# Patient Record
Sex: FEMALE | Race: WHITE | Employment: OTHER | URBAN - METROPOLITAN AREA
[De-identification: names, ages, dates, MRNs, and addresses within clinical notes are randomized per-mention and may not be internally consistent; named-entity substitution may affect disease eponyms.]

---

## 2017-01-17 ENCOUNTER — HOSPITAL ENCOUNTER (OUTPATIENT)
Dept: RADIOLOGY | Facility: MEDICAL CENTER | Age: 82
End: 2017-01-17
Attending: FAMILY MEDICINE
Payer: MEDICARE

## 2017-01-17 DIAGNOSIS — Z12.31 SCREENING MAMMOGRAM, ENCOUNTER FOR: ICD-10-CM

## 2017-01-17 PROCEDURE — 77063 BREAST TOMOSYNTHESIS BI: CPT

## 2017-03-20 ENCOUNTER — HOSPITAL ENCOUNTER (OUTPATIENT)
Dept: LAB | Facility: MEDICAL CENTER | Age: 82
End: 2017-03-20
Attending: FAMILY MEDICINE
Payer: MEDICARE

## 2017-03-20 DIAGNOSIS — M85.80 OSTEOPENIA: ICD-10-CM

## 2017-03-20 DIAGNOSIS — Z78.0 POST-MENOPAUSAL: ICD-10-CM

## 2017-03-20 LAB
25(OH)D3 SERPL-MCNC: 39 NG/ML (ref 30–100)
CHOLEST SERPL-MCNC: 242 MG/DL (ref 100–199)
HDLC SERPL-MCNC: 86 MG/DL
LDLC SERPL CALC-MCNC: 140 MG/DL
TRIGL SERPL-MCNC: 80 MG/DL (ref 0–149)

## 2017-03-20 PROCEDURE — 80061 LIPID PANEL: CPT

## 2017-03-20 PROCEDURE — 36415 COLL VENOUS BLD VENIPUNCTURE: CPT

## 2017-03-20 PROCEDURE — 82306 VITAMIN D 25 HYDROXY: CPT

## 2017-03-27 ENCOUNTER — TELEPHONE (OUTPATIENT)
Dept: MEDICAL GROUP | Facility: MEDICAL CENTER | Age: 82
End: 2017-03-27

## 2017-03-27 ENCOUNTER — OFFICE VISIT (OUTPATIENT)
Dept: MEDICAL GROUP | Facility: MEDICAL CENTER | Age: 82
End: 2017-03-27
Payer: MEDICARE

## 2017-03-27 VITALS
OXYGEN SATURATION: 94 % | TEMPERATURE: 98.3 F | HEART RATE: 66 BPM | DIASTOLIC BLOOD PRESSURE: 68 MMHG | BODY MASS INDEX: 23.55 KG/M2 | WEIGHT: 132.94 LBS | SYSTOLIC BLOOD PRESSURE: 116 MMHG | HEIGHT: 63 IN | RESPIRATION RATE: 14 BRPM

## 2017-03-27 DIAGNOSIS — J30.9 CHRONIC ALLERGIC RHINITIS: ICD-10-CM

## 2017-03-27 DIAGNOSIS — E78.5 HYPERLIPIDEMIA, UNSPECIFIED HYPERLIPIDEMIA TYPE: ICD-10-CM

## 2017-03-27 DIAGNOSIS — M15.9 OSTEOARTHRITIS OF MULTIPLE JOINTS, UNSPECIFIED OSTEOARTHRITIS TYPE: ICD-10-CM

## 2017-03-27 PROCEDURE — 4040F PNEUMOC VAC/ADMIN/RCVD: CPT | Performed by: FAMILY MEDICINE

## 2017-03-27 PROCEDURE — G8420 CALC BMI NORM PARAMETERS: HCPCS | Performed by: FAMILY MEDICINE

## 2017-03-27 PROCEDURE — 1036F TOBACCO NON-USER: CPT | Performed by: FAMILY MEDICINE

## 2017-03-27 PROCEDURE — 1101F PT FALLS ASSESS-DOCD LE1/YR: CPT | Performed by: FAMILY MEDICINE

## 2017-03-27 PROCEDURE — G8432 DEP SCR NOT DOC, RNG: HCPCS | Performed by: FAMILY MEDICINE

## 2017-03-27 PROCEDURE — G8482 FLU IMMUNIZE ORDER/ADMIN: HCPCS | Performed by: FAMILY MEDICINE

## 2017-03-27 PROCEDURE — 99214 OFFICE O/P EST MOD 30 MIN: CPT | Performed by: FAMILY MEDICINE

## 2017-03-27 NOTE — TELEPHONE ENCOUNTER
1. Caller Name: Lisa                      Call Back Number: [phone] (home)     2. Message: States was seen today and you discussed with her about another nasal spray than the one she has. States that you wanted to prescribe her a different nasal spray?    3. Patient approves office to leave a detailed voicemail/MyChart message: N\A

## 2017-03-27 NOTE — MR AVS SNAPSHOT
"        Lisa Garza   3/27/2017 10:00 AM   Office Visit   MRN: [id number]    Department:  [address] [place] [address]   Dept Phone:  [phone]    Description:  Female : 1935   Provider:  Brandon Pearl M.D.           Reason for Visit     Follow-Up 3 month check up, lab results      Allergies as of 3/27/2017     Allergen Noted Reactions    Clindamycin Hcl-Fd&C Blue #1 2015       unknown    Food 2015       Scallops-vomiting, citrus-itching, redness, oatmeal-diarrhea      You were diagnosed with     Hyperlipidemia, unspecified hyperlipidemia type   [6523847]       Chronic allergic rhinitis   [630621]       Osteoarthritis of multiple joints, unspecified osteoarthritis type   [1670142]         Vital Signs     Blood Pressure Pulse Temperature Respirations Height Weight    116/68 mmHg 66 36.8 °C (98.3 °F) 14 1.6 m (5' 3\") 60.3 kg (132 lb 15 oz)    Body Mass Index Oxygen Saturation Smoking Status             23.55 kg/m2 94% Former Smoker         Basic Information     Date Of Birth Sex Race Ethnicity Preferred Language    1935 Female White Non- English      Problem List              ICD-10-CM Priority Class Noted - Resolved    History of total left hip arthroplasty Z96.642   2015 - Present    Osteoarthritis M19.90   2015 - Present    Osteoarthritis, multiple sites M15.9   2015 - Present    Osteoarthritis of right hip M16.11   2015 - Present    Systolic murmur R01.1   2015 - Present      Health Maintenance        Date Due Completion Dates    IMM ZOSTER VACCINE 1995 ---    IMM PNEUMOCOCCAL 65+ (ADULT) LOW/MEDIUM RISK SERIES (2 of 2 - PCV13) 2017, 10/1/2002    MAMMOGRAM 2018, 2015, 2015 (Postponed)    Override on 2015: Postponed    BONE DENSITY 2021, 2015 (Postponed)    Override on 2015: Postponed    COLONOSCOPY 2025 (Postponed)    Override on 2015: Postponed   " IMM DTaP/Tdap/Td Vaccine (2 - Td) 11/22/2026 11/22/2016            Current Immunizations     Influenza TIV (IM) 10/1/2014    Influenza Vaccine Adult HD 10/7/2016    Pneumococcal polysaccharide vaccine (PPSV-23) 9/6/2016, 10/1/2002    Tdap Vaccine 11/22/2016      Below and/or attached are the medications your provider expects you to take. Review all of your home medications and newly ordered medications with your provider and/or pharmacist. Follow medication instructions as directed by your provider and/or pharmacist. Please keep your medication list with you and share with your provider. Update the information when medications are discontinued, doses are changed, or new medications (including over-the-counter products) are added; and carry medication information at all times in the event of emergency situations     Allergies:  CLINDAMYCIN HCL-FD&C BLUE #1 - (reactions not documented)     FOOD - (reactions not documented)               Medications  Valid as of: March 27, 2017 - 10:28 AM    Generic Name Brand Name Tablet Size Instructions for use    Amoxicillin (Cap) AMOXIL 500 MG Take 2,000 mg by mouth Once. One hour before dental procedure        Aspirin (Tablet Delayed Response) ECOTRIN 81 MG Take 81 mg by mouth every day.        Calcium Carbonate (Chew Tab) Calcium 500 MG Take 1 Tab by mouth every morning.        Cholecalciferol (Cap) Vitamin D-3 1000 UNITS Take 1 Tab by mouth every morning.        Fluticasone Propionate (Suspension) FLONASE 50 MCG/ACT Spray 1 Spray in nose every morning.        Influenza Virus Vacc Split PF (Suspension Prefilled Syringe) AFLURIA PRESERVATIVE FREE 0.5 ML         Multiple Vitamins-Minerals   Take 1 Tab by mouth every morning.        Omega-3 Fatty Acids (Cap) Omega 3 1200 MG Take 1 Cap by mouth every morning.        Psyllium (Cap) Psyllium 0.52 GM Take 1 Cap by mouth every morning.        Simvastatin (Tab) ZOCOR 10 MG Take 1 Tab by mouth every evening.        .                    Medicines prescribed today were sent to:     [id number] - [place], [place] - [address]    [address] [place] [address]    Phone: [phone] Fax: [phone]    Open 24 Hours?: No      Medication refill instructions:       If your prescription bottle indicates you have medication refills left, it is not necessary to call your provider’s office. Please contact your pharmacy and they will refill your medication.    If your prescription bottle indicates you do not have any refills left, you may request refills at any time through one of the following ways: The online [employer] system (except Urgent Care), by calling your provider’s office, or by asking your pharmacy to contact your provider’s office with a refill request. Medication refills are processed only during regular business hours and may not be available until the next business day. Your provider may request additional information or to have a follow-up visit with you prior to refilling your medication.   *Please Note: Medication refills are assigned a new Rx number when refilled electronically. Your pharmacy may indicate that no refills were authorized even though a new prescription for the same medication is available at the pharmacy. Please request the medicine by name with the pharmacy before contacting your provider for a refill.           [place] Status: Patient Declined

## 2017-03-28 NOTE — TELEPHONE ENCOUNTER
Brandon Pearl M.D.  Arthur Villanueva, Med Ass't       Caller: Unspecified (Yesterday, 1:21 PM)                     It is the over the counter normal saline nasal spray ( not the Afrin).

## 2017-05-09 ENCOUNTER — APPOINTMENT (OUTPATIENT)
Dept: OTHER | Facility: IMAGING CENTER | Age: 82
End: 2017-05-09

## 2017-09-13 ENCOUNTER — NON-PROVIDER VISIT (OUTPATIENT)
Dept: MEDICAL GROUP | Facility: MEDICAL CENTER | Age: 82
End: 2017-09-13
Payer: MEDICARE

## 2017-09-13 DIAGNOSIS — Z23 NEED FOR INFLUENZA VACCINATION: ICD-10-CM

## 2017-09-13 PROCEDURE — 90662 IIV NO PRSV INCREASED AG IM: CPT | Performed by: INTERNAL MEDICINE

## 2017-09-13 PROCEDURE — G0008 ADMIN INFLUENZA VIRUS VAC: HCPCS | Performed by: INTERNAL MEDICINE

## 2017-09-13 NOTE — PROGRESS NOTES
"Lisa Garza is a 81 y.o. female here for a non-provider visit for:   FLU    Reason for immunization: Annual Flu Vaccine  Immunization records indicate need for vaccine: Yes, confirmed with Epic  Minimum interval has been met for this vaccine: Yes  ABN completed: Not Indicated    Order and dose verified by: [facility]  VIS Dated  8-7-15 was given to patient: Yes  All IAC Questionnaire questions were answered \"No.\"    Patient tolerated injection and no adverse effects were observed or reported: Yes    Pt scheduled for next dose in series: No    "

## 2017-11-16 ENCOUNTER — OFFICE VISIT (OUTPATIENT)
Dept: MEDICAL GROUP | Facility: MEDICAL CENTER | Age: 82
End: 2017-11-16
Payer: MEDICARE

## 2017-11-16 VITALS
OXYGEN SATURATION: 94 % | WEIGHT: 122 LBS | HEIGHT: 63 IN | DIASTOLIC BLOOD PRESSURE: 62 MMHG | SYSTOLIC BLOOD PRESSURE: 130 MMHG | HEART RATE: 81 BPM | RESPIRATION RATE: 16 BRPM | BODY MASS INDEX: 21.62 KG/M2 | TEMPERATURE: 98.9 F

## 2017-11-16 DIAGNOSIS — J30.1 CHRONIC SEASONAL ALLERGIC RHINITIS DUE TO POLLEN: ICD-10-CM

## 2017-11-16 DIAGNOSIS — R01.1 SYSTOLIC MURMUR: ICD-10-CM

## 2017-11-16 DIAGNOSIS — Z00.00 MEDICARE ANNUAL WELLNESS VISIT, SUBSEQUENT: ICD-10-CM

## 2017-11-16 DIAGNOSIS — E78.2 MIXED HYPERLIPIDEMIA: ICD-10-CM

## 2017-11-16 DIAGNOSIS — Z00.00 HEALTHCARE MAINTENANCE: ICD-10-CM

## 2017-11-16 DIAGNOSIS — M15.9 PRIMARY OSTEOARTHRITIS INVOLVING MULTIPLE JOINTS: ICD-10-CM

## 2017-11-16 PROCEDURE — 90670 PCV13 VACCINE IM: CPT | Performed by: FAMILY MEDICINE

## 2017-11-16 PROCEDURE — G0009 ADMIN PNEUMOCOCCAL VACCINE: HCPCS | Performed by: FAMILY MEDICINE

## 2017-11-16 ASSESSMENT — PATIENT HEALTH QUESTIONNAIRE - PHQ9: CLINICAL INTERPRETATION OF PHQ2 SCORE: 0

## 2017-11-16 ASSESSMENT — PAIN SCALES - GENERAL: PAINLEVEL: NO PAIN

## 2017-11-16 NOTE — PROGRESS NOTES
CC: ***    HPI:   Lisa presents today ***      Patient Active Problem List    Diagnosis Date Noted   • Mixed hyperlipidemia 11/16/2017   • Chronic seasonal allergic rhinitis due to pollen 11/16/2017   • Systolic murmur 11/30/2015   • Osteoarthritis of right hip 07/06/2015   • History of total left hip arthroplasty 05/21/2015   • Osteoarthritis 05/21/2015   • Osteoarthritis, multiple sites 05/21/2015       Current Outpatient Prescriptions   Medication Sig Dispense Refill   • simvastatin (ZOCOR) 10 MG Tab Take 1 Tab by mouth every evening. 30 Tab 11   • aspirin EC (ECOTRIN) 81 MG Tablet Delayed Response Take 81 mg by mouth every day.     • AFLURIA PRESERVATIVE FREE 0.5 ML Suspension Prefilled Syringe      • amoxicillin (AMOXIL) 500 MG Cap Take 2,000 mg by mouth Once. One hour before dental procedure     • fluticasone (FLONASE) 50 MCG/ACT nasal spray Spray 1 Spray in nose every morning.     • Cholecalciferol (VITAMIN D-3) 1000 UNITS CAPS Take 1 Tab by mouth every morning.     • Psyllium (METAMUCIL) 0.52 GM CAPS Take 1 Cap by mouth every morning.     • Omega 3 1200 MG CAPS Take 1 Cap by mouth every morning.     • Calcium 500 MG CHEW Take 1 Tab by mouth every morning.     • Multiple Vitamins-Minerals (MULTIVITAMIN PO) Take 1 Tab by mouth every morning.       No current facility-administered medications for this visit.          Allergies as of 11/16/2017 - Reviewed 11/16/2017   Allergen Reaction Noted   • Clindamycin hcl-fd&c blue #1  05/29/2015   • Food  07/02/2015        ROS: Denies any chest pain, Shortness of breath, Changes bowel or bladder, Lower extremity edema.    Physical Exam:  Gen.: Well-developed, well-nourished, no apparent distress,pleasant and cooperative with the examination  Skin:  Warm and dry with good turgor. No rashes or suspicious lesions in visible areas  Eye: PERRLA, conjunctiva and sclera clear, lids normal  HEENT: Normocephalic/atraumatic, sinuses nontender with palpation, TMs clear, nares  patent with pink mucosa and clear rhinorrhea, lips without lesions, oropharynx clear.  Neck: Trachea midline,no masses or adenopathy  Thyroid: normal consistency and size. No masses or nodules. Not tender with palpation.  Cor: Regular rate and rhythm without murmur, gallop or rub.  Lungs: Respirations unlabored.Clear to auscultation with equal breath sounds bilaterally. No wheezes, rhonchi.  Abdomen: Soft nontender without hepatosplenomegaly or masses appreciated, normoactive bowel sounds. No hernias.  Extremities: No cyanosis, clubbing or edema, Symmetrical without deformities or malformations. Pulses 2+ and symmetrical both upper and lower extremities  Lymphatic: No abnormal adenopathy of the neck groin or axillae.  Psych: Alert and oriented x 3.Normal affect, judgement,insight and memory.        Assessment and Plan.   82 y.o. female ***    1. Medicare annual wellness visit, subsequent  ***  - ANNUAL WELLNESS VISIT SUBSEQ    2. Primary osteoarthritis involving multiple joints  ***  - ANNUAL WELLNESS VISIT SUBSEQ    3. Mixed hyperlipidemia  ***  - ANNUAL WELLNESS VISIT SUBSEQ  - LIPID PANEL    4. Systolic murmur  ***  - ANNUAL WELLNESS VISIT SUBSEQ    5. Chronic seasonal allergic rhinitis due to pollen  ***  - ANNUAL WELLNESS VISIT SUBSEQ    6. Healthcare maintenance  ***  - Prevnar 13 PCV-13  - ANNUAL WELLNESS VISIT SUBSEQ

## 2017-11-16 NOTE — PROGRESS NOTES
Chief Complaint   Patient presents with   • Annual Wellness Visit         HPI:  Lisa is a 82 y.o. here for Medicare Annual Wellness Visit    Patient Active Problem List    Diagnosis Date Noted   • Mixed hyperlipidemia 11/16/2017   • Chronic seasonal allergic rhinitis due to pollen 11/16/2017   • Systolic murmur 11/30/2015   • Osteoarthritis of right hip 07/06/2015   • History of total left hip arthroplasty 05/21/2015   • Osteoarthritis 05/21/2015   • Osteoarthritis, multiple sites 05/21/2015       Current Outpatient Prescriptions   Medication Sig Dispense Refill   • simvastatin (ZOCOR) 10 MG Tab Take 1 Tab by mouth every evening. 30 Tab 11   • aspirin EC (ECOTRIN) 81 MG Tablet Delayed Response Take 81 mg by mouth every day.     • AFLURIA PRESERVATIVE FREE 0.5 ML Suspension Prefilled Syringe      • amoxicillin (AMOXIL) 500 MG Cap Take 2,000 mg by mouth Once. One hour before dental procedure     • fluticasone (FLONASE) 50 MCG/ACT nasal spray Spray 1 Spray in nose every morning.     • Cholecalciferol (VITAMIN D-3) 1000 UNITS CAPS Take 1 Tab by mouth every morning.     • Psyllium (METAMUCIL) 0.52 GM CAPS Take 1 Cap by mouth every morning.     • Omega 3 1200 MG CAPS Take 1 Cap by mouth every morning.     • Calcium 500 MG CHEW Take 1 Tab by mouth every morning.     • Multiple Vitamins-Minerals (MULTIVITAMIN PO) Take 1 Tab by mouth every morning.       No current facility-administered medications for this visit.         Patient is taking medications as noted in medication list.  Current supplements as per medication list.     Allergies: Clindamycin hcl-fd&c blue #1 and Food    Current social contact/activities: Visit with family     Is patient current with immunizations? No, due for PREVNAR (PCV13)  and ZOSTAVAX (Shingles). Patient is interested in receiving PREVNAR (PCV13)  today.    She  reports that she quit smoking about 26 years ago. Her smoking use included Cigarettes. She has a 33.00 pack-year smoking history. She  has never used smokeless tobacco. She reports that she drinks about 7.0 oz of alcohol per week . She reports that she does not use drugs.  Counseling given: Not Answered        DPA/Advanced directive: Patient has Advanced Directive on file.     ROS:    Gait: Uses no assistive device   Ostomy: no   Other tubes: no   Amputations: no   Chronic oxygen use no   Last eye exam 6-8 year ago   Wears hearing aids: no   : Reports incontinence.     Depression Screening    Little interest or pleasure in doing things?  0 - not at all  Feeling down, depressed, or hopeless? 0 - not at all  Patient Health Questionnaire Score: 0    If depressive symptoms identified deferred to follow up visit unless specifically addressed in assessment and plan.    Interpretation of PHQ-9 Total Score   Score Severity   1-4 No Depression   5-9 Mild Depression   10-14 Moderate Depression   15-19 Moderately Severe Depression   20-27 Severe Depression    Screening for Cognitive Impairment    Three Minute Recall (apple, watch, cherelle)  3/3    Draw clock face with all 12 numbers set to the hand to show 10 minutes past 11 o'clock  1 5/5  If cognitive concerns identified, deferred for follow up unless specifically addressed in assessment and plan.    Fall Risk Assessment    Has the patient had two or more falls in the last year or any fall with injury in the last year?     If fall risk identified, deferred for follow up unless specifically addressed in assessment and plan.    Safety Assessment    Throw rugs on floor.  No  Handrails on all stairs.  Yes  Good lighting in all hallways.  Yes  Difficulty hearing.  No  Patient counseled about all safety risks that were identified.    Functional Assessment ADLs    Are there any barriers preventing you from cooking for yourself or meeting nutritional needs?  No.    Are there any barriers preventing you from driving safely or obtaining transportation?  No.    Are there any barriers preventing you from using a  telephone or calling for help?  No.    Are there any barriers preventing you from shopping?  No.    Are there any barriers preventing you from taking care of your own finances?  No.    Are there any barriers preventing you from managing your medications?  No.    Are you currently engaging any exercise or physical activity?  Yes.       Health Maintenance Summary                PFT SCREENING-FEV1 AND FEV/FVC RATIO / SPIROMETRY SHOULD BE PERFORMED ANNUALLY Overdue 11/9/1953     IMM ZOSTER VACCINE Overdue 11/9/1995     IMM PNEUMOCOCCAL 65+ (ADULT) LOW/MEDIUM RISK SERIES Overdue 9/6/2017      Done 9/6/2016 Imm Admin: Pneumococcal polysaccharide vaccine (PPSV-23)     Patient has more history with this topic...    Annual Wellness Visit Next Due 11/23/2017      Done 11/22/2016      Patient has more history with this topic...    MAMMOGRAM Next Due 1/17/2018      Done 1/17/2017 MA-MAMMO SCREENING BILAT W/TOMOSYNTHESIS W/CAD     Patient has more history with this topic...    BONE DENSITY Next Due 12/7/2021      Done 12/7/2016 DS-BONE DENSITY STUDY (DEXA)     Patient has more history with this topic...    COLONOSCOPY Next Due 11/30/2025      Postponed 11/30/2015     IMM DTaP/Tdap/Td Vaccine Next Due 11/22/2026      Done 11/22/2016 Imm Admin: Tdap Vaccine          Patient Care Team:  Brandon Pearl M.D. as PCP - General (Geriatrics)  Bhupinder Goodman D.P.M. as Consulting Physician (Podiatry)    Social History   Substance Use Topics   • Smoking status: Former Smoker     Packs/day: 1.00     Years: 33.00     Types: Cigarettes     Quit date: 1/1/1991   • Smokeless tobacco: Never Used      Comment: Started smoking at age 23   • Alcohol use 7.0 oz/week     14 Glasses of wine per week      Comment: 14 glasses of red wine     Family History   Problem Relation Age of Onset   • Cancer Father      GI   • Heart Disease Father    • Lung Disease Father    • Stroke Sister    • No Known Problems Son    • No Known Problems Son    • No Known  "Problems Maternal Grandmother    • No Known Problems Maternal Grandfather    • No Known Problems Paternal Grandmother    • No Known Problems Paternal Grandfather      She  has a past medical history of Arthritis; Cancer ([facility]); Heart murmur; Unspecified cataract; and Unspecified urinary incontinence.   Past Surgical History:   Procedure Laterality Date   • HIP ARTHROPLASTY TOTAL Left 7/6/2015    Procedure: HIP ARTHROPLASTY TOTAL;  Surgeon: Navid Howard M.D.;  Location: SURGERY [facility];  Service:    • OTHER Right 2006    cyst & bone spur on right hand   • EYE SURGERY      bilateral IOL           Exam:     Blood pressure 130/62, pulse 81, temperature 37.2 °C (98.9 °F), resp. rate 16, height 1.594 m (5' 2.75\"), weight 55.3 kg (122 lb), SpO2 94 %. Body mass index is 21.78 kg/m².    Hearing, good  Dentition , good  Alert, oriented in no acute distress.  Eye contact is good, speech goal directed, affect calm      Assessment and Plan. The following treatment and monitoring plan is recommended:  .   82 y.o. female     1. Medicare annual wellness visit, subsequent  Preventive measures and chronic medical issues were reviewed. Needs PCV 13,given today.    - ANNUAL WELLNESS VISIT SUBSEQ    2. Primary osteoarthritis involving multiple joints  Stable.No issues  Continue on otc pain meds.  - ANNUAL WELLNESS VISIT SUBSEQ    3. Mixed hyperlipidemia  He has been tolerating the statin. Denies muscle pain LFTs has been normal  Continue on Simvastatin 10 mg daily,     - ANNUAL WELLNESS VISIT SUBSEQ    4. Systolic murmur  Asymptomatic.  Last echo was essentially normal.    - ANNUAL WELLNESS VISIT SUBSEQ    5. Chronic seasonal allergic rhinitis due to pollen  Advised to use normal saline nasal drops.  Continue on the flonase as needed.     - ANNUAL WELLNESS VISIT SUBSEQ    6. Healthcare maintenance  Due for PCV 13, given.    - Prevnar 13 PCV-13  - ANNUAL WELLNESS VISIT SUBSEQ      7. No h/o COPD    Health Care Screening " recommendations as per orders if indicated.  Referrals offered: PT/OT/Nutrition counseling/Behavioral Health/Smoking cessation as per orders if indicated.    Discussion today about general wellness and lifestyle habits:    · Prevent falls and reduce trip hazards; Cautioned about securing or removing rugs.  · Have a working fire alarm and carbon monoxide detector;   · Engage in regular physical activity and social activities       Follow-up: 6 month.

## 2017-11-17 ENCOUNTER — TELEPHONE (OUTPATIENT)
Dept: MEDICAL GROUP | Facility: MEDICAL CENTER | Age: 82
End: 2017-11-17

## 2017-11-17 NOTE — TELEPHONE ENCOUNTER
1. Caller Name: Lisa Garza                                           Call Back Number: [phone] (home)         Patient approves a detailed voicemail message: N\A    2.  What is the procedure: Lab    3.  When is the procedure scheduled: 11-    5. Has the patient completed any screening tests for the procedure: n/a    6. Has PCP received a written request from Surgeon: no    7. Patient scheduled for an appointment for this clearance?:  yes -     Patient is concerned that her nam[place] injection may interfear with her lab appt.

## 2017-11-21 NOTE — TELEPHONE ENCOUNTER
Pt is concerned that her recent immunization with interfere with her up-coming lab work. She recently received the Pneumonia vaccine

## 2017-11-27 ENCOUNTER — HOSPITAL ENCOUNTER (OUTPATIENT)
Dept: LAB | Facility: MEDICAL CENTER | Age: 82
End: 2017-11-27
Attending: FAMILY MEDICINE
Payer: MEDICARE

## 2017-11-27 LAB
CHOLEST SERPL-MCNC: 205 MG/DL (ref 100–199)
HDLC SERPL-MCNC: 86 MG/DL
LDLC SERPL CALC-MCNC: 104 MG/DL
TRIGL SERPL-MCNC: 77 MG/DL (ref 0–149)

## 2017-11-27 PROCEDURE — 36415 COLL VENOUS BLD VENIPUNCTURE: CPT

## 2017-11-27 PROCEDURE — 80061 LIPID PANEL: CPT

## 2017-11-29 ENCOUNTER — TELEPHONE (OUTPATIENT)
Dept: MEDICAL GROUP | Facility: MEDICAL CENTER | Age: 82
End: 2017-11-29

## 2017-11-29 NOTE — TELEPHONE ENCOUNTER
----- Message from Brandon Pearl M.D. sent at 11/28/2017  8:10 AM PST -----  The result is normal please let the patient know.  Slightly high cholesterol.   Avoid high carb and fat diet.  Will discuss more next visit.

## 2017-12-01 ENCOUNTER — OFFICE VISIT (OUTPATIENT)
Dept: MEDICAL GROUP | Facility: MEDICAL CENTER | Age: 82
End: 2017-12-01
Payer: MEDICARE

## 2017-12-01 VITALS
RESPIRATION RATE: 16 BRPM | WEIGHT: 129 LBS | SYSTOLIC BLOOD PRESSURE: 120 MMHG | BODY MASS INDEX: 22.02 KG/M2 | HEIGHT: 64 IN | OXYGEN SATURATION: 92 % | TEMPERATURE: 97.8 F | HEART RATE: 90 BPM | DIASTOLIC BLOOD PRESSURE: 62 MMHG

## 2017-12-01 DIAGNOSIS — E78.2 MIXED HYPERLIPIDEMIA: ICD-10-CM

## 2017-12-01 DIAGNOSIS — M85.88 OSTEOPENIA OF SPINE: ICD-10-CM

## 2017-12-01 DIAGNOSIS — L21.9 SEBORRHEIC DERMATITIS: ICD-10-CM

## 2017-12-01 PROCEDURE — 99214 OFFICE O/P EST MOD 30 MIN: CPT | Performed by: FAMILY MEDICINE

## 2017-12-01 NOTE — PROGRESS NOTES
CC: Skin rash/ high cholesterol    HPI:   Lisa presents today to discuss the following medical issues:    Seborrheic dermatitis, she gets the rash intermittently around the eye brows, and sides of the nose, it itches and scally. She usually responds to Hydrocortisone 2.5 % .     Mixed hyperlipidemia, she has been tolerating the statin. Denies muscle pain LFTs has been normal. Her last lipid panel showed improvement ( T chol 205 ( was 242), TG 77, HDL 86, [id number]( [id number]). She has been on Simvastatin 10 mg chelsea;ly.    Osteopenia of spine as per bone density scan that was done in 12/2016. Denies h/o fractures. Has been on on Calcium and vit D.      Patient Active Problem List    Diagnosis Date Noted   • Mixed hyperlipidemia 11/16/2017   • Chronic seasonal allergic rhinitis due to pollen 11/16/2017   • Systolic murmur 11/30/2015   • Osteoarthritis of right hip 07/06/2015   • History of total left hip arthroplasty 05/21/2015   • Osteoarthritis 05/21/2015   • Osteoarthritis, multiple sites 05/21/2015       Current Outpatient Prescriptions   Medication Sig Dispense Refill   • hydrocortisone 2.5 % Cream topical cream Apply 1 Application to affected area(s) 2 times a day. 2 Tube 1   • Psyllium (METAMUCIL FIBER PO) Take  by mouth.     • Multiple Vitamins-Minerals (VITRUM 50+ SENIOR MULTI PO) Take  by mouth.     • aspirin EC (ECOTRIN) 81 MG Tablet Delayed Response Take 81 mg by mouth every day.     • simvastatin (ZOCOR) 10 MG Tab Take 1 Tab by mouth every evening. 30 Tab 11   • fluticasone (FLONASE) 50 MCG/ACT nasal spray Spray 1 Spray in nose every morning.     • Cholecalciferol (VITAMIN D-3) 1000 UNITS CAPS Take 1 Tab by mouth every morning.     • Omega 3 1200 MG CAPS Take 1 Cap by mouth every morning.     • Calcium 500 MG CHEW Take 1 Tab by mouth every morning.       No current facility-administered medications for this visit.          Allergies as of 12/01/2017 - Reviewed 12/01/2017   Allergen Reaction Noted   •  "Clindamycin hcl-fd&c blue #1  05/29/2015   • Food  07/02/2015        ROS: Denies any chest pain, Shortness of breath, Changes bowel or bladder, Lower extremity edema.    Physical Exam:  /62   Pulse 90   Temp 36.6 °C (97.8 °F)   Resp 16   Ht 1.619 m (5' 3.75\")   Wt 58.5 kg (129 lb)   SpO2 92%   BMI 22.32 kg/m²   Gen.: Well-developed, well-nourished, no apparent distress,pleasant and cooperative with the examination  Skin:  Warm and dry with good turgor. Dry scaly rashes around the lid brows, sides of the nose  HEENT:Sinuses nontender with palpation, TMs clear, nares patent with pink mucosa and clear rhinorrhea,no septal deviation ,polyps or lesions. lips without lesions, oropharynx clear.  Neck: Trachea midline,no masses or adenopathy. No JVD.  Cor: Regular rate and rhythm without murmur, gallop or rub.  Lungs: Respirations unlabored.Clear to auscultation with equal breath sounds bilaterally. No wheezes, rhonchi.  Extremities: No cyanosis, clubbing or edema.          Assessment and Plan.   82 y.o. female     1. Seborrheic dermatitis  Intermittently symptomatic.  Usually responds to Hydrocortisone 2.5 % . Medication refilled.    - hydrocortisone 2.5 % Cream topical cream; Apply 1 Application to affected area(s) 2 times a day.  Dispense: 2 Tube; Refill: 1    2. Mixed hyperlipidemia  He has been tolerating the statin. Denies muscle pain LFTs has been normal  Last lipid panel showed improvement( T chol 205 ( was 242), TG, HDL, [id number]( [id number]).  Continue on Simvastatin 10 mg chelsea;ly.    3. Osteopenia of spine  Stable, asymptomatic.  Continue on Calcium and vit D.      "

## 2018-01-10 DIAGNOSIS — E78.5 HYPERLIPIDEMIA, UNSPECIFIED HYPERLIPIDEMIA TYPE: ICD-10-CM

## 2018-01-10 RX ORDER — SIMVASTATIN 10 MG
10 TABLET ORAL EVERY EVENING
Qty: 30 TAB | Refills: 11 | Status: SHIPPED | OUTPATIENT
Start: 2018-01-10 | End: 2018-01-12 | Stop reason: SDUPTHER

## 2018-01-12 DIAGNOSIS — E78.5 HYPERLIPIDEMIA, UNSPECIFIED HYPERLIPIDEMIA TYPE: ICD-10-CM

## 2018-01-14 RX ORDER — SIMVASTATIN 10 MG
10 TABLET ORAL EVERY EVENING
Qty: 30 TAB | Refills: 11 | Status: SHIPPED | OUTPATIENT
Start: 2018-01-14 | End: 2018-06-13 | Stop reason: SDUPTHER

## 2018-03-26 ENCOUNTER — APPOINTMENT (OUTPATIENT)
Dept: RADIOLOGY | Facility: MEDICAL CENTER | Age: 83
DRG: 871 | End: 2018-03-26
Attending: EMERGENCY MEDICINE
Payer: MEDICARE

## 2018-03-26 ENCOUNTER — HOSPITAL ENCOUNTER (INPATIENT)
Facility: MEDICAL CENTER | Age: 83
LOS: 4 days | DRG: 871 | End: 2018-03-31
Attending: EMERGENCY MEDICINE | Admitting: HOSPITALIST
Payer: MEDICARE

## 2018-03-26 ENCOUNTER — OFFICE VISIT (OUTPATIENT)
Dept: URGENT CARE | Facility: CLINIC | Age: 83
End: 2018-03-26
Payer: MEDICARE

## 2018-03-26 ENCOUNTER — APPOINTMENT (OUTPATIENT)
Dept: RADIOLOGY | Facility: MEDICAL CENTER | Age: 83
DRG: 871 | End: 2018-03-26
Payer: MEDICARE

## 2018-03-26 ENCOUNTER — TELEPHONE (OUTPATIENT)
Dept: MEDICAL GROUP | Facility: MEDICAL CENTER | Age: 83
End: 2018-03-26

## 2018-03-26 VITALS
HEART RATE: 120 BPM | OXYGEN SATURATION: 93 % | WEIGHT: 136.91 LBS | SYSTOLIC BLOOD PRESSURE: 118 MMHG | TEMPERATURE: 100 F | DIASTOLIC BLOOD PRESSURE: 70 MMHG | BODY MASS INDEX: 23.37 KG/M2 | HEIGHT: 64 IN | RESPIRATION RATE: 22 BRPM

## 2018-03-26 DIAGNOSIS — J18.9 SEPSIS DUE TO PNEUMONIA (HCC): ICD-10-CM

## 2018-03-26 DIAGNOSIS — I48.0 PAROXYSMAL ATRIAL FIBRILLATION (HCC): ICD-10-CM

## 2018-03-26 DIAGNOSIS — R09.02 HYPOXIA: ICD-10-CM

## 2018-03-26 DIAGNOSIS — R05.9 COUGH: ICD-10-CM

## 2018-03-26 DIAGNOSIS — J18.9 PNEUMONIA OF LEFT LOWER LOBE DUE TO INFECTIOUS ORGANISM: ICD-10-CM

## 2018-03-26 DIAGNOSIS — A41.9 SEPSIS DUE TO PNEUMONIA (HCC): ICD-10-CM

## 2018-03-26 DIAGNOSIS — I48.91 ATRIAL FIBRILLATION WITH RVR (HCC): ICD-10-CM

## 2018-03-26 LAB
ALBUMIN SERPL BCP-MCNC: 3 G/DL (ref 3.2–4.9)
ALBUMIN/GLOB SERPL: 0.9 G/DL
ALP SERPL-CCNC: 107 U/L (ref 30–99)
ALT SERPL-CCNC: 58 U/L (ref 2–50)
ANION GAP SERPL CALC-SCNC: 8 MMOL/L (ref 0–11.9)
APTT PPP: 33.1 SEC (ref 24.7–36)
AST SERPL-CCNC: 66 U/L (ref 12–45)
BASOPHILS # BLD AUTO: 0.6 % (ref 0–1.8)
BASOPHILS # BLD: 0.08 K/UL (ref 0–0.12)
BILIRUB SERPL-MCNC: 0.5 MG/DL (ref 0.1–1.5)
BNP SERPL-MCNC: 274 PG/ML (ref 0–100)
BUN SERPL-MCNC: 14 MG/DL (ref 8–22)
CALCIUM SERPL-MCNC: 9.3 MG/DL (ref 8.5–10.5)
CHLORIDE SERPL-SCNC: 98 MMOL/L (ref 96–112)
CO2 SERPL-SCNC: 28 MMOL/L (ref 20–33)
CREAT SERPL-MCNC: 0.56 MG/DL (ref 0.5–1.4)
DEPRECATED D DIMER PPP IA-ACNC: 1114 NG/ML(D-DU)
EKG IMPRESSION: NORMAL
EOSINOPHIL # BLD AUTO: 0.3 K/UL (ref 0–0.51)
EOSINOPHIL NFR BLD: 2.2 % (ref 0–6.9)
ERYTHROCYTE [DISTWIDTH] IN BLOOD BY AUTOMATED COUNT: 48.5 FL (ref 35.9–50)
FLUAV+FLUBV AG SPEC QL IA: NEGATIVE
GLOBULIN SER CALC-MCNC: 3.5 G/DL (ref 1.9–3.5)
GLUCOSE SERPL-MCNC: 108 MG/DL (ref 65–99)
HCT VFR BLD AUTO: 33.8 % (ref 37–47)
HGB BLD-MCNC: 11 G/DL (ref 12–16)
IMM GRANULOCYTES # BLD AUTO: 0.08 K/UL (ref 0–0.11)
IMM GRANULOCYTES NFR BLD AUTO: 0.6 % (ref 0–0.9)
INR PPP: 1.25 (ref 0.87–1.13)
INT CON NEG: NEGATIVE
INT CON POS: POSITIVE
LIPASE SERPL-CCNC: 5 U/L (ref 11–82)
LYMPHOCYTES # BLD AUTO: 0.96 K/UL (ref 1–4.8)
LYMPHOCYTES NFR BLD: 7.2 % (ref 22–41)
MCH RBC QN AUTO: 32.7 PG (ref 27–33)
MCHC RBC AUTO-ENTMCNC: 32.5 G/DL (ref 33.6–35)
MCV RBC AUTO: 100.6 FL (ref 81.4–97.8)
MONOCYTES # BLD AUTO: 1.1 K/UL (ref 0–0.85)
MONOCYTES NFR BLD AUTO: 8.2 % (ref 0–13.4)
NEUTROPHILS # BLD AUTO: 10.85 K/UL (ref 2–7.15)
NEUTROPHILS NFR BLD: 81.2 % (ref 44–72)
NRBC # BLD AUTO: 0 K/UL
NRBC BLD-RTO: 0 /100 WBC
PLATELET # BLD AUTO: 438 K/UL (ref 164–446)
PMV BLD AUTO: 10.1 FL (ref 9–12.9)
POTASSIUM SERPL-SCNC: 4 MMOL/L (ref 3.6–5.5)
PROT SERPL-MCNC: 6.5 G/DL (ref 6–8.2)
PROTHROMBIN TIME: 15.4 SEC (ref 12–14.6)
RBC # BLD AUTO: 3.36 M/UL (ref 4.2–5.4)
SODIUM SERPL-SCNC: 134 MMOL/L (ref 135–145)
TROPONIN I SERPL-MCNC: 0.02 NG/ML (ref 0–0.04)
TSH SERPL DL<=0.005 MIU/L-ACNC: 1.1 UIU/ML (ref 0.38–5.33)
WBC # BLD AUTO: 13.4 K/UL (ref 4.8–10.8)

## 2018-03-26 PROCEDURE — 85610 PROTHROMBIN TIME: CPT

## 2018-03-26 PROCEDURE — 87040 BLOOD CULTURE FOR BACTERIA: CPT | Mod: 91

## 2018-03-26 PROCEDURE — 700105 HCHG RX REV CODE 258: Performed by: EMERGENCY MEDICINE

## 2018-03-26 PROCEDURE — 94760 N-INVAS EAR/PLS OXIMETRY 1: CPT

## 2018-03-26 PROCEDURE — 700111 HCHG RX REV CODE 636 W/ 250 OVERRIDE (IP): Performed by: EMERGENCY MEDICINE

## 2018-03-26 PROCEDURE — 71045 X-RAY EXAM CHEST 1 VIEW: CPT

## 2018-03-26 PROCEDURE — 700101 HCHG RX REV CODE 250: Performed by: EMERGENCY MEDICINE

## 2018-03-26 PROCEDURE — 80053 COMPREHEN METABOLIC PANEL: CPT

## 2018-03-26 PROCEDURE — 83690 ASSAY OF LIPASE: CPT

## 2018-03-26 PROCEDURE — 87804 INFLUENZA ASSAY W/OPTIC: CPT | Performed by: PHYSICIAN ASSISTANT

## 2018-03-26 PROCEDURE — 83036 HEMOGLOBIN GLYCOSYLATED A1C: CPT

## 2018-03-26 PROCEDURE — 83880 ASSAY OF NATRIURETIC PEPTIDE: CPT

## 2018-03-26 PROCEDURE — 96361 HYDRATE IV INFUSION ADD-ON: CPT

## 2018-03-26 PROCEDURE — 96365 THER/PROPH/DIAG IV INF INIT: CPT

## 2018-03-26 PROCEDURE — 96375 TX/PRO/DX INJ NEW DRUG ADDON: CPT

## 2018-03-26 PROCEDURE — 85379 FIBRIN DEGRADATION QUANT: CPT

## 2018-03-26 PROCEDURE — 85730 THROMBOPLASTIN TIME PARTIAL: CPT

## 2018-03-26 PROCEDURE — 93005 ELECTROCARDIOGRAM TRACING: CPT

## 2018-03-26 PROCEDURE — 85025 COMPLETE CBC W/AUTO DIFF WBC: CPT

## 2018-03-26 PROCEDURE — 93005 ELECTROCARDIOGRAM TRACING: CPT | Performed by: EMERGENCY MEDICINE

## 2018-03-26 PROCEDURE — 36415 COLL VENOUS BLD VENIPUNCTURE: CPT

## 2018-03-26 PROCEDURE — 94640 AIRWAY INHALATION TREATMENT: CPT

## 2018-03-26 PROCEDURE — 84443 ASSAY THYROID STIM HORMONE: CPT

## 2018-03-26 PROCEDURE — 99205 OFFICE O/P NEW HI 60 MIN: CPT | Performed by: PHYSICIAN ASSISTANT

## 2018-03-26 PROCEDURE — 99285 EMERGENCY DEPT VISIT HI MDM: CPT

## 2018-03-26 PROCEDURE — 84484 ASSAY OF TROPONIN QUANT: CPT

## 2018-03-26 RX ORDER — SODIUM CHLORIDE 9 MG/ML
1000 INJECTION, SOLUTION INTRAVENOUS ONCE
Status: COMPLETED | OUTPATIENT
Start: 2018-03-26 | End: 2018-03-26

## 2018-03-26 RX ORDER — AZITHROMYCIN 250 MG/1
TABLET, FILM COATED ORAL
Qty: 6 TAB | Refills: 0 | Status: CANCELLED | OUTPATIENT
Start: 2018-03-26

## 2018-03-26 RX ORDER — METHYLPREDNISOLONE 4 MG/1
TABLET ORAL
Qty: 1 KIT | Refills: 0 | Status: CANCELLED | OUTPATIENT
Start: 2018-03-26

## 2018-03-26 RX ORDER — VERAPAMIL HYDROCHLORIDE 2.5 MG/ML
5 INJECTION, SOLUTION INTRAVENOUS ONCE
Status: COMPLETED | OUTPATIENT
Start: 2018-03-26 | End: 2018-03-26

## 2018-03-26 RX ORDER — AZITHROMYCIN 500 MG/1
500 INJECTION, POWDER, LYOPHILIZED, FOR SOLUTION INTRAVENOUS ONCE
Status: DISCONTINUED | OUTPATIENT
Start: 2018-03-26 | End: 2018-03-26

## 2018-03-26 RX ORDER — CEFTRIAXONE 1 G/1
1 INJECTION, POWDER, FOR SOLUTION INTRAMUSCULAR; INTRAVENOUS ONCE
Status: COMPLETED | OUTPATIENT
Start: 2018-03-26 | End: 2018-03-26

## 2018-03-26 RX ADMIN — AZITHROMYCIN FOR INJECTION INJECTION, POWDER, LYOPHILIZED, FOR SOLUTION 500 MG: 500 INJECTION INTRAVENOUS at 23:05

## 2018-03-26 RX ADMIN — VERAPAMIL HYDROCHLORIDE 5 MG: 2.5 INJECTION, SOLUTION INTRAVENOUS at 21:34

## 2018-03-26 RX ADMIN — CEFTRIAXONE SODIUM 1 G: 1 INJECTION, POWDER, FOR SOLUTION INTRAMUSCULAR; INTRAVENOUS at 22:30

## 2018-03-26 RX ADMIN — ALBUTEROL SULFATE 5 MG: 2.5 SOLUTION RESPIRATORY (INHALATION) at 22:46

## 2018-03-26 RX ADMIN — SODIUM CHLORIDE 1000 ML: 9 INJECTION, SOLUTION INTRAVENOUS at 21:34

## 2018-03-26 ASSESSMENT — LIFESTYLE VARIABLES
DO YOU DRINK ALCOHOL: YES
TOTAL SCORE: 0
TOTAL SCORE: 0
HAVE YOU EVER FELT YOU SHOULD CUT DOWN ON YOUR DRINKING: NO
HAVE PEOPLE ANNOYED YOU BY CRITICIZING YOUR DRINKING: NO
EVER HAD A DRINK FIRST THING IN THE MORNING TO STEADY YOUR NERVES TO GET RID OF A HANGOVER: NO
TOTAL SCORE: 0
EVER FELT BAD OR GUILTY ABOUT YOUR DRINKING: NO
AVERAGE NUMBER OF DAYS PER WEEK YOU HAVE A DRINK CONTAINING ALCOHOL: 5
ON A TYPICAL DAY WHEN YOU DRINK ALCOHOL HOW MANY DRINKS DO YOU HAVE: 1
HOW MANY TIMES IN THE PAST YEAR HAVE YOU HAD 5 OR MORE DRINKS IN A DAY: 0
CONSUMPTION TOTAL: NEGATIVE

## 2018-03-26 ASSESSMENT — ENCOUNTER SYMPTOMS
ORTHOPNEA: 0
FEVER: 0
CHILLS: 0
DIZZINESS: 0
DIARRHEA: 0
NAUSEA: 0
PALPITATIONS: 0
VOMITING: 0
MYALGIAS: 0
SPUTUM PRODUCTION: 1
ABDOMINAL PAIN: 0
COUGH: 1
SHORTNESS OF BREATH: 0
WHEEZING: 0
MUSCULOSKELETAL NEGATIVE: 1
SORE THROAT: 0

## 2018-03-26 ASSESSMENT — PAIN SCALES - GENERAL: PAINLEVEL_OUTOF10: 0

## 2018-03-26 ASSESSMENT — COPD QUESTIONNAIRES: COPD: 0

## 2018-03-26 NOTE — TELEPHONE ENCOUNTER
Fátima the patients son is requesting a referral for home health, patient has a hard time leaving the house and getting to her appointments and would like to have a nurse come out few hours a weeks   Fátima number is [phone]

## 2018-03-27 ENCOUNTER — RESOLUTE PROFESSIONAL BILLING HOSPITAL PROF FEE (OUTPATIENT)
Dept: HOSPITALIST | Facility: MEDICAL CENTER | Age: 83
End: 2018-03-27
Payer: MEDICARE

## 2018-03-27 ENCOUNTER — TELEPHONE (OUTPATIENT)
Dept: MEDICAL GROUP | Facility: MEDICAL CENTER | Age: 83
End: 2018-03-27

## 2018-03-27 ENCOUNTER — APPOINTMENT (OUTPATIENT)
Dept: RADIOLOGY | Facility: MEDICAL CENTER | Age: 83
DRG: 871 | End: 2018-03-27
Attending: HOSPITALIST
Payer: MEDICARE

## 2018-03-27 PROBLEM — I48.91 ATRIAL FIBRILLATION WITH RVR (HCC): Status: ACTIVE | Noted: 2018-03-27

## 2018-03-27 PROBLEM — J18.9 SEPSIS DUE TO PNEUMONIA (HCC): Status: ACTIVE | Noted: 2018-03-27

## 2018-03-27 PROBLEM — F10.10 ALCOHOL ABUSE: Status: ACTIVE | Noted: 2018-03-27

## 2018-03-27 PROBLEM — A41.9 SEPSIS DUE TO PNEUMONIA (HCC): Status: ACTIVE | Noted: 2018-03-27

## 2018-03-27 PROBLEM — R79.89 ABNORMAL LFTS: Status: ACTIVE | Noted: 2018-03-27

## 2018-03-27 PROBLEM — E88.09 HYPOALBUMINEMIA: Status: ACTIVE | Noted: 2018-03-27

## 2018-03-27 PROBLEM — E87.1 HYPONATREMIA: Status: ACTIVE | Noted: 2018-03-27

## 2018-03-27 PROBLEM — D64.9 ANEMIA: Status: ACTIVE | Noted: 2018-03-27

## 2018-03-27 LAB
APPEARANCE UR: CLEAR
APTT PPP: 31 SEC (ref 24.7–36)
BILIRUB UR QL STRIP.AUTO: NEGATIVE
BLOOD CULTURE HOLD CXBCH: NORMAL
BLOOD CULTURE HOLD CXBCH: NORMAL
COLOR UR: YELLOW
EST. AVERAGE GLUCOSE BLD GHB EST-MCNC: 120 MG/DL
GLUCOSE UR STRIP.AUTO-MCNC: NEGATIVE MG/DL
HBA1C MFR BLD: 5.8 % (ref 0–5.6)
INR PPP: 1.32 (ref 0.87–1.13)
IRON SATN MFR SERPL: 8 % (ref 15–55)
IRON SERPL-MCNC: 17 UG/DL (ref 40–170)
KETONES UR STRIP.AUTO-MCNC: NEGATIVE MG/DL
LACTATE BLD-SCNC: 1.2 MMOL/L (ref 0.5–2)
LACTATE BLD-SCNC: 1.2 MMOL/L (ref 0.5–2)
LEUKOCYTE ESTERASE UR QL STRIP.AUTO: NEGATIVE
MAGNESIUM SERPL-MCNC: 1.7 MG/DL (ref 1.5–2.5)
MICRO URNS: NORMAL
NITRITE UR QL STRIP.AUTO: NEGATIVE
PH UR STRIP.AUTO: 7.5 [PH]
PREALB SERPL-MCNC: 3 MG/DL (ref 18–38)
PROCALCITONIN SERPL-MCNC: <0.05 NG/ML
PROT UR QL STRIP: NEGATIVE MG/DL
PROTHROMBIN TIME: 16.1 SEC (ref 12–14.6)
RBC UR QL AUTO: NEGATIVE
SP GR UR STRIP.AUTO: 1.04
TIBC SERPL-MCNC: 204 UG/DL (ref 250–450)
TROPONIN I SERPL-MCNC: 0.01 NG/ML (ref 0–0.04)
TROPONIN I SERPL-MCNC: <0.01 NG/ML (ref 0–0.04)
UROBILINOGEN UR STRIP.AUTO-MCNC: 1 MG/DL

## 2018-03-27 PROCEDURE — 700102 HCHG RX REV CODE 250 W/ 637 OVERRIDE(OP): Performed by: INTERNAL MEDICINE

## 2018-03-27 PROCEDURE — A9270 NON-COVERED ITEM OR SERVICE: HCPCS | Performed by: INTERNAL MEDICINE

## 2018-03-27 PROCEDURE — 700102 HCHG RX REV CODE 250 W/ 637 OVERRIDE(OP): Performed by: HOSPITALIST

## 2018-03-27 PROCEDURE — 83540 ASSAY OF IRON: CPT

## 2018-03-27 PROCEDURE — 83735 ASSAY OF MAGNESIUM: CPT

## 2018-03-27 PROCEDURE — A9270 NON-COVERED ITEM OR SERVICE: HCPCS | Performed by: HOSPITALIST

## 2018-03-27 PROCEDURE — 700111 HCHG RX REV CODE 636 W/ 250 OVERRIDE (IP): Performed by: HOSPITALIST

## 2018-03-27 PROCEDURE — 85610 PROTHROMBIN TIME: CPT

## 2018-03-27 PROCEDURE — 81003 URINALYSIS AUTO W/O SCOPE: CPT

## 2018-03-27 PROCEDURE — 85730 THROMBOPLASTIN TIME PARTIAL: CPT

## 2018-03-27 PROCEDURE — 84484 ASSAY OF TROPONIN QUANT: CPT

## 2018-03-27 PROCEDURE — 700105 HCHG RX REV CODE 258: Performed by: HOSPITALIST

## 2018-03-27 PROCEDURE — 700117 HCHG RX CONTRAST REV CODE 255: Performed by: EMERGENCY MEDICINE

## 2018-03-27 PROCEDURE — 770020 HCHG ROOM/CARE - TELE (206)

## 2018-03-27 PROCEDURE — 83605 ASSAY OF LACTIC ACID: CPT

## 2018-03-27 PROCEDURE — 94760 N-INVAS EAR/PLS OXIMETRY 1: CPT

## 2018-03-27 PROCEDURE — 99223 1ST HOSP IP/OBS HIGH 75: CPT | Mod: AI | Performed by: HOSPITALIST

## 2018-03-27 PROCEDURE — 36415 COLL VENOUS BLD VENIPUNCTURE: CPT

## 2018-03-27 PROCEDURE — 76705 ECHO EXAM OF ABDOMEN: CPT

## 2018-03-27 PROCEDURE — 71275 CT ANGIOGRAPHY CHEST: CPT

## 2018-03-27 PROCEDURE — 84145 PROCALCITONIN (PCT): CPT

## 2018-03-27 PROCEDURE — 84134 ASSAY OF PREALBUMIN: CPT

## 2018-03-27 PROCEDURE — 83550 IRON BINDING TEST: CPT

## 2018-03-27 RX ORDER — DOXYCYCLINE 100 MG/1
100 CAPSULE ORAL 2 TIMES DAILY
Qty: 14 CAP | Refills: 0 | Status: SHIPPED | OUTPATIENT
Start: 2018-03-27 | End: 2018-03-31

## 2018-03-27 RX ORDER — AMOXICILLIN 250 MG
2 CAPSULE ORAL 2 TIMES DAILY
Status: DISCONTINUED | OUTPATIENT
Start: 2018-03-27 | End: 2018-03-31 | Stop reason: HOSPADM

## 2018-03-27 RX ORDER — SODIUM CHLORIDE 9 MG/ML
500 INJECTION, SOLUTION INTRAVENOUS
Status: DISCONTINUED | OUTPATIENT
Start: 2018-03-27 | End: 2018-03-31 | Stop reason: HOSPADM

## 2018-03-27 RX ORDER — SIMVASTATIN 10 MG
10 TABLET ORAL EVERY EVENING
Status: DISCONTINUED | OUTPATIENT
Start: 2018-03-27 | End: 2018-03-31 | Stop reason: HOSPADM

## 2018-03-27 RX ORDER — HEPARIN SODIUM 5000 [USP'U]/ML
5000 INJECTION, SOLUTION INTRAVENOUS; SUBCUTANEOUS EVERY 8 HOURS
Status: DISCONTINUED | OUTPATIENT
Start: 2018-03-27 | End: 2018-03-27

## 2018-03-27 RX ORDER — ONDANSETRON 4 MG/1
4 TABLET, ORALLY DISINTEGRATING ORAL EVERY 4 HOURS PRN
Status: DISCONTINUED | OUTPATIENT
Start: 2018-03-27 | End: 2018-03-31 | Stop reason: HOSPADM

## 2018-03-27 RX ORDER — BISACODYL 10 MG
10 SUPPOSITORY, RECTAL RECTAL
Status: DISCONTINUED | OUTPATIENT
Start: 2018-03-27 | End: 2018-03-31 | Stop reason: HOSPADM

## 2018-03-27 RX ORDER — OXYCODONE HYDROCHLORIDE 5 MG/1
2.5 TABLET ORAL
Status: DISCONTINUED | OUTPATIENT
Start: 2018-03-27 | End: 2018-03-31 | Stop reason: HOSPADM

## 2018-03-27 RX ORDER — GUAIFENESIN 600 MG/1
1200 TABLET, EXTENDED RELEASE ORAL EVERY 12 HOURS
Status: DISCONTINUED | OUTPATIENT
Start: 2018-03-27 | End: 2018-03-28

## 2018-03-27 RX ORDER — ONDANSETRON 2 MG/ML
4 INJECTION INTRAMUSCULAR; INTRAVENOUS EVERY 4 HOURS PRN
Status: DISCONTINUED | OUTPATIENT
Start: 2018-03-27 | End: 2018-03-31 | Stop reason: HOSPADM

## 2018-03-27 RX ORDER — OXYCODONE HYDROCHLORIDE 5 MG/1
5 TABLET ORAL
Status: DISCONTINUED | OUTPATIENT
Start: 2018-03-27 | End: 2018-03-31 | Stop reason: HOSPADM

## 2018-03-27 RX ORDER — SODIUM CHLORIDE 9 MG/ML
INJECTION, SOLUTION INTRAVENOUS CONTINUOUS
Status: DISCONTINUED | OUTPATIENT
Start: 2018-03-27 | End: 2018-03-27

## 2018-03-27 RX ORDER — GUAIFENESIN 600 MG/1
600 TABLET, EXTENDED RELEASE ORAL EVERY 12 HOURS
Status: DISCONTINUED | OUTPATIENT
Start: 2018-03-27 | End: 2018-03-27

## 2018-03-27 RX ORDER — POLYETHYLENE GLYCOL 3350 17 G/17G
1 POWDER, FOR SOLUTION ORAL
Status: DISCONTINUED | OUTPATIENT
Start: 2018-03-27 | End: 2018-03-31 | Stop reason: HOSPADM

## 2018-03-27 RX ORDER — MORPHINE SULFATE 4 MG/ML
2 INJECTION, SOLUTION INTRAMUSCULAR; INTRAVENOUS
Status: DISCONTINUED | OUTPATIENT
Start: 2018-03-27 | End: 2018-03-31 | Stop reason: HOSPADM

## 2018-03-27 RX ORDER — AZITHROMYCIN 250 MG/1
250 TABLET, FILM COATED ORAL DAILY
Status: DISCONTINUED | OUTPATIENT
Start: 2018-03-27 | End: 2018-03-29

## 2018-03-27 RX ORDER — MAGNESIUM SULFATE HEPTAHYDRATE 40 MG/ML
2 INJECTION, SOLUTION INTRAVENOUS ONCE
Status: COMPLETED | OUTPATIENT
Start: 2018-03-27 | End: 2018-03-27

## 2018-03-27 RX ADMIN — AZITHROMYCIN 250 MG: 250 TABLET, FILM COATED ORAL at 11:13

## 2018-03-27 RX ADMIN — GUAIFENESIN 600 MG: 600 TABLET, EXTENDED RELEASE ORAL at 03:39

## 2018-03-27 RX ADMIN — CEFTRIAXONE 2 G: 2 INJECTION, POWDER, FOR SOLUTION INTRAMUSCULAR; INTRAVENOUS at 08:47

## 2018-03-27 RX ADMIN — METOPROLOL TARTRATE 25 MG: 25 TABLET, FILM COATED ORAL at 08:47

## 2018-03-27 RX ADMIN — RIVAROXABAN 20 MG: 20 TABLET, FILM COATED ORAL at 18:45

## 2018-03-27 RX ADMIN — METOPROLOL TARTRATE 25 MG: 25 TABLET, FILM COATED ORAL at 02:15

## 2018-03-27 RX ADMIN — SODIUM CHLORIDE: 9 INJECTION, SOLUTION INTRAVENOUS at 02:15

## 2018-03-27 RX ADMIN — GUAIFENESIN 1200 MG: 600 TABLET, EXTENDED RELEASE ORAL at 20:05

## 2018-03-27 RX ADMIN — STANDARDIZED SENNA CONCENTRATE AND DOCUSATE SODIUM 1 TABLET: 8.6; 5 TABLET, FILM COATED ORAL at 11:13

## 2018-03-27 RX ADMIN — IOHEXOL 65 ML: 350 INJECTION, SOLUTION INTRAVENOUS at 00:29

## 2018-03-27 RX ADMIN — MAGNESIUM SULFATE IN WATER 2 G: 40 INJECTION, SOLUTION INTRAVENOUS at 11:20

## 2018-03-27 RX ADMIN — SIMVASTATIN 10 MG: 10 TABLET, FILM COATED ORAL at 20:06

## 2018-03-27 RX ADMIN — HEPARIN SODIUM 5000 UNITS: 5000 INJECTION, SOLUTION INTRAVENOUS; SUBCUTANEOUS at 05:47

## 2018-03-27 RX ADMIN — METOPROLOL TARTRATE 25 MG: 25 TABLET, FILM COATED ORAL at 20:06

## 2018-03-27 ASSESSMENT — COGNITIVE AND FUNCTIONAL STATUS - GENERAL
SUGGESTED CMS G CODE MODIFIER DAILY ACTIVITY: CH
DAILY ACTIVITIY SCORE: 24
DAILY ACTIVITIY SCORE: 24
SUGGESTED CMS G CODE MODIFIER MOBILITY: CI
DAILY ACTIVITIY SCORE: 24
CLIMB 3 TO 5 STEPS WITH RAILING: A LITTLE
MOBILITY SCORE: 23
MOBILITY SCORE: 23
CLIMB 3 TO 5 STEPS WITH RAILING: A LITTLE
SUGGESTED CMS G CODE MODIFIER DAILY ACTIVITY: CH
CLIMB 3 TO 5 STEPS WITH RAILING: A LITTLE
MOBILITY SCORE: 23
SUGGESTED CMS G CODE MODIFIER DAILY ACTIVITY: CH
SUGGESTED CMS G CODE MODIFIER MOBILITY: CI
SUGGESTED CMS G CODE MODIFIER MOBILITY: CI

## 2018-03-27 ASSESSMENT — ENCOUNTER SYMPTOMS
FEVER: 0
NAUSEA: 0
MYALGIAS: 0
BRUISES/BLEEDS EASILY: 0
BLURRED VISION: 0
VOMITING: 0
DIZZINESS: 0
FOCAL WEAKNESS: 0
WEAKNESS: 0
DEPRESSION: 0
SENSORY CHANGE: 0
HEMOPTYSIS: 0
HALLUCINATIONS: 0
EYE DISCHARGE: 0
HEARTBURN: 0
DOUBLE VISION: 0
SPUTUM PRODUCTION: 1
ABDOMINAL PAIN: 0
SHORTNESS OF BREATH: 1
DEPRESSION: 1
PALPITATIONS: 0
FLANK PAIN: 0
SPEECH CHANGE: 0
CHILLS: 0
COUGH: 1

## 2018-03-27 ASSESSMENT — COPD QUESTIONNAIRES
DURING THE PAST 4 WEEKS HOW MUCH DID YOU FEEL SHORT OF BREATH: SOME OF THE TIME
DO YOU EVER COUGH UP ANY MUCUS OR PHLEGM?: NO/ONLY WITH OCCASIONAL COLDS OR INFECTIONS
COPD SCREENING SCORE: 5
HAVE YOU SMOKED AT LEAST 100 CIGARETTES IN YOUR ENTIRE LIFE: YES

## 2018-03-27 ASSESSMENT — PATIENT HEALTH QUESTIONNAIRE - PHQ9
SUM OF ALL RESPONSES TO PHQ9 QUESTIONS 1 AND 2: 0
2. FEELING DOWN, DEPRESSED, IRRITABLE, OR HOPELESS: NOT AT ALL
1. LITTLE INTEREST OR PLEASURE IN DOING THINGS: NOT AT ALL

## 2018-03-27 ASSESSMENT — LIFESTYLE VARIABLES
TOTAL SCORE: 0
TOTAL SCORE: 0
ALCOHOL_USE: YES
CONSUMPTION TOTAL: NEGATIVE
HOW MANY TIMES IN THE PAST YEAR HAVE YOU HAD 5 OR MORE DRINKS IN A DAY: 0
HAVE PEOPLE ANNOYED YOU BY CRITICIZING YOUR DRINKING: NO
TOTAL SCORE: 0
EVER HAD A DRINK FIRST THING IN THE MORNING TO STEADY YOUR NERVES TO GET RID OF A HANGOVER: NO
HAVE YOU EVER FELT YOU SHOULD CUT DOWN ON YOUR DRINKING: NO
AVERAGE NUMBER OF DAYS PER WEEK YOU HAVE A DRINK CONTAINING ALCOHOL: 5
SUBSTANCE_ABUSE: 0
EVER_SMOKED: YES
ON A TYPICAL DAY WHEN YOU DRINK ALCOHOL HOW MANY DRINKS DO YOU HAVE: 1
EVER FELT BAD OR GUILTY ABOUT YOUR DRINKING: NO
EVER_SMOKED: YES

## 2018-03-27 ASSESSMENT — PAIN SCALES - GENERAL
PAINLEVEL_OUTOF10: 0

## 2018-03-27 ASSESSMENT — CHA2DS2 SCORE
PRIOR STROKE OR TIA OR THROMBOEMBOLISM: NO
CHF OR LEFT VENTRICULAR DYSFUNCTION: NO
CHA2DS2 VASC SCORE: 3
HYPERTENSION: NO
DIABETES: NO
AGE 65 TO 74: NO
SEX: FEMALE
VASCULAR DISEASE: NO
AGE 75 OR GREATER: YES

## 2018-03-27 NOTE — ED TRIAGE NOTES
Pt came in from urgent care for A-fib with RVR.  Pt was seen at urgent care for a cough lasting 3 weeks with clear sputum but was found by staff to have A-fib with RVR.  Pt denies history of A-fib.

## 2018-03-27 NOTE — ED NOTES
Pt requesting a new IV due to the original IV causing a lot of pain in her arm.  New PIV established, original PIV removed.

## 2018-03-27 NOTE — ED NOTES
Pt had a transient run of V-tach lasting about 8 beats.  Pt denied any symptoms.  Dr. Taylor notified, orders received, pt medicated per MAR.  Blood drawn and sent to lab.

## 2018-03-27 NOTE — ED PROVIDER NOTES
"ED Provider Note    Scribed for Boyd Frye M.D. by Lolly Gomez. 3/26/2018, 9:10 PM.    Primary care provider: Brandon Pearl M.D.  Means of arrival: Walk in  History obtained from: Patient  History limited by: None    CHIEF COMPLAINT  Chief Complaint   Patient presents with   • Atrial Fibrillation     Sent by Urgent Care for A-fib with RVR   • Cough     3 weeks, productive with clear sputum   • Chest Pain     Sharp left sided chest pain 8/10       HPI  Lisa Garza is a 82 y.o. female who presents to the Emergency Department for evaluation of atrial fibrillation with RVR onset prior to arrival. She states she had went to urgent care, had an EKG, and was recommended to come to the ED for further evaluation. The patient claims she had originally went to urgent care for a cough that has been ongoing the past 3 weeks. She additionally endorses \"sharp\" left sided chest pain that is exacerbated by coughing. The patient reports she recently was in a car for a prolonged period of time when she drove back from [place]. The patient denies swelling in her legs. She has no past history of cardiovascular issues. No alleviating or exacerbating factors are identified at this time.     REVIEW OF SYSTEMS  Pertinent positives include atrial fibrillation, cough, and left chest pain. Pertinent negatives include bilateral leg swelling. All other systems negative.  C.    PAST MEDICAL HISTORY   has a past medical history of Arthritis; Cancer (CMS-HCC); Heart murmur; High cholesterol; Unspecified cataract; and Unspecified urinary incontinence.    SURGICAL HISTORY   has a past surgical history that includes eye surgery; other (Right, 2006); and hip arthroplasty total (Left, 7/6/2015).    SOCIAL HISTORY  Social History   Substance Use Topics   • Smoking status: Former Smoker     Packs/day: 1.00     Years: 33.00     Types: Cigarettes     Quit date: 1/1/1991   • Smokeless tobacco: Never Used      Comment: Started smoking at age " "23   • Alcohol use 7.0 oz/week     14 Glasses of wine per week      Comment: 1 glass wine/day      History   Drug Use No       FAMILY HISTORY  Family History   Problem Relation Age of Onset   • Cancer Father      GI   • Heart Disease Father    • Lung Disease Father    • Stroke Sister    • No Known Problems Son    • No Known Problems Son    • No Known Problems Maternal Grandmother    • No Known Problems Maternal Grandfather    • No Known Problems Paternal Grandmother    • No Known Problems Paternal Grandfather        CURRENT MEDICATIONS  Home Medications     Reviewed by Jared Valdez R.N. (Registered Nurse) on 03/27/18 at 0256  Med List Status: Complete   Medication Last Dose Status   aspirin EC (ECOTRIN) 81 MG Tablet Delayed Response 12/1/2017 Active   Calcium 500 MG CHEW 12/1/2017 Active   Cholecalciferol (VITAMIN D-3) 1000 UNITS CAPS 12/1/2017 Active   fluticasone (FLONASE) 50 MCG/ACT nasal spray 12/1/2017 Active   hydrocortisone 2.5 % Cream topical cream  Active   Multiple Vitamins-Minerals (VITRUM 50+ SENIOR MULTI PO) 12/1/2017 Active   [place] 3 1200 MG CAPS 12/1/2017 Active   Psyllium (METAMUCIL FIBER PO) 12/1/2017 Active   simvastatin (ZOCOR) 10 MG Tab 3/26/2018 Active                ALLERGIES  Allergies   Allergen Reactions   • Clindamycin Hcl-Fd&C Blue #1      unknown   • Food      Scallops-vomiting, citrus-itching, redness, oatmeal-diarrhea       PHYSICAL EXAM  VITAL SIGNS: /92   Pulse (!) 115   Temp 37.6 °C (99.6 °F)   Resp 18   Ht 1.626 m (5' 4\")   SpO2 97%   BMI 23.50 kg/m²     Constitutional: Well developed, Well nourished, No distress.   HENT: Normocephalic, Atraumatic, Oropharynx moist.   Eyes: Conjunctiva normal, No discharge.   Cardiovascular: Tachycardic heart rate, irregularly irregular rhythm, No murmurs, equal pulses.   Pulmonary: Normal breath sounds, No respiratory distress, No wheezing, No rales, No rhonchi.  Chest: No chest wall deformity. Chest pain non reproducible. "   Abdomen:Soft, No tenderness, No masses, no rebound, no guarding.   Back: No CVA tenderness.   Musculoskeletal: No major deformities noted, No calf pain or tenderness.  Skin: Warm, Dry. Small bruise to left lower shin.   Neurologic: Alert & oriented x 3, Normal motor function,  No focal deficits noted.   Psychiatric: Affect normal, Judgment normal, Mood normal.     LABS  Results for orders placed or performed during the hospital encounter of 03/26/18   Troponin   Result Value Ref Range    Troponin I 0.02 0.00 - 0.04 ng/mL   Btype Natriuretic Peptide   Result Value Ref Range    B Natriuretic Peptide 274 (H) 0 - 100 pg/mL   CBC with Differential   Result Value Ref Range    WBC 13.4 (H) 4.8 - 10.8 K/uL    RBC 3.36 (L) 4.20 - 5.40 M/uL    Hemoglobin 11.0 (L) 12.0 - 16.0 g/dL    Hematocrit 33.8 (L) 37.0 - 47.0 %    .6 (H) 81.4 - 97.8 fL    MCH 32.7 27.0 - 33.0 pg    MCHC 32.5 (L) 33.6 - 35.0 g/dL    RDW 48.5 35.9 - 50.0 fL    Platelet Count 438 164 - 446 K/uL    MPV 10.1 9.0 - 12.9 fL    Neutrophils-Polys 81.20 (H) 44.00 - 72.00 %    Lymphocytes 7.20 (L) 22.00 - 41.00 %    Monocytes 8.20 0.00 - 13.40 %    Eosinophils 2.20 0.00 - 6.90 %    Basophils 0.60 0.00 - 1.80 %    Immature Granulocytes 0.60 0.00 - 0.90 %    Nucleated RBC 0.00 /100 WBC    Neutrophils (Absolute) 10.85 (H) 2.00 - 7.15 K/uL    Lymphs (Absolute) 0.96 (L) 1.00 - 4.80 K/uL    Monos (Absolute) 1.10 (H) 0.00 - 0.85 K/uL    Eos (Absolute) 0.30 0.00 - 0.51 K/uL    Baso (Absolute) 0.08 0.00 - 0.12 K/uL    Immature Granulocytes (abs) 0.08 0.00 - 0.11 K/uL    NRBC (Absolute) 0.00 K/uL   Complete Metabolic Panel (CMP)   Result Value Ref Range    Sodium 134 (L) 135 - 145 mmol/L    Potassium 4.0 3.6 - 5.5 mmol/L    Chloride 98 96 - 112 mmol/L    Co2 28 20 - 33 mmol/L    Anion Gap 8.0 0.0 - 11.9    Glucose 108 (H) 65 - 99 mg/dL    Bun 14 8 - 22 mg/dL    Creatinine 0.56 0.50 - 1.40 mg/dL    Calcium 9.3 8.5 - 10.5 mg/dL    AST(SGOT) 66 (H) 12 - 45 U/L     ALT(SGPT) 58 (H) 2 - 50 U/L    Alkaline Phosphatase 107 (H) 30 - 99 U/L    Total Bilirubin 0.5 0.1 - 1.5 mg/dL    Albumin 3.0 (L) 3.2 - 4.9 g/dL    Total Protein 6.5 6.0 - 8.2 g/dL    Globulin 3.5 1.9 - 3.5 g/dL    A-G Ratio 0.9 g/dL   Prothrombin Time   Result Value Ref Range    PT 15.4 (H) 12.0 - 14.6 sec    INR 1.25 (H) 0.87 - 1.13   APTT   Result Value Ref Range    APTT 33.1 24.7 - 36.0 sec   Lipase   Result Value Ref Range    Lipase 5 (L) 11 - 82 U/L   ESTIMATED GFR   Result Value Ref Range    GFR If African American >60 >60 mL/min/1.73 m 2    GFR If Non African American >60 >60 mL/min/1.73 m 2   TSH (for screening thyroid dysfunction)   Result Value Ref Range    TSH 1.100 0.380 - 5.330 uIU/mL   D-Dimer (only helpful in low pre-test probability wells critieria. Do not order if patient ruled out by PERC criteria. See Weblinks at top of Labs section)   Result Value Ref Range    D-Dimer Screen 1114 (H) <250 ng/mL(D-DU)   BLOOD CULTURE,HOLD   Result Value Ref Range    Blood Culture Hold Collected    BLOOD CULTURE,HOLD   Result Value Ref Range    Blood Culture Hold Collected    Urinalysis   Result Value Ref Range    Color Yellow     Character Clear     Specific Gravity 1.039 <1.035    Ph 7.5 5.0 - 8.0    Glucose Negative Negative mg/dL    Ketones Negative Negative mg/dL    Protein Negative Negative mg/dL    Bilirubin Negative Negative    Urobilinogen, Urine 1.0 Negative    Nitrite Negative Negative    Leukocyte Esterase Negative Negative    Occult Blood Negative Negative    Micro Urine Req see below    Prothrombin time (INR)   Result Value Ref Range    PT 16.1 (H) 12.0 - 14.6 sec    INR 1.32 (H) 0.87 - 1.13   APTT   Result Value Ref Range    APTT 31.0 24.7 - 36.0 sec   Lactic Acid -STAT Once   Result Value Ref Range    Lactic Acid 1.2 0.5 - 2.0 mmol/L   Procalcitonin   Result Value Ref Range    Procalcitonin <0.05 <0.25 ng/mL   TROPONIN   Result Value Ref Range    Troponin I <0.01 0.00 - 0.04 ng/mL   MAGNESIUM    Result Value Ref Range    Magnesium 1.7 1.5 - 2.5 mg/dL   PREALBUMIN   Result Value Ref Range    Pre-Albumin 3.0 (L) 18.0 - 38.0 mg/dL   EKG (ER)   Result Value Ref Range    Report       [facility] Emergency Dept.    Test Date:  2018  Pt Name:    ADALID REDDY                 Department: ER  MRN:        [id number]                      Room:  Gender:     Female                       Technician: [id number]  :        [id number]                   Requested By:ER TRIAGE PROTOCOL  Order #:    [id number]                    Reading MD:    Measurements  Intervals                                Axis  Rate:       151                          P:  NJ:                                      QRS:        71  QRSD:       68                           T:          47  QT:         304  QTc:        482    Interpretive Statements  ATRIAL FIBRILLATION WITH RAPID V-RATE  MULTIFORM VENTRICULAR PREMATURE COMPLEXES  Compared to ECG 2015 11:06:16  Ventricular premature complex(es) now present  Sinus rhythm no longer present  Atrial abnormality no longer present  Myocardial infarct finding no longer present     EKG (NOW)   Result Value Ref Range    Report       [facility] Emergency Dept.    Test Date:  2018  Pt Name:    ADALID REDDY                 Department: ER  MRN:        [id number]                      Room:       RD 11  Gender:     Female                       Technician: [id number]  :        [id number]                   Requested By:UMBERTO KATZ  Order #:    [id number]                    Reading MD:    Measurements  Intervals                                Axis  Rate:       86                           P:          35  NJ:         208                          QRS:        -29  QRSD:       74                           T:          6  QT:         364  QTc:        436    Interpretive Statements  SINUS RHYTHM  MULTIPLE VENTRICULAR PREMATURE COMPLEXES  LEFT ATRIAL ABNORMALITY  INFERIOR INFARCT,  AGE INDETERMINATE  Compared to ECG 2018 19:09:01  Atrial abnormality now present  Myocardial infarct finding now present  Atrial fibrillation no longer present       All labs reviewed by me.    EKG  12 Lead EKG interpreted by me shows atrial fibrillation at a rapid rate of 151. Axis normal. No ST elevations. Occasional PVC's. Old EKG from 7/2/15 shows no patient is no longer in sinus rhythm. Final impression: Atrial Fibrillation.    Repeat EK Lead EKG interpreted by me shows a normal sinus rhythm at a rate of 86. Leftward axis. Occasional PVC's. Q waves in inferior leads, 3, and AVF. Prior EKG show patient no longer in atrial fibrillation. Final impression: Sinus Rhythm with multiple PVC's.    RADIOLOGY  CT-CTA CHEST PULMONARY ARTERY W/ RECONS   Final Result         1. No CT evidence of pulmonary embolism.      2. Patchy left basilar opacity, likely atelectasis.      DX-CHEST-LIMITED (1 VIEW)   Final Result         Airspace opacity in the left lower lobe, in keeping with pneumonia.      ECHOCARDIOGRAM COMP W/O CONT    (Results Pending)   US-LIVER AND BILIARY TREE    (Results Pending)     The radiologist's interpretation of all radiological studies have been reviewed by me.    COURSE & MEDICAL DECISION MAKING  Pertinent Labs & Imaging studies reviewed. (See chart for details)    9:10 PM - Patient seen and examined at bedside. Patient will be treated with 1 L NS for tachycardia. Ordered D-Dimer, TSH, Estimated GFR, Lipase, APTT, PTT, CMP, CBC, BNP, Troponin, DX-Chest, and EKG to evaluate her symptoms. The differential diagnoses include but are not limited to: pneumonia, hypothyroidism, atrial fibrillation with RVR, Myocardial infarction, PE.     10:14 PM - Reviewed patient's lab and radiology results as shown above.     10:16 PM - Patient reevaluated at bedside. She was informed of lab and radiology results. Patient made aware I ordered CT-Chest. She will be medicated with Zithromax 500 mg, Rocephin 1 g,  and Isoptin 5 mg.     1:01 AM - Patient reevaluated at bedside. She has improvement of tachycardia after IV fluids. She was informed of radiology results that indicate no blood clot.    1:02 AM - Paged Cardiology.     1:05 AM - Consult with Dr. Mccloud, Cardiology, who wants the patient started on Metoprolol 25 mg BID and can follow up with him in clinic.    1:10 AM - Patient informed she will be placed on Metoprolol after consulting with Cardiology. She will be taken off oxygen to determine oxygen saturation level before discharge.    1:15 AM - Patient reevaluated. She is found to be 87% on Room air. Patient informed she will be subjected to a road test to determine if her oxygen saturation improves.    1:19 AM - Patient found to be 84% on roadtest. She is informed she will be admitted. Patient is agreeable.     1:20 AM - Paged Hospitalist.     1:28 AM - Consult with Dr. Taylor, Hospitalist, who agrees to admit the patient.     Medical Decision Making: Patient presents with A. fib with RVR. As well as a cough. Because the patient's recent travel I was concerned that she may have a pulmonary embolism there for CTA of the chest was done after she had a positive d-dimer. This did not show any pulmonary embolism but did show questionable pneumonia versus atelectasis. Given the patient's increasing cough as well as fevers and think this is likely early pneumonia. Patient continued to have episodes of hypoxia despite breathing treatments therefore she'll be admitted for oxygen and continue breathing treatments.     DISPOSITION:  Patient will be admitted to Dr. Taylor, Hospitalist, in guarded condition.    FINAL IMPRESSION  1. Atrial fibrillation with RVR (CMS-HCC)    2. Cough    3. Pneumonia of left lower lobe due to infectious organism (CMS-HCC)    4. Hypoxia          Lolly STONE), am scribing for, and in the presence of, Boyd Frye M.D.    Electronically signed by: Lolly Echeverria), 3/26/2018    CHASE  Boyd Frye M.D. personally performed the services described in this documentation, as scribed by Lolly Gomez in my presence, and it is both accurate and complete.    The note accurately reflects work and decisions made by me.  Boyd Frye  3/27/2018  6:41 AM

## 2018-03-27 NOTE — PROGRESS NOTES
Lisa Heath is an 82-year-old female with history of PAF. She has been feeling unwell for the past 3 weeks with cough. She spontaneously reverted to sinus rhythm. She has a history of hyperlipidemia and is on statin.    Will stop IV fluids.   Pending echocardiogram.

## 2018-03-27 NOTE — H&P
Hospital Medicine History and Physical    Date of Service  3/27/2018    Chief Complaint  Chief Complaint   Patient presents with   • Atrial Fibrillation     Sent by Urgent Care for A-fib with RVR   • Cough     3 weeks, productive with clear sputum   • Chest Pain     Sharp left sided chest pain 8/10       History of Presenting Illness  82 y.o. female who presented 3/26/2018 with evaluation of A. fib with RVR. Patient went to urgent care at EKG came to the ED for further evaluation for A. fib. Upon presentation in A. fib with RVR rate into the 140s. She initially presented to the urgent care with a cough ongoing for the past 3 weeks. Also is sharp left-sided chest pain exacerbated by coughing. Reports recently was in a car for prolonged time when she drove back from [place]. Cough productive yellow-green sputum. No alleviating or exacerbating factors to the patient's symptoms.     Primary Care Physician  Brandon Pearl M.D.    Consultants  Dr. Mccloud, Cardiology    Code Status  full    Review of Systems  Review of Systems   Constitutional: Negative for chills and fever.   HENT: Negative for congestion, hearing loss and tinnitus.    Eyes: Negative for blurred vision, double vision and discharge.   Respiratory: Positive for cough and shortness of breath. Negative for hemoptysis.    Cardiovascular: Positive for chest pain. Negative for palpitations and leg swelling.   Gastrointestinal: Negative for abdominal pain, heartburn, nausea and vomiting.   Genitourinary: Negative for dysuria and flank pain.   Musculoskeletal: Negative for joint pain and myalgias.   Skin: Negative for rash.   Neurological: Negative for dizziness, sensory change, speech change, focal weakness and weakness.   Endo/Heme/Allergies: Negative for environmental allergies. Does not bruise/bleed easily.   Psychiatric/Behavioral: Negative for depression, hallucinations and substance abuse.        Past Medical History  Past Medical History:   Diagnosis Date    • Arthritis     osteo, hip   • Cancer (CMS-HCC)     skin on left shoulder   • Heart murmur    • High cholesterol    • Unspecified cataract     tony IOL   • Unspecified urinary incontinence        Surgical History  Past Surgical History:   Procedure Laterality Date   • HIP ARTHROPLASTY TOTAL Left 7/6/2015    Procedure: HIP ARTHROPLASTY TOTAL;  Surgeon: Navid Howard M.D.;  Location: SURGERY [facility];  Service:    • OTHER Right 2006    cyst & bone spur on right hand   • EYE SURGERY      bilateral IOL       Medications  No current facility-administered medications on file prior to encounter.      Current Outpatient Prescriptions on File Prior to Encounter   Medication Sig Dispense Refill   • simvastatin (ZOCOR) 10 MG Tab Take 1 Tab by mouth every evening. 30 Tab 11   • hydrocortisone 2.5 % Cream topical cream Apply 1 Application to affected area(s) 2 times a day. 2 Tube 1   • Psyllium (METAMUCIL FIBER PO) Take  by mouth.     • Multiple Vitamins-Minerals (VITRUM 50+ SENIOR MULTI PO) Take  by mouth.     • aspirin EC (ECOTRIN) 81 MG Tablet Delayed Response Take 81 mg by mouth every day.     • fluticasone (FLONASE) 50 MCG/ACT nasal spray Spray 1 Spray in nose every morning.     • Cholecalciferol (VITAMIN D-3) 1000 UNITS CAPS Take 1 Tab by mouth every morning.     • Omega 3 1200 MG CAPS Take 1 Cap by mouth every morning.     • Calcium 500 MG CHEW Take 1 Tab by mouth every morning.         Family History  Family History   Problem Relation Age of Onset   • Cancer Father      GI   • Heart Disease Father    • Lung Disease Father    • Stroke Sister    • No Known Problems Son    • No Known Problems Son    • No Known Problems Maternal Grandmother    • No Known Problems Maternal Grandfather    • No Known Problems Paternal Grandmother    • No Known Problems Paternal Grandfather        Social History  Social History   Substance Use Topics   • Smoking status: Former Smoker     Packs/day: 1.00     Years: 33.00     Types:  Cigarettes     Quit date: 1991   • Smokeless tobacco: Never Used      Comment: Started smoking at age 23   • Alcohol use 7.0 oz/week     14 Glasses of wine per week      Comment: 1 glass wine/day       Allergies  Allergies   Allergen Reactions   • Clindamycin Hcl-Fd&C Blue #1      unknown   • Food      Scallops-vomiting, citrus-itching, redness, oatmeal-diarrhea        Physical Exam  Laboratory   Hemodynamics  Temp (24hrs), Av.6 °C (99.6 °F), Min:37.6 °C (99.6 °F), Max:37.6 °C (99.6 °F)   Temperature: 37.6 °C (99.6 °F)  Pulse  Av.3  Min: 83  Max: 132 Heart Rate (Monitored): 93  Blood Pressure : 147/92, NIBP: 105/49      Respiratory      Respiration: 16, Pulse Oximetry: 97 %, O2 Daily Delivery Respiratory : Silicone Nasal Cannula     Given By:: Mouthpiece  RUL Breath Sounds: Diminished, RML Breath Sounds: Diminished, RLL Breath Sounds: Diminished, NEO Breath Sounds: Clear, LLL Breath Sounds: Diminished    Physical Exam   Constitutional: She is oriented to person, place, and time. She appears well-developed and well-nourished. No distress.   HENT:   Head: Normocephalic and atraumatic.   Eyes: Conjunctivae and EOM are normal. Pupils are equal, round, and reactive to light.   Neck: Normal range of motion. Neck supple. No JVD present.   Cardiovascular: Normal rate, regular rhythm, normal heart sounds and intact distal pulses.    No murmur heard.  Pulmonary/Chest: Effort normal.   ronchi bilateral    Abdominal: Soft. Bowel sounds are normal. She exhibits no distension. There is no tenderness.   Musculoskeletal: Normal range of motion. She exhibits no edema.   Neurological: She is alert and oriented to person, place, and time. She exhibits normal muscle tone.   Skin: Skin is warm and dry.   Psychiatric: She has a normal mood and affect. Her behavior is normal. Judgment and thought content normal.   Nursing note and vitals reviewed.      Recent Labs      18   WBC  13.4*   RBC  3.36*   HEMOGLOBIN   11.0*   HEMATOCRIT  33.8*   MCV  100.6*   MCH  32.7   MCHC  32.5*   RDW  48.5   PLATELETCT  438   MPV  10.1     Recent Labs      03/26/18 1958   SODIUM  134*   POTASSIUM  4.0   CHLORIDE  98   CO2  28   GLUCOSE  108*   BUN  14   CREATININE  0.56   CALCIUM  9.3     Recent Labs      03/26/18 1958   ALTSGPT  58*   ASTSGOT  66*   ALKPHOSPHAT  107*   TBILIRUBIN  0.5   LIPASE  5*   GLUCOSE  108*     Recent Labs      03/26/18 1958   APTT  33.1   INR  1.25*     Recent Labs      03/26/18 1958   BNPBTYPENAT  274*         Lab Results   Component Value Date    TROPONINI 0.02 03/26/2018     Urinalysis:    Lab Results  Component Value Date/Time   SPECGRAVITY 1.020 07/02/2015 1057   GLUCOSEUR Neg 07/02/2015 1057   KETONES 25 07/02/2015 1057   NITRITE Neg 07/02/2015 1057   RBCURINE 2-5 (A) 07/02/2015 1057   BACTERIA Few (A) 07/02/2015 1057   EPITHELCELL Few 07/02/2015 1057        Imaging  CT-CTA CHEST PULMONARY ARTERY W/ RECONS [[id number]] Resulted: 03/27/18 0041   Order Status: Completed Updated: 03/27/18 0043   Narrative:       3/26/2018 11:56 PM    HISTORY/REASON FOR EXAM:  Pain  Atrial fibrillation.    TECHNIQUE/EXAM DESCRIPTION:  CT angiogram scan for pulmonary embolism with contrast, with reconstructions.    1.25 mm helical sections were obtained from the lung apices through the lung bases following the rapid bolus administration of 65 mL of Omnipaque 350 nonionic contrast. Thin-section overlapping reconstruction interval was utilized. Coronal   reconstructions were generated from the axial data. MIP post processing was performed and utilized for the interpretation.    Low dose optimization technique was utilized for this CT exam including automated exposure control and adjustment of the mA and/or kV according to patient size.    COMPARISON: None    FINDINGS:  Pulmonary Embolism: No.    Main Pulmonary Arteries: No.    Segmental branches: No.    Subsegmental branches: No.      Additional Comments: None.    Lungs: Patchy  left basilar opacity.    Airway: Patent.    Pleura: No pleural effusion or pneumothorax.    Nodes: No enlarged mediastinal or hilar lymph nodes.      Additional findings:    Thoracic aorta and great vessels:  No aneurysm.    Heart and pericardium: Moderate coronary artery calcification. No pericardial effusion.    Thoracic spine:  No fracture or malalignment. No compression deformity.    Chest wall:   Unremarkable.    Visualized upper abdomen: No acute abnormality.     Impression:         1. No CT evidence of pulmonary embolism.    2. Patchy left basilar opacity, likely atelectasis.   DX-CHEST-LIMITED (1 VIEW) [[id number]] Resulted: 03/26/18 2152   Order Status: Completed Updated: 03/26/18 2154   Narrative:       3/26/2018 9:48 PM    HISTORY/REASON FOR EXAM:  Chest Pain      TECHNIQUE/EXAM DESCRIPTION AND NUMBER OF VIEWS:  Single portable view of the chest.    COMPARISON: 9/1/2014    FINDINGS:      Airspace opacity in the left lower lobe.  No pleural effusion. No pneumothorax.  Stable cardiopericardial silhouette.     Impression:         Airspace opacity in the left lower lobe, in keeping with pneumonia.        Assessment/Plan     I anticipate this patient will require at least two midnights for appropriate medical management, necessitating inpatient admission.    * Atrial fibrillation with RVR (CMS-[facility])   Assessment & Plan    New onset, now converted to sinus rhythm   Echo ordered  Dr. Lo cards called upon admission no consult, only recommended 25 mg Metop BID I have ordered this   Will monitor on telemetry   Treat underlying PNA  Consider anti coag          Sepsis due to pneumonia (CMS-[facility])   Assessment & Plan    This is sepsis (without associated acute organ dysfunction).   New onset PNA  IV abx   Follow cultures  procal   descilate as clinically appropriate  Check Lactic        Alcohol abuse   Assessment & Plan    Few glasses of wine a day   No hx of withdrawals no drinks this week   Monitor for withdrawal no  ciwa ordered        Hypoalbuminemia   Assessment & Plan    Check prealb        Hyponatremia   Assessment & Plan    IVF recheck lab        Anemia   Assessment & Plan    Consider lab workup when stable          Abnormal LFTs   Assessment & Plan    Unknown etiology; etoh?  Will check liver us          Mixed hyperlipidemia- (present on admission)   Assessment & Plan    Lipid panel         History of total left hip arthroplasty- (present on admission)   Assessment & Plan    hx            VTE prophylaxis: heparin

## 2018-03-27 NOTE — ASSESSMENT & PLAN NOTE
New onset, now converted to sinus rhythm ; PAF  Echo noted   25 mg Metop BID , titrating up for better heart rate control  Will monitor on telemetry   Treat underlying PNA  xarelto

## 2018-03-27 NOTE — ASSESSMENT & PLAN NOTE
This is sepsis (without associated acute organ dysfunction).   New onset PNA  IV abx   Blood cultures negative   CXR and CT chest concerning of LLL Pna  Continue antibiotic empirically

## 2018-03-27 NOTE — CONSULTS
DATE OF SERVICE:  03/27/2018    CHIEF COMPLAINT:  Paroxysmal atrial fibrillation.    HISTORY OF PRESENT ILLNESS:  The patient is a pleasant 82-year-old female seen   at the request of Dr. Frye for evaluation of PAF.  She has been   feeling ill for the past 3 weeks and has had a chronic and very persistent   nonproductive cough.  She went to an urgent care yesterday where she was found   to be in atrial fibrillation with rapid ventricular response.  The patient   was asymptomatic for this and also denies any prior history of atrial   fibrillation.  She was sent to the ER where she spontaneously reverted to   sinus rhythm.  The patient was going to be sent home, but was admitted because   of hypoxia.  Since reverting to sinus rhythm, she has maintained sinus rhythm   in the hospital.  There is no antecedent history of known atrial fibrillation   or sense of palpitations.  Cardiac risk factor profile is positive for   history of hyperlipidemia, treated with statin.  No history of diabetes,   hypertension, cigarette smoking or family history.    The patient is physically active for her age and denies any exercise   intolerance, exertional chest pain, edema or sense of palpitations.  She is   under significant stress that she is the primary caregiver for her  who   has advanced dementia.    MEDICATIONS ON ADMISSION:  Aspirin 81 mg a day, omega-3 fatty acids,   simvastatin 10 mg a day, Flonase, vitamin D.    SOCIAL HISTORY:  The patient is .  As noted above, she is the primary   caregiver for her  who has dementia.  She is a nonsmoker.  She drinks   1-2 glasses of wine per night.    PAST SURGICAL HISTORY:  Total left hip arthroplasty, July 2015.    REVIEW OF SYSTEMS:  CONSTITUTIONAL:  Patient has had no weight loss.  She actually gained a few   pounds when she was in [place] on a trip recently.  NEUROLOGIC:  No stroke or TIAs.  PSYCHIATRIC:  Denies depression, but is under severe stress that she  is the   primary caretaker for her .  ENT:  No difficulty swallowing.  PULMONARY:  History of cough as outlined above.  The cough has not been   productive.  No asthma or emphysema.  CARDIAC:  As above.  No sense of palpitations, no prior atrial fib, denies   sleep apnea.  GASTROINTESTINAL:  No melena or peptic ulcer disease.  RENAL:  Denies kidney impairment or kidney stones.  GENITOURINARY:  No hematuria.  MUSCULOSKELETAL:  History of arthritis of the hip, no recent trauma.  ENDOCRINE:  Denies weight loss and no history of thyroid disease or cold   intolerance.  INTEGUMENT:  No recent rash.  HEMATOPOIETIC:  She has had no easy bleeding or bruising.    PHYSICAL EXAMINATION:  GENERAL:  Patient is resting comfortably, but does have a persistent cough.  VITAL SIGNS:  On the monitor, she is in sinus rhythm.  Blood pressure is   104/61, afebrile.  HEENT:  Pupils are equal, gaze conjugate, sclerae nonicteric.  NECK:  There is no JVD or bruit.  LUNGS:  No wheezes.  A few rhonchi at left base.  HEART:  Regular rate and rhythm with I/VI systolic murmur compatible with   aortic sclerosis.  There is no heave.  ABDOMEN:  Soft and nontender.  No masses, no hepatomegaly.  EXTREMITIES:  No edema.  Posterior tibial pulses are 2+.  SKIN:  Warm and dry.  NEUROLOGIC:  The patient is alert and cooperative.  Cranial nerves are intact.    Admission chest x-ray is personally reviewed and shows no infiltrate.  There   is no cardiomegaly.    PERTINENT LABORATORY DATA:  Potassium is 4.0.  GFR is greater than 60.  SGOT   is elevated at 66.  Troponin negative x2.  Hemoglobin is 11, hematocrit is   34%, white count is elevated at 13,400.    EKG was personally reviewed and demonstrates sinus rhythm with left axis   deviation and PVCs as well as aberrantly conducted PACs.  Prior EKG   demonstrated atrial fibrillation.    IMPRESSION:  1.  Paroxysmal atrial fibrillation.  Patient had documented paroxysmal atrial   fibrillation.  She  reverted spontaneously to sinus rhythm.  The etiology of   her atrial fibrillation is uncertain.  Thyroid studies were performed on this   admission and TSH is 1.1.  The atrial fibrillation was asymptomatic and she   may have had previous episodes.  An echo is ordered and to evaluate her left   ventricular function.  2.  Heart murmur that has not been evaluated before.  As noted above, an echo   has been ordered.  3.  History of hyperlipidemia, treated with statin.  4.  Chronic cough.  5.  Anemia of uncertain etiology.  Stool guaiac will be ordered.    PLAN:  Patient will be started on beta-blocker for rate control should she go   back to atrial fibrillation.  Her CHADS score is 2 and she should be on   anticoagulation.  Patient will be started on anticoagulant.  Consider EP   consultation.       ____________________________________     MD EMIL KRUSE / NTS    DD:  03/27/2018 06:38:22  DT:  03/27/2018 08:40:02    D#:  [id number]  Job#:  [id number]

## 2018-03-27 NOTE — PROGRESS NOTES
"Subjective:      Lisa Garza is a 82 y.o. female who presents with Cough (for over 3 weeks)        Patient is accompanied by her son.     Cough   This is a new problem. The current episode started 1 to 4 weeks ago (3 weeks). The problem has been unchanged. The problem occurs every few minutes. The cough is productive of sputum. Pertinent negatives include no chest pain, chills, ear pain, fever, myalgias, sore throat, shortness of breath or wheezing. Nothing aggravates the symptoms. Her past medical history is significant for pneumonia. There is no history of asthma, bronchitis or COPD.      Patient presents to urgent care reporting a 3 week history of a cough that is productive in the mornings then dry throughout the daytime. She also reports some intermittent sharp pains in her left anterior rib area that she attributes to the cough. She denies any fevers, chills, body aches, chest pain/pressure, palpitations, SOB, dizziness, nausea, or diaphoresis. She has a history of pneumonia about 15 years ago and reports significant hospitalization with intubation and secondary atrial fibrillation at that time. She does not have sustained atrial fibrillation and is not on anticoagulation or any other chronic medications apart from Zocor.     Review of Systems   Constitutional: Negative for chills and fever.   HENT: Negative for congestion, ear pain and sore throat.    Respiratory: Positive for cough and sputum production. Negative for shortness of breath and wheezing.    Cardiovascular: Negative for chest pain, palpitations, orthopnea and leg swelling.   Gastrointestinal: Negative for abdominal pain, diarrhea, nausea and vomiting.   Genitourinary: Negative.    Musculoskeletal: Negative.  Negative for myalgias.   Neurological: Negative for dizziness.   All other systems reviewed and are negative.       Objective:     /70   Pulse (!) 120   Temp 37.8 °C (100 °F)   Resp (!) 22   Ht 1.619 m (5' 3.75\")   Wt 62.1 kg " (136 lb 14.5 oz)   SpO2 93%   BMI 23.68 kg/m²      Physical Exam   Constitutional: She is oriented to person, place, and time. She appears well-developed and well-nourished. No distress.   HENT:   Head: Normocephalic and atraumatic.   Right Ear: Hearing, tympanic membrane, external ear and ear canal normal.   Left Ear: Hearing, tympanic membrane, external ear and ear canal normal.   Mouth/Throat: Oropharynx is clear and moist. No oropharyngeal exudate, posterior oropharyngeal edema or posterior oropharyngeal erythema.   Eyes: Conjunctivae are normal. Pupils are equal, round, and reactive to light. Right eye exhibits no discharge. Left eye exhibits no discharge.   Neck: Normal range of motion.   Cardiovascular: Exam reveals no friction rub.    Murmur heard.  Tachycardic, irregular rhythm   Pulmonary/Chest: Effort normal. She has no wheezes. She has no rales.   Harsh cough throughout exam   Musculoskeletal: Normal range of motion.   Lymphadenopathy:     She has no cervical adenopathy.   Neurological: She is alert and oriented to person, place, and time.   Skin: Skin is warm and dry. She is not diaphoretic.   Psychiatric: She has a normal mood and affect. Her behavior is normal.   Nursing note and vitals reviewed.       PMH:  has a past medical history of Arthritis; Cancer (CMS-HCC); Heart murmur; Unspecified cataract; and Unspecified urinary incontinence.  MEDS:   Current Outpatient Prescriptions:   •  simvastatin (ZOCOR) 10 MG Tab, Take 1 Tab by mouth every evening., Disp: 30 Tab, Rfl: 11  •  hydrocortisone 2.5 % Cream topical cream, Apply 1 Application to affected area(s) 2 times a day., Disp: 2 Tube, Rfl: 1  •  Psyllium (METAMUCIL FIBER PO), Take  by mouth., Disp: , Rfl:   •  Multiple Vitamins-Minerals (VITRUM 50+ SENIOR MULTI PO), Take  by mouth., Disp: , Rfl:   •  aspirin EC (ECOTRIN) 81 MG Tablet Delayed Response, Take 81 mg by mouth every day., Disp: , Rfl:   •  fluticasone (FLONASE) 50 MCG/ACT nasal spray,  Spray 1 Spray in nose every morning., Disp: , Rfl:   •  Cholecalciferol (VITAMIN D-3) 1000 UNITS CAPS, Take 1 Tab by mouth every morning., Disp: , Rfl:   •  Omega 3 1200 MG CAPS, Take 1 Cap by mouth every morning., Disp: , Rfl:   •  Calcium 500 MG CHEW, Take 1 Tab by mouth every morning., Disp: , Rfl:   ALLERGIES:   Allergies   Allergen Reactions   • Clindamycin Hcl-Fd&C Blue #1      unknown   • Food      Scallops-vomiting, citrus-itching, redness, oatmeal-diarrhea     SURGHX:   Past Surgical History:   Procedure Laterality Date   • HIP ARTHROPLASTY TOTAL Left 7/6/2015    Procedure: HIP ARTHROPLASTY TOTAL;  Surgeon: Navid Howard M.D.;  Location: SURGERY [facility];  Service:    • OTHER Right 2006    cyst & bone spur on right hand   • EYE SURGERY      bilateral IOL     SOCHX:  reports that she quit smoking about 27 years ago. Her smoking use included Cigarettes. She has a 33.00 pack-year smoking history. She has never used smokeless tobacco. She reports that she drinks about 7.0 oz of alcohol per week . She reports that she does not use drugs.  FH: family history includes Cancer in her father; Heart Disease in her father; Lung Disease in her father; No Known Problems in her maternal grandfather, maternal grandmother, paternal grandfather, paternal grandmother, son, and son; Stroke in her sister.       Assessment/Plan:     1. Paroxysmal atrial fibrillation (CMS-[facility])  - EKG - Clinic performed -->  Atrial fibrillation, no evidence of acute ischemic changes    2. Cough  - POCT Influenza A/B: NEGATIVE    Unable to obtain CXR at today's visit. Given patient's atrial fibrillation and duration of cough, recommend patient go directly to the ED for further evaluation and management of her symptoms. She is reluctant to go but ultimately agrees. Called and spoke to Dr. Pacheco about patient's case, who accepted transfer and will be expecting her shortly. Patient left urgent care in stable condition and will be driven to  the hospital by her son.

## 2018-03-27 NOTE — PROGRESS NOTES
Cardiology Progress Note               Author: Katelyn Rivera, BAN Date & Time created: 3/27/2018  9:14 AM     Cardiology consultation for atrial fibrillation with RVR.    Admitted with atrial fibrillation, cough for 3 weeks    History of hyperlipidemia and arthritis.    3/27/18: Patient resting in bed just finishing up with her echocardiogram. Patient reports feeling fairly well. She denies any palpitations, chest pain, or dizziness. She continues to have a cough which is somewhat productive.    Telemetry: SR R PVC, 70s    CTA 3/27/18: No PE, patchy left basilar opacity  Chest x-ray 3/27/18 left lower lobe pneumonia    Review of Systems   Constitutional: Negative for chills and fever.   Respiratory: Positive for cough and sputum production.    Cardiovascular: Negative for chest pain, palpitations and leg swelling.   Gastrointestinal: Negative for nausea and vomiting.   Musculoskeletal:        Left ant pain just below breast.   Psychiatric/Behavioral: Positive for depression.     Physical Exam   Constitutional: She is oriented to person, place, and time. She appears well-developed and well-nourished.   HENT:   Head: Normocephalic and atraumatic.   Eyes: EOM are normal.   Neck: Neck supple.   Cardiovascular: Normal rate and intact distal pulses.  An irregular rhythm present.   Murmur heard.  Pulmonary/Chest: Effort normal. She has decreased breath sounds in the left lower field. She has rhonchi in the left lower field.   Abdominal: Soft. Bowel sounds are normal.   Musculoskeletal: Normal range of motion. She exhibits no edema.   Neurological: She is alert and oriented to person, place, and time.   Skin: Skin is warm and dry.   Psychiatric: She has a normal mood and affect. Her behavior is normal. Judgment and thought content normal.   Nursing note and vitals reviewed.    Hemodynamics:  Temp (24hrs), Av.3 °C (99.1 °F), Min:36.7 °C (98 °F), Max:37.6 °C (99.6 °F)  Temperature: 37.6 °C (99.6 °F)  Pulse  Av.2   Min: 73  Max: 132Heart Rate (Monitored): 83  Blood Pressure : 121/69, NIBP: 104/61     Respiratory:    Respiration: 16, Pulse Oximetry: 94 %, O2 Daily Delivery Respiratory : Silicone Nasal Cannula  Fluids:   Weight: 63 kg (138 lb 14.2 oz)  GI/Nutrition:  Orders Placed This Encounter   Procedures   • Diet Order     Standing Status:   Standing     Number of Occurrences:   1     Order Specific Question:   Diet:     Answer:   Cardiac [6]     Lab Results:  Recent Labs      03/26/18 1958   WBC  13.4*   RBC  3.36*   HEMOGLOBIN  11.0*   HEMATOCRIT  33.8*   MCV  100.6*   MCH  32.7   MCHC  32.5*   RDW  48.5   PLATELETCT  438   MPV  10.1     Recent Labs      03/26/18 1958   SODIUM  134*   POTASSIUM  4.0   CHLORIDE  98   CO2  28   GLUCOSE  108*   BUN  14   CREATININE  0.56   CALCIUM  9.3     Recent Labs      03/26/18 1958 03/27/18 0219   APTT  33.1  31.0   INR  1.25*  1.32*     Recent Labs      03/26/18 1958   BNPBTYPENAT  274*     Recent Labs      03/26/18 1958 03/27/18 0219 03/27/18   0740   TROPONINI  0.02  <0.01  0.01   BNPBTYPENAT  274*   --    --      Medical Decision Making, by Problem:  Active Hospital Problems    Diagnosis   • *Atrial fibrillation with RVR (CMS-[facility]) [I48.91]   • Sepsis due to pneumonia (CMS-HCC) [J18.9, A41.9]   • Abnormal LFTs [R79.89]   • Anemia [D64.9]   • Hyponatremia [E87.1]   • Hypoalbuminemia [E88.09]   • Alcohol abuse [F10.10]   • Mixed hyperlipidemia [E78.2]   • 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 Atrial fibrillation with RVR  -Spontaneous conversion to sinus rhythm  -VT, 8 beats, patient sleeping  -Continue metoprolol 25 mg twice a day, xarelto, and heparin drip for VTE prophylaxis    2. Hyperlipidemia  -Continue simvastatin 10 mg  -Obtain lipid panel (already ordered)    3 Left lower lobe pneumonia  -Followed by primary    4. Murmur 2/6  -Pending echocardiogram results  -Continue antimicrobial therapy    Thank you for allowing us to participate in  the care of your patient.    Quality-Core Measures   Reviewed items::  EKG reviewed, Radiology images reviewed, Labs reviewed and Medications reviewed  Acosta catheter::  No Acosta  DVT prophylaxis pharmacological::  Heparin

## 2018-03-27 NOTE — TELEPHONE ENCOUNTER
Patients son came in and spoke to sherrie and he was advised patient needs an appt. And patient is admitted so they will be handling her care

## 2018-03-28 PROBLEM — J96.01 ACUTE RESPIRATORY FAILURE WITH HYPOXIA (HCC): Status: ACTIVE | Noted: 2018-03-28

## 2018-03-28 LAB
ALBUMIN SERPL BCP-MCNC: 2.7 G/DL (ref 3.2–4.9)
ALBUMIN/GLOB SERPL: 0.8 G/DL
ALP SERPL-CCNC: 123 U/L (ref 30–99)
ALT SERPL-CCNC: 46 U/L (ref 2–50)
ANION GAP SERPL CALC-SCNC: 6 MMOL/L (ref 0–11.9)
AST SERPL-CCNC: 47 U/L (ref 12–45)
BASOPHILS # BLD AUTO: 0.4 % (ref 0–1.8)
BASOPHILS # BLD: 0.05 K/UL (ref 0–0.12)
BILIRUB SERPL-MCNC: 0.4 MG/DL (ref 0.1–1.5)
BUN SERPL-MCNC: 10 MG/DL (ref 8–22)
CALCIUM SERPL-MCNC: 9.2 MG/DL (ref 8.5–10.5)
CHLORIDE SERPL-SCNC: 99 MMOL/L (ref 96–112)
CHOLEST SERPL-MCNC: 119 MG/DL (ref 100–199)
CO2 SERPL-SCNC: 28 MMOL/L (ref 20–33)
CREAT SERPL-MCNC: 0.51 MG/DL (ref 0.5–1.4)
EKG IMPRESSION: NORMAL
EOSINOPHIL # BLD AUTO: 0.48 K/UL (ref 0–0.51)
EOSINOPHIL NFR BLD: 3.7 % (ref 0–6.9)
ERYTHROCYTE [DISTWIDTH] IN BLOOD BY AUTOMATED COUNT: 50.2 FL (ref 35.9–50)
GLOBULIN SER CALC-MCNC: 3.4 G/DL (ref 1.9–3.5)
GLUCOSE SERPL-MCNC: 100 MG/DL (ref 65–99)
HCT VFR BLD AUTO: 32.2 % (ref 37–47)
HDLC SERPL-MCNC: 25 MG/DL
HGB BLD-MCNC: 10.4 G/DL (ref 12–16)
IMM GRANULOCYTES # BLD AUTO: 0.09 K/UL (ref 0–0.11)
IMM GRANULOCYTES NFR BLD AUTO: 0.7 % (ref 0–0.9)
LDLC SERPL CALC-MCNC: 78 MG/DL
LV EJECT FRACT  99904: 70
LV EJECT FRACT MOD 2C 99903: 75.85
LV EJECT FRACT MOD 4C 99902: 66.41
LV EJECT FRACT MOD BP 99901: 72.63
LYMPHOCYTES # BLD AUTO: 1.15 K/UL (ref 1–4.8)
LYMPHOCYTES NFR BLD: 9 % (ref 22–41)
MCH RBC QN AUTO: 32.9 PG (ref 27–33)
MCHC RBC AUTO-ENTMCNC: 32.3 G/DL (ref 33.6–35)
MCV RBC AUTO: 101.9 FL (ref 81.4–97.8)
MONOCYTES # BLD AUTO: 0.97 K/UL (ref 0–0.85)
MONOCYTES NFR BLD AUTO: 7.6 % (ref 0–13.4)
NEUTROPHILS # BLD AUTO: 10.09 K/UL (ref 2–7.15)
NEUTROPHILS NFR BLD: 78.6 % (ref 44–72)
NRBC # BLD AUTO: 0 K/UL
NRBC BLD-RTO: 0 /100 WBC
PHOSPHATE SERPL-MCNC: 3.3 MG/DL (ref 2.5–4.5)
PLATELET # BLD AUTO: 453 K/UL (ref 164–446)
PMV BLD AUTO: 10.2 FL (ref 9–12.9)
POTASSIUM SERPL-SCNC: 4.1 MMOL/L (ref 3.6–5.5)
PROT SERPL-MCNC: 6.1 G/DL (ref 6–8.2)
RBC # BLD AUTO: 3.16 M/UL (ref 4.2–5.4)
SODIUM SERPL-SCNC: 133 MMOL/L (ref 135–145)
TRIGL SERPL-MCNC: 80 MG/DL (ref 0–149)
WBC # BLD AUTO: 12.8 K/UL (ref 4.8–10.8)

## 2018-03-28 PROCEDURE — 700102 HCHG RX REV CODE 250 W/ 637 OVERRIDE(OP): Performed by: HOSPITALIST

## 2018-03-28 PROCEDURE — 36415 COLL VENOUS BLD VENIPUNCTURE: CPT

## 2018-03-28 PROCEDURE — 700102 HCHG RX REV CODE 250 W/ 637 OVERRIDE(OP): Performed by: STUDENT IN AN ORGANIZED HEALTH CARE EDUCATION/TRAINING PROGRAM

## 2018-03-28 PROCEDURE — 93010 ELECTROCARDIOGRAM REPORT: CPT | Performed by: INTERNAL MEDICINE

## 2018-03-28 PROCEDURE — 80053 COMPREHEN METABOLIC PANEL: CPT

## 2018-03-28 PROCEDURE — 85025 COMPLETE CBC W/AUTO DIFF WBC: CPT

## 2018-03-28 PROCEDURE — A9270 NON-COVERED ITEM OR SERVICE: HCPCS | Performed by: NURSE PRACTITIONER

## 2018-03-28 PROCEDURE — A9270 NON-COVERED ITEM OR SERVICE: HCPCS | Performed by: HOSPITALIST

## 2018-03-28 PROCEDURE — 700102 HCHG RX REV CODE 250 W/ 637 OVERRIDE(OP): Performed by: INTERNAL MEDICINE

## 2018-03-28 PROCEDURE — 99233 SBSQ HOSP IP/OBS HIGH 50: CPT | Performed by: HOSPITALIST

## 2018-03-28 PROCEDURE — 84100 ASSAY OF PHOSPHORUS: CPT

## 2018-03-28 PROCEDURE — 93306 TTE W/DOPPLER COMPLETE: CPT

## 2018-03-28 PROCEDURE — A9270 NON-COVERED ITEM OR SERVICE: HCPCS | Performed by: INTERNAL MEDICINE

## 2018-03-28 PROCEDURE — 770020 HCHG ROOM/CARE - TELE (206)

## 2018-03-28 PROCEDURE — 700102 HCHG RX REV CODE 250 W/ 637 OVERRIDE(OP): Performed by: NURSE PRACTITIONER

## 2018-03-28 PROCEDURE — 80061 LIPID PANEL: CPT

## 2018-03-28 PROCEDURE — A9270 NON-COVERED ITEM OR SERVICE: HCPCS | Performed by: STUDENT IN AN ORGANIZED HEALTH CARE EDUCATION/TRAINING PROGRAM

## 2018-03-28 PROCEDURE — 93005 ELECTROCARDIOGRAM TRACING: CPT | Performed by: HOSPITALIST

## 2018-03-28 PROCEDURE — 700111 HCHG RX REV CODE 636 W/ 250 OVERRIDE (IP): Performed by: HOSPITALIST

## 2018-03-28 PROCEDURE — 93306 TTE W/DOPPLER COMPLETE: CPT | Mod: 26 | Performed by: INTERNAL MEDICINE

## 2018-03-28 RX ORDER — ACETAMINOPHEN 325 MG/1
650 TABLET ORAL EVERY 6 HOURS PRN
Status: DISCONTINUED | OUTPATIENT
Start: 2018-03-28 | End: 2018-03-31 | Stop reason: HOSPADM

## 2018-03-28 RX ORDER — BENZONATATE 100 MG/1
100 CAPSULE ORAL 3 TIMES DAILY PRN
Status: DISCONTINUED | OUTPATIENT
Start: 2018-03-28 | End: 2018-03-31 | Stop reason: HOSPADM

## 2018-03-28 RX ORDER — GUAIFENESIN 600 MG/1
600 TABLET, EXTENDED RELEASE ORAL EVERY 12 HOURS
Status: DISCONTINUED | OUTPATIENT
Start: 2018-03-28 | End: 2018-03-31 | Stop reason: HOSPADM

## 2018-03-28 RX ORDER — GUAIFENESIN/DEXTROMETHORPHAN 100-10MG/5
10 SYRUP ORAL EVERY 4 HOURS PRN
Status: DISCONTINUED | OUTPATIENT
Start: 2018-03-28 | End: 2018-03-31 | Stop reason: HOSPADM

## 2018-03-28 RX ORDER — METOPROLOL TARTRATE 50 MG/1
50 TABLET, FILM COATED ORAL TWICE DAILY
Status: DISCONTINUED | OUTPATIENT
Start: 2018-03-28 | End: 2018-03-29

## 2018-03-28 RX ORDER — METOPROLOL TARTRATE 50 MG/1
50 TABLET, FILM COATED ORAL DAILY
Status: DISCONTINUED | OUTPATIENT
Start: 2018-03-28 | End: 2018-03-28

## 2018-03-28 RX ADMIN — GUAIFENESIN 1200 MG: 600 TABLET, EXTENDED RELEASE ORAL at 08:23

## 2018-03-28 RX ADMIN — CEFTRIAXONE SODIUM 1 G: 10 INJECTION, POWDER, FOR SOLUTION INTRAVENOUS at 08:23

## 2018-03-28 RX ADMIN — GUAIFENESIN 600 MG: 600 TABLET, EXTENDED RELEASE ORAL at 20:16

## 2018-03-28 RX ADMIN — ACETAMINOPHEN 650 MG: 325 TABLET, FILM COATED ORAL at 20:17

## 2018-03-28 RX ADMIN — RIVAROXABAN 20 MG: 20 TABLET, FILM COATED ORAL at 17:11

## 2018-03-28 RX ADMIN — METOPROLOL TARTRATE 50 MG: 50 TABLET, FILM COATED ORAL at 17:10

## 2018-03-28 RX ADMIN — METOPROLOL TARTRATE 25 MG: 25 TABLET, FILM COATED ORAL at 08:23

## 2018-03-28 RX ADMIN — AZITHROMYCIN 250 MG: 250 TABLET, FILM COATED ORAL at 08:23

## 2018-03-28 RX ADMIN — GUAIFENESIN AND DEXTROMETHORPHAN 10 ML: 100; 10 SYRUP ORAL at 11:02

## 2018-03-28 RX ADMIN — SIMVASTATIN 10 MG: 10 TABLET, FILM COATED ORAL at 20:16

## 2018-03-28 ASSESSMENT — COGNITIVE AND FUNCTIONAL STATUS - GENERAL
SUGGESTED CMS G CODE MODIFIER MOBILITY: CI
MOBILITY SCORE: 23
DAILY ACTIVITIY SCORE: 24
SUGGESTED CMS G CODE MODIFIER DAILY ACTIVITY: CH
CLIMB 3 TO 5 STEPS WITH RAILING: A LITTLE

## 2018-03-28 ASSESSMENT — PAIN SCALES - GENERAL
PAINLEVEL_OUTOF10: 5
PAINLEVEL_OUTOF10: 0

## 2018-03-28 ASSESSMENT — ENCOUNTER SYMPTOMS
ORTHOPNEA: 0
VOMITING: 0
FEVER: 0
DIARRHEA: 0
WEAKNESS: 1
PALPITATIONS: 1
ABDOMINAL PAIN: 0
SPUTUM PRODUCTION: 1
NECK PAIN: 0
DEPRESSION: 0
HEADACHES: 0
COUGH: 1
DIZZINESS: 0
SHORTNESS OF BREATH: 1
MYALGIAS: 0

## 2018-03-28 NOTE — PROGRESS NOTES
[facility] Hospitalist Progress Note    Date of Service: 3/28/2018    Chief Complaint  82 y.o. female admitted 3/26/2018 with acute hypoxic respiratory failure 2/2 PAF and PNA    Interval Problem Update  Patient doing ok this morning, still requiring O2, coughing unable to bring sputum up.     Consultants/Specialty  Cardiology-signed off    Disposition  Home when stable        Review of Systems   Constitutional: Positive for malaise/fatigue. Negative for fever.   HENT: Negative for hearing loss.    Respiratory: Positive for cough, sputum production and shortness of breath.    Cardiovascular: Positive for palpitations. Negative for chest pain and orthopnea.   Gastrointestinal: Negative for abdominal pain, diarrhea and vomiting.   Genitourinary: Negative for dysuria and urgency.   Musculoskeletal: Negative for myalgias and neck pain.   Neurological: Positive for weakness. Negative for dizziness and headaches.   Psychiatric/Behavioral: Negative for depression and suicidal ideas.      Physical Exam  Laboratory/Imaging   Hemodynamics  Temp (24hrs), Av.2 °C (98.9 °F), Min:36.8 °C (98.3 °F), Max:37.7 °C (99.8 °F)   Temperature: 37.3 °C (99.1 °F)  Pulse  Av.4  Min: 69  Max: 132    Blood Pressure : 111/61      Respiratory      Respiration: 18, Pulse Oximetry: 96 %     Work Of Breathing / Effort: Mild  RUL Breath Sounds: Diminished, RML Breath Sounds: Diminished, RLL Breath Sounds: Crackles;Diminished, NEO Breath Sounds: Diminished, LLL Breath Sounds: Diminished;Crackles    Fluids  No intake or output data in the 24 hours ending 18 1255    Nutrition  Orders Placed This Encounter   Procedures   • Diet Order     Standing Status:   Standing     Number of Occurrences:   1     Order Specific Question:   Diet:     Answer:   Cardiac [6]     Physical Exam   Constitutional: She is oriented to person, place, and time. She appears well-developed and well-nourished.   HENT:   Head: Normocephalic and atraumatic.   Mouth/Throat:  Oropharynx is clear and moist.   Eyes: Pupils are equal, round, and reactive to light.   Neck: Normal range of motion. No JVD present.   Cardiovascular: Normal rate.    Pulmonary/Chest: Effort normal and breath sounds normal. No respiratory distress. She has no wheezes. She has no rales.   Diminished breath sounds   Abdominal: Soft. Bowel sounds are normal. She exhibits no distension. There is no tenderness.   Musculoskeletal: Normal range of motion. She exhibits no edema or tenderness.   Lymphadenopathy:     She has no cervical adenopathy.   Neurological: She is alert and oriented to person, place, and time. She exhibits normal muscle tone.   Skin: Skin is warm and dry. No rash noted. No erythema.   Psychiatric: She has a normal mood and affect.       Recent Labs      03/26/18 1958 03/28/18 0329   WBC  13.4*  12.8*   RBC  3.36*  3.16*   HEMOGLOBIN  11.0*  10.4*   HEMATOCRIT  33.8*  32.2*   MCV  100.6*  101.9*   MCH  32.7  32.9   MCHC  32.5*  32.3*   RDW  48.5  50.2*   PLATELETCT  438  453*   MPV  10.1  10.2     Recent Labs      03/26/18 1958 03/28/18   0329   SODIUM  134*  133*   POTASSIUM  4.0  4.1   CHLORIDE  98  99   CO2  28  28   GLUCOSE  108*  100*   BUN  14  10   CREATININE  0.56  0.51   CALCIUM  9.3  9.2     Recent Labs      03/26/18 1958 03/27/18   0219   APTT  33.1  31.0   INR  1.25*  1.32*     Recent Labs      03/26/18 1958   BNPBTYPENAT  274*     Recent Labs      03/28/18   0329   TRIGLYCERIDE  80   HDL  25*   LDL  78          Assessment/Plan     * Atrial fibrillation with RVR (CMS-HCC)   Assessment & Plan    New onset, now converted to sinus rhythm ; PAF  Echo noted   25 mg Metop BID   Will monitor on telemetry   Treat underlying PNA  xarelto          Sepsis due to pneumonia (CMS-HCC)   Assessment & Plan    This is sepsis (without associated acute organ dysfunction).   New onset PNA  IV abx   Blood cultures negative   CXR and CT chest concerning of LLL Pna  Continue abx, does not wear O2 at  home, however requiring 3L NC  Continue to wean as tolerated          Acute respiratory failure with hypoxia (CMS-HCC)   Assessment & Plan    2/2 PAF and PNA  Treating with abx and BB  Wean O2  RT protocol        Alcohol abuse   Assessment & Plan    Few glasses of wine a day   No hx of withdrawals no drinks this week   Monitor for withdrawal no ciwa ordered        Hypoalbuminemia   Assessment & Plan    Pre-albumin low  Nutrition consult        Hyponatremia   Assessment & Plan    IVF recheck lab        Anemia   Assessment & Plan    Mixed iron def and AOCD  Start iron supplementation          Abnormal LFTs   Assessment & Plan    Unknown etiology; etoh?  Liver US  Neg  Repeat cmp          Mixed hyperlipidemia- (present on admission)   Assessment & Plan    Lipid panel   Continue statin        History of total left hip arthroplasty- (present on admission)   Assessment & Plan    hx          Quality-Core Measures   Reviewed items::  Labs reviewed, Medications reviewed and Radiology images reviewed  Acosta catheter::  No Acosta  Antibiotics:  Treating active infection/contamination beyond 24 hours perioperative coverage

## 2018-03-28 NOTE — PROGRESS NOTES
Monitor Strip Summary: SR with 1st Degree AVB, occasional PVCs noted, HR: 76-96, MD: 0.24, QRS: 0.08, QT: 0.38

## 2018-03-28 NOTE — RESPIRATORY CARE
COPD EDUCATION by COPD CLINICAL EDUCATOR  3/28/2018 at 6:56 AM by Sanjana Drake     Patient reviewed by COPD education team. Patient does not qualify for COPD program.

## 2018-03-28 NOTE — PROGRESS NOTES
Assumed care of pt at 0645. Bedside report received from nightshift RN. Initial assessment complete; orders reviewed. Pt is on monitor with no signs of distress. POC discussed with pt; no further questions at this time. Bed in the lowest position, call light instruction provided and within reach.

## 2018-03-29 PROBLEM — J18.9 PNEUMONIA DUE TO INFECTIOUS ORGANISM: Status: ACTIVE | Noted: 2018-03-29

## 2018-03-29 LAB
MAGNESIUM SERPL-MCNC: 2.1 MG/DL (ref 1.5–2.5)
PROCALCITONIN SERPL-MCNC: <0.05 NG/ML

## 2018-03-29 PROCEDURE — 700102 HCHG RX REV CODE 250 W/ 637 OVERRIDE(OP): Performed by: HOSPITALIST

## 2018-03-29 PROCEDURE — 83735 ASSAY OF MAGNESIUM: CPT

## 2018-03-29 PROCEDURE — 87205 SMEAR GRAM STAIN: CPT

## 2018-03-29 PROCEDURE — A9270 NON-COVERED ITEM OR SERVICE: HCPCS | Performed by: HOSPITALIST

## 2018-03-29 PROCEDURE — 99232 SBSQ HOSP IP/OBS MODERATE 35: CPT | Performed by: INTERNAL MEDICINE

## 2018-03-29 PROCEDURE — 36415 COLL VENOUS BLD VENIPUNCTURE: CPT

## 2018-03-29 PROCEDURE — A9270 NON-COVERED ITEM OR SERVICE: HCPCS | Performed by: INTERNAL MEDICINE

## 2018-03-29 PROCEDURE — 700111 HCHG RX REV CODE 636 W/ 250 OVERRIDE (IP): Performed by: HOSPITALIST

## 2018-03-29 PROCEDURE — 700102 HCHG RX REV CODE 250 W/ 637 OVERRIDE(OP): Performed by: INTERNAL MEDICINE

## 2018-03-29 PROCEDURE — 770020 HCHG ROOM/CARE - TELE (206)

## 2018-03-29 PROCEDURE — 87070 CULTURE OTHR SPECIMN AEROBIC: CPT

## 2018-03-29 PROCEDURE — 84145 PROCALCITONIN (PCT): CPT

## 2018-03-29 RX ORDER — METOPROLOL TARTRATE 50 MG/1
100 TABLET, FILM COATED ORAL TWICE DAILY
Status: DISCONTINUED | OUTPATIENT
Start: 2018-03-29 | End: 2018-03-31 | Stop reason: HOSPADM

## 2018-03-29 RX ORDER — LEVOFLOXACIN 750 MG/1
750 TABLET, FILM COATED ORAL DAILY
Status: DISCONTINUED | OUTPATIENT
Start: 2018-03-29 | End: 2018-03-31 | Stop reason: HOSPADM

## 2018-03-29 RX ADMIN — LEVOFLOXACIN 750 MG: 750 TABLET, FILM COATED ORAL at 13:24

## 2018-03-29 RX ADMIN — RIVAROXABAN 20 MG: 20 TABLET, FILM COATED ORAL at 18:15

## 2018-03-29 RX ADMIN — GUAIFENESIN 600 MG: 600 TABLET, EXTENDED RELEASE ORAL at 08:49

## 2018-03-29 RX ADMIN — STANDARDIZED SENNA CONCENTRATE AND DOCUSATE SODIUM 2 TABLET: 8.6; 5 TABLET, FILM COATED ORAL at 08:50

## 2018-03-29 RX ADMIN — SIMVASTATIN 10 MG: 10 TABLET, FILM COATED ORAL at 19:50

## 2018-03-29 RX ADMIN — AZITHROMYCIN 250 MG: 250 TABLET, FILM COATED ORAL at 08:50

## 2018-03-29 RX ADMIN — METOPROLOL TARTRATE 100 MG: 50 TABLET, FILM COATED ORAL at 19:51

## 2018-03-29 RX ADMIN — STANDARDIZED SENNA CONCENTRATE AND DOCUSATE SODIUM 2 TABLET: 8.6; 5 TABLET, FILM COATED ORAL at 19:50

## 2018-03-29 RX ADMIN — METOPROLOL TARTRATE 100 MG: 50 TABLET, FILM COATED ORAL at 08:50

## 2018-03-29 RX ADMIN — CEFTRIAXONE SODIUM 1 G: 10 INJECTION, POWDER, FOR SOLUTION INTRAVENOUS at 08:50

## 2018-03-29 ASSESSMENT — ENCOUNTER SYMPTOMS
DIZZINESS: 0
PALPITATIONS: 0
ORTHOPNEA: 0
PND: 0
HEADACHES: 0
SPUTUM PRODUCTION: 1
SHORTNESS OF BREATH: 1
WEAKNESS: 1
NECK PAIN: 0
FEVER: 0
DIARRHEA: 0
DEPRESSION: 0
ABDOMINAL PAIN: 0
COUGH: 1
VOMITING: 0
CLAUDICATION: 0
MYALGIAS: 0

## 2018-03-29 ASSESSMENT — PAIN SCALES - GENERAL
PAINLEVEL_OUTOF10: 0

## 2018-03-29 NOTE — PROGRESS NOTES
Monitor Strip Summary: A-Fib/Flutter/SR, frequent PVCs noted, QRS: 0.10, Pt began shift in A-Fib?Flutter, converted to SR at 2132, and then converted back to A-Fib/Flutter @ 2344. Pt remains in A-Fib/Flutter

## 2018-03-29 NOTE — PROGRESS NOTES
Cardiology Progress Note               Author: Ashok Hoyos Date & Time created: 3/29/2018  8:15 AM     Consultation for atrial fibrillation RVR (no prior history of A. fib)      Admitted with feeling ill for the past 3 weeks, chronic and persistent nonproductive cough      History of hyperlipidemia      Labs reviewed   sodium 133  Potassium, creatinine stable  Troponin negative        BP =1116/75  HR =70's NSR, in and out of A. fib overnight      Echocardiogram 3/28/18, EF 70%, grade 2 diastolic dysfunction, moderately dilated left atrium, RVSP 45 mmHg    Review of Systems   Respiratory: Positive for cough and shortness of breath.    Cardiovascular: Negative for chest pain, palpitations, orthopnea, claudication, leg swelling and PND.       Physical Exam   Constitutional: She is oriented to person, place, and time. She appears well-developed.   HENT:   Head: Normocephalic.   Eyes: Conjunctivae are normal.   Neck: No JVD present. No thyromegaly present.   Cardiovascular: Normal rate and regular rhythm.    Pulses:       Carotid pulses are 2+ on the right side, and 2+ on the left side.       Radial pulses are 2+ on the right side, and 2+ on the left side.   Pulmonary/Chest: She has no wheezes.   Abdominal: Soft.   Musculoskeletal: She exhibits no edema.   Neurological: She is alert and oriented to person, place, and time.   Skin: Skin is warm and dry.       Hemodynamics:  Temp (24hrs), Av.2 °C (99 °F), Min:36.4 °C (97.6 °F), Max:38.6 °C (101.4 °F)  Temperature: 37.5 °C (99.5 °F)  Pulse  Av.5  Min: 69  Max: 132   Blood Pressure : 116/75     Respiratory:    Respiration: 16, Pulse Oximetry: 93 %     Work Of Breathing / Effort: Mild  RUL Breath Sounds: Diminished, RML Breath Sounds: Diminished, RLL Breath Sounds: Diminished, NEO Breath Sounds: Diminished, LLL Breath Sounds: Diminished  Fluids:     Weight: 65.8 kg (145 lb 1 oz)  GI/Nutrition:  Orders Placed This Encounter   Procedures   • Diet Order     Standing  Status:   Standing     Number of Occurrences:   1     Order Specific Question:   Diet:     Answer:   Cardiac [6]     Lab Results:  Recent Labs      03/26/18 1958 03/28/18 0329   WBC  13.4*  12.8*   RBC  3.36*  3.16*   HEMOGLOBIN  11.0*  10.4*   HEMATOCRIT  33.8*  32.2*   MCV  100.6*  101.9*   MCH  32.7  32.9   MCHC  32.5*  32.3*   RDW  48.5  50.2*   PLATELETCT  438  453*   MPV  10.1  10.2     Recent Labs      03/26/18 1958 03/28/18 0329   SODIUM  134*  133*   POTASSIUM  4.0  4.1   CHLORIDE  98  99   CO2  28  28   GLUCOSE  108*  100*   BUN  14  10   CREATININE  0.56  0.51   CALCIUM  9.3  9.2     Recent Labs      03/26/18 1958 03/27/18 0219   APTT  33.1  31.0   INR  1.25*  1.32*     Recent Labs      03/26/18 1958   BNPBTYPENAT  274*     Recent Labs      03/26/18 1958 03/27/18 0219 03/27/18   0740   TROPONINI  0.02  <0.01  0.01   BNPBTYPENAT  274*   --    --      Recent Labs      03/28/18   0329   TRIGLYCERIDE  80   HDL  25*   LDL  78         Medical Decision Making, by Problem:  Active Hospital Problems    Diagnosis   • *Atrial fibrillation with RVR (CMS-HCC) [I48.91]   • Sepsis due to pneumonia (CMS-[facility]) [J18.9, A41.9]   • Acute respiratory failure with hypoxia (CMS-[facility]) [J96.01]   • Abnormal LFTs [R79.89]   • Anemia [D64.9]   • Hyponatremia [E87.1]   • Hypoalbuminemia [E88.09]   • Alcohol abuse [F10.10]   • Mixed hyperlipidemia [E78.2]   • 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

## 2018-03-29 NOTE — DISCHARGE PLANNING
Pt's grand daughter requested [facility] paperwork.  Pt signed [employer].  Sw faxed facesheet, [employer] and [facility] paperwork to [facility] coordinator at [phone].     Plan:  SW will continue to assist with pt's d/c needs.

## 2018-03-29 NOTE — PROGRESS NOTES
Monitor summary    SR 69-84 with occasional PVC 0.24/0.06/0.36  Conversion to afib 90s-120 at 1556.

## 2018-03-29 NOTE — CARE PLAN
Problem: Communication  Goal: The ability to communicate needs accurately and effectively will improve    Intervention: Educate patient and significant other/support system about the plan of care, procedures, treatments, medications and allow for questions  Update pt on POC. Answer questions as needed.      Problem: Safety  Goal: Will remain free from injury    Intervention: Provide assistance with mobility  Pt educated on fall precautions. Bed in low, locked position. Call light within reach. Treaded socks on pt. Hourly rounding in place.

## 2018-03-29 NOTE — CARE PLAN
Problem: Safety  Goal: Will remain free from injury    Intervention: Provide assistance with mobility   03/28/18 1930   OTHER   Assistance / Tolerance Standby Assist   Pt instructed to call for help when getting out of bed, verbalized understanding.

## 2018-03-29 NOTE — PROGRESS NOTES
HR sustaining 120s-140s.  Patient asymptomatic.  BP stable.  Dr. Mars with cardiology updated.  No new orders at this time unless HR sustaining >150s.

## 2018-03-30 LAB
ANION GAP SERPL CALC-SCNC: 7 MMOL/L (ref 0–11.9)
BASOPHILS # BLD AUTO: 0.5 % (ref 0–1.8)
BASOPHILS # BLD: 0.07 K/UL (ref 0–0.12)
BUN SERPL-MCNC: 12 MG/DL (ref 8–22)
CALCIUM SERPL-MCNC: 8.8 MG/DL (ref 8.5–10.5)
CHLORIDE SERPL-SCNC: 100 MMOL/L (ref 96–112)
CO2 SERPL-SCNC: 30 MMOL/L (ref 20–33)
CREAT SERPL-MCNC: 0.57 MG/DL (ref 0.5–1.4)
EOSINOPHIL # BLD AUTO: 0.4 K/UL (ref 0–0.51)
EOSINOPHIL NFR BLD: 3 % (ref 0–6.9)
ERYTHROCYTE [DISTWIDTH] IN BLOOD BY AUTOMATED COUNT: 49.7 FL (ref 35.9–50)
GLUCOSE SERPL-MCNC: 96 MG/DL (ref 65–99)
GRAM STN SPEC: NORMAL
HCT VFR BLD AUTO: 31.5 % (ref 37–47)
HGB BLD-MCNC: 10 G/DL (ref 12–16)
IMM GRANULOCYTES # BLD AUTO: 0.07 K/UL (ref 0–0.11)
IMM GRANULOCYTES NFR BLD AUTO: 0.5 % (ref 0–0.9)
LYMPHOCYTES # BLD AUTO: 1.44 K/UL (ref 1–4.8)
LYMPHOCYTES NFR BLD: 10.8 % (ref 22–41)
MCH RBC QN AUTO: 32.2 PG (ref 27–33)
MCHC RBC AUTO-ENTMCNC: 31.7 G/DL (ref 33.6–35)
MCV RBC AUTO: 101.3 FL (ref 81.4–97.8)
MONOCYTES # BLD AUTO: 1.03 K/UL (ref 0–0.85)
MONOCYTES NFR BLD AUTO: 7.7 % (ref 0–13.4)
NEUTROPHILS # BLD AUTO: 10.31 K/UL (ref 2–7.15)
NEUTROPHILS NFR BLD: 77.5 % (ref 44–72)
NRBC # BLD AUTO: 0 K/UL
NRBC BLD-RTO: 0 /100 WBC
PLATELET # BLD AUTO: 473 K/UL (ref 164–446)
PMV BLD AUTO: 9.9 FL (ref 9–12.9)
POTASSIUM SERPL-SCNC: 3.8 MMOL/L (ref 3.6–5.5)
RBC # BLD AUTO: 3.11 M/UL (ref 4.2–5.4)
SIGNIFICANT IND 70042: NORMAL
SITE SITE: NORMAL
SODIUM SERPL-SCNC: 137 MMOL/L (ref 135–145)
SOURCE SOURCE: NORMAL
WBC # BLD AUTO: 13.3 K/UL (ref 4.8–10.8)

## 2018-03-30 PROCEDURE — A9270 NON-COVERED ITEM OR SERVICE: HCPCS | Performed by: STUDENT IN AN ORGANIZED HEALTH CARE EDUCATION/TRAINING PROGRAM

## 2018-03-30 PROCEDURE — 80048 BASIC METABOLIC PNL TOTAL CA: CPT

## 2018-03-30 PROCEDURE — 36415 COLL VENOUS BLD VENIPUNCTURE: CPT

## 2018-03-30 PROCEDURE — 700102 HCHG RX REV CODE 250 W/ 637 OVERRIDE(OP): Performed by: HOSPITALIST

## 2018-03-30 PROCEDURE — 700102 HCHG RX REV CODE 250 W/ 637 OVERRIDE(OP): Performed by: STUDENT IN AN ORGANIZED HEALTH CARE EDUCATION/TRAINING PROGRAM

## 2018-03-30 PROCEDURE — A9270 NON-COVERED ITEM OR SERVICE: HCPCS | Performed by: INTERNAL MEDICINE

## 2018-03-30 PROCEDURE — A9270 NON-COVERED ITEM OR SERVICE: HCPCS | Performed by: HOSPITALIST

## 2018-03-30 PROCEDURE — 700102 HCHG RX REV CODE 250 W/ 637 OVERRIDE(OP): Performed by: INTERNAL MEDICINE

## 2018-03-30 PROCEDURE — 85025 COMPLETE CBC W/AUTO DIFF WBC: CPT

## 2018-03-30 PROCEDURE — 770020 HCHG ROOM/CARE - TELE (206)

## 2018-03-30 PROCEDURE — 99232 SBSQ HOSP IP/OBS MODERATE 35: CPT | Performed by: INTERNAL MEDICINE

## 2018-03-30 RX ORDER — GUAIFENESIN 600 MG/1
600 TABLET, EXTENDED RELEASE ORAL EVERY 12 HOURS
Qty: 28 TAB | Refills: 0 | Status: SHIPPED | OUTPATIENT
Start: 2018-03-30 | End: 2018-04-12

## 2018-03-30 RX ORDER — LEVOFLOXACIN 750 MG/1
750 TABLET, FILM COATED ORAL DAILY
Qty: 3 TAB | Refills: 0 | Status: SHIPPED | OUTPATIENT
Start: 2018-03-31 | End: 2018-04-03

## 2018-03-30 RX ADMIN — LEVOFLOXACIN 750 MG: 750 TABLET, FILM COATED ORAL at 09:36

## 2018-03-30 RX ADMIN — SIMVASTATIN 10 MG: 10 TABLET, FILM COATED ORAL at 21:12

## 2018-03-30 RX ADMIN — METOPROLOL TARTRATE 100 MG: 50 TABLET, FILM COATED ORAL at 09:37

## 2018-03-30 RX ADMIN — METOPROLOL TARTRATE 100 MG: 50 TABLET, FILM COATED ORAL at 21:12

## 2018-03-30 RX ADMIN — GUAIFENESIN 600 MG: 600 TABLET, EXTENDED RELEASE ORAL at 21:12

## 2018-03-30 RX ADMIN — GUAIFENESIN 600 MG: 600 TABLET, EXTENDED RELEASE ORAL at 09:36

## 2018-03-30 RX ADMIN — BENZONATATE 100 MG: 100 CAPSULE ORAL at 02:38

## 2018-03-30 RX ADMIN — RIVAROXABAN 20 MG: 20 TABLET, FILM COATED ORAL at 18:21

## 2018-03-30 ASSESSMENT — PAIN SCALES - GENERAL
PAINLEVEL_OUTOF10: 0

## 2018-03-30 NOTE — DOCUMENTATION QUERY
DOCUMENTATION QUERY    PROVIDERS: Please select “Cosign w/ note” to reply to query.    To better represent the severity of illness of your patient, please review the following information and exercise your independent professional judgment in responding to this query.     Sepsis and acute respiratory failure are documented in the 3/28 - 3/29 Hospitalist Progress Notes. Based upon the clinical findings, risk factors, and treatment, can the relationship between these two conditions be further specified?    • Acute respiratory failure with hypoxia is related to sepsis (severe sepsis)  • Acute respiratory failure with hypoxia is not related to sepsis  • Other explanation of clinical findings  • Unable to determine    The medical record reflects the following:   Clinical Findings 3/28 Hosp PN:  · Sepsis due to pneumonia - does not wear O2 at home, however requiring 3L NC  · Acute respiratory failure with hypoxia - 2/2 PAF and PNA    3/29 Hosp PN:  · Congested cough continues; Still requiring O2  · Diminished breath sounds  · Sepsis - new onset pna  · Acute respiratory failure with hypoxia 2/2 PAF and PNA    Treatment Ceftriaxone  Azithromycin  Levofloxacin   RT protocol   Risk Factors Advanced age  Sepsis  PAF  Acute respiratory failure with hypoxia  Pneumonia    Location within medical record Progress Notes     Thank you,   Dulce Owens, RN, BSN  Clinical   [phone]

## 2018-03-30 NOTE — DISCHARGE PLANNING
ATTN: Case Management  RE: Referral for Home Health    Reason for referral denial: Home Health cannot accept this patient due to capacity issues. We would not be able to see this patient in an timely manner                We would like to take this opportunity to thank you for submitting a referral for your patient to continue the journey to recovery with [facility]. We hope to facilitate continued referrals from your facility as our goal is to provide quality, skilled care to as many patients as possible.            Unfortunately, we are not able to accept your patient into our service for the reason listed above. If further clarity is needed, our Intake Coordinators are available to discuss any barriers to service.            If you have any questions or concerns regarding this or future patients’ transition to Home Health, please do not hesitate to contact us. We are open for referrals 7 days a week from 8AM to 5PM at [phone].      We look forward to collaborating with you in the future,  [facility] Team

## 2018-03-30 NOTE — DISCHARGE PLANNING
Pt provided verbal authorization for [facility], per nurse. SW faxed choice form to [facility].      Plan:  SW will continue to assist with pt's d/c needs.

## 2018-03-30 NOTE — DISCHARGE PLANNING
Received choice form from [facility]. Referral sent to [facility] at [phone] on 03-30-18

## 2018-03-30 NOTE — DISCHARGE SUMMARY
CHIEF COMPLAINT ON ADMISSION  Chief Complaint   Patient presents with   • Atrial Fibrillation     Sent by Urgent Care for A-fib with RVR   • Cough     3 weeks, productive with clear sputum   • Chest Pain     Sharp left sided chest pain 8/10       CODE STATUS  Full Code    HPI & HOSPITAL COURSE  This is a 82 y.o. female here with elevated heart rate, left-sided chest pain, cough with yellow and green sputum, was diagnosed with sepsis and acute hypoxic respiratory failure secondary to pneumonia/sepsis, as well as new onset atrial fibrillation. For details see H&P note.  Patient was treated for pneumonia with IV antibiotics ceftriaxone and azithromycin, that were changed to levofloxacin due to spikes of fever. She was getting breathing treatment, bronchodilators. Hypoxia improved, however patient does require oxygen upon discharge. That was ordered.  Patient had new onset atrial fibrillation treated in the hospital with metoprolol, as well as Xarelto for anticoagulation. His A. fib converted back to sinus rythm. Heart ultrasound showed diastolic dysfunction grade 2, ejection fraction 70%. Cardiology consulted.  The patient met 2-midnight criteria for an inpatient stay at the time of discharge.    Therefore, she is discharged with oral medications, home health, home oxygen in fair and stable condition with close outpatient follow-up.    SPECIFIC OUTPATIENT FOLLOW-UP  PCP  Cardiology    DISCHARGE PROBLEM LIST  Principal Problem:    Atrial fibrillation with RVR (CMS-HCC) POA: Unknown  Active Problems:    Sepsis due to pneumonia (CMS-HCC) POA: Unknown    History of total left hip arthroplasty POA: Yes    Mixed hyperlipidemia POA: Yes    Abnormal LFTs POA: Unknown    Anemia POA: Unknown    Hyponatremia POA: Unknown    Hypoalbuminemia POA: Unknown    Alcohol abuse POA: Unknown    Acute respiratory failure with hypoxia (CMS-HCC) POA: Unknown    Pneumonia due to infectious organism POA: Unknown  Resolved Problems:    * No  resolved hospital problems. *      FOLLOW UP  Future Appointments  Date Time Provider Department Center   4/2/2018 1:00 PM CARE MANAGER [facility]   4/4/2018 1:40 PM Yamileth Worthington M.D. [facility]   4/12/2018 10:40 AM MATTHEW Pereira [facility] None   5/2/2018 3:00 PM Brandon Pearl M.D. [facility] Pearl M.D.  [address]  [facility] [address]  [place] [address]  [phone]    Schedule an appointment as soon as possible for a visit in 1 week  Hospital follow-up appointment with PCP      MEDICATIONS ON DISCHARGE     Medication List      START taking these medications      Instructions   doxycycline 100 MG capsule  Commonly known as:  MONODOX   Take 1 Cap by mouth 2 times a day.  Dose:  100 mg     guaiFENesin  MG Tb12  Commonly known as:  MUCINEX   Take 1 Tab by mouth every 12 hours.  Dose:  600 mg     levoFLOXacin 750 MG tablet  Start taking on:  3/31/2018  Commonly known as:  LEVAQUIN   Take 1 Tab by mouth every day for 3 days.  Dose:  750 mg     metoprolol 25 MG Tabs  Commonly known as:  LOPRESSOR   Take 2 Tabs by mouth 2 times a day.  Dose:  50 mg     rivaroxaban 20 MG Tabs tablet  Commonly known as:  XARELTO   Take 1 Tab by mouth with dinner.  Dose:  20 mg        CONTINUE taking these medications      Instructions   Calcium 500 MG Chew   Take 1 Tab by mouth every morning.  Dose:  1 Tab     FLONASE 50 MCG/ACT nasal spray  Generic drug:  fluticasone   Spray 1 Spray in nose every morning.  Dose:  1 Spray     hydrocortisone 2.5 % Crea topical cream   Apply 1 Application to affected area(s) 2 times a day.  Dose:  1 Application     METAMUCIL FIBER PO   Take  by mouth.     Omega 3 1200 MG Caps   Take 1 Cap by mouth every morning.  Dose:  1 Cap     simvastatin 10 MG Tabs  Commonly known as:  ZOCOR   Take 1 Tab by mouth every evening.  Dose:  10 mg     Vitamin D-3 1000 units Caps   Take 1 Tab by mouth every morning.  Dose:  1 Tab     VITRUM 50+ SENIOR MULTI PO   Take  by mouth.        STOP  taking these medications    aspirin EC 81 MG Tbec  Commonly known as:  ECOTRIN              DIET  Orders Placed This Encounter   Procedures   • Diet Order     Standing Status:   Standing     Number of Occurrences:   1     Order Specific Question:   Diet:     Answer:   Cardiac [6]       ACTIVITY  As tolerated.        CONSULTATIONS  Cardiology    PROCEDURES  none    LABORATORY  Lab Results   Component Value Date/Time    SODIUM 137 03/30/2018 03:16 AM    POTASSIUM 3.8 03/30/2018 03:16 AM    CHLORIDE 100 03/30/2018 03:16 AM    CO2 30 03/30/2018 03:16 AM    GLUCOSE 96 03/30/2018 03:16 AM    BUN 12 03/30/2018 03:16 AM    CREATININE 0.57 03/30/2018 03:16 AM        Lab Results   Component Value Date/Time    WBC 13.3 (H) 03/30/2018 03:16 AM    HEMOGLOBIN 10.0 (L) 03/30/2018 03:16 AM    HEMATOCRIT 31.5 (L) 03/30/2018 03:16 AM    PLATELETCT 473 (H) 03/30/2018 03:16 AM      ECHOCARDIOGRAM COMP W/O CONT   Final Result      US-LIVER AND BILIARY TREE   Final Result      Contracted gallbladder, limiting evaluation.      No biliary ductal dilatation.      Atherosclerotic plaque.         CT-CTA CHEST PULMONARY ARTERY W/ RECONS   Final Result         1. No CT evidence of pulmonary embolism.      2. Patchy left basilar opacity, likely atelectasis.      DX-CHEST-LIMITED (1 VIEW)   Final Result         Airspace opacity in the left lower lobe, in keeping with pneumonia.            Total time of the discharge process exceeds 45 minutes

## 2018-03-30 NOTE — CARE PLAN
Problem: Communication  Goal: The ability to communicate needs accurately and effectively will improve  Outcome: PROGRESSING AS EXPECTED    Intervention: [place] patient and significant other/support system to call light to alert staff of needs   03/30/18 0100   OTHER   Oriented to: All of the Following : Location of Bathroom, Visiting Policy, Unit Routine, Call Light and Bedside Controls, Bedside Rail Policy, Smoking Policy, Rights and Responsibilities, Bedside Report, and Patient Education Notebook         Problem: Knowledge Deficit  Goal: Knowledge of disease process/condition, treatment plan, diagnostic tests, and medications will improve  Outcome: PROGRESSING AS EXPECTED  Patient educated about disease process and plan of care for the shift. Patient expressed understanding. All questions answered at this time.

## 2018-03-30 NOTE — FACE TO FACE
Face to Face Note  -  Durable Medical Equipment    Desmond Sewell M.D. - NPI: [id number]  I certify that this patient is under my care and that they had a durable medical equipment(DME)face to face encounter by myself that meets the physician DME face-to-face encounter requirements with this patient on:    Date of encounter:   Patient:                    MRN:                       YOB: 2018  Lisa Garza  [id number]  1935     The encounter with the patient was in whole, or in part, for the following medical condition, which is the primary reason for durable medical equipment:  Other - Pneumonia    I certify that, based on my findings, the following durable medical equipment is medically necessary:  Oxygen.    HOME O2 Saturation Measurements:(Values must be present for Home Oxygen orders)  Room air sat at rest: 93  Room air sat with amb: 88  With liters of O2: 0.5, O2 sat at rest with O2: 94  With Liters of O2: 1, O2 sat with amb with O2 : 96  Is the patient mobile?: Yes    My Clinical findings support the need for the above equipment due to:  Hypoxia    Supporting Symptoms: Dyspnea

## 2018-03-30 NOTE — PROGRESS NOTES
Report received from previous nurse. Patient encouraged to call for assistance. Call light within reach. Bed locked and in lowest position.

## 2018-03-30 NOTE — FACE TO FACE
Face to Face Supporting Documentation - Home Health    The encounter with this patient was in whole or in part the primary reason for home health admission.    Date of encounter:   Patient:                    MRN:                       YOB: 2018  Lisa Garza  [id number]  1935     Home health to see patient for:  Skilled Nursing care for assessment, interventions & education    Skilled need for:  New Onset Medical Diagnosis Pneumonia and Medication Management .    Skilled nursing interventions to include:  Comment: assessment    Homebound status evidenced by:  Needs the assistance of another person in order to leave the home. Leaving home requires a considerable and taxing effort. There is a normal inability to leave the home.    Community Physician to provide follow up care: Brandon Pearl M.D.     Optional Interventions? No      I certify the face to face encounter for this home health care referral meets the CMS requirements and the encounter/clinical assessment with the patient was, in whole, or in part, for the medical condition(s) listed above, which is the primary reason for home health care. Based on my clinical findings: the service(s) are medically necessary, support the need for home health care, and the homebound criteria are met.  I certify that this patient has had a face to face encounter by myself.  Desmond Sewell M.D. - NPI: [id number]

## 2018-03-30 NOTE — DISCHARGE PLANNING
Received call from [place] at [facility] they have declined patient as they are unable to accept anymore patients.

## 2018-03-30 NOTE — PROGRESS NOTES
[facility]    Date of Service: 3/29/2018    Chief Complaint  82 y.o. female admitted 3/26/2018 with acute hypoxic respiratory failure 2/2 PAF and PNA    Interval Problem Update  Congested cough continues. still requiring O2.  Dose of metoprolol increased for better control of heart rate.  Spiked fever yesterday 101.4. Antibiotics changed to levofloxacin today for additional 5 days    Consultants/Specialty  Cardiology-signed off    Disposition  Home when stable        Review of Systems   Constitutional: Positive for malaise/fatigue. Negative for fever.   HENT: Negative for hearing loss.    Respiratory: Positive for cough, sputum production and shortness of breath.    Cardiovascular: Negative for chest pain, palpitations and orthopnea.   Gastrointestinal: Negative for abdominal pain, diarrhea and vomiting.   Genitourinary: Negative for dysuria and urgency.   Musculoskeletal: Negative for myalgias and neck pain.   Neurological: Positive for weakness. Negative for dizziness and headaches.   Psychiatric/Behavioral: Negative for depression and suicidal ideas.      Physical Exam  Laboratory/Imaging   Hemodynamics  Temp (24hrs), Av.4 °C (99.4 °F), Min:36.8 °C (98.2 °F), Max:38.6 °C (101.4 °F)   Temperature: 37.7 °C (99.8 °F)  Pulse  Av.3  Min: 65  Max: 132    Blood Pressure : 121/70      Respiratory      Respiration: 16, Pulse Oximetry: 94 %     Work Of Breathing / Effort: Mild  RUL Breath Sounds: Clear, RML Breath Sounds: Clear, RLL Breath Sounds: Clear, NEO Breath Sounds: Clear, LLL Breath Sounds: Clear    Fluids  No intake or output data in the 24 hours ending 18 1701    Nutrition  Orders Placed This Encounter   Procedures   • Diet Order     Standing Status:   Standing     Number of Occurrences:   1     Order Specific Question:   Diet:     Answer:   Cardiac [6]     Physical Exam   Constitutional: She is oriented to person, place, and time. She appears well-developed and  well-nourished.   HENT:   Head: Normocephalic and atraumatic.   Mouth/Throat: Oropharynx is clear and moist.   Eyes: Pupils are equal, round, and reactive to light.   Neck: Normal range of motion. No JVD present.   Cardiovascular: Normal rate.    Pulmonary/Chest: Effort normal and breath sounds normal. No respiratory distress. She has no wheezes. She has no rales.   Diminished breath sounds   Abdominal: Soft. Bowel sounds are normal. She exhibits no distension. There is no tenderness.   Musculoskeletal: Normal range of motion. She exhibits no edema or tenderness.   Lymphadenopathy:     She has no cervical adenopathy.   Neurological: She is alert and oriented to person, place, and time. She exhibits normal muscle tone.   Skin: Skin is warm and dry. No rash noted. No erythema.   Psychiatric: She has a normal mood and affect.       Recent Labs      03/26/18 1958 03/28/18 0329   WBC  13.4*  12.8*   RBC  3.36*  3.16*   HEMOGLOBIN  11.0*  10.4*   HEMATOCRIT  33.8*  32.2*   MCV  100.6*  101.9*   MCH  32.7  32.9   MCHC  32.5*  32.3*   RDW  48.5  50.2*   PLATELETCT  438  453*   MPV  10.1  10.2     Recent Labs      03/26/18 1958 03/28/18 0329   SODIUM  134*  133*   POTASSIUM  4.0  4.1   CHLORIDE  98  99   CO2  28  28   GLUCOSE  108*  100*   BUN  14  10   CREATININE  0.56  0.51   CALCIUM  9.3  9.2     Recent Labs      03/26/18 1958 03/27/18   0219   APTT  33.1  31.0   INR  1.25*  1.32*     Recent Labs      03/26/18 1958   BNPBTYPENAT  274*     Recent Labs      03/28/18 0329   TRIGLYCERIDE  80   HDL  25*   LDL  78          Assessment/Plan     * Atrial fibrillation with RVR (CMS-[facility])   Assessment & Plan    New onset, now converted to sinus rhythm ; PAF  Echo noted   25 mg Metop BID , titrating up for better heart rate control  Will monitor on telemetry   Treat underlying PNA  xarelto          Sepsis due to pneumonia (CMS-[facility])   Assessment & Plan    This is sepsis (without associated acute organ dysfunction).    New onset PNA  IV abx   Blood cultures negative   CXR and CT chest concerning of LLL Pna  Continue antibiotic empirically            Pneumonia due to infectious organism   Assessment & Plan    Started on ceftriaxone and azithromycin, changed to levofloxacin due to continuing spikes of fever  Continue for 5 days  Breathing treatment  Blood culture negative.         Acute respiratory failure with hypoxia (CMS-HCC)   Assessment & Plan    2/2 PAF and PNA  Treating with abx and BB  Wean O2  RT protocol.          Alcohol abuse   Assessment & Plan    Few glasses of wine a day   No hx of withdrawals no drinks this week   Monitor for withdrawal.        Hypoalbuminemia   Assessment & Plan    Pre-albumin low  Nutrition consult        Hyponatremia   Assessment & Plan    IVF recheck lab        Anemia   Assessment & Plan    Mixed iron def and AOCD  Started iron supplementation.          Abnormal LFTs   Assessment & Plan    etoh?  Liver US  Neg  Improved. 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

## 2018-03-30 NOTE — ASSESSMENT & PLAN NOTE
Started on ceftriaxone and azithromycin, changed to levofloxacin due to continuing spikes of fever  Continue for 5 days  Breathing treatment  Blood culture negative.

## 2018-03-31 VITALS
RESPIRATION RATE: 16 BRPM | SYSTOLIC BLOOD PRESSURE: 115 MMHG | HEIGHT: 63 IN | OXYGEN SATURATION: 95 % | WEIGHT: 134.92 LBS | BODY MASS INDEX: 23.91 KG/M2 | HEART RATE: 68 BPM | TEMPERATURE: 99.3 F | DIASTOLIC BLOOD PRESSURE: 61 MMHG

## 2018-03-31 LAB
BACTERIA SPEC RESP CULT: NORMAL
GRAM STN SPEC: NORMAL
SIGNIFICANT IND 70042: NORMAL
SITE SITE: NORMAL
SOURCE SOURCE: NORMAL

## 2018-03-31 PROCEDURE — 99239 HOSP IP/OBS DSCHRG MGMT >30: CPT | Performed by: INTERNAL MEDICINE

## 2018-03-31 PROCEDURE — A9270 NON-COVERED ITEM OR SERVICE: HCPCS | Performed by: HOSPITALIST

## 2018-03-31 PROCEDURE — 700102 HCHG RX REV CODE 250 W/ 637 OVERRIDE(OP): Performed by: INTERNAL MEDICINE

## 2018-03-31 PROCEDURE — 700102 HCHG RX REV CODE 250 W/ 637 OVERRIDE(OP): Performed by: HOSPITALIST

## 2018-03-31 PROCEDURE — A9270 NON-COVERED ITEM OR SERVICE: HCPCS | Performed by: INTERNAL MEDICINE

## 2018-03-31 RX ADMIN — GUAIFENESIN 600 MG: 600 TABLET, EXTENDED RELEASE ORAL at 07:16

## 2018-03-31 RX ADMIN — LEVOFLOXACIN 750 MG: 750 TABLET, FILM COATED ORAL at 07:16

## 2018-03-31 RX ADMIN — METOPROLOL TARTRATE 100 MG: 50 TABLET, FILM COATED ORAL at 07:15

## 2018-03-31 ASSESSMENT — ENCOUNTER SYMPTOMS
NECK PAIN: 0
COUGH: 1
VOMITING: 0
ABDOMINAL PAIN: 0
ORTHOPNEA: 0
SHORTNESS OF BREATH: 1
HEADACHES: 0
DIARRHEA: 0
DIZZINESS: 0
DEPRESSION: 0
PALPITATIONS: 0
SPUTUM PRODUCTION: 1
FEVER: 0
MYALGIAS: 0
WEAKNESS: 1

## 2018-03-31 NOTE — CARE PLAN
Problem: Safety  Goal: Will remain free from falls  Outcome: PROGRESSING AS EXPECTED  Bed in lowest position with call light and bedside table within reach. Instructed patient to use call light for assistance,verbalized understanding.

## 2018-03-31 NOTE — DISCHARGE PLANNING
Per [facility] request, referral has been sent to [facility] at 1716 on 03-30-18

## 2018-03-31 NOTE — DISCHARGE INSTRUCTIONS
Discharge Instructions    Discharged to home by car with relative. Discharged via wheelchair, hospital escort: Yes.  Special equipment needed: Oxygen    Be sure to schedule a follow-up appointment with your primary care doctor or any specialists as instructed.     Discharge Plan:   Influenza Vaccine Indication: Not indicated: Previously immunized this influenza season and > 8 years of age    I understand that a diet low in cholesterol, fat, and sodium is recommended for good health. Unless I have been given specific instructions below for another diet, I accept this instruction as my diet prescription.   Other diet: low sodium    Special Instructions: None    · Is patient discharged on Warfarin / Coumadin?   No     Rivaroxaban oral tablets  What is this medicine?  RIVAROXABAN (ri va SRUTHI a ban) is an anticoagulant (blood thinner). It is used to treat blood clots in the lungs or in the veins. It is also used after knee or hip surgeries to prevent blood clots. It is also used to lower the chance of stroke in people with a medical condition called atrial fibrillation.  This medicine may be used for other purposes; ask your health care provider or pharmacist if you have questions.  COMMON BRAND NAME(S): Xarelto, Xarelto Starter Pack  What should I tell my health care provider before I take this medicine?  They need to know if you have any of these conditions:  -bleeding disorders  -bleeding in the brain  -blood in your stools (black or tarry stools) or if you have blood in your vomit  -history of stomach bleeding  -kidney disease  -liver disease  -low blood counts, like low white cell, platelet, or red cell counts  -recent or planned spinal or epidural procedure  -take medicines that treat or prevent blood clots  -an unusual or allergic reaction to rivaroxaban, other medicines, foods, dyes, or preservatives  -pregnant or trying to get pregnant  -breast-feeding  How should I use this medicine?  Take this medicine by mouth  with a glass of water. Follow the directions on the prescription label. Take your medicine at regular intervals. Do not take it more often than directed. Do not stop taking except on your doctor's advice. Stopping this medicine may increase your risk of a blood clot. Be sure to refill your prescription before you run out of medicine.  If you are taking this medicine after hip or knee replacement surgery, take it with or without food. If you are taking this medicine for atrial fibrillation, take it with your evening meal. If you are taking this medicine to treat blood clots, take it with food at the same time each day. If you are unable to swallow your tablet, you may crush the tablet and mix it in applesauce. Then, immediately eat the applesauce. You should eat more food right after you eat the applesauce containing the crushed tablet.  Talk to your pediatrician regarding the use of this medicine in children. Special care may be needed.  Overdosage: If you think you have taken too much of this medicine contact a poison control center or emergency room at once.  NOTE: This medicine is only for you. Do not share this medicine with others.  What if I miss a dose?  If you take your medicine once a day and miss a dose, take the missed dose as soon as you remember. If you take your medicine twice a day and miss a dose, take the missed dose immediately. In this instance, 2 tablets may be taken at the same time. The next day you should take 1 tablet twice a day as directed.  What may interact with this medicine?  Do not take this medicine with any of the following medications:  -defibrotide  This medicine may also interact with the following medications:  -aspirin and aspirin-like medicines  -certain antibiotics like erythromycin, azithromycin, and clarithromycin  -certain medicines for fungal infections like ketoconazole and itraconazole  -certain medicines for irregular heart beat like amiodarone, quinidine,  dronedarone  -certain medicines for seizures like carbamazepine, phenytoin  -certain medicines that treat or prevent blood clots like warfarin, enoxaparin, and dalteparin  -conivaptan  -diltiazem  -felodipine  -indinavir  -lopinavir; ritonavir  -NSAIDS, medicines for pain and inflammation, like ibuprofen or naproxen  -ranolazine  -rifampin  -ritonavir  -SNRIs, medicines for depression, like desvenlafaxine, duloxetine, levomilnacipran, venlafaxine  -SSRIs, medicines for depression, like citalopram, escitalopram, fluoxetine, fluvoxamine, paroxetine, sertraline  -Stone's wort  -verapamil  This list may not describe all possible interactions. Give your health care provider a list of all the medicines, herbs, non-prescription drugs, or dietary supplements you use. Also tell them if you smoke, drink alcohol, or use illegal drugs. Some items may interact with your medicine.  What should I watch for while using this medicine?  Visit your doctor or health care professional for regular checks on your progress.  Notify your doctor or health care professional and seek emergency treatment if you develop breathing problems; changes in vision; chest pain; severe, sudden headache; pain, swelling, warmth in the leg; trouble speaking; sudden numbness or weakness of the face, arm or leg. These can be signs that your condition has gotten worse.  If you are going to have surgery or other procedure, tell your doctor that you are taking this medicine.  What side effects may I notice from receiving this medicine?  Side effects that you should report to your doctor or health care professional as soon as possible:  -allergic reactions like skin rash, itching or hives, swelling of the face, lips, or tongue  -back pain  -redness, blistering, peeling or loosening of the skin, including inside the mouth  -signs and symptoms of bleeding such as bloody or black, tarry stools; red or dark-brown urine; spitting up blood or brown material that  looks like coffee grounds; red spots on the skin; unusual bruising or bleeding from the eye, gums, or nose  Side effects that usually do not require medical attention (report to your doctor or health care professional if they continue or are bothersome):  -dizziness  -muscle pain  This list may not describe all possible side effects. Call your doctor for medical advice about side effects. You may report side effects to FDA at 9-371-UYS-7447.  Where should I keep my medicine?  Keep out of the reach of children.  Store at room temperature between 15 and 30 degrees C (59 and 86 degrees F). Throw away any unused medicine after the expiration date.  NOTE: This sheet is a summary. It may not cover all possible information. If you have questions about this medicine, talk to your doctor, pharmacist, or health care provider.  © 2018 [employer] (2017-09-06 16:29:33)  Metoprolol tablets  What is this medicine?  METOPROLOL (me TOE proe lole) is a beta-blocker. Beta-blockers reduce the workload on the heart and help it to beat more regularly. This medicine is used to treat high blood pressure and to prevent chest pain. It is also used to after a heart attack and to prevent an additional heart attack from occurring.  This medicine may be used for other purposes; ask your health care provider or pharmacist if you have questions.  COMMON BRAND NAME(S): Lopressor  What should I tell my health care provider before I take this medicine?  They need to know if you have any of these conditions:  -diabetes  -heart or vessel disease like slow heart rate, worsening heart failure, heart block, sick sinus syndrome or Raynaud's disease  -kidney disease  -liver disease  -lung or breathing disease, like asthma or emphysema  -pheochromocytoma  -thyroid disease  -an unusual or allergic reaction to metoprolol, other beta-blockers, medicines, foods, dyes, or preservatives  -pregnant or trying to get pregnant  -breast-feeding  How should  I use this medicine?  Take this medicine by mouth with a drink of water. Follow the directions on the prescription label. Take this medicine immediately after meals. Take your doses at regular intervals. Do not take more medicine than directed. Do not stop taking this medicine suddenly. This could lead to serious heart-related effects.  Talk to your pediatrician regarding the use of this medicine in children. Special care may be needed.  Overdosage: If you think you have taken too much of this medicine contact a poison control center or emergency room at once.  NOTE: This medicine is only for you. Do not share this medicine with others.  What if I miss a dose?  If you miss a dose, take it as soon as you can. If it is almost time for your next dose, take only that dose. Do not take double or extra doses.  What may interact with this medicine?  This medicine may interact with the following medications:  -certain medicines for blood pressure, heart disease, irregular heart beat  -certain medicines for depression like monoamine oxidase (MAO) inhibitors, fluoxetine, or paroxetine  -clonidine  -dobutamine  -epinephrine  -isoproterenol  -reserpine  This list may not describe all possible interactions. Give your health care provider a list of all the medicines, herbs, non-prescription drugs, or dietary supplements you use. Also tell them if you smoke, drink alcohol, or use illegal drugs. Some items may interact with your medicine.  What should I watch for while using this medicine?  Visit your doctor or health care professional for regular check ups. Contact your doctor right away if your symptoms worsen. Check your blood pressure and pulse rate regularly. Ask your health care professional what your blood pressure and pulse rate should be, and when you should contact them.  You may get drowsy or dizzy. Do not drive, use machinery, or do anything that needs mental alertness until you know how this medicine affects you. Do  not sit or stand up quickly, especially if you are an older patient. This reduces the risk of dizzy or fainting spells. Contact your doctor if these symptoms continue. Alcohol may interfere with the effect of this medicine. Avoid alcoholic drinks.  What side effects may I notice from receiving this medicine?  Side effects that you should report to your doctor or health care professional as soon as possible:  -allergic reactions like skin rash, itching or hives  -cold or numb hands or feet  -depression  -difficulty breathing  -faint  -fever with sore throat  -irregular heartbeat, chest pain  -rapid weight gain  -swollen legs or ankles  Side effects that usually do not require medical attention (report to your doctor or health care professional if they continue or are bothersome):  -anxiety or nervousness  -change in sex drive or performance  -dry skin  -headache  -nightmares or trouble sleeping  -short term memory loss  -stomach upset or diarrhea  -unusually tired  This list may not describe all possible side effects. Call your doctor for medical advice about side effects. You may report side effects to FDA at 1-762-FDA-7538.  Where should I keep my medicine?  Keep out of the reach of children.  Store at room temperature between 15 and 30 degrees C (59 and 86 degrees F). Throw away any unused medicine after the expiration date.  NOTE: This sheet is a summary. It may not cover all possible information. If you have questions about this medicine, talk to your doctor, pharmacist, or health care provider.  © 2018 [employer] Standard (2014-08-22 14:40:36)  Levofloxacin tablets  What is this medicine?  LEVOFLOXACIN (janene voe FLOX a sin) is a quinolone antibiotic. It is used to treat certain kinds of bacterial infections. It will not work for colds, flu, or other viral infections.  This medicine may be used for other purposes; ask your health care provider or pharmacist if you have questions.  COMMON BRAND NAME(S):  Rosy Gallegos-Gene  What should I tell my health care provider before I take this medicine?  They need to know if you have any of these conditions:  -bone problems  -diabetes  -history of low levels of potassium in the blood  -irregular heartbeat  -joint problems  -kidney disease  -liver disease  -myasthenia gravis  -seizures  -tendon problems  -tingling of the fingers or toes, or other nerve disorder  -an unusual or allergic reaction to levofloxacin, other quinolone antibiotics, foods, dyes, or preservatives  -pregnant or trying to get pregnant  -breast-feeding  How should I use this medicine?  Take this medicine by mouth with a full glass of water. Follow the directions on the prescription label. This medicine can be taken with or without food. Take your medicine at regular intervals. Do not take your medicine more often than directed. Do not skip doses or stop your medicine early even if you feel better. Do not stop taking except on your doctor's advice.  A special MedGuide will be given to you by the pharmacist with each prescription and refill. Be sure to read this information carefully each time.  Talk to your pediatrician regarding the use of this medicine in children. While this drug may be prescribed for children as young as 6 months for selected conditions, precautions do apply.  Overdosage: If you think you have taken too much of this medicine contact a poison control center or emergency room at once.  NOTE: This medicine is only for you. Do not share this medicine with others.  What if I miss a dose?  If you miss a dose, take it as soon as you remember. If it is almost time for your next dose, take only that dose. Do not take double or extra doses.  What may interact with this medicine?  Do not take this medicine with any of the following medications:  -bepridil  -certain medicines for depression, anxiety, or psychotic disturbances like pimozide, thioridazine, and ziprasidone  -certain medicines  for irregular heart beat like dofetilide and dronedarone  -cisapride  -halofantrine  This medicine may also interact with the following medications:  -antacids  -birth control pills  -certain medicines for diabetes, like glipizide, glyburide, or insulin  -didanosine buffered tablets or powder  -multivitamins  -NSAIDS, medicines for pain and inflammation, like ibuprofen or naproxen  -steroid medicines like prednisone or cortisone  -sucralfate  -theophylline  -warfarin  This list may not describe all possible interactions. Give your health care provider a list of all the medicines, herbs, non-prescription drugs, or dietary supplements you use. Also tell them if you smoke, drink alcohol, or use illegal drugs. Some items may interact with your medicine.  What should I watch for while using this medicine?  Tell your doctor or healthcare professional if your symptoms do not start to get better or if they get worse.  Do not treat diarrhea with over the counter products. Contact your doctor if you have diarrhea that lasts more than 2 days or if it is severe and watery.  Check with your doctor or health care professional if you get an attack of severe diarrhea, nausea and vomiting, or if you sweat a lot. The loss of too much body fluid can make it dangerous for you to take this medicine.  You may get drowsy or dizzy. Do not drive, use machinery, or do anything that needs mental alertness until you know how this medicine affects you. Do not sit or stand up quickly, especially if you are an older patient. This reduces the risk of dizzy or fainting spells.  This medicine can make you more sensitive to the sun. Keep out of the sun. If you cannot avoid being in the sun, wear protective clothing and use a sunscreen. Do not use sun lamps or tanning beds/booths. Contact your doctor if you get a sunburn.  If you are a diabetic monitor your blood glucose carefully. If you get an unusual reading stop taking this medicine and call your  doctor right away.  Avoid antacids, calcium, iron, and zinc products for 2 hours before and 2 hours after taking a dose of this medicine.  What side effects may I notice from receiving this medicine?  Side effects that you should report to your doctor or health care professional as soon as possible:  -allergic reactions like skin rash or hives, swelling of the face, lips, or tongue  -anxious  -breathing problems  -confusion  -depressed mood  -diarrhea  -dizziness  -fast, irregular heartbeat  -hallucination, loss of contact with reality  -joint, muscle, or tendon pain or swelling  -muscle weakness  -pain, tingling, numbness in the hands or feet  -seizures  -signs and symptoms of high blood sugar such as dizziness; dry mouth; dry skin; fruity breath; nausea; stomach pain; increased hunger or thirst; increased urination  -signs and symptoms of liver injury like dark yellow or brown urine; general ill feeling or flu-like symptoms; light-colored stools; loss of appetite; nausea; right upper belly pain; unusually weak or tired; yellowing of the eyes or skin  -signs and symptoms of low blood sugar such as feeling anxious; confusion; dizziness; increased hunger; unusually weak or tired; sweating; shakiness; cold; irritable; headache; blurred vision; fast heartbeat; loss of consciousness  -suicidal thoughts or other mood changes  -sunburn  -unusually weak or tired  Side effects that usually do not require medical attention (report to your doctor or health care professional if they continue or are bothersome):  -constipation  -dry mouth  -headache  -nausea, vomiting  -trouble sleeping  This list may not describe all possible side effects. Call your doctor for medical advice about side effects. You may report side effects to FDA at 9-332-FDA-3078.  Where should I keep my medicine?  Keep out of the reach of children.  Store at room temperature between 15 and 30 degrees C (59 and 86 degrees F). Keep in a tightly closed  container. Throw away any unused medicine after the expiration date.  NOTE: This sheet is a summary. It may not cover all possible information. If you have questions about this medicine, talk to your doctor, pharmacist, or health care provider.  © 2018 [employer] Standard (2017-06-27 12:38:27)  Guaifenesin oral ER tablets  What is this medicine?  GUAIFENESIN (gwye FEN e sin) is an expectorant. It helps to thin mucous and make coughs more productive. This medicine is used to treat coughs caused by colds or the flu. It is not intended to treat chronic cough caused by smoking, asthma, emphysema, or heart failure.  This medicine may be used for other purposes; ask your health care provider or pharmacist if you have questions.  COMMON BRAND NAME(S): Humibid, Mucinex  What should I tell my health care provider before I take this medicine?  They need to know if you have any of these conditions:  -fever  -kidney disease  -an unusual or allergic reaction to guaifenesin, other medicines, foods, dyes, or preservatives  -pregnant or trying to get pregnant  -breast-feeding  How should I use this medicine?  Take this medicine by mouth with a full glass of water. Follow the directions on the prescription label. Do not break, chew or crush this medicine. You may take with food or on an empty stomach. Take your medicine at regular intervals. Do not take your medicine more often than directed.  Talk to your pediatrician regarding the use of this medicine in children. While this drug may be prescribed for children as young as 12 years old for selected conditions, precautions do apply.  Overdosage: If you think you have taken too much of this medicine contact a poison control center or emergency room at once.  NOTE: This medicine is only for you. Do not share this medicine with others.  What if I miss a dose?  If you miss a dose, take it as soon as you can. If it is almost time for your next dose, take only that dose. Do not take  double or extra doses.  What may interact with this medicine?  Interactions are not expected.  This list may not describe all possible interactions. Give your health care provider a list of all the medicines, herbs, non-prescription drugs, or dietary supplements you use. Also tell them if you smoke, drink alcohol, or use illegal drugs. Some items may interact with your medicine.  What should I watch for while using this medicine?  Do not treat a cough for more than 1 week without consulting your doctor or health care professional. If you also have a high fever, skin rash, continuing headache, or sore throat, see your doctor.  For best results, drink 6 to 8 glasses water daily while you are taking this medicine.  What side effects may I notice from receiving this medicine?  Side effects that you should report to your doctor or health care professional as soon as possible:  -allergic reactions like skin rash, itching or hives, swelling of the face, lips, or tongue  Side effects that usually do not require medical attention (report to your doctor or health care professional if they continue or are bothersome):  -dizziness  -headache  -stomach upset  This list may not describe all possible side effects. Call your doctor for medical advice about side effects. You may report side effects to FDA at 1-745-FDA-9559.  Where should I keep my medicine?  Keep out of the reach of children.  Store at room temperature between 20 and 25 degrees C (68 and 77 degrees F). Keep container tightly closed. Throw away any unused medicine after the expiration date.  NOTE: This sheet is a summary. It may not cover all possible information. If you have questions about this medicine, talk to your doctor, pharmacist, or health care provider.  © 2018 [employer] Standard (2009-04-29 12:14:14)  Doxycycline tablets or capsules  What is this medicine?  DOXYCYCLINE (dox felicitas degroot) is a tetracycline antibiotic. It kills certain bacteria or stops  their growth. It is used to treat many kinds of infections, like dental, skin, respiratory, and urinary tract infections. It also treats acne, Lyme disease, malaria, and certain sexually transmitted infections.  This medicine may be used for other purposes; ask your health care provider or pharmacist if you have questions.  COMMON BRAND NAME(S): Acticlate, Adoxa, Adoxa CK, Adoxa Gene, Adoxa TT, Alodox, Avidoxy, Doxal, Mondoxyne NL, Monodox, Morgidox 1x, Morgidox 1x Kit, Morgidox 2x, Morgidox 2x Kit, NutriDox, Ocudox, TARGADOX, Vibra-Tabs, Vibramycin  What should I tell my health care provider before I take this medicine?  They need to know if you have any of these conditions:  -liver disease  -long exposure to sunlight like working outdoors  -stomach problems like colitis  -an unusual or allergic reaction to doxycycline, tetracycline antibiotics, other medicines, foods, dyes, or preservatives  -pregnant or trying to get pregnant  -breast-feeding  How should I use this medicine?  Take this medicine by mouth with a full glass of water. Follow the directions on the prescription label. It is best to take this medicine without food, but if it upsets your stomach take it with food. Take your medicine at regular intervals. Do not take your medicine more often than directed. Take all of your medicine as directed even if you think you are better. Do not skip doses or stop your medicine early.  Talk to your pediatrician regarding the use of this medicine in children. While this drug may be prescribed for selected conditions, precautions do apply.  Overdosage: If you think you have taken too much of this medicine contact a poison control center or emergency room at once.  NOTE: This medicine is only for you. Do not share this medicine with others.  What if I miss a dose?  If you miss a dose, take it as soon as you can. If it is almost time for your next dose, take only that dose. Do not take double or extra doses.  What may  interact with this medicine?  -antacids  -barbiturates  -birth control pills  -bismuth subsalicylate  -carbamazepine  -methoxyflurane  -other antibiotics  -phenytoin  -vitamins that contain iron  -warfarin  This list may not describe all possible interactions. Give your health care provider a list of all the medicines, herbs, non-prescription drugs, or dietary supplements you use. Also tell them if you smoke, drink alcohol, or use illegal drugs. Some items may interact with your medicine.  What should I watch for while using this medicine?  Tell your doctor or health care professional if your symptoms do not improve.  Do not treat diarrhea with over the counter products. Contact your doctor if you have diarrhea that lasts more than 2 days or if it is severe and watery.  Do not take this medicine just before going to bed. It may not dissolve properly when you lay down and can cause pain in your throat. Drink plenty of fluids while taking this medicine to also help reduce irritation in your throat.  This medicine can make you more sensitive to the sun. Keep out of the sun. If you cannot avoid being in the sun, wear protective clothing and use sunscreen. Do not use sun lamps or tanning beds/booths.  Birth control pills may not work properly while you are taking this medicine. Talk to your doctor about using an extra method of birth control.  If you are being treated for a sexually transmitted infection, avoid sexual contact until you have finished your treatment. Your sexual partner may also need treatment.  Avoid antacids, aluminum, calcium, magnesium, and iron products for 4 hours before and 2 hours after taking a dose of this medicine.  If you are using this medicine to prevent malaria, you should still protect yourself from contact with mosquitos. Stay in screened-in areas, use mosquito nets, keep your body covered, and use an insect repellent.  What side effects may I notice from receiving this medicine?  Side  effects that you should report to your doctor or health care professional as soon as possible:  -allergic reactions like skin rash, itching or hives, swelling of the face, lips, or tongue  -difficulty breathing  -fever  -itching in the rectal or genital area  -pain on swallowing  -redness, blistering, peeling or loosening of the skin, including inside the mouth  -severe stomach pain or cramps  -unusual bleeding or bruising  -unusually weak or tired  -yellowing of the eyes or skin  Side effects that usually do not require medical attention (report to your doctor or health care professional if they continue or are bothersome):  -diarrhea  -loss of appetite  -nausea, vomiting  This list may not describe all possible side effects. Call your doctor for medical advice about side effects. You may report side effects to FDA at 3-383-FDA-9474.  Where should I keep my medicine?  Keep out of the reach of children.  Store at room temperature, below 30 degrees C (86 degrees F). Protect from light. Keep container tightly closed. Throw away any unused medicine after the expiration date. Taking this medicine after the expiration date can make you seriously ill.  NOTE: This sheet is a summary. It may not cover all possible information. If you have questions about this medicine, talk to your doctor, pharmacist, or health care provider.  © 2018 Elsevier/Gold Standard (2017-01-18 17:11:22)  Sepsis, Adult  Sepsis is a serious infection of your blood or tissues that affects your whole body. The infection that causes sepsis may be bacterial, viral, fungal, or parasitic. Sepsis may be life threatening. Sepsis can cause your blood pressure to drop. This may result in shock. Shock causes your central nervous system and your organs to stop working correctly.  What increases the risk?  Sepsis can happen in anyone, but it is more likely to happen in people who have weakened immune systems.  What are the signs or symptoms?  Symptoms of sepsis  can include:  · Fever or low body temperature (hypothermia).  · Rapid breathing (hyperventilation).  · Chills.  · Rapid heartbeat (tachycardia).  · Confusion or light-headedness.  · Trouble breathing.  · Urinating much less than usual.  · Cool, clammy skin or red, flushed skin.  · Other problems with the heart, kidneys, or brain.  How is this diagnosed?  Your health care provider will likely do tests to look for an infection, to see if the infection has spread to your blood, and to see how serious your condition is. Tests can include:  · Blood tests, including cultures of your blood.  · Cultures of other fluids from your body, such as:  ¨ Urine.  ¨ Pus from wounds.  ¨ Mucus coughed up from your lungs.  · Urine tests other than cultures.  · X-ray exams or other imaging tests.  How is this treated?  Treatment will begin with elimination of the source of infection. If your sepsis is likely caused by a bacterial or fungal infection, you will be given antibiotic or antifungal medicines.  You may also receive:  · Oxygen.  · Fluids through an IV tube.  · Medicines to increase your blood pressure.  · A machine to clean your blood (dialysis) if your kidneys fail.  · A machine to help you breathe if your lungs fail.  Get help right away if:  You get an infection or develop any of the signs and symptoms of sepsis after surgery or a hospitalization.  This information is not intended to replace advice given to you by your health care provider. Make sure you discuss any questions you have with your health care provider.  Document Released: 09/15/2004 Document Revised: 05/25/2017 Document Reviewed: 08/25/2014  [employer] Interactive Patient Education © 2017 [employer] Inc.      Depression / Suicide Risk    As you are discharged from this [facility] facility, it is important to learn how to keep safe from harming yourself.    Recognize the warning signs:  · Abrupt changes in personality, positive or negative- including increase in  energy   · Giving away possessions  · Change in eating patterns- significant weight changes-  positive or negative  · Change in sleeping patterns- unable to sleep or sleeping all the time   · Unwillingness or inability to communicate  · Depression  · Unusual sadness, discouragement and loneliness  · Talk of wanting to die  · Neglect of personal appearance   · Rebelliousness- reckless behavior  · Withdrawal from people/activities they love  · Confusion- inability to concentrate     If you or a loved one observes any of these behaviors or has concerns about self-harm, here's what you can do:  · Talk about it- your feelings and reasons for harming yourself  · Remove any means that you might use to hurt yourself (examples: pills, rope, extension cords, firearm)  · Get professional help from the community (Mental Health, Substance Abuse, psychological counseling)  · Do not be alone:Call your Safe Contact- someone whom you trust who will be there for you.  · Call your local CRISIS HOTLINE 289-4149 or 803-508-2777  · Call your local Children's Mobile Crisis Response Team [place] (153) 116-2006 or wwwActimo  · Call the toll free National Suicide Prevention Hotlines   · National Suicide Prevention Lifeline 982-227-EQCM (3191)  · National Hope Line Network 800-SUICIDE (635-1475)    Return if you have chest pain, rapid heart rate, difficulty breathing Atrial Fibrillation  Atrial fibrillation is a type of irregular or rapid heartbeat (arrhythmia). In atrial fibrillation, the heart quivers continuously in a chaotic pattern. This occurs when parts of the heart receive disorganized signals that make the heart unable to pump blood normally. This can increase the risk for stroke, heart failure, and other heart-related conditions. There are different types of atrial fibrillation, including:  · Paroxysmal atrial fibrillation. This type starts suddenly, and it usually stops on its own shortly after it  starts.  · Persistent atrial fibrillation. This type often lasts longer than a week. It may stop on its own or with treatment.  · Long-lasting persistent atrial fibrillation. This type lasts longer than 12 months.  · Permanent atrial fibrillation. This type does not go away.  Talk with your health care provider to learn about the type of atrial fibrillation that you have.  What are the causes?  This condition is caused by some heart-related conditions or procedures, including:  · A heart attack.  · Coronary artery disease.  · Heart failure.  · Heart valve conditions.  · High blood pressure.  · Inflammation of the sac that surrounds the heart (pericarditis).  · Heart surgery.  · Certain heart rhythm disorders, such as Blandon-Parkinson-White syndrome.  Other causes include:  · Pneumonia.  · Obstructive sleep apnea.  · Blockage of an artery in the lungs (pulmonary embolism, or PE).  · Lung cancer.  · Chronic lung disease.  · Thyroid problems, especially if the thyroid is overactive (hyperthyroidism).  · Caffeine.  · Excessive alcohol use or illegal drug use.  · Use of some medicines, including certain decongestants and diet pills.  Sometimes, the cause cannot be found.  What increases the risk?  This condition is more likely to develop in:  · People who are older in age.  · People who smoke.  · People who have diabetes mellitus.  · People who are overweight (obese).  · Athletes who exercise vigorously.  What are the signs or symptoms?  Symptoms of this condition include:  · A feeling that your heart is beating rapidly or irregularly.  · A feeling of discomfort or pain in your chest.  · Shortness of breath.  · Sudden light-headedness or weakness.  · Getting tired easily during exercise.  In some cases, there are no symptoms.  How is this diagnosed?  Your health care provider may be able to detect atrial fibrillation when taking your pulse. If detected, this condition may be diagnosed with:  · An electrocardiogram  (ECG).  · A Holter monitor test that records your heartbeat patterns over a 24-hour period.  · Transthoracic echocardiogram (TTE) to evaluate how blood flows through your heart.  · Transesophageal echocardiogram (STEVIE) to view more detailed images of your heart.  · A stress test.  · Imaging tests, such as a CT scan or chest X-ray.  · Blood tests.  How is this treated?  The main goals of treatment are to prevent blood clots from forming and to keep your heart beating at a normal rate and rhythm. The type of treatment that you receive depends on many factors, such as your underlying medical conditions and how you feel when you are experiencing atrial fibrillation.  This condition may be treated with:  · Medicine to slow down the heart rate, bring the heart’s rhythm back to normal, or prevent clots from forming.  · Electrical cardioversion. This is a procedure that resets your heart’s rhythm by delivering a controlled, low-energy shock to the heart through your skin.  · Different types of ablation, such as catheter ablation, catheter ablation with pacemaker, or surgical ablation. These procedures destroy the heart tissues that send abnormal signals. When the pacemaker is used, it is placed under your skin to help your heart beat in a regular rhythm.  Follow these instructions at home:  · Take over-the counter and prescription medicines only as told by your health care provider.  · If your health care provider prescribed a blood-thinning medicine (anticoagulant), take it exactly as told. Taking too much blood-thinning medicine can cause bleeding. If you do not take enough blood-thinning medicine, you will not have the protection that you need against stroke and other problems.  · Do not use tobacco products, including cigarettes, chewing tobacco, and e-cigarettes. If you need help quitting, ask your health care provider.  · If you have obstructive sleep apnea, manage your condition as told by your health care  provider.  · Do not drink alcohol.  · Do not drink beverages that contain caffeine, such as coffee, soda, and tea.  · Maintain a healthy weight. Do not use diet pills unless your health care provider approves. Diet pills may make heart problems worse.  · Follow diet instructions as told by your health care provider.  · Exercise regularly as told by your health care provider.  · Keep all follow-up visits as told by your health care provider. This is important.  How is this prevented?  · Avoid drinking beverages that contain caffeine or alcohol.  · Avoid certain medicines, especially medicines that are used for breathing problems.  · Avoid certain herbs and herbal medicines, such as those that contain ephedra or ginseng.  · Do not use illegal drugs, such as cocaine and amphetamines.  · Do not smoke.  · Manage your high blood pressure.  Contact a health care provider if:  · You notice a change in the rate, rhythm, or strength of your heartbeat.  · You are taking an anticoagulant and you notice increased bruising.  · You tire more easily when you exercise or exert yourself.  Get help right away if:  · You have chest pain, abdominal pain, sweating, or weakness.  · You feel nauseous.  · You notice blood in your vomit, bowel movement, or urine.  · You have shortness of breath.  · You suddenly have swollen feet and ankles.  · You feel dizzy.  · You have sudden weakness or numbness of the face, arm, or leg, especially on one side of the body.  · You have trouble speaking, trouble understanding, or both (aphasia).  · Your face or your eyelid droops on one side.  These symptoms may represent a serious problem that is an emergency. Do not wait to see if the symptoms will go away. Get medical help right away. Call your local emergency services (911 in the [place] Do not drive yourself to the hospital.   This information is not intended to replace advice given to you by your health care provider. Make sure you discuss any  questions you have with your health care provider.  Document Released: 12/18/2006 Document Revised: 04/26/2017 Document Reviewed: 04/13/2016  [employer] Interactive Patient Education © 2017 [employer]          Community-Acquired Pneumonia, Adult  Pneumonia is an infection of the lungs. There are different types of pneumonia. One type can develop while a person is in a hospital. A different type, called community-acquired pneumonia, develops in people who are not, or have not recently been, in the hospital or other health care facility.  What are the causes?  Pneumonia may be caused by bacteria, viruses, or funguses. Community-acquired pneumonia is often caused by Streptococcus pneumonia bacteria. These bacteria are often passed from one person to another by breathing in droplets from the cough or sneeze of an infected person.  What increases the risk?  The condition is more likely to develop in:  · People who have chronic diseases, such as chronic obstructive pulmonary disease (COPD), asthma, congestive heart failure, cystic fibrosis, diabetes, or kidney disease.  · People who have early-stage or late-stage HIV.  · People who have sickle cell disease.  · People who have had their spleen removed (splenectomy).  · People who have poor dental hygiene.  · People who have medical conditions that increase the risk of breathing in (aspirating) secretions their own mouth and nose.  · People who have a weakened immune system (immunocompromised).  · People who smoke.  · People who travel to areas where pneumonia-causing germs commonly exist.  · People who are around animal habitats or animals that have pneumonia-causing germs, including birds, bats, rabbits, cats, and farm animals.  What are the signs or symptoms?  Symptoms of this condition include:  · A dry cough.  · A wet (productive) cough.  · Fever.  · Sweating.  · Chest pain, especially when breathing deeply or coughing.  · Rapid breathing or difficulty  breathing.  · Shortness of breath.  · Shaking chills.  · Fatigue.  · Muscle aches.  How is this diagnosed?  Your health care provider will take a medical history and perform a physical exam. You may also have other tests, including:  · Imaging studies of your chest, including X-rays.  · Tests to check your blood oxygen level and other blood gases.  · Other tests on blood, mucus (sputum), fluid around your lungs (pleural fluid), and urine.  If your pneumonia is severe, other tests may be done to identify the specific cause of your illness.  How is this treated?  The type of treatment that you receive depends on many factors, such as the cause of your pneumonia, the medicines you take, and other medical conditions that you have. For most adults, treatment and recovery from pneumonia may occur at home. In some cases, treatment must happen in a hospital. Treatment may include:  · Antibiotic medicines, if the pneumonia was caused by bacteria.  · Antiviral medicines, if the pneumonia was caused by a virus.  · Medicines that are given by mouth or through an IV tube.  · Oxygen.  · Respiratory therapy.  Although rare, treating severe pneumonia may include:  · Mechanical ventilation. This is done if you are not breathing well on your own and you cannot maintain a safe blood oxygen level.  · Thoracentesis. This procedure removes fluid around one lung or both lungs to help you breathe better.  Follow these instructions at home:  · Take over-the-counter and prescription medicines only as told by your health care provider.  ¨ Only take cough medicine if you are losing sleep. Understand that cough medicine can prevent your body’s natural ability to remove mucus from your lungs.  ¨ If you were prescribed an antibiotic medicine, take it as told by your health care provider. Do not stop taking the antibiotic even if you start to feel better.  · Sleep in a semi-upright position at night. Try sleeping in a reclining chair, or place a  few pillows under your head.  · Do not use tobacco products, including cigarettes, chewing tobacco, and e-cigarettes. If you need help quitting, ask your health care provider.  · Drink enough water to keep your urine clear or pale yellow. This will help to thin out mucus secretions in your lungs.  How is this prevented?  There are ways that you can decrease your risk of developing community-acquired pneumonia. Consider getting a pneumococcal vaccine if:  · You are older than 65 years of age.  · You are older than 19 years of age and are undergoing cancer treatment, have chronic lung disease, or have other medical conditions that affect your immune system. Ask your health care provider if this applies to you.  There are different types and schedules of pneumococcal vaccines. Ask your health care provider which vaccination option is best for you.  You may also prevent community-acquired pneumonia if you take these actions:  · Get an influenza vaccine every year. Ask your health care provider which type of influenza vaccine is best for you.  · Go to the dentist on a regular basis.  · Wash your hands often. Use hand  if soap and water are not available.  Contact a health care provider if:  · You have a fever.  · You are losing sleep because you cannot control your cough with cough medicine.  Get help right away if:  · You have worsening shortness of breath.  · You have increased chest pain.  · Your sickness becomes worse, especially if you are an older adult or have a weakened immune system.  · You cough up blood.  This information is not intended to replace advice given to you by your health care provider. Make sure you discuss any questions you have with your health care provider.  Document Released: 12/18/2006 Document Revised: 04/27/2017 Document Reviewed: 04/13/2016  [employer] Interactive Patient Education © 2017 [employer]

## 2018-03-31 NOTE — PROGRESS NOTES
[facility] Hospitalist Progress Note    Date of Service: 3/30/2018    Chief Complaint  82 y.o. female admitted 3/26/2018 with acute hypoxic respiratory failure 2/2 PAF and PNA    Interval Problem Update  Patient feels better. Afebrile.  Home to short patient needs 1 L of oxygen,referral was sent.  Home health referral was also sent  Patient deems to be stable to be discharged    Consultants/Specialty  Cardiology-signed off    Disposition  Home when stable        Review of Systems   Constitutional: Positive for malaise/fatigue. Negative for fever.   HENT: Negative for hearing loss.    Respiratory: Positive for cough, sputum production and shortness of breath.    Cardiovascular: Negative for chest pain, palpitations and orthopnea.   Gastrointestinal: Negative for abdominal pain, diarrhea and vomiting.   Genitourinary: Negative for dysuria and urgency.   Musculoskeletal: Negative for myalgias and neck pain.   Neurological: Positive for weakness. Negative for dizziness and headaches.   Psychiatric/Behavioral: Negative for depression and suicidal ideas.      Physical Exam  Laboratory/Imaging   Hemodynamics  Temp (24hrs), Av.3 °C (99.2 °F), Min:37 °C (98.6 °F), Max:37.6 °C (99.7 °F)   Temperature: 37.4 °C (99.3 °F)  Pulse  Av.3  Min: 65  Max: 132    Blood Pressure : 115/61      Respiratory      Respiration: 16, Pulse Oximetry: 95 %     Work Of Breathing / Effort: Mild  RUL Breath Sounds: Clear, RML Breath Sounds: Clear, RLL Breath Sounds: Clear, NEO Breath Sounds: Clear, LLL Breath Sounds: Clear    Fluids    Intake/Output Summary (Last 24 hours) at 18 1510  Last data filed at 18 1916   Gross per 24 hour   Intake              240 ml   Output                0 ml   Net              240 ml       Nutrition  Orders Placed This Encounter   Procedures   • Diet Order     Standing Status:   Standing     Number of Occurrences:   1     Order Specific Question:   Diet:     Answer:   Cardiac [6]     Physical Exam    Constitutional: She is oriented to person, place, and time. She appears well-developed and well-nourished.   HENT:   Head: Normocephalic and atraumatic.   Mouth/Throat: Oropharynx is clear and moist.   Eyes: Pupils are equal, round, and reactive to light.   Neck: Normal range of motion. No JVD present.   Cardiovascular: Normal rate.    Pulmonary/Chest: Effort normal and breath sounds normal. No respiratory distress. She has no wheezes. She has no rales.   Diminished breath sounds   Abdominal: Soft. Bowel sounds are normal. She exhibits no distension. There is no tenderness.   Musculoskeletal: Normal range of motion. She exhibits no edema or tenderness.   Lymphadenopathy:     She has no cervical adenopathy.   Neurological: She is alert and oriented to person, place, and time. She exhibits normal muscle tone.   Skin: Skin is warm and dry. No rash noted. No erythema.   Psychiatric: She has a normal mood and affect.       Recent Labs      03/30/18   0316   WBC  13.3*   RBC  3.11*   HEMOGLOBIN  10.0*   HEMATOCRIT  31.5*   MCV  101.3*   MCH  32.2   MCHC  31.7*   RDW  49.7   PLATELETCT  473*   MPV  9.9     Recent Labs      03/30/18   0316   SODIUM  137   POTASSIUM  3.8   CHLORIDE  100   CO2  30   GLUCOSE  96   BUN  12   CREATININE  0.57   CALCIUM  8.8                      Assessment/Plan     * Atrial fibrillation with RVR (CMS-HCC)   Assessment & Plan    New onset, now converted to sinus rhythm ; PAF  Echo noted   25 mg Metop BID , titrating up for better heart rate control  Will monitor on telemetry   Treat underlying PNA  xarelto          Sepsis due to pneumonia (CMS-HCC)   Assessment & Plan    This is sepsis (without associated acute organ dysfunction).   New onset PNA  IV abx   Blood cultures negative   CXR and CT chest concerning of LLL Pna  Continue antibiotic empirically            Pneumonia due to infectious organism   Assessment & Plan    Started on ceftriaxone and azithromycin, changed to levofloxacin due to  continuing spikes of fever  Continue for 5 days  Breathing treatment  Blood culture negative.         Acute respiratory failure with hypoxia (CMS-HCC)   Assessment & Plan    2/2 PAF and PNA  Treating with abx and BB  Wean O2  RT protocol.          Alcohol abuse   Assessment & Plan    Few glasses of wine a day   No hx of withdrawals no drinks this week   Monitor for withdrawal.        Hypoalbuminemia   Assessment & Plan    Pre-albumin low  Nutrition consult        Hyponatremia   Assessment & Plan    IVF recheck lab        Anemia   Assessment & Plan    Mixed iron def and AOCD  Started iron supplementation.          Abnormal LFTs   Assessment & Plan    etoh?  Liver US  Neg  Improved. 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

## 2018-03-31 NOTE — DISCHARGE PLANNING
Received choice form from [facility]. Referral has been sent to [facility] at 0521 on 03-30-18.

## 2018-03-31 NOTE — PROGRESS NOTES
In discharge paperwork noted patient to take doxycycline. Asked MD for prescription MD stated patient doesn't need to take d/t pt on levaquin.

## 2018-03-31 NOTE — CARE PLAN
Problem: Safety  Goal: Will remain free from injury  Outcome: PROGRESSING AS EXPECTED  Pt educated regarding fall risk and safety, pt verbalizes understanding and calls appropriately for assistance. Bed is locked and in lowest position, safety maintained throughout shift.     Problem: Venous Thromboembolism (VTW)/Deep Vein Thrombosis (DVT) Prevention:  Goal: Patient will participate in Venous Thrombosis (VTE)/Deep Vein Thrombosis (DVT)Prevention Measures  Outcome: PROGRESSING AS EXPECTED   03/31/18 0423   OTHER   Pharmacologic Prophylaxis Used Rivaroxaban (Xarelto) - (ONLY for Total Knee and Total Hip Surgery)

## 2018-03-31 NOTE — PROGRESS NOTES
Monitor summary: SR 68-78 with 1st degree block, occassional PVC's, couplets, bigeminy, .24/.08/.34

## 2018-03-31 NOTE — CARE PLAN
Problem: Communication  Goal: The ability to communicate needs accurately and effectively will improve    Intervention: Educate patient and significant other/support system about the plan of care, procedures, treatments, medications and allow for questions  Update pt on POC. Answer questions as needed.      Problem: Infection  Goal: Will remain free from infection    Intervention: Assess signs and symptoms of infection  Medicating per MAR. Pt assessment performed. Monitoring vital signs. Monitoring for s/s of worsening infection.

## 2018-03-31 NOTE — DISCHARGE PLANNING
Medical Social Work    Called [facility] and spoke with Dahiana. Per Dahiana, unable to verify acceptance due to weekend, however she will inform office of referral and make sure pt is accepted.

## 2018-03-31 NOTE — DISCHARGE PLANNING
Medical Social Work    Confirmed with [facility] pt's O2 was delivered this morning. Requested [facility] follow up on HH acceptance.     Called [facility] pharmacy and confirmed [employer] pay is $47. Pharmacy has medication ready for .

## 2018-03-31 NOTE — PROGRESS NOTES
Discharge Summary    Educated patient on new medications, how to use home oxygen, up coming appointments, and s/s to look for when returning to emergency room. Patient and son verbalized understanding. Removed PIV with no s/s of bleeding or infection at site. Vitals WNL. Escorted patient downstairs via wheelchair to meet son for discharge home.

## 2018-04-01 LAB
BACTERIA BLD CULT: NORMAL
BACTERIA BLD CULT: NORMAL
EKG IMPRESSION: NORMAL
SIGNIFICANT IND 70042: NORMAL
SIGNIFICANT IND 70042: NORMAL
SITE SITE: NORMAL
SITE SITE: NORMAL
SOURCE SOURCE: NORMAL
SOURCE SOURCE: NORMAL

## 2018-04-02 ENCOUNTER — PATIENT OUTREACH (OUTPATIENT)
Dept: HEALTH INFORMATION MANAGEMENT | Facility: OTHER | Age: 83
End: 2018-04-02

## 2018-04-02 ENCOUNTER — TELEPHONE (OUTPATIENT)
Dept: MEDICAL GROUP | Facility: MEDICAL CENTER | Age: 83
End: 2018-04-02

## 2018-04-02 DIAGNOSIS — R53.81 DEBILITY: ICD-10-CM

## 2018-04-02 NOTE — TELEPHONE ENCOUNTER
Patient was just discharged from the hospital and she would an order for [facility] instead of [facility]   Can you submit the order for [facility]

## 2018-04-03 ENCOUNTER — HOME HEALTH ADMISSION (OUTPATIENT)
Dept: HOME HEALTH SERVICES | Facility: HOME HEALTHCARE | Age: 83
End: 2018-04-03
Payer: MEDICARE

## 2018-04-04 ENCOUNTER — OFFICE VISIT (OUTPATIENT)
Dept: MEDICAL GROUP | Facility: CLINIC | Age: 83
End: 2018-04-04
Payer: MEDICARE

## 2018-04-04 ENCOUNTER — HOSPITAL ENCOUNTER (OUTPATIENT)
Dept: LAB | Facility: MEDICAL CENTER | Age: 83
End: 2018-04-04
Attending: FAMILY MEDICINE
Payer: MEDICARE

## 2018-04-04 ENCOUNTER — TELEPHONE (OUTPATIENT)
Dept: MEDICAL GROUP | Facility: MEDICAL CENTER | Age: 83
End: 2018-04-04

## 2018-04-04 VITALS
WEIGHT: 135 LBS | OXYGEN SATURATION: 92 % | HEART RATE: 78 BPM | DIASTOLIC BLOOD PRESSURE: 62 MMHG | TEMPERATURE: 99 F | RESPIRATION RATE: 14 BRPM | BODY MASS INDEX: 23.05 KG/M2 | SYSTOLIC BLOOD PRESSURE: 110 MMHG | HEIGHT: 64 IN

## 2018-04-04 DIAGNOSIS — E61.1 IRON DEFICIENCY: ICD-10-CM

## 2018-04-04 DIAGNOSIS — J96.11 CHRONIC RESPIRATORY FAILURE WITH HYPOXIA (HCC): ICD-10-CM

## 2018-04-04 DIAGNOSIS — I35.0 MILD AORTIC STENOSIS: ICD-10-CM

## 2018-04-04 DIAGNOSIS — D53.9 MACROCYTIC ANEMIA: ICD-10-CM

## 2018-04-04 DIAGNOSIS — D72.829 LEUKOCYTOSIS, UNSPECIFIED TYPE: ICD-10-CM

## 2018-04-04 DIAGNOSIS — J96.01 ACUTE RESPIRATORY FAILURE WITH HYPOXIA (HCC): ICD-10-CM

## 2018-04-04 DIAGNOSIS — J18.9 SEPSIS DUE TO PNEUMONIA (HCC): ICD-10-CM

## 2018-04-04 DIAGNOSIS — Z09 HOSPITAL DISCHARGE FOLLOW-UP: ICD-10-CM

## 2018-04-04 DIAGNOSIS — I48.91 ATRIAL FIBRILLATION WITH RVR (HCC): ICD-10-CM

## 2018-04-04 DIAGNOSIS — A41.9 SEPSIS DUE TO PNEUMONIA (HCC): ICD-10-CM

## 2018-04-04 LAB
BASOPHILS # BLD AUTO: 0.9 % (ref 0–1.8)
BASOPHILS # BLD: 0.1 K/UL (ref 0–0.12)
EOSINOPHIL # BLD AUTO: 0.24 K/UL (ref 0–0.51)
EOSINOPHIL NFR BLD: 2.2 % (ref 0–6.9)
ERYTHROCYTE [DISTWIDTH] IN BLOOD BY AUTOMATED COUNT: 49.3 FL (ref 35.9–50)
FERRITIN SERPL-MCNC: 393.4 NG/ML (ref 10–291)
FOLATE SERPL-MCNC: 20 NG/ML
HCT VFR BLD AUTO: 35.9 % (ref 37–47)
HGB BLD-MCNC: 11.3 G/DL (ref 12–16)
IMM GRANULOCYTES # BLD AUTO: 0.05 K/UL (ref 0–0.11)
IMM GRANULOCYTES NFR BLD AUTO: 0.5 % (ref 0–0.9)
LYMPHOCYTES # BLD AUTO: 1.43 K/UL (ref 1–4.8)
LYMPHOCYTES NFR BLD: 13 % (ref 22–41)
MCH RBC QN AUTO: 32.1 PG (ref 27–33)
MCHC RBC AUTO-ENTMCNC: 31.5 G/DL (ref 33.6–35)
MCV RBC AUTO: 102 FL (ref 81.4–97.8)
MONOCYTES # BLD AUTO: 1 K/UL (ref 0–0.85)
MONOCYTES NFR BLD AUTO: 9.1 % (ref 0–13.4)
NEUTROPHILS # BLD AUTO: 8.15 K/UL (ref 2–7.15)
NEUTROPHILS NFR BLD: 74.3 % (ref 44–72)
NRBC # BLD AUTO: 0 K/UL
NRBC BLD-RTO: 0 /100 WBC
PLATELET # BLD AUTO: 523 K/UL (ref 164–446)
PMV BLD AUTO: 10.8 FL (ref 9–12.9)
RBC # BLD AUTO: 3.52 M/UL (ref 4.2–5.4)
VIT B12 SERPL-MCNC: 540 PG/ML (ref 211–911)
WBC # BLD AUTO: 11 K/UL (ref 4.8–10.8)

## 2018-04-04 PROCEDURE — 99496 TRANSJ CARE MGMT HIGH F2F 7D: CPT | Performed by: FAMILY MEDICINE

## 2018-04-04 PROCEDURE — 82728 ASSAY OF FERRITIN: CPT

## 2018-04-04 PROCEDURE — 85025 COMPLETE CBC W/AUTO DIFF WBC: CPT

## 2018-04-04 PROCEDURE — 82746 ASSAY OF FOLIC ACID SERUM: CPT

## 2018-04-04 PROCEDURE — 82607 VITAMIN B-12: CPT

## 2018-04-04 PROCEDURE — 36415 COLL VENOUS BLD VENIPUNCTURE: CPT

## 2018-04-04 NOTE — PATIENT INSTRUCTIONS
If you need further assistance, or have any questions; concerns or lingering symptoms before seeing your Primary Care Provider or specialist.     Do not hesitate to contact us.     Please contact us at the Post-Hospital Follow Up Program at [phone].   Our offices hours are Monday-Friday 8 am-5 pm.

## 2018-04-04 NOTE — NON-PROVIDER
Oxygen Sat on RA while sitting for a minute and a half ranged from: 88-91%   Oxygen Sat on RA while walking a few times around the office ranged from: 86% lowest and 92% as the highest Sat.

## 2018-04-04 NOTE — TELEPHONE ENCOUNTER
VOICEMAIL  1. Caller Name: Lisa Gazra                        Call Back Number: [phone] (home)       2. Message: patient is switching [facility]     3. Patient approves office to leave a detailed voicemail/MyChart message: yes

## 2018-04-04 NOTE — PROGRESS NOTES
Subjective:     Lisa Garza is a 82 y.o. female who presents for Hospital Follow-up.  Chart and discharge summary reviewed.   Date of discharge 3/31/18.  48- hour post discharge RN call completed on 4/2/18 and documented in the medical record by Zahida Angelo RN:   POST DISCHARGE CALL:  Discharge Date:3/31/2018   Date of Outreach Call: 4/2/2018  2:06 PM  Now that you're home, how are you doing? Fair  Do you have questions about your medications? No    Did you fill your medications? Yes    Do you have a follow-up appointment scheduled?Yes    Discharging Department: Telemetry 8    Number of Attempts: 1  Current or previous attempts completed within two business days of discharge? Yes  Provided education regarding treatment plan, medication, self-management, ADLs? Yes  Has patient completed Advance Directive? If yes, advise them to bring to appointment. Yes  Care Manager phone number provided? Yes  Is there anything else I can help you with? Yes  Comment:W[facility] of [facility]: Recently hospitalized for sepsis due to pneumonia and chronic respiratory failure along with atrial fibrillation with rapid ventricular response. She was treated with antibiotics and discharged on oxygen 1 L/m. She was also told to take Mucinex which she says is causing her to cough. She would like to get off of that.  She has a remote history of smoking and says she quit 30 years ago.  Echocardiogram showed mild aortic stenosis.  Lab work was significant for macrocytic anemia. Iron levels were decreased. I could not find a B12 or folic acid level.    Since returning home, patient reports feeling fine except for the coughing which she says is from the Mucinex. She also wants to get off of the oxygen.     The patient has questions regarding hospitalization or discharge: She wants to know if she can get off of the oxygen.  The patient denies weakness; denies difficulty taking care of self at home.  Patient reports  "denies taking medications as instructed.      Allergies:   Clindamycin hcl-fd&c blue #1 and Food    Social History:  Social History   Substance Use Topics   • Smoking status: Former Smoker     Packs/day: 1.00     Years: 33.00     Types: Cigarettes     Quit date: 1/1/1991   • Smokeless tobacco: Never Used      Comment: Started smoking at age 23   • Alcohol use 7.0 oz/week     14 Glasses of wine per week      Comment: 1 glass wine/day        ROS:    Constitutional:  Denies fever, chills, night sweats, fatigue or malaise  HENT: Denies head congestion, ear pain or drainage, decreased hearing, sore throat  Eyes: Denies visual changes, eye drainage or redness, eye pain  Neck: Denies pain, swollen glands, decreased range of motion  Lungs: Denies shortness of breath, wheezing. She has a cough  Cardiovascular: Denies chest pain, orthopnea, lower extremity edema, palpitations  Abdominal: Denies abdominal pain, change in bowel or bladder habits, nausea or vomiting  Musculoskeletal: Denies back pain, joint pains, decreased range of motion  Endocrine: Denies unexplained weight changes, heat or cold intolerance, hair loss, polyuria or polydipsia  Neurological: Denies dizziness, headache, confusion, focal weakness or numbness, memory loss  Psychiatric: Denies depression, anxiety, insomnia       Objective:     Blood pressure 110/62, pulse 78, temperature 37.2 °C (99 °F), resp. rate 14, height 1.619 m (5' 3.75\"), weight 61.2 kg (135 lb), SpO2 92 %, not currently breastfeeding.     Physical Exam:    GEN:  Alert, oriented, in no distress  HEENT:  PERRLA, EOMI  NECK;  Supple without adenopathy   LUNGS:  Clear to auscultation without rales, rhonci, or wheezes.  CV:  Heart RRR with 2/6 systolic ejection murmur left sternal border, no S3 or S4  EXT:  Without cyanosis, clubbing, or edema.  NEURO:  Cranial nerves II through XII intact.  Motor function and sensation intact.  SKIN: No rashes or suspicious lesions.  PSYCH:  Behavior is " appropriate.      Assessment and Plan:     1. Hospital follow-up:   Hospitalization and results reviewed with patient. High risk conditions requiring teaching or care coordination were identified and addressed.The patient demonstrate understanding of admission and underlying conditions. The patient understands discharge instructions and when to seek medical attention. Medications reviewed including instructions regarding high risk medications, dosing and side effects.    The patient is able to safely adhere to ADL/IADL, treatment and medication regimen, self-manage of high-risk conditions? Yes   The patient requires physical therapy/home health/DME referral? Home health referral completed at discharge.  The patient requires referral to care coordination/behavioral health/social work?  No   Patient requires referral for pharmacy consult? No   Advance directive/POLST on file?  Yes   Required counseling on advance directive?  No      2. Sepsis due to pneumonia (CMS-HCC)  -Resolved    3. Acute respiratory failure now with chronic respiratory failure with hypoxia (CMS-HCC)  -We measured the patient's oxygen saturation on room air both at rest and with ambulation. Oxygen saturation was labile between 88% and 92%. The patient was instructed that she needs to stay on oxygen    4. Atrial fibrillation with RVR (CMS-HCC)  -Continue Xarelto and metoprolol.  -Cardiology follow-up as scheduled    5. Mild aortic stenosis  -Cardiology follow-up as scheduled    6. Macrocytic and iron deficiency anemia    - VITAMIN B12; Future  - FOLATE; Future  -Repeat CBC  -Ferritin level        Medication Reconciliation  Medication list at end of encounter:   Current Outpatient Prescriptions   Medication Sig Dispense Refill   • metoprolol (LOPRESSOR) 25 MG Tab Take 2 Tabs by mouth 2 times a day. 60 Tab 0   • guaiFENesin LA (MUCINEX) 600 MG TABLET SR 12 HR Take 1 Tab by mouth every 12 hours. 28 Tab 0   • rivaroxaban (XARELTO) 20 MG Tab tablet Take  1 Tab by mouth with dinner. 30 Tab 0   • simvastatin (ZOCOR) 10 MG Tab Take 1 Tab by mouth every evening. 30 Tab 11   • hydrocortisone 2.5 % Cream topical cream Apply 1 Application to affected area(s) 2 times a day. 2 Tube 1   • Multiple Vitamins-Minerals (VITRUM 50+ SENIOR MULTI PO) Take  by mouth.     • Cholecalciferol (VITAMIN D-3) 1000 UNITS CAPS Take 1 Tab by mouth every morning.     • Omega 3 1200 MG CAPS Take 1 Cap by mouth every morning.     • Calcium 500 MG CHEW Take 1 Tab by mouth every morning.     • Psyllium (METAMUCIL FIBER PO) Take  by mouth.     • fluticasone (FLONASE) 50 MCG/ACT nasal spray Spray 1 Spray in nose every morning.       No current facility-administered medications for this visit.        Primary care follow-up:  New health conditions identified during hospitalization? Yes   Labs/pathology/imaging requires future PCP follow-up?  Yes , anemia  Changes to medications during hospitalization or today? Yes     Recommended followup:  with Brandon Pearl M.D.   Future Appointments       Provider Department Center    4/5/2018 TBD Toya Rod R.N. [facility]     4/12/2018 10:40 AM MAT[facility]W [facility]     5/2/2018 3:00 PM Brandon Pearl M.D. [facility] [address]          Patient Instruction  Patient provided with educational material on discharge diagnosis and management of symptoms/red flags. Patient instructed to keep follow-up appointments and to bring written questions and and actual medications to each office visit. Patient instructed to call PCP/specialist with any problems/questions/concerns. Patient verbalizes understanding and has no further questions at this time.    Face-to-face transitional care management services with high medical decision complexity were provided.

## 2018-04-05 ENCOUNTER — PATIENT OUTREACH (OUTPATIENT)
Dept: HEALTH INFORMATION MANAGEMENT | Facility: OTHER | Age: 83
End: 2018-04-05

## 2018-04-05 ENCOUNTER — HOME CARE VISIT (OUTPATIENT)
Dept: HOME HEALTH SERVICES | Facility: HOME HEALTHCARE | Age: 83
End: 2018-04-05
Payer: MEDICARE

## 2018-04-05 ENCOUNTER — TELEPHONE (OUTPATIENT)
Dept: MEDICAL GROUP | Facility: CLINIC | Age: 83
End: 2018-04-05

## 2018-04-05 ENCOUNTER — TELEPHONE (OUTPATIENT)
Dept: HEALTH INFORMATION MANAGEMENT | Facility: OTHER | Age: 83
End: 2018-04-05

## 2018-04-05 VITALS
HEIGHT: 64 IN | OXYGEN SATURATION: 95 % | HEART RATE: 84 BPM | SYSTOLIC BLOOD PRESSURE: 102 MMHG | WEIGHT: 130 LBS | TEMPERATURE: 99.7 F | BODY MASS INDEX: 22.2 KG/M2 | DIASTOLIC BLOOD PRESSURE: 58 MMHG | RESPIRATION RATE: 20 BRPM

## 2018-04-05 PROCEDURE — G0493 RN CARE EA 15 MIN HH/HOSPICE: HCPCS

## 2018-04-05 PROCEDURE — 665001 SOC-HOME HEALTH

## 2018-04-05 SDOH — ECONOMIC STABILITY: HOUSING INSECURITY
HOME SAFETY: OXYGEN SAFETY RISK ASSESSMENT PERFORMED. PATIENT DOES HAVE A NO SMOKING SIGN POSTED IN THE HOME. PATIENT DOES HAVE A WORKING FIRE EXTINGUISHER PRESENT IN THE HOME. SMOKE ALARMS ARE PRESENT AND FUNCTIONAL ON EACH LEVEL OF THE HOME. PATIENT DOES HAVE A

## 2018-04-05 SDOH — ECONOMIC STABILITY: HOUSING INSECURITY
HOME SAFETY: FIRE ESCAPE PLAN DEVELOPED. PATIENT DOES NOT HAVE FLAMMABLE MATERIALS PRESENT IN THE HOME PRESENTING A FIRE HAZARD. NO EVIDENCE FOUND OF SMOKING MATERIALS PRESENT IN THE HOME.

## 2018-04-05 ASSESSMENT — ACTIVITIES OF DAILY LIVING (ADL)
OASIS_M1830: 03
HOME_HEALTH_OASIS: 02
HOME_HEALTH_OASIS: 01

## 2018-04-05 ASSESSMENT — ENCOUNTER SYMPTOMS
SEVERE DYSPNEA: 1
DEBILITATING PAIN: 1
NAUSEA: DENIES
VOMITING: DENIES
SHORTNESS OF BREATH: T

## 2018-04-05 ASSESSMENT — PATIENT HEALTH QUESTIONNAIRE - PHQ9
1. LITTLE INTEREST OR PLEASURE IN DOING THINGS: 00
2. FEELING DOWN, DEPRESSED, IRRITABLE, OR HOPELESS: 00

## 2018-04-05 NOTE — TELEPHONE ENCOUNTER
Raffaele Pittman,    Referral from [facility]. Med review completed. Discrepancy noted between metoprolol directions on prescription bottle and current med list. Outbound call to [facility] pharmacy to clarify. Technician states last Rx dispensed was from a previous prescription ordered for metoprolol tartrate 25mg BID. She confirmed that there is an order that was received on 3/30 for metoprolol tartrate 25mg take 2 tabs BID. Insurance will not accept claim for latter Rx until 4/18. Outbound call to patient. Instructed her to follow newest directions of 2 tablets BID. Patient verbalized understanding. Provided my contact info for her son to call in case of any further questions.    Thanks,  Matthew Corley, PharmD [phone]

## 2018-04-05 NOTE — TELEPHONE ENCOUNTER
----- Message from Yamileth Worthington M.D. sent at 4/5/2018  7:48 AM PDT -----  Please notify patient that she is not B12 deficient but there are a few lab abnormalities that should be discussed at her upcoming appointment with Dr. Pearl.

## 2018-04-05 NOTE — PROGRESS NOTES
Referral from [facility]. Med review completed. Discrepancy noted between metoprolol directions on prescription bottle and current med list. Outbound call to [facility] pharmacy to clarify. Technician states last Rx dispensed was from a previous prescription ordered for metoprolol tartrate 25mg BID. She confirmed that there is an order that was received on 3/30 for metoprolol tartrate 25mg take 2 tabs BID. Insurance will not accept claim for latter Rx until 4/18. Outbound call to patient. Instructed her to follow newest directions of 2 tablets BID. Patient verbalized understanding. Provided my contact info for her son to call in case of any further questions.

## 2018-04-06 ENCOUNTER — HOME CARE VISIT (OUTPATIENT)
Dept: HOME HEALTH SERVICES | Facility: HOME HEALTHCARE | Age: 83
End: 2018-04-06
Payer: MEDICARE

## 2018-04-07 SDOH — ECONOMIC STABILITY: HOUSING INSECURITY: UNSAFE APPLIANCES: 0

## 2018-04-07 SDOH — ECONOMIC STABILITY: HOUSING INSECURITY: UNSAFE COOKING RANGE AREA: 0

## 2018-04-08 ENCOUNTER — HOME CARE VISIT (OUTPATIENT)
Dept: HOME HEALTH SERVICES | Facility: HOME HEALTHCARE | Age: 83
End: 2018-04-08
Payer: MEDICARE

## 2018-04-09 ENCOUNTER — HOME CARE VISIT (OUTPATIENT)
Dept: HOME HEALTH SERVICES | Facility: HOME HEALTHCARE | Age: 83
End: 2018-04-09
Payer: MEDICARE

## 2018-04-09 ENCOUNTER — TELEPHONE (OUTPATIENT)
Dept: HEALTH INFORMATION MANAGEMENT | Facility: OTHER | Age: 83
End: 2018-04-09

## 2018-04-09 VITALS
TEMPERATURE: 98.2 F | HEART RATE: 68 BPM | RESPIRATION RATE: 18 BRPM | DIASTOLIC BLOOD PRESSURE: 42 MMHG | OXYGEN SATURATION: 100 % | SYSTOLIC BLOOD PRESSURE: 118 MMHG

## 2018-04-09 PROCEDURE — G0299 HHS/HOSPICE OF RN EA 15 MIN: HCPCS

## 2018-04-09 ASSESSMENT — ENCOUNTER SYMPTOMS
RESPIRATORY SYMPTOMS COMMENTS: NO CONCERNS AT THE TIME OF THIS ASSESSMENT.
NAUSEA: DENIES
VOMITING: DENIES

## 2018-04-09 NOTE — TELEPHONE ENCOUNTER
Raffaele Villaseñor,    Med review completed 4/5. No new medication related issues noted.    Thanks,  Matthew Corley, PharmD [phone]

## 2018-04-10 ENCOUNTER — HOME CARE VISIT (OUTPATIENT)
Dept: HOME HEALTH SERVICES | Facility: HOME HEALTHCARE | Age: 83
End: 2018-04-10
Payer: MEDICARE

## 2018-04-10 VITALS
HEART RATE: 76 BPM | RESPIRATION RATE: 18 BRPM | SYSTOLIC BLOOD PRESSURE: 118 MMHG | DIASTOLIC BLOOD PRESSURE: 58 MMHG | OXYGEN SATURATION: 96 % | TEMPERATURE: 99.2 F

## 2018-04-10 PROCEDURE — G0151 HHCP-SERV OF PT,EA 15 MIN: HCPCS

## 2018-04-10 ASSESSMENT — ACTIVITIES OF DAILY LIVING (ADL)
IADLS_COMMENTS: <!--EPICS-->SEE OT REPORT<!--EPICE-->
ADLS_COMMENTS: <!--EPICS-->SEE OT REPORT<!--EPICE-->

## 2018-04-12 ENCOUNTER — OFFICE VISIT (OUTPATIENT)
Dept: CARDIOLOGY | Facility: MEDICAL CENTER | Age: 83
End: 2018-04-12
Payer: MEDICARE

## 2018-04-12 ENCOUNTER — HOME CARE VISIT (OUTPATIENT)
Dept: HOME HEALTH SERVICES | Facility: HOME HEALTHCARE | Age: 83
End: 2018-04-12
Payer: MEDICARE

## 2018-04-12 VITALS
HEIGHT: 64 IN | HEART RATE: 76 BPM | SYSTOLIC BLOOD PRESSURE: 120 MMHG | BODY MASS INDEX: 22.06 KG/M2 | DIASTOLIC BLOOD PRESSURE: 72 MMHG | WEIGHT: 129.2 LBS | OXYGEN SATURATION: 92 %

## 2018-04-12 DIAGNOSIS — E78.2 MIXED HYPERLIPIDEMIA: ICD-10-CM

## 2018-04-12 DIAGNOSIS — I48.91 ATRIAL FIBRILLATION WITH RVR (HCC): ICD-10-CM

## 2018-04-12 PROCEDURE — 99214 OFFICE O/P EST MOD 30 MIN: CPT | Performed by: NURSE PRACTITIONER

## 2018-04-12 ASSESSMENT — ENCOUNTER SYMPTOMS
LOSS OF CONSCIOUSNESS: 0
ORTHOPNEA: 0
WHEEZING: 0
FOCAL WEAKNESS: 0
DIZZINESS: 0
SHORTNESS OF BREATH: 0
PALPITATIONS: 0
DOUBLE VISION: 0
BLURRED VISION: 0
WEAKNESS: 0
FALLS: 0
HEADACHES: 0
COUGH: 1

## 2018-04-12 NOTE — PROGRESS NOTES
Chief Complaint   Patient presents with   • Atrial Fibrillation     Hospital follow up   • Chest Pain       Subjective:   Lisa Garza is a 82 y.o. female who presents today for hospital follow up after atrial fibrillation and chest pain.    She is new to our clinic, She was seen by Dr. Pacheco in the hospital. HX: hyperlipidemia.     Patient was hospitalized from 3/26 to 30/2018 for pneumonia and atrial fibrillation. She had a new onset of atrial fibrillation she was started on Xarelto for anticoagulation. Her atrial fibrillation reverted back to normal rhythm. Echocardiogram showed diastolic dysfunction grade 2 and ejection fraction 70%.    Patient is doing well, no complaints. Her cough has been improving and still wearing oxygen.     She has had no episodes of chest pain, palpitations, dizziness/lightheadedness, shortness of breath, orthopnea, or peripheral edema.    Past Medical History:   Diagnosis Date   • Arthritis     osteo, hip   • Cancer (CMS-HCC)     skin on left shoulder   • Heart murmur    • High cholesterol    • Unspecified cataract     tony IOL   • Unspecified urinary incontinence      Past Surgical History:   Procedure Laterality Date   • HIP ARTHROPLASTY TOTAL Left 7/6/2015    Procedure: HIP ARTHROPLASTY TOTAL;  Surgeon: Navid Howard M.D.;  Location: SURGERY [facility];  Service:    • OTHER Right 2006    cyst & bone spur on right hand   • EYE SURGERY      bilateral IOL     Family History   Problem Relation Age of Onset   • Cancer Father      GI   • Heart Disease Father    • Lung Disease Father    • Stroke Sister    • No Known Problems Son    • No Known Problems Son    • No Known Problems Maternal Grandmother    • No Known Problems Maternal Grandfather    • No Known Problems Paternal Grandmother    • No Known Problems Paternal Grandfather    • Hypertension Sister    • Hyperlipidemia Sister      Social History     Social History   • Marital status:      Spouse name: N/A   • Number of  children: N/A   • Years of education: N/A     Occupational History   • Not on file.     Social History Main Topics   • Smoking status: Former Smoker     Packs/day: 1.00     Years: 33.00     Types: Cigarettes     Quit date: 1/1/1991   • Smokeless tobacco: Never Used      Comment: Started smoking at age 23   • Alcohol use 7.0 oz/week     14 Glasses of wine per week      Comment: 1 glass wine/day   • Drug use: No   • Sexual activity: No     Other Topics Concern   • Not on file     Social History Narrative   • No narrative on file     Allergies   Allergen Reactions   • Clindamycin Hcl-Fd&C Blue #1      unknown   • Food      Scallops-vomiting, citrus-itching, redness, oatmeal-diarrhea     Outpatient Encounter Prescriptions as of 4/12/2018   Medication Sig Dispense Refill   • rivaroxaban (XARELTO) 20 MG Tab tablet Take 1 Tab by mouth with dinner. 30 Tab 0   • metoprolol (LOPRESSOR) 25 MG Tab Take 2 Tabs by mouth 2 times a day. 60 Tab 0   • simvastatin (ZOCOR) 10 MG Tab Take 1 Tab by mouth every evening. 30 Tab 11   • hydrocortisone 2.5 % Cream topical cream Apply 1 Application to affected area(s) 2 times a day. 2 Tube 1   • Multiple Vitamins-Minerals (VITRUM 50+ SENIOR MULTI PO) Take 1 Tab by mouth every day at 6 PM.     • Cholecalciferol (VITAMIN D-3) 1000 UNITS CAPS Take 1 Tab by mouth every morning.     • Omega 3 1200 MG CAPS Take 1 Cap by mouth every morning.     • Calcium 500 MG CHEW Take 1 Tab by mouth every morning.     • [DISCONTINUED] metoprolol (LOPRESSOR) 25 MG Tab Take 2 Tabs by mouth 2 times a day. 60 Tab 0   • [DISCONTINUED] guaiFENesin LA (MUCINEX) 600 MG TABLET SR 12 HR Take 1 Tab by mouth every 12 hours. 28 Tab 0   • [DISCONTINUED] rivaroxaban (XARELTO) 20 MG Tab tablet Take 1 Tab by mouth with dinner. 30 Tab 0   • [DISCONTINUED] Psyllium (METAMUCIL FIBER PO) Take 1 Tab by mouth every day at 6 PM.     • [DISCONTINUED] fluticasone (FLONASE) 50 MCG/ACT nasal spray Spray 1 Spray in nose every morning.    "    No facility-administered encounter medications on file as of 4/12/2018.      Review of Systems   Constitutional: Negative for malaise/fatigue.   Eyes: Negative for blurred vision and double vision.   Respiratory: Positive for cough. Negative for shortness of breath and wheezing.    Cardiovascular: Negative for chest pain, palpitations, orthopnea and leg swelling.   Musculoskeletal: Negative for falls.   Neurological: Negative for dizziness, focal weakness, loss of consciousness, weakness and headaches.   All other systems reviewed and are negative.       Objective:   /72   Pulse 76   Ht 1.626 m (5' 4\")   Wt 58.6 kg (129 lb 3.2 oz)   SpO2 92%   BMI 22.18 kg/m²     Physical Exam   Constitutional: She is oriented to person, place, and time. She appears well-developed and well-nourished. No distress.   HENT:   Head: Normocephalic and atraumatic.   Eyes: Pupils are equal, round, and reactive to light.   Neck: No JVD present.   Cardiovascular: Normal rate and regular rhythm.    Murmur heard.  Pulmonary/Chest: Effort normal and breath sounds normal.   On oxygen    Abdominal: Soft. Bowel sounds are normal. She exhibits no distension.   Musculoskeletal: She exhibits no edema.   Neurological: She is alert and oriented to person, place, and time.   Skin: Skin is warm and dry. She is not diaphoretic.   Psychiatric: She has a normal mood and affect. Her behavior is normal. Judgment and thought content normal.       Echocardiography Laboratory  3/28/2018  Left ventricular ejection fraction is visually estimated to be 70%.  Mild concentric left ventricular hypertrophy.  Grade II diastolic dysfunction.  Moderately dilated left atrium.  Mild mitral regurgitation.  Mild aortic stenosis.    Lab Results   Component Value Date/Time    CHOLSTRLTOT 119 03/28/2018 03:29 AM    LDL 78 03/28/2018 03:29 AM    HDL 25 (A) 03/28/2018 03:29 AM    TRIGLYCERIDE 80 03/28/2018 03:29 AM       Lab Results   Component Value Date/Time    " SODIUM 137 03/30/2018 03:16 AM    POTASSIUM 3.8 03/30/2018 03:16 AM    CHLORIDE 100 03/30/2018 03:16 AM    CO2 30 03/30/2018 03:16 AM    GLUCOSE 96 03/30/2018 03:16 AM    BUN 12 03/30/2018 03:16 AM    CREATININE 0.57 03/30/2018 03:16 AM     Lab Results   Component Value Date/Time    ALKPHOSPHAT 123 (H) 03/28/2018 03:29 AM    ASTSGOT 47 (H) 03/28/2018 03:29 AM    ALTSGPT 46 03/28/2018 03:29 AM    TBILIRUBIN 0.4 03/28/2018 03:29 AM          Assessment:     1. Atrial fibrillation with RVR (CMS-HCC)  rivaroxaban (XARELTO) 20 MG Tab tablet    metoprolol (LOPRESSOR) 25 MG Tab   2. Mixed hyperlipidemia         Medical Decision Making:  Today's Assessment / Status / Plan:     1. Atrial fibrillation:  - in NSR today  - continue metoprolol 25mg BID   - [id number]: 4 on xarelto 20mg qd  - ECHO: ef 70% mild aortic stenosis and mitral regurgitation      2. Hyperlipidemia:  - LDL 78, goal less than 100  - continue simvastatin 10mg qd    3. Pneumonia:  - on 1L of oxygen   - symptoms improved per patient   - breath sounds are clear     Follow up in 4 months with Dr. Sutherland, sooner as needed.    Collaborating Provider: Dr. Isaac

## 2018-04-13 ENCOUNTER — HOME CARE VISIT (OUTPATIENT)
Dept: HOME HEALTH SERVICES | Facility: HOME HEALTHCARE | Age: 83
End: 2018-04-13
Payer: MEDICARE

## 2018-04-13 VITALS
SYSTOLIC BLOOD PRESSURE: 96 MMHG | OXYGEN SATURATION: 96 % | TEMPERATURE: 98.8 F | DIASTOLIC BLOOD PRESSURE: 60 MMHG | HEART RATE: 76 BPM | RESPIRATION RATE: 16 BRPM

## 2018-04-13 PROCEDURE — G0495 RN CARE TRAIN/EDU IN HH: HCPCS

## 2018-04-13 PROCEDURE — G0151 HHCP-SERV OF PT,EA 15 MIN: HCPCS

## 2018-04-13 ASSESSMENT — ENCOUNTER SYMPTOMS: SEVERE DYSPNEA: 1

## 2018-04-14 VITALS
SYSTOLIC BLOOD PRESSURE: 108 MMHG | RESPIRATION RATE: 18 BRPM | DIASTOLIC BLOOD PRESSURE: 60 MMHG | OXYGEN SATURATION: 96 % | TEMPERATURE: 98.8 F | HEART RATE: 76 BPM

## 2018-04-14 SDOH — ECONOMIC STABILITY: HOUSING INSECURITY: UNSAFE COOKING RANGE AREA: 0

## 2018-04-14 SDOH — ECONOMIC STABILITY: HOUSING INSECURITY: UNSAFE APPLIANCES: 0

## 2018-04-16 ENCOUNTER — HOME CARE VISIT (OUTPATIENT)
Dept: HOME HEALTH SERVICES | Facility: HOME HEALTHCARE | Age: 83
End: 2018-04-16
Payer: MEDICARE

## 2018-04-16 VITALS
DIASTOLIC BLOOD PRESSURE: 60 MMHG | OXYGEN SATURATION: 97 % | RESPIRATION RATE: 16 BRPM | TEMPERATURE: 98.1 F | SYSTOLIC BLOOD PRESSURE: 110 MMHG | HEART RATE: 72 BPM

## 2018-04-16 PROCEDURE — G0495 RN CARE TRAIN/EDU IN HH: HCPCS

## 2018-04-16 PROCEDURE — G0180 MD CERTIFICATION HHA PATIENT: HCPCS | Performed by: FAMILY MEDICINE

## 2018-04-16 SDOH — ECONOMIC STABILITY: HOUSING INSECURITY: UNSAFE APPLIANCES: 0

## 2018-04-16 SDOH — ECONOMIC STABILITY: HOUSING INSECURITY: UNSAFE COOKING RANGE AREA: 0

## 2018-04-17 ENCOUNTER — HOME CARE VISIT (OUTPATIENT)
Dept: HOME HEALTH SERVICES | Facility: HOME HEALTHCARE | Age: 83
End: 2018-04-17
Payer: MEDICARE

## 2018-04-17 VITALS
RESPIRATION RATE: 16 BRPM | TEMPERATURE: 98.8 F | DIASTOLIC BLOOD PRESSURE: 58 MMHG | SYSTOLIC BLOOD PRESSURE: 100 MMHG | OXYGEN SATURATION: 97 % | HEART RATE: 76 BPM

## 2018-04-17 PROCEDURE — G0151 HHCP-SERV OF PT,EA 15 MIN: HCPCS

## 2018-04-17 ASSESSMENT — ENCOUNTER SYMPTOMS: SEVERE DYSPNEA: 1

## 2018-04-19 ENCOUNTER — HOME CARE VISIT (OUTPATIENT)
Dept: HOME HEALTH SERVICES | Facility: HOME HEALTHCARE | Age: 83
End: 2018-04-19
Payer: MEDICARE

## 2018-04-19 VITALS
OXYGEN SATURATION: 97 % | TEMPERATURE: 98.8 F | DIASTOLIC BLOOD PRESSURE: 60 MMHG | HEART RATE: 68 BPM | SYSTOLIC BLOOD PRESSURE: 108 MMHG | RESPIRATION RATE: 16 BRPM

## 2018-04-19 PROCEDURE — G0495 RN CARE TRAIN/EDU IN HH: HCPCS

## 2018-04-22 SDOH — ECONOMIC STABILITY: HOUSING INSECURITY: UNSAFE APPLIANCES: 0

## 2018-04-22 SDOH — ECONOMIC STABILITY: HOUSING INSECURITY: UNSAFE COOKING RANGE AREA: 0

## 2018-04-22 ASSESSMENT — ENCOUNTER SYMPTOMS: RESPIRATORY SYMPTOMS COMMENTS: PT ON CONTINUOUS OXYGEN

## 2018-04-23 ENCOUNTER — HOME CARE VISIT (OUTPATIENT)
Dept: HOME HEALTH SERVICES | Facility: HOME HEALTHCARE | Age: 83
End: 2018-04-23
Payer: MEDICARE

## 2018-04-23 VITALS
OXYGEN SATURATION: 98 % | TEMPERATURE: 99 F | DIASTOLIC BLOOD PRESSURE: 70 MMHG | RESPIRATION RATE: 18 BRPM | SYSTOLIC BLOOD PRESSURE: 112 MMHG | HEART RATE: 78 BPM

## 2018-04-23 PROCEDURE — G0495 RN CARE TRAIN/EDU IN HH: HCPCS

## 2018-04-24 SDOH — ECONOMIC STABILITY: HOUSING INSECURITY: UNSAFE APPLIANCES: 0

## 2018-04-24 SDOH — ECONOMIC STABILITY: HOUSING INSECURITY: UNSAFE COOKING RANGE AREA: 0

## 2018-04-25 ENCOUNTER — HOME CARE VISIT (OUTPATIENT)
Dept: HOME HEALTH SERVICES | Facility: HOME HEALTHCARE | Age: 83
End: 2018-04-25
Payer: MEDICARE

## 2018-04-25 VITALS
HEART RATE: 66 BPM | SYSTOLIC BLOOD PRESSURE: 110 MMHG | TEMPERATURE: 99.1 F | RESPIRATION RATE: 16 BRPM | OXYGEN SATURATION: 92 % | DIASTOLIC BLOOD PRESSURE: 60 MMHG

## 2018-04-25 PROCEDURE — G0157 HHC PT ASSISTANT EA 15: HCPCS

## 2018-04-30 ENCOUNTER — PATIENT OUTREACH (OUTPATIENT)
Dept: HEALTH INFORMATION MANAGEMENT | Facility: OTHER | Age: 83
End: 2018-04-30

## 2018-04-30 ENCOUNTER — HOME CARE VISIT (OUTPATIENT)
Dept: HOME HEALTH SERVICES | Facility: HOME HEALTHCARE | Age: 83
End: 2018-04-30
Payer: MEDICARE

## 2018-04-30 VITALS
DIASTOLIC BLOOD PRESSURE: 68 MMHG | TEMPERATURE: 98.4 F | HEART RATE: 63 BPM | OXYGEN SATURATION: 94 % | SYSTOLIC BLOOD PRESSURE: 114 MMHG | RESPIRATION RATE: 16 BRPM

## 2018-04-30 PROCEDURE — G0495 RN CARE TRAIN/EDU IN HH: HCPCS

## 2018-04-30 SDOH — ECONOMIC STABILITY: HOUSING INSECURITY: UNSAFE COOKING RANGE AREA: 0

## 2018-04-30 SDOH — ECONOMIC STABILITY: HOUSING INSECURITY: UNSAFE APPLIANCES: 0

## 2018-05-02 ENCOUNTER — OFFICE VISIT (OUTPATIENT)
Dept: MEDICAL GROUP | Facility: MEDICAL CENTER | Age: 83
End: 2018-05-02
Payer: MEDICARE

## 2018-05-02 VITALS
BODY MASS INDEX: 21.68 KG/M2 | DIASTOLIC BLOOD PRESSURE: 76 MMHG | WEIGHT: 127 LBS | OXYGEN SATURATION: 96 % | RESPIRATION RATE: 14 BRPM | HEIGHT: 64 IN | SYSTOLIC BLOOD PRESSURE: 122 MMHG | HEART RATE: 80 BPM | TEMPERATURE: 97.4 F

## 2018-05-02 DIAGNOSIS — I48.20 CHRONIC ATRIAL FIBRILLATION (HCC): ICD-10-CM

## 2018-05-02 DIAGNOSIS — G47.34 NOCTURNAL HYPOXIA: ICD-10-CM

## 2018-05-02 DIAGNOSIS — R93.1 ABNORMAL ECHOCARDIOGRAM: ICD-10-CM

## 2018-05-02 DIAGNOSIS — Z09 HOSPITAL DISCHARGE FOLLOW-UP: ICD-10-CM

## 2018-05-02 DIAGNOSIS — E78.2 MIXED HYPERLIPIDEMIA: ICD-10-CM

## 2018-05-02 PROCEDURE — 99214 OFFICE O/P EST MOD 30 MIN: CPT | Performed by: FAMILY MEDICINE

## 2018-05-02 NOTE — PROGRESS NOTES
CC: Hospital discharge  HPI:   Lisa presents today for post hospitalization follow up.    Patient was admitted to [facility] on 3/26/2017 for chest pain, cough with yellow and green sputum, was diagnosed with sepsis and acute hypoxic respiratory failure secondary to pneumonia/sepsis, as well as new onset atrial fibrillation. She was treated for pneumonia with IV antibiotics ceftriaxone and azithromycin, that were changed to levofloxacin due to spikes of fever. She was getting breathing treatment, bronchodilators. Hypoxia improved, however patient does require oxygen upon discharge. Patient had new onset atrial fibrillation treated in the hospital with metoprolol, as well as Xarelto for anticoagulation. Her A. fib converted back to sinus rythm. Heart ultrasound showed diastolic dysfunction grade 2, ejection fraction 70%. Patient was discharged on 3/31/2018 in a good condition.    Came in for follow up    She has been doing fine. Denies cough, and SOB. However her Oxygen saturation drps to 89-87 with exertion. Has been on Oxygen at night. Advised to take it on exertion as well.     Chronic atrial fibrillation (HCC)  She has been asymptomatic. Denies palpitation, and SOB , has normal rate and rhythm.Has been on Xarelto, and metoprolol. No side efects.    Abnormal echocardiogram/ Pulmonary hypertension  Last echo in the hospital showed:EF 70%, and RVSP was 45 mmHg.  Asymptomatic. No SOB at rest, no leg swelling    Mixed hyperlipidemia  He has been tolerating the statin. Denies muscle pain LFTs has been normal, has been on Simvastatin 10 mg daily. Last lipid panel was within normal limit.    Patient Active Problem List    Diagnosis Date Noted   • Sepsis due to pneumonia ([facility]) 03/27/2018     Priority: High   • Pneumonia due to infectious organism 03/29/2018   • Acute respiratory failure with hypoxia ([facility]) 03/28/2018   • Abnormal LFTs 03/27/2018   • Atrial fibrillation with RVR ([facility]) 03/27/2018   • Anemia 03/27/2018  "  • Hyponatremia 03/27/2018   • Hypoalbuminemia 03/27/2018   • Alcohol abuse 03/27/2018   • Mixed hyperlipidemia 11/16/2017   • Chronic seasonal allergic rhinitis due to pollen 11/16/2017   • Systolic murmur 11/30/2015   • Osteoarthritis of right hip 07/06/2015   • History of total left hip arthroplasty 05/21/2015   • Osteoarthritis 05/21/2015   • Osteoarthritis, multiple sites 05/21/2015       Current Outpatient Prescriptions   Medication Sig Dispense Refill   • Non Formulary Request Inhale 1 L/min by mouth Continuous. oxygen x nasal cannula     • rivaroxaban (XARELTO) 20 MG Tab tablet Take 1 Tab by mouth with dinner. 30 Tab 0   • metoprolol (LOPRESSOR) 25 MG Tab Take 2 Tabs by mouth 2 times a day. 60 Tab 0   • simvastatin (ZOCOR) 10 MG Tab Take 1 Tab by mouth every evening. 30 Tab 11   • hydrocortisone 2.5 % Cream topical cream Apply 1 Application to affected area(s) 2 times a day. 2 Tube 1   • Multiple Vitamins-Minerals (VITRUM 50+ SENIOR MULTI PO) Take 1 Tab by mouth every day at 6 PM.     • Cholecalciferol (VITAMIN D-3) 1000 UNITS CAPS Take 1 Tab by mouth every morning.     • Omega 3 1200 MG CAPS Take 1 Cap by mouth every morning.     • Calcium 500 MG CHEW Take 1 Tab by mouth every morning.       No current facility-administered medications for this visit.          Allergies as of 05/02/2018 - Reviewed 05/02/2018   Allergen Reaction Noted   • Clindamycin hcl-fd&c blue #1  05/29/2015   • Food  07/02/2015        ROS: Denies any chest pain, Shortness of breath, Changes bowel or bladder, Lower extremity edema.    Physical Exam:  /76   Pulse 80   Temp 36.3 °C (97.4 °F)   Resp 14   Ht 1.626 m (5' 4\")   Wt 57.6 kg (127 lb)   SpO2 96%   BMI 21.80 kg/m²   Gen.: Well-developed, well-nourished, no apparent distress,pleasant and cooperative with the examination  Skin:  Warm and dry with good turgor. No rashes or suspicious lesions in visible areas  HEENT:Sinuses nontender with palpation, TMs clear, nares " patent with pink mucosa and clear rhinorrhea,no septal deviation ,polyps or lesions. lips without lesions, oropharynx clear.  Neck: Trachea midline,no masses or adenopathy. No JVD.  Cor: Regular rate and rhythm without murmur, gallop or rub.  Lungs: Respirations unlabored.Clear to auscultation with equal breath sounds bilaterally. No wheezes, rhonchi.  Extremities: No cyanosis, clubbing or edema.        Assessment and Plan.   82 y.o. female     1. Hospital discharge follow-up  Discharge summary reviewed. Currently stable, and asymptomatic.    2. Nocturnal / exertional  hypoxia  Continue on oxygen at night, and during exercise.    3. Chronic atrial fibrillation (HCC)  Stable, has normal rate and rhythm.  Continue on Xarelto, and metoprolol    4. Abnormal echocardiogram  Last echo in the hospital showed:  Left ventricular ejection fraction is visually estimated to be 70%.  Mild concentric left ventricular hypertrophy.  Grade II diastolic dysfunction.  Moderately dilated left atrium.  Mild mitral regurgitation.  Mild aortic stenosis.  Mild tricuspid regurgitation. Right ventricular systolic pressure is estimated to be 45 mmHg.  Asymptomatic.  Continue watch.    5. Mixed hyperlipidemia  He has been tolerating the statin. Denies muscle pain LFTs has been normal  Continue on Simvastatin 10 mg daily.

## 2018-05-03 NOTE — PROGRESS NOTES
Lisa Garza was an emergent admission who discharged on 3/31. Patient advocate was able to assist the patient with switching her Home Health Care from [place] to [facility] and with sending her resources for financial assistance for her Energy bills. Patient kept appointments with her PCP on 4/4 and Cardiology 4/12. Patient has a follow up appointment on 5/2  with her PCP. PPS 80%

## 2018-05-04 ENCOUNTER — HOME CARE VISIT (OUTPATIENT)
Dept: HOME HEALTH SERVICES | Facility: HOME HEALTHCARE | Age: 83
End: 2018-05-04
Payer: MEDICARE

## 2018-05-04 VITALS
DIASTOLIC BLOOD PRESSURE: 68 MMHG | HEART RATE: 87 BPM | TEMPERATURE: 99.4 F | OXYGEN SATURATION: 99 % | SYSTOLIC BLOOD PRESSURE: 114 MMHG | RESPIRATION RATE: 16 BRPM

## 2018-05-04 PROCEDURE — G0151 HHCP-SERV OF PT,EA 15 MIN: HCPCS

## 2018-05-04 SDOH — ECONOMIC STABILITY: HOUSING INSECURITY: UNSAFE COOKING RANGE AREA: 0

## 2018-05-04 SDOH — ECONOMIC STABILITY: HOUSING INSECURITY: UNSAFE APPLIANCES: 0

## 2018-05-04 ASSESSMENT — ACTIVITIES OF DAILY LIVING (ADL)
HOME_HEALTH_OASIS: 01
OASIS_M1830: 01
HOME_HEALTH_OASIS: 00

## 2018-05-17 ENCOUNTER — PATIENT OUTREACH (OUTPATIENT)
Dept: HEALTH INFORMATION MANAGEMENT | Facility: OTHER | Age: 83
End: 2018-05-17

## 2018-05-17 NOTE — PROGRESS NOTES
Outcome: Left Message    Please transfer to Patient Outreach Team at [phone] when patient returns call.        Attempt # 1

## 2018-05-29 DIAGNOSIS — I48.91 ATRIAL FIBRILLATION WITH RVR (HCC): ICD-10-CM

## 2018-06-05 DIAGNOSIS — I48.91 ATRIAL FIBRILLATION WITH RVR (HCC): ICD-10-CM

## 2018-06-13 DIAGNOSIS — I48.91 ATRIAL FIBRILLATION WITH RVR (HCC): ICD-10-CM

## 2018-06-13 DIAGNOSIS — E78.5 HYPERLIPIDEMIA, UNSPECIFIED HYPERLIPIDEMIA TYPE: ICD-10-CM

## 2018-06-14 RX ORDER — SIMVASTATIN 10 MG
10 TABLET ORAL EVERY EVENING
Qty: 90 TAB | Refills: 3 | Status: SHIPPED | OUTPATIENT
Start: 2018-06-14 | End: 2018-06-20 | Stop reason: SDUPTHER

## 2018-06-18 ENCOUNTER — HOSPITAL ENCOUNTER (OUTPATIENT)
Dept: RADIOLOGY | Facility: MEDICAL CENTER | Age: 83
End: 2018-06-18
Attending: FAMILY MEDICINE
Payer: MEDICARE

## 2018-06-18 DIAGNOSIS — Z12.31 BREAST CANCER SCREENING BY MAMMOGRAM: ICD-10-CM

## 2018-06-18 PROCEDURE — 77067 SCR MAMMO BI INCL CAD: CPT

## 2018-06-20 DIAGNOSIS — E78.5 HYPERLIPIDEMIA, UNSPECIFIED HYPERLIPIDEMIA TYPE: ICD-10-CM

## 2018-06-20 RX ORDER — SIMVASTATIN 10 MG
10 TABLET ORAL EVERY EVENING
Qty: 90 TAB | Refills: 3 | Status: SHIPPED | OUTPATIENT
Start: 2018-06-20 | End: 2019-05-15 | Stop reason: SDUPTHER

## 2018-07-13 ENCOUNTER — TELEPHONE (OUTPATIENT)
Dept: MEDICAL GROUP | Facility: MEDICAL CENTER | Age: 83
End: 2018-07-13

## 2018-07-13 NOTE — TELEPHONE ENCOUNTER
Patient would like an order to discontinue her oxygen. She no longer uses it and [facility] needs a disc. Order before they will pick it.   There fax # is [phone]

## 2018-07-15 ENCOUNTER — OFFICE VISIT (OUTPATIENT)
Dept: URGENT CARE | Facility: CLINIC | Age: 83
End: 2018-07-15
Payer: MEDICARE

## 2018-07-15 VITALS
OXYGEN SATURATION: 94 % | BODY MASS INDEX: 23.42 KG/M2 | SYSTOLIC BLOOD PRESSURE: 120 MMHG | HEART RATE: 71 BPM | DIASTOLIC BLOOD PRESSURE: 60 MMHG | WEIGHT: 132.2 LBS | TEMPERATURE: 98.9 F | HEIGHT: 63 IN | RESPIRATION RATE: 16 BRPM
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
## 2018-07-15 PROCEDURE — 99214 OFFICE O/P EST MOD 30 MIN: CPT | Performed by: PHYSICIAN ASSISTANT

## 2018-07-15 RX ORDER — CEFTRIAXONE SODIUM 250 MG/1
500 INJECTION, POWDER, FOR SOLUTION INTRAMUSCULAR; INTRAVENOUS ONCE
Status: COMPLETED | OUTPATIENT
Start: 2018-07-15 | End: 2018-07-15

## 2018-07-15 RX ORDER — DEXAMETHASONE SODIUM PHOSPHATE 4 MG/ML
8 INJECTION, SOLUTION INTRA-ARTICULAR; INTRALESIONAL; INTRAMUSCULAR; INTRAVENOUS; SOFT TISSUE ONCE
Status: COMPLETED | OUTPATIENT
Start: 2018-07-15 | End: 2018-07-15

## 2018-07-15 RX ORDER — PREDNISONE 20 MG/1
20 TABLET ORAL DAILY
Qty: 4 TAB | Refills: 0 | Status: SHIPPED | OUTPATIENT
Start: 2018-07-15 | End: 2018-07-19

## 2018-07-15 RX ORDER — CEFUROXIME AXETIL 500 MG/1
500 TABLET ORAL 2 TIMES DAILY
Qty: 14 TAB | Refills: 0 | Status: SHIPPED | OUTPATIENT
Start: 2018-07-15 | End: 2018-07-22

## 2018-07-15 RX ADMIN — CEFTRIAXONE SODIUM 500 MG: 250 INJECTION, POWDER, FOR SOLUTION INTRAMUSCULAR; INTRAVENOUS at 14:47

## 2018-07-15 RX ADMIN — DEXAMETHASONE SODIUM PHOSPHATE 8 MG: 4 INJECTION, SOLUTION INTRA-ARTICULAR; INTRALESIONAL; INTRAMUSCULAR; INTRAVENOUS; SOFT TISSUE at 14:48

## 2018-07-15 ASSESSMENT — ENCOUNTER SYMPTOMS
MUSCULOSKELETAL NEGATIVE: 1
FEVER: 0
SWOLLEN GLANDS: 0
NUMBNESS: 0
WEAKNESS: 0
NEUROLOGICAL NEGATIVE: 1
CONSTITUTIONAL NEGATIVE: 1
JOINT SWELLING: 0

## 2018-07-15 NOTE — PROGRESS NOTES
"Subjective:      Lisa Garza is a 82 y.o. female who presents with Bee Sting (yellow jacket)            Other   This is a new problem. The current episode started yesterday (bee sting; local skin redn/swell). The problem occurs constantly. The problem has been unchanged. Pertinent negatives include no fever, joint swelling, numbness, swollen glands or weakness. Nothing aggravates the symptoms. She has tried nothing for the symptoms. The treatment provided no relief.       Review of Systems   Constitutional: Negative.  Negative for fever.   Musculoskeletal: Negative.  Negative for joint swelling.   Skin: Negative.    Neurological: Negative.  Negative for weakness and numbness.          Objective:     /60   Pulse 71   Temp 37.2 °C (98.9 °F)   Resp 16   Ht 1.6 m (5' 3\")   Wt 60 kg (132 lb 3.2 oz)   SpO2 94%   BMI 23.42 kg/m²      Physical Exam   Constitutional: She is oriented to person, place, and time. She appears well-developed and well-nourished. No distress.   Musculoskeletal: Normal range of motion. She exhibits edema and tenderness (dorsal L hand skin sting area redn/swell/tend; redn up to distal half forearm).   Neurological: She is alert and oriented to person, place, and time. No sensory deficit. She exhibits normal muscle tone. Coordination normal.   Skin: Skin is warm and dry. Capillary refill takes less than 2 seconds. There is erythema.   Psychiatric: She has a normal mood and affect. Her behavior is normal.   Nursing note and vitals reviewed.    Active Ambulatory Problems     Diagnosis Date Noted   • History of total left hip arthroplasty 05/21/2015   • Osteoarthritis 05/21/2015   • Osteoarthritis, multiple sites 05/21/2015   • Osteoarthritis of right hip 07/06/2015   • Systolic murmur 11/30/2015   • Mixed hyperlipidemia 11/16/2017   • Chronic seasonal allergic rhinitis due to pollen 11/16/2017   • Abnormal LFTs 03/27/2018   • Atrial fibrillation with RVR (HCC) 03/27/2018   • Sepsis due " to pneumonia ([facility]) 03/27/2018   • Anemia 03/27/2018   • Hyponatremia 03/27/2018   • Hypoalbuminemia 03/27/2018   • Alcohol abuse 03/27/2018   • Acute respiratory failure with hypoxia ([facility]) 03/28/2018   • Pneumonia due to infectious organism 03/29/2018     Resolved Ambulatory Problems     Diagnosis Date Noted   • No Resolved Ambulatory Problems     Past Medical History:   Diagnosis Date   • Arthritis    • Cancer ([facility])    • Heart murmur    • High cholesterol    • Unspecified cataract    • Unspecified urinary incontinence      Current Outpatient Prescriptions on File Prior to Visit   Medication Sig Dispense Refill   • simvastatin (ZOCOR) 10 MG Tab Take 1 Tab by mouth every evening. 90 Tab 3   • metoprolol (LOPRESSOR) 25 MG Tab Take 2 Tabs by mouth 2 times a day. 360 Tab 1   • rivaroxaban (XARELTO) 20 MG Tab tablet Take 1 Tab by mouth with dinner. 90 Tab 3   • hydrocortisone 2.5 % Cream topical cream Apply 1 Application to affected area(s) 2 times a day. 2 Tube 1   • Multiple Vitamins-Minerals (VITRUM 50+ SENIOR MULTI PO) Take 1 Tab by mouth every day at 6 PM.     • Cholecalciferol (VITAMIN D-3) 1000 UNITS CAPS Take 1 Tab by mouth every morning.     • Omega 3 1200 MG CAPS Take 1 Cap by mouth every morning.     • Calcium 500 MG CHEW Take 1 Tab by mouth every morning.     • Non Formulary Request Inhale 1 L/min by mouth Continuous. oxygen x nasal cannula       No current facility-administered medications on file prior to visit.      Social History     Social History   • Marital status:      Spouse name: N/A   • Number of children: N/A   • Years of education: N/A     Occupational History   • Not on file.     Social History Main Topics   • Smoking status: Former Smoker     Packs/day: 1.00     Years: 33.00     Types: Cigarettes     Quit date: 1/1/1991   • Smokeless tobacco: Never Used      Comment: Started smoking at age 23   • Alcohol use 7.0 oz/week     14 Glasses of wine per week      Comment: 1 glass wine/day   •  Drug use: No   • Sexual activity: No     Other Topics Concern   • Not on file     Social History Narrative   • No narrative on file     Family History   Problem Relation Age of Onset   • Cancer Father      GI   • Heart Disease Father    • Lung Disease Father    • Stroke Sister    • No Known Problems Son    • No Known Problems Son    • No Known Problems Maternal Grandmother    • No Known Problems Maternal Grandfather    • No Known Problems Paternal Grandmother    • No Known Problems Paternal Grandfather    • Hypertension Sister    • Hyperlipidemia Sister      Clindamycin hcl-fd&c blue #1 and Food       Rocephin 500mgIM  Decadron 8mgIM     Assessment/Plan:     ·  bee sting reaction      · rx meds;   · Go to ER if worsens

## 2018-08-02 ENCOUNTER — OFFICE VISIT (OUTPATIENT)
Dept: MEDICAL GROUP | Facility: MEDICAL CENTER | Age: 83
End: 2018-08-02
Payer: MEDICARE

## 2018-08-02 VITALS
OXYGEN SATURATION: 93 % | BODY MASS INDEX: 22.02 KG/M2 | TEMPERATURE: 98.1 F | HEART RATE: 73 BPM | WEIGHT: 129 LBS | SYSTOLIC BLOOD PRESSURE: 100 MMHG | HEIGHT: 64 IN | DIASTOLIC BLOOD PRESSURE: 60 MMHG

## 2018-08-02 DIAGNOSIS — I27.20 PULMONARY HYPERTENSION (HCC): ICD-10-CM

## 2018-08-02 DIAGNOSIS — E78.2 MIXED HYPERLIPIDEMIA: ICD-10-CM

## 2018-08-02 DIAGNOSIS — I48.20 CHRONIC ATRIAL FIBRILLATION (HCC): ICD-10-CM

## 2018-08-02 PROCEDURE — 99214 OFFICE O/P EST MOD 30 MIN: CPT | Performed by: FAMILY MEDICINE

## 2018-08-02 NOTE — LETTER
August 2, 2018      To whom it may concern,     Lisa is feeling great and is not experiencing shortness of breathe. Her oxygen saturation is 93% at room air. So Lisa no longer needs oxygen. If you have any questions or concerns please call us at [phone]. Thank you.        Brandon Pearl M.D

## 2018-08-03 NOTE — PROGRESS NOTES
CC: A fib, PHT ( not on oxygen), HLD    HPI:   Lisa presents today to discuss the following:    Chronic atrial fibrillation (HCC)  She has been asymptomatic. Denies palpitation, and SOB , she has normal rate and rhythm.She is currently on Xarelto, and metoprolol.      Pulmonary hypertension  Her last echo in 3/2018 showed:EF 70%, grade II diastolic dysfunction, and her RVSP was 45 mmHg.Denies SOB , and leg swelling. She used to take oxygen when she was discharged from the hospital, butr currently she has been doing fine, her o2 saturation has been normal all the time, today is 93 at rest, in the RA     Mixed hyperlipidemia  He has been tolerating the statin. Denies muscle pain LFTs has been normal, has been on Simvastatin 10 mg daily. Last lipid panel was within normal limit.     Patient Active Problem List    Diagnosis Date Noted   • Sepsis due to pneumonia ([facility]) 03/27/2018     Priority: High   • Pneumonia due to infectious organism 03/29/2018   • Acute respiratory failure with hypoxia ([facility]) 03/28/2018   • Abnormal LFTs 03/27/2018   • Atrial fibrillation with RVR ([facility]) 03/27/2018   • Anemia 03/27/2018   • Hyponatremia 03/27/2018   • Hypoalbuminemia 03/27/2018   • Alcohol abuse 03/27/2018   • Mixed hyperlipidemia 11/16/2017   • Chronic seasonal allergic rhinitis due to pollen 11/16/2017   • Systolic murmur 11/30/2015   • Osteoarthritis of right hip 07/06/2015   • History of total left hip arthroplasty 05/21/2015   • Osteoarthritis 05/21/2015   • Osteoarthritis, multiple sites 05/21/2015       Current Outpatient Prescriptions   Medication Sig Dispense Refill   • simvastatin (ZOCOR) 10 MG Tab Take 1 Tab by mouth every evening. 90 Tab 3   • metoprolol (LOPRESSOR) 25 MG Tab Take 2 Tabs by mouth 2 times a day. 360 Tab 1   • rivaroxaban (XARELTO) 20 MG Tab tablet Take 1 Tab by mouth with dinner. 90 Tab 3   • Multiple Vitamins-Minerals (VITRUM 50+ SENIOR MULTI PO) Take 1 Tab by mouth every day at 6 PM.     •  "Cholecalciferol (VITAMIN D-3) 1000 UNITS CAPS Take 1 Tab by mouth every morning.     • Omega 3 1200 MG CAPS Take 1 Cap by mouth every morning.     • Calcium 500 MG CHEW Take 1 Tab by mouth every morning.     • Non Formulary Request Inhale 1 L/min by mouth Continuous. oxygen x nasal cannula     • hydrocortisone 2.5 % Cream topical cream Apply 1 Application to affected area(s) 2 times a day. 2 Tube 1     No current facility-administered medications for this visit.          Allergies as of 08/02/2018 - Reviewed 08/02/2018   Allergen Reaction Noted   • Clindamycin hcl-fd&c blue #1  05/29/2015   • Food  07/02/2015        ROS: Denies any chest pain, Shortness of breath, Changes bowel or bladder, Lower extremity edema.    Physical Exam:  /60   Pulse 73   Temp 36.7 °C (98.1 °F)   Ht 1.62 m (5' 3.78\")   Wt 58.5 kg (129 lb)   SpO2 93%   BMI 22.30 kg/m²   Gen.: Well-developed, well-nourished, no apparent distress,pleasant and cooperative with the examination  Skin:  Warm and dry with good turgor. No rashes or suspicious lesions in visible areas  HEENT:Sinuses nontender with palpation, TMs clear, nares patent with pink mucosa and clear rhinorrhea,no septal deviation ,polyps or lesions. lips without lesions, oropharynx clear.  Neck: Trachea midline,no masses or adenopathy. No JVD.  Cor: Regular rate and rhythm without murmur, gallop or rub.  Lungs: Respirations unlabored.Clear to auscultation with equal breath sounds bilaterally. No wheezes, rhonchi.  Extremities: No cyanosis, clubbing or edema.        Assessment and Plan.   82 y.o. female     1. Chronic atrial fibrillation (HCC)  Stable, asymptomatic.  Continue on BB, and Xarelto.    2. Pulmonary hypertension (HCC)  Stable, asymptomatic. She has not been using oxygen, wants a letter to preferred home care to give up all oxygen quipment    3. Mixed hyperlipidemia  He has been tolerating the statin. Denies muscle pain LFTs has been normal  Continue on Simvastatin 10 " mg daily.

## 2018-08-14 ENCOUNTER — OFFICE VISIT (OUTPATIENT)
Dept: CARDIOLOGY | Facility: MEDICAL CENTER | Age: 83
End: 2018-08-14
Payer: MEDICARE

## 2018-08-14 VITALS
OXYGEN SATURATION: 93 % | BODY MASS INDEX: 22.36 KG/M2 | RESPIRATION RATE: 14 BRPM | SYSTOLIC BLOOD PRESSURE: 102 MMHG | HEART RATE: 66 BPM | WEIGHT: 131 LBS | DIASTOLIC BLOOD PRESSURE: 58 MMHG | HEIGHT: 64 IN

## 2018-08-14 DIAGNOSIS — I48.0 PAF (PAROXYSMAL ATRIAL FIBRILLATION) (HCC): ICD-10-CM

## 2018-08-14 DIAGNOSIS — Z79.01 CHRONIC ANTICOAGULATION: ICD-10-CM

## 2018-08-14 DIAGNOSIS — I35.0 AORTIC VALVE STENOSIS, ETIOLOGY OF CARDIAC VALVE DISEASE UNSPECIFIED: ICD-10-CM

## 2018-08-14 LAB — EKG IMPRESSION: NORMAL

## 2018-08-14 PROCEDURE — 93000 ELECTROCARDIOGRAM COMPLETE: CPT | Performed by: INTERNAL MEDICINE

## 2018-08-14 PROCEDURE — 99214 OFFICE O/P EST MOD 30 MIN: CPT | Performed by: INTERNAL MEDICINE

## 2018-08-14 ASSESSMENT — ENCOUNTER SYMPTOMS
PALPITATIONS: 0
COUGH: 0
DIZZINESS: 0
MYALGIAS: 0
SHORTNESS OF BREATH: 0
LOSS OF CONSCIOUSNESS: 0

## 2018-08-14 NOTE — PROGRESS NOTES
Chief Complaint   Patient presents with   • Atrial Fibrillation       Subjective:   Lisa Garza is a 82 y.o. female who presents today for follow-up evaluation of PAF, chronic atrial fibrillation and mild aortic stenosis.    Last seen on 4/12/2018 by APN.    Since 4/12/2018 the patient denies any cardiac symptoms.  No bleeding problems on Xarelto.  States that she may have had a prior episode of atrial fibrillation 14 years ago when she had pneumonia while living in [place].    The patient previously smoked but quit 20 years ago.  History of alcohol abuse.    Patient was hospitalized from 3/26 to 30/2018 for pneumonia and atrial fibrillation. She had a new onset of atrial fibrillation she was started on Xarelto for anticoagulation. Her atrial fibrillation reverted back to normal rhythm. Echocardiogram showed diastolic dysfunction grade 2 and ejection fraction 70%.    Past Medical History:   Diagnosis Date   • Arthritis     osteo, hip   • Cancer (HCC)     skin on left shoulder   • Heart murmur    • High cholesterol    • Unspecified cataract     tony IOL   • Unspecified urinary incontinence      Past Surgical History:   Procedure Laterality Date   • HIP ARTHROPLASTY TOTAL Left 7/6/2015    Procedure: HIP ARTHROPLASTY TOTAL;  Surgeon: Navid Howard M.D.;  Location: SURGERY [facility];  Service:    • OTHER Right 2006    cyst & bone spur on right hand   • EYE SURGERY      bilateral IOL     Family History   Problem Relation Age of Onset   • Cancer Father         GI   • Heart Disease Father    • Lung Disease Father    • Stroke Sister    • No Known Problems Son    • No Known Problems Son    • No Known Problems Maternal Grandmother    • No Known Problems Maternal Grandfather    • No Known Problems Paternal Grandmother    • No Known Problems Paternal Grandfather    • Hypertension Sister    • Hyperlipidemia Sister      Social History     Social History   • Marital status:      Spouse name: N/A   • Number of  children: N/A   • Years of education: N/A     Occupational History   • Not on file.     Social History Main Topics   • Smoking status: Former Smoker     Packs/day: 1.00     Years: 33.00     Types: Cigarettes     Quit date: 1/1/1991   • Smokeless tobacco: Never Used      Comment: Started smoking at age 23   • Alcohol use 7.0 oz/week     14 Glasses of wine per week      Comment: 1 glass wine/day   • Drug use: No   • Sexual activity: No     Other Topics Concern   • Not on file     Social History Narrative   • No narrative on file     Allergies   Allergen Reactions   • Clindamycin Hcl-Fd&C Blue #1      unknown   • Food      Scallops-vomiting, citrus-itching, redness, oatmeal-diarrhea     Outpatient Encounter Prescriptions as of 8/14/2018   Medication Sig Dispense Refill   • Psyllium (METAMUCIL FIBER PO) Take 1 Capsule by mouth as needed.     • simvastatin (ZOCOR) 10 MG Tab Take 1 Tab by mouth every evening. 90 Tab 3   • metoprolol (LOPRESSOR) 25 MG Tab Take 2 Tabs by mouth 2 times a day. 360 Tab 1   • rivaroxaban (XARELTO) 20 MG Tab tablet Take 1 Tab by mouth with dinner. 90 Tab 3   • Multiple Vitamins-Minerals (VITRUM 50+ SENIOR MULTI PO) Take 1 Tab by mouth every day at 6 PM.     • Cholecalciferol (VITAMIN D-3) 1000 UNITS CAPS Take 1 Tab by mouth every morning.     • Omega 3 1200 MG CAPS Take 1 Cap by mouth every morning.     • Calcium 500 MG CHEW Take 1 Tab by mouth every morning.     • Non Formulary Request Inhale 1 L/min by mouth Continuous. oxygen x nasal cannula     • hydrocortisone 2.5 % Cream topical cream Apply 1 Application to affected area(s) 2 times a day. (Patient not taking: Reported on 8/14/2018) 2 Tube 1     No facility-administered encounter medications on file as of 8/14/2018.      Review of Systems   Respiratory: Negative for cough and shortness of breath.    Cardiovascular: Negative for chest pain and palpitations.   Musculoskeletal: Negative for myalgias.   Neurological: Negative for dizziness and  "loss of consciousness.        Objective:   /58   Pulse 66   Resp 14   Ht 1.626 m (5' 4\")   Wt 59.4 kg (131 lb)   SpO2 93%   BMI 22.49 kg/m²     Physical Exam   Constitutional: She is oriented to person, place, and time. She appears well-developed and well-nourished. No distress.   Neck: No JVD present.   Cardiovascular: Normal rate, normal heart sounds and intact distal pulses.  An irregularly irregular rhythm present. Exam reveals no gallop and no friction rub.    No murmur heard.  Pulmonary/Chest: Effort normal and breath sounds normal. No respiratory distress. She has no wheezes. She has no rales.   Abdominal: Soft. She exhibits no distension and no mass. There is no tenderness.   Musculoskeletal: She exhibits no edema.   Neurological: She is alert and oriented to person, place, and time.   Skin: Skin is warm and dry.   Psychiatric: She has a normal mood and affect. Her behavior is normal.     Echocardiography Laboratory  3/28/2018  Left ventricular ejection fraction is visually estimated to be 70%.  Mild concentric left ventricular hypertrophy.  Grade II diastolic dysfunction.  Moderately dilated left atrium.  Mild mitral regurgitation.  Mild aortic stenosis.    08/14/2018 EKG: Normal sinus rhythm, rate 60.  First-degree AV block.  Possible previous inferior myocardial infarction.  Reviewed by myself.    Assessment:     1. PAF (paroxysmal atrial fibrillation) ([facility])  EKG   2. Aortic valve stenosis, etiology of cardiac valve disease unspecified     3. Chronic anticoagulation         Medical Decision Making:  Today's Assessment / Status / Plan:   1.  PAF.  Maintaining NSR.  2.  Chronic anticoagulation.  On Xarelto.  3.  Aortic stenosis.  Mild.  4.  Excessive alcohol use.    Recommendation Discussion  1.  I reviewed with patient her current cardiac condition and results of the EKG.  2.  The patient was counseled to reduce and preferably abstain from alcohol.  3.  Continue current cardiac therapy.  4.  " Follow-up 6 months.

## 2018-08-23 ENCOUNTER — APPOINTMENT (RX ONLY)
Dept: URBAN - METROPOLITAN AREA CLINIC 4 | Facility: CLINIC | Age: 83
Setting detail: DERMATOLOGY
End: 2018-08-23

## 2018-08-23 DIAGNOSIS — L82.1 OTHER SEBORRHEIC KERATOSIS: ICD-10-CM

## 2018-08-23 DIAGNOSIS — L21.8 OTHER SEBORRHEIC DERMATITIS: ICD-10-CM

## 2018-08-23 DIAGNOSIS — D22 MELANOCYTIC NEVI: ICD-10-CM

## 2018-08-23 DIAGNOSIS — L82.0 INFLAMED SEBORRHEIC KERATOSIS: ICD-10-CM

## 2018-08-23 DIAGNOSIS — L57.0 ACTINIC KERATOSIS: ICD-10-CM

## 2018-08-23 PROBLEM — D48.5 NEOPLASM OF UNCERTAIN BEHAVIOR OF SKIN: Status: ACTIVE | Noted: 2018-08-23

## 2018-08-23 PROBLEM — Z85.820 PERSONAL HISTORY OF MALIGNANT MELANOMA OF SKIN: Status: ACTIVE | Noted: 2018-08-23

## 2018-08-23 PROBLEM — Z85.828 PERSONAL HISTORY OF OTHER MALIGNANT NEOPLASM OF SKIN: Status: ACTIVE | Noted: 2018-08-23

## 2018-08-23 PROCEDURE — 99203 OFFICE O/P NEW LOW 30 MIN: CPT | Mod: 25

## 2018-08-23 PROCEDURE — ? COUNSELING

## 2018-08-23 PROCEDURE — 17000 DESTRUCT PREMALG LESION: CPT

## 2018-08-23 PROCEDURE — 11101: CPT

## 2018-08-23 PROCEDURE — 17003 DESTRUCT PREMALG LES 2-14: CPT

## 2018-08-23 PROCEDURE — ? TREATMENT REGIMEN

## 2018-08-23 PROCEDURE — ? LIQUID NITROGEN

## 2018-08-23 PROCEDURE — ? BIOPSY BY SHAVE METHOD

## 2018-08-23 PROCEDURE — 11100: CPT | Mod: 59

## 2018-08-23 ASSESSMENT — LOCATION SIMPLE DESCRIPTION DERM
LOCATION SIMPLE: RIGHT HAND
LOCATION SIMPLE: RIGHT FOREARM
LOCATION SIMPLE: RIGHT UPPER BACK
LOCATION SIMPLE: UPPER BACK

## 2018-08-23 ASSESSMENT — LOCATION DETAILED DESCRIPTION DERM
LOCATION DETAILED: RIGHT DORSAL MIDDLE METACARPOPHALANGEAL JOINT
LOCATION DETAILED: INFERIOR THORACIC SPINE
LOCATION DETAILED: RIGHT DISTAL DORSAL FOREARM
LOCATION DETAILED: RIGHT ULNAR DORSAL HAND
LOCATION DETAILED: RIGHT MID-UPPER BACK

## 2018-08-23 ASSESSMENT — LOCATION ZONE DERM
LOCATION ZONE: ARM
LOCATION ZONE: HAND
LOCATION ZONE: TRUNK

## 2018-08-23 NOTE — PROCEDURE: BIOPSY BY SHAVE METHOD
Notification Instructions: Patient will be notified of biopsy results. However, patient instructed to call the office if not contacted within 2 weeks.
Wound Care: Petrolatum
Size Of Lesion In Cm: 1
Cryotherapy Text: The wound bed was treated with cryotherapy after the biopsy was performed.
Hemostasis: Drysol
Anesthesia Type: 1% lidocaine with epinephrine
Type Of Destruction Used: Curettage
Biopsy Type: H and E
Bill 03107 For Specimen Handling/Conveyance To Laboratory?: no
Detail Level: Detailed
Lab Facility: 
Anesthesia Volume In Cc: 0.5
Biopsy Method: Dermablade
Billing Type: Third-Party Bill
Silver Nitrate Text: The wound bed was treated with silver nitrate after the biopsy was performed.
Consent: Written consent was obtained and risks were reviewed including but not limited to scarring, infection, bleeding, scabbing, incomplete removal, nerve damage and allergy to anesthesia.
Post-Care Instructions: I reviewed with the patient in detail post-care instructions. Patient is to keep the biopsy site dry overnight, and then apply bacitracin twice daily until healed. Patient may apply hydrogen peroxide soaks to remove any crusting.
X Size Of Lesion In Cm: 0
Electrodesiccation Text: The wound bed was treated with electrodesiccation after the biopsy was performed.
Curettage Text: The wound bed was treated with curettage after the biopsy was performed.
Was A Bandage Applied: Yes
Dressing: bandage
Electrodesiccation And Curettage Text: The wound bed was treated with electrodesiccation and curettage after the biopsy was performed.
Lab: [id number]
Depth Of Biopsy: dermis
Size Of Lesion In Cm: 0.8

## 2018-08-23 NOTE — PROCEDURE: TREATMENT REGIMEN
Plan: Advised to continue hydrocortisone 2.5% or hydrocortisone 1%, which ever one the pt prefers as pt does see improvement.
Detail Level: Zone

## 2018-08-27 DIAGNOSIS — I48.91 ATRIAL FIBRILLATION WITH RVR (HCC): ICD-10-CM

## 2018-09-11 ENCOUNTER — NON-PROVIDER VISIT (OUTPATIENT)
Dept: MEDICAL GROUP | Facility: MEDICAL CENTER | Age: 83
End: 2018-09-11
Payer: MEDICARE

## 2018-09-11 ENCOUNTER — OFFICE VISIT (OUTPATIENT)
Dept: MEDICAL GROUP | Facility: MEDICAL CENTER | Age: 83
End: 2018-09-11
Payer: MEDICARE

## 2018-09-11 DIAGNOSIS — Z53.8 APPOINTMENT CANCELED BY HOSPITAL: ICD-10-CM

## 2018-09-11 DIAGNOSIS — Z23 NEED FOR VACCINATION: ICD-10-CM

## 2018-09-11 PROCEDURE — 90662 IIV NO PRSV INCREASED AG IM: CPT | Performed by: FAMILY MEDICINE

## 2018-09-11 PROCEDURE — G0008 ADMIN INFLUENZA VIRUS VAC: HCPCS | Performed by: FAMILY MEDICINE

## 2018-09-11 NOTE — PROGRESS NOTES
"Lisa Garza is a 82 y.o. female here for a non-provider visit for:   FLU    Reason for immunization: Annual Flu Vaccine  Immunization records indicate need for vaccine: Yes, confirmed with Epic  Minimum interval has been met for this vaccine: Yes  ABN completed: Yes    Order and dose verified by: mv  VIS Dated  08/07/15 was given to patient: Yes  All IAC Questionnaire questions were answered \"No.\"    Patient tolerated injection and no adverse effects were observed or reported: Yes    Pt scheduled for next dose in series: Not Indicated    "

## 2018-09-11 NOTE — PROGRESS NOTES
"Lisa Garza is a 82 y.o. female here for a non-provider visit for:   FLU    Reason for immunization: Annual Flu Vaccine  Immunization records indicate need for vaccine: Yes, confirmed with Epic  Minimum interval has been met for this vaccine: Yes  ABN completed: Yes    Order and dose verified by: [email or web address]  VIS Dated  08/05/2015 was given to patient: Yes  All IAC Questionnaire questions were answered \"No.\"    Patient tolerated injection and no adverse effects were observed or reported: Yes    Pt scheduled for next dose in series: Yes    "

## 2018-09-20 ENCOUNTER — APPOINTMENT (RX ONLY)
Dept: URBAN - METROPOLITAN AREA CLINIC 4 | Facility: CLINIC | Age: 83
Setting detail: DERMATOLOGY
End: 2018-09-20

## 2018-09-20 DIAGNOSIS — L57.0 ACTINIC KERATOSIS: ICD-10-CM

## 2018-09-20 PROBLEM — D03.61 MELANOMA IN SITU OF RIGHT UPPER LIMB, INCLUDING SHOULDER: Status: ACTIVE | Noted: 2018-09-20

## 2018-09-20 PROCEDURE — 17000 DESTRUCT PREMALG LESION: CPT

## 2018-09-20 PROCEDURE — ? COUNSELING

## 2018-09-20 PROCEDURE — 12032 INTMD RPR S/A/T/EXT 2.6-7.5: CPT | Mod: 59

## 2018-09-20 PROCEDURE — ? EXCISION

## 2018-09-20 PROCEDURE — ? LIQUID NITROGEN

## 2018-09-20 PROCEDURE — 11603 EXC TR-EXT MAL+MARG 2.1-3 CM: CPT | Mod: 59

## 2018-09-20 ASSESSMENT — LOCATION DETAILED DESCRIPTION DERM: LOCATION DETAILED: NASAL DORSUM

## 2018-09-20 ASSESSMENT — LOCATION ZONE DERM: LOCATION ZONE: NOSE

## 2018-09-20 ASSESSMENT — LOCATION SIMPLE DESCRIPTION DERM: LOCATION SIMPLE: NOSE

## 2018-09-20 NOTE — PROCEDURE: EXCISION
Show Previous Accession Variable: Yes
Mastoid Interpolation Flap Text: A decision was made to reconstruct the defect utilizing an interpolation axial flap and a staged reconstruction.  A telfa template was made of the defect.  This telfa template was then used to outline the mastoid interpolation flap.  The donor area for the pedicle flap was then injected with anesthesia.  The flap was excised through the skin and subcutaneous tissue down to the layer of the underlying musculature.  The pedicle flap was carefully excised within this deep plane to maintain its blood supply.  The edges of the donor site were undermined.   The donor site was closed in a primary fashion.  The pedicle was then rotated into position and sutured.  Once the tube was sutured into place, adequate blood supply was confirmed with blanching and refill.  The pedicle was then wrapped with xeroform gauze and dressed appropriately with a telfa and gauze bandage to ensure continued blood supply and protect the attached pedicle.
Helical Rim Advancement Flap Text: The defect edges were debeveled with a #15 blade scalpel.  Given the location of the defect and the proximity to free margins (helical rim) a double helical rim advancement flap was deemed most appropriate.  Using a sterile surgical marker, the appropriate advancement flaps were drawn incorporating the defect and placing the expected incisions between the helical rim and antihelix where possible.  The area thus outlined was incised through and through with a #15 scalpel blade.  With a skin hook and iris scissors, the flaps were gently and sharply undermined and freed up.
Elliptical Excision Additional Text (Leave Blank If You Do Not Want): The margin was drawn around the clinically apparent lesion.  An elliptical shape was then drawn on the skin incorporating the lesion and margins.  Incisions were then made along these lines to the appropriate tissue plane and the lesion was extirpated.
Complex Repair And Z Plasty Text: The defect edges were debeveled with a #15 scalpel blade.  The primary defect was closed partially with a complex linear closure.  Given the location of the remaining defect, shape of the defect and the proximity to free margins a Z plasty was deemed most appropriate for complete closure of the defect.  Using a sterile surgical marker, an appropriate advancement flap was drawn incorporating the defect and placing the expected incisions within the relaxed skin tension lines where possible.    The area thus outlined was incised deep to adipose tissue with a #15 scalpel blade.  The skin margins were undermined to an appropriate distance in all directions utilizing iris scissors.
Complex Repair And Rotation Flap Text: The defect edges were debeveled with a #15 scalpel blade.  The primary defect was closed partially with a complex linear closure.  Given the location of the remaining defect, shape of the defect and the proximity to free margins a rotation flap was deemed most appropriate for complete closure of the defect.  Using a sterile surgical marker, an appropriate advancement flap was drawn incorporating the defect and placing the expected incisions within the relaxed skin tension lines where possible.    The area thus outlined was incised deep to adipose tissue with a #15 scalpel blade.  The skin margins were undermined to an appropriate distance in all directions utilizing iris scissors.
Composite Graft Text: The defect edges were debeveled with a #15 scalpel blade.  Given the location of the defect, shape of the defect, the proximity to free margins and the fact the defect was full thickness a composite graft was deemed most appropriate.  The defect was outline and then transferred to the donor site.  A full thickness graft was then excised from the donor site. The graft was then placed in the primary defect, oriented appropriately and then sutured into place.  The secondary defect was then repaired using a primary closure.
Post-Care Instructions: I reviewed with the patient in detail post-care instructions. Patient is not to engage in any heavy lifting, exercise, or swimming for the next 14 days. Should the patient develop any fevers, chills, bleeding, severe pain patient will contact the office immediately.
Dressing: dry sterile dressing
Complex Repair And Modified Advancement Flap Text: The defect edges were debeveled with a #15 scalpel blade.  The primary defect was closed partially with a complex linear closure.  Given the location of the remaining defect, shape of the defect and the proximity to free margins a modified advancement flap was deemed most appropriate for complete closure of the defect.  Using a sterile surgical marker, an appropriate advancement flap was drawn incorporating the defect and placing the expected incisions within the relaxed skin tension lines where possible.    The area thus outlined was incised deep to adipose tissue with a #15 scalpel blade.  The skin margins were undermined to an appropriate distance in all directions utilizing iris scissors.
No Repair - Repaired With Adjacent Surgical Defect Text (Leave Blank If You Do Not Want): After the excision the defect was repaired concurrently with another surgical defect which was in close approximation.
Transposition Flap Text: The defect edges were debeveled with a #15 scalpel blade.  Given the location of the defect and the proximity to free margins a transposition flap was deemed most appropriate.  Using a sterile surgical marker, an appropriate transposition flap was drawn incorporating the defect.    The area thus outlined was incised deep to adipose tissue with a #15 scalpel blade.  The skin margins were undermined to an appropriate distance in all directions utilizing iris scissors.
Deep Sutures: 4-0 Polysorb
Bilobed Flap Text: The defect edges were debeveled with a #15 scalpel blade.  Given the location of the defect and the proximity to free margins a bilobe flap was deemed most appropriate.  Using a sterile surgical marker, an appropriate bilobe flap drawn around the defect.    The area thus outlined was incised deep to adipose tissue with a #15 scalpel blade.  The skin margins were undermined to an appropriate distance in all directions utilizing iris scissors.
Anesthesia Type: 1% lidocaine with epinephrine
H Plasty Text: Given the location of the defect, shape of the defect and the proximity to free margins a H-plasty was deemed most appropriate for repair.  Using a sterile surgical marker, the appropriate advancement arms of the H-plasty were drawn incorporating the defect and placing the expected incisions within the relaxed skin tension lines where possible. The area thus outlined was incised deep to adipose tissue with a #15 scalpel blade. The skin margins were undermined to an appropriate distance in all directions utilizing iris scissors.  The opposing advancement arms were then advanced into place in opposite direction and anchored with interrupted buried subcutaneous sutures.
Wound Care: Petrolatum
Spiral Flap Text: The defect edges were debeveled with a #15 scalpel blade.  Given the location of the defect, shape of the defect and the proximity to free margins a spiral flap was deemed most appropriate.  Using a sterile surgical marker, an appropriate rotation flap was drawn incorporating the defect and placing the expected incisions within the relaxed skin tension lines where possible. The area thus outlined was incised deep to adipose tissue with a #15 scalpel blade.  The skin margins were undermined to an appropriate distance in all directions utilizing iris scissors.
Advancement-Rotation Flap Text: The defect edges were debeveled with a #15 scalpel blade.  Given the location of the defect, shape of the defect and the proximity to free margins an advancement-rotation flap was deemed most appropriate.  Using a sterile surgical marker, an appropriate flap was drawn incorporating the defect and placing the expected incisions within the relaxed skin tension lines where possible. The area thus outlined was incised deep to adipose tissue with a #15 scalpel blade.  The skin margins were undermined to an appropriate distance in all directions utilizing iris scissors.
Size Of Lesion In Cm: 1
Curettage Prior To Excision?: No
Pre-Excision Curettage Text (Leave Blank If You Do Not Want): Prior to drawing the surgical margin the visible lesion was removed with electrodesiccation and curettage to clearly define the lesion size.
Complex Repair And Dermal Autograft Text: The defect edges were debeveled with a #15 scalpel blade.  The primary defect was closed partially with a complex linear closure.  Given the location of the defect, shape of the defect and the proximity to free margins an dermal autograft was deemed most appropriate to repair the remaining defect.  The graft was trimmed to fit the size of the remaining defect.  The graft was then placed in the primary defect, oriented appropriately, and sutured into place.
Crescentic Advancement Flap Text: The defect edges were debeveled with a #15 scalpel blade.  Given the location of the defect and the proximity to free margins a crescentic advancement flap was deemed most appropriate.  Using a sterile surgical marker, the appropriate advancement flap was drawn incorporating the defect and placing the expected incisions within the relaxed skin tension lines where possible.    The area thus outlined was incised deep to adipose tissue with a #15 scalpel blade.  The skin margins were undermined to an appropriate distance in all directions utilizing iris scissors.
Repair Type: Intermediate
Burow's Advancement Flap Text: The defect edges were debeveled with a #15 scalpel blade.  Given the location of the defect and the proximity to free margins a Burow's advancement flap was deemed most appropriate.  Using a sterile surgical marker, the appropriate advancement flap was drawn incorporating the defect and placing the expected incisions within the relaxed skin tension lines where possible.    The area thus outlined was incised deep to adipose tissue with a #15 scalpel blade.  The skin margins were undermined to an appropriate distance in all directions utilizing iris scissors.
Secondary Defect Width (In Cm): 0
Muscle Hinge Flap Text: The defect edges were debeveled with a #15 scalpel blade.  Given the size, depth and location of the defect and the proximity to free margins a muscle hinge flap was deemed most appropriate.  Using a sterile surgical marker, an appropriate hinge flap was drawn incorporating the defect. The area thus outlined was incised with a #15 scalpel blade.  The skin margins were undermined to an appropriate distance in all directions utilizing iris scissors.
Billing Type: Third-Party Bill
Complex Repair And Bilobe Flap Text: The defect edges were debeveled with a #15 scalpel blade.  The primary defect was closed partially with a complex linear closure.  Given the location of the remaining defect, shape of the defect and the proximity to free margins a bilobe flap was deemed most appropriate for complete closure of the defect.  Using a sterile surgical marker, an appropriate advancement flap was drawn incorporating the defect and placing the expected incisions within the relaxed skin tension lines where possible.    The area thus outlined was incised deep to adipose tissue with a #15 scalpel blade.  The skin margins were undermined to an appropriate distance in all directions utilizing iris scissors.
Excision Depth: fascia
Home Suture Removal Text: Patient was provided a home suture removal kit and will remove their sutures at home.  If they have any questions or difficulties they will call the office.
Complex Repair And Rhombic Flap Text: The defect edges were debeveled with a #15 scalpel blade.  The primary defect was closed partially with a complex linear closure.  Given the location of the remaining defect, shape of the defect and the proximity to free margins a rhombic flap was deemed most appropriate for complete closure of the defect.  Using a sterile surgical marker, an appropriate advancement flap was drawn incorporating the defect and placing the expected incisions within the relaxed skin tension lines where possible.    The area thus outlined was incised deep to adipose tissue with a #15 scalpel blade.  The skin margins were undermined to an appropriate distance in all directions utilizing iris scissors.
Purse String (Simple) Text: Given the location of the defect and the characteristics of the surrounding skin a purse string simple closure was deemed most appropriate.  Undermining was performed circumferentially around the surgical defect.  A purse string suture was then placed and tightened.
Scalpel Size: 15 blade
Epidermal Closure: running
Keystone Flap Text: The defect edges were debeveled with a #15 scalpel blade.  Given the location of the defect, shape of the defect a keystone flap was deemed most appropriate.  Using a sterile surgical marker, an appropriate keystone flap was drawn incorporating the defect, outlining the appropriate donor tissue and placing the expected incisions within the relaxed skin tension lines where possible. The area thus outlined was incised deep to adipose tissue with a #15 scalpel blade.  The skin margins were undermined to an appropriate distance in all directions around the primary defect and laterally outward around the flap utilizing iris scissors.
Crescentic Intermediate Repair Preamble Text (Leave Blank If You Do Not Want): Undermining was performed with blunt dissection.
Dermal Autograft Text: The defect edges were debeveled with a #15 scalpel blade.  Given the location of the defect, shape of the defect and the proximity to free margins a dermal autograft was deemed most appropriate.  Using a sterile surgical marker, the primary defect shape was transferred to the donor site. The area thus outlined was incised deep to adipose tissue with a #15 scalpel blade.  The harvested graft was then trimmed of adipose and epidermal tissue until only dermis was left.  The skin graft was then placed in the primary defect and oriented appropriately.
Modified Advancement Flap Text: The defect edges were debeveled with a #15 scalpel blade.  Given the location of the defect, shape of the defect and the proximity to free margins a modified advancement flap was deemed most appropriate.  Using a sterile surgical marker, an appropriate advancement flap was drawn incorporating the defect and placing the expected incisions within the relaxed skin tension lines where possible.    The area thus outlined was incised deep to adipose tissue with a #15 scalpel blade.  The skin margins were undermined to an appropriate distance in all directions utilizing iris scissors.
Epidermal Closure Graft Donor Site (Optional): simple interrupted
Anesthesia Volume In Cc: 6
Previous Accession (Optional): [id number]
V-Y Flap Text: The defect edges were debeveled with a #15 scalpel blade.  Given the location of the defect, shape of the defect and the proximity to free margins a V-Y flap was deemed most appropriate.  Using a sterile surgical marker, an appropriate advancement flap was drawn incorporating the defect and placing the expected incisions within the relaxed skin tension lines where possible.    The area thus outlined was incised deep to adipose tissue with a #15 scalpel blade.  The skin margins were undermined to an appropriate distance in all directions utilizing iris scissors.
Rhombic Flap Text: The defect edges were debeveled with a #15 scalpel blade.  Given the location of the defect and the proximity to free margins a rhombic flap was deemed most appropriate.  Using a sterile surgical marker, an appropriate rhombic flap was drawn incorporating the defect.    The area thus outlined was incised deep to adipose tissue with a #15 scalpel blade.  The skin margins were undermined to an appropriate distance in all directions utilizing iris scissors.
O-L Flap Text: The defect edges were debeveled with a #15 scalpel blade.  Given the location of the defect, shape of the defect and the proximity to free margins an O-L flap was deemed most appropriate.  Using a sterile surgical marker, an appropriate advancement flap was drawn incorporating the defect and placing the expected incisions within the relaxed skin tension lines where possible.    The area thus outlined was incised deep to adipose tissue with a #15 scalpel blade.  The skin margins were undermined to an appropriate distance in all directions utilizing iris scissors.
Complex Repair And Split-Thickness Skin Graft Text: The defect edges were debeveled with a #15 scalpel blade.  The primary defect was closed partially with a complex linear closure.  Given the location of the defect, shape of the defect and the proximity to free margins a split thickness skin graft was deemed most appropriate to repair the remaining defect.  The graft was trimmed to fit the size of the remaining defect.  The graft was then placed in the primary defect, oriented appropriately, and sutured into place.
Posterior Auricular Interpolation Flap Text: A decision was made to reconstruct the defect utilizing an interpolation axial flap and a staged reconstruction.  A telfa template was made of the defect.  This telfa template was then used to outline the posterior auricular interpolation flap.  The donor area for the pedicle flap was then injected with anesthesia.  The flap was excised through the skin and subcutaneous tissue down to the layer of the underlying musculature.  The pedicle flap was carefully excised within this deep plane to maintain its blood supply.  The edges of the donor site were undermined.   The donor site was closed in a primary fashion.  The pedicle was then rotated into position and sutured.  Once the tube was sutured into place, adequate blood supply was confirmed with blanching and refill.  The pedicle was then wrapped with xeroform gauze and dressed appropriately with a telfa and gauze bandage to ensure continued blood supply and protect the attached pedicle.
Melolabial Interpolation Flap Text: A decision was made to reconstruct the defect utilizing an interpolation axial flap and a staged reconstruction.  A telfa template was made of the defect.  This telfa template was then used to outline the melolabial interpolation flap.  The donor area for the pedicle flap was then injected with anesthesia.  The flap was excised through the skin and subcutaneous tissue down to the layer of the underlying musculature.  The pedicle flap was carefully excised within this deep plane to maintain its blood supply.  The edges of the donor site were undermined.   The donor site was closed in a primary fashion.  The pedicle was then rotated into position and sutured.  Once the tube was sutured into place, adequate blood supply was confirmed with blanching and refill.  The pedicle was then wrapped with xeroform gauze and dressed appropriately with a telfa and gauze bandage to ensure continued blood supply and protect the attached pedicle.
Complex Repair And Epidermal Autograft Text: The defect edges were debeveled with a #15 scalpel blade.  The primary defect was closed partially with a complex linear closure.  Given the location of the defect, shape of the defect and the proximity to free margins an epidermal autograft was deemed most appropriate to repair the remaining defect.  The graft was trimmed to fit the size of the remaining defect.  The graft was then placed in the primary defect, oriented appropriately, and sutured into place.
Island Pedicle Flap-Requiring Vessel Identification Text: The defect edges were debeveled with a #15 scalpel blade.  Given the location of the defect, shape of the defect and the proximity to free margins an island pedicle advancement flap was deemed most appropriate.  Using a sterile surgical marker, an appropriate advancement flap was drawn, based on the axial vessel mentioned above, incorporating the defect, outlining the appropriate donor tissue and placing the expected incisions within the relaxed skin tension lines where possible.    The area thus outlined was incised deep to adipose tissue with a #15 scalpel blade.  The skin margins were undermined to an appropriate distance in all directions around the primary defect and laterally outward around the island pedicle utilizing iris scissors.  There was minimal undermining beneath the pedicle flap.
Bilobed Transposition Flap Text: The defect edges were debeveled with a #15 scalpel blade.  Given the location of the defect and the proximity to free margins a bilobed transposition flap was deemed most appropriate.  Using a sterile surgical marker, an appropriate bilobe flap drawn around the defect.    The area thus outlined was incised deep to adipose tissue with a #15 scalpel blade.  The skin margins were undermined to an appropriate distance in all directions utilizing iris scissors.
Hemostasis: Electrocautery
Intermediate / Complex Repair - Final Wound Length In Cm: 4
Split-Thickness Skin Graft Text: The defect edges were debeveled with a #15 scalpel blade.  Given the location of the defect, shape of the defect and the proximity to free margins a split thickness skin graft was deemed most appropriate.  Using a sterile surgical marker, the primary defect shape was transferred to the donor site. The split thickness graft was then harvested.  The skin graft was then placed in the primary defect and oriented appropriately.
Complex Repair And Transposition Flap Text: The defect edges were debeveled with a #15 scalpel blade.  The primary defect was closed partially with a complex linear closure.  Given the location of the remaining defect, shape of the defect and the proximity to free margins a transposition flap was deemed most appropriate for complete closure of the defect.  Using a sterile surgical marker, an appropriate advancement flap was drawn incorporating the defect and placing the expected incisions within the relaxed skin tension lines where possible.    The area thus outlined was incised deep to adipose tissue with a #15 scalpel blade.  The skin margins were undermined to an appropriate distance in all directions utilizing iris scissors.
Star Wedge Flap Text: The defect edges were debeveled with a #15 scalpel blade.  Given the location of the defect, shape of the defect and the proximity to free margins a star wedge flap was deemed most appropriate.  Using a sterile surgical marker, an appropriate rotation flap was drawn incorporating the defect and placing the expected incisions within the relaxed skin tension lines where possible. The area thus outlined was incised deep to adipose tissue with a #15 scalpel blade.  The skin margins were undermined to an appropriate distance in all directions utilizing iris scissors.
Complex Repair And Double Advancement Flap Text: The defect edges were debeveled with a #15 scalpel blade.  The primary defect was closed partially with a complex linear closure.  Given the location of the remaining defect, shape of the defect and the proximity to free margins a double advancement flap was deemed most appropriate for complete closure of the defect.  Using a sterile surgical marker, an appropriate advancement flap was drawn incorporating the defect and placing the expected incisions within the relaxed skin tension lines where possible.    The area thus outlined was incised deep to adipose tissue with a #15 scalpel blade.  The skin margins were undermined to an appropriate distance in all directions utilizing iris scissors.
Melolabial Transposition Flap Text: The defect edges were debeveled with a #15 scalpel blade.  Given the location of the defect and the proximity to free margins a melolabial flap was deemed most appropriate.  Using a sterile surgical marker, an appropriate melolabial transposition flap was drawn incorporating the defect.    The area thus outlined was incised deep to adipose tissue with a #15 scalpel blade.  The skin margins were undermined to an appropriate distance in all directions utilizing iris scissors.
Advancement Flap (Single) Text: The defect edges were debeveled with a #15 scalpel blade.  Given the location of the defect and the proximity to free margins a single advancement flap was deemed most appropriate.  Using a sterile surgical marker, an appropriate advancement flap was drawn incorporating the defect and placing the expected incisions within the relaxed skin tension lines where possible.    The area thus outlined was incised deep to adipose tissue with a #15 scalpel blade.  The skin margins were undermined to an appropriate distance in all directions utilizing iris scissors.
Epidermal Autograft Text: The defect edges were debeveled with a #15 scalpel blade.  Given the location of the defect, shape of the defect and the proximity to free margins an epidermal autograft was deemed most appropriate.  Using a sterile surgical marker, the primary defect shape was transferred to the donor site. The epidermal graft was then harvested.  The skin graft was then placed in the primary defect and oriented appropriately.
Advancement Flap (Double) Text: The defect edges were debeveled with a #15 scalpel blade.  Given the location of the defect and the proximity to free margins a double advancement flap was deemed most appropriate.  Using a sterile surgical marker, the appropriate advancement flaps were drawn incorporating the defect and placing the expected incisions within the relaxed skin tension lines where possible.    The area thus outlined was incised deep to adipose tissue with a #15 scalpel blade.  The skin margins were undermined to an appropriate distance in all directions utilizing iris scissors.
Complex Repair And Ftsg Text: The defect edges were debeveled with a #15 scalpel blade.  The primary defect was closed partially with a complex linear closure.  Given the location of the defect, shape of the defect and the proximity to free margins a full thickness skin graft was deemed most appropriate to repair the remaining defect.  The graft was trimmed to fit the size of the remaining defect.  The graft was then placed in the primary defect, oriented appropriately, and sutured into place.
Complex Repair And Single Advancement Flap Text: The defect edges were debeveled with a #15 scalpel blade.  The primary defect was closed partially with a complex linear closure.  Given the location of the remaining defect, shape of the defect and the proximity to free margins a single advancement flap was deemed most appropriate for complete closure of the defect.  Using a sterile surgical marker, an appropriate advancement flap was drawn incorporating the defect and placing the expected incisions within the relaxed skin tension lines where possible.    The area thus outlined was incised deep to adipose tissue with a #15 scalpel blade.  The skin margins were undermined to an appropriate distance in all directions utilizing iris scissors.
Mucosal Advancement Flap Text: Given the location of the defect, shape of the defect and the proximity to free margins a mucosal advancement flap was deemed most appropriate. Incisions were made with a 15 blade scalpel in the appropriate fashion along the cutaneous vermilion border and the mucosal lip. The remaining actinically damaged mucosal tissue was excised.  The mucosal advancement flap was then elevated to the gingival sulcus with care taken to preserve the neurovascular structures and advanced into the primary defect. Care was taken to ensure that precise realignment of the vermilion border was achieved.
Fusiform Excision Additional Text (Leave Blank If You Do Not Want): The margin was drawn around the clinically apparent lesion.  A fusiform shape was then drawn on the skin incorporating the lesion and margins.  Incisions were then made along these lines to the appropriate tissue plane and the lesion was extirpated.
Bi-Rhombic Flap Text: The defect edges were debeveled with a #15 scalpel blade.  Given the location of the defect and the proximity to free margins a bi-rhombic flap was deemed most appropriate.  Using a sterile surgical marker, an appropriate rhombic flap was drawn incorporating the defect. The area thus outlined was incised deep to adipose tissue with a #15 scalpel blade.  The skin margins were undermined to an appropriate distance in all directions utilizing iris scissors.
Complex Repair Preamble Text (Leave Blank If You Do Not Want): Extensive wide undermining was performed.
Xenograft Text: The defect edges were debeveled with a #15 scalpel blade.  Given the location of the defect, shape of the defect and the proximity to free margins a xenograft was deemed most appropriate.  The graft was then trimmed to fit the size of the defect.  The graft was then placed in the primary defect and oriented appropriately.
Island Pedicle Flap Text: The defect edges were debeveled with a #15 scalpel blade.  Given the location of the defect, shape of the defect and the proximity to free margins an island pedicle advancement flap was deemed most appropriate.  Using a sterile surgical marker, an appropriate advancement flap was drawn incorporating the defect, outlining the appropriate donor tissue and placing the expected incisions within the relaxed skin tension lines where possible.    The area thus outlined was incised deep to adipose tissue with a #15 scalpel blade.  The skin margins were undermined to an appropriate distance in all directions around the primary defect and laterally outward around the island pedicle utilizing iris scissors.  There was minimal undermining beneath the pedicle flap.
Purse String (Intermediate) Text: Given the location of the defect and the characteristics of the surrounding skin a purse string intermediate closure was deemed most appropriate.  Undermining was performed circumfirentially around the surgical defect.  A purse string suture was then placed and tightened.
Ftsg Text: The defect edges were debeveled with a #15 scalpel blade.  Given the location of the defect, shape of the defect and the proximity to free margins a full thickness skin graft was deemed most appropriate.  Using a sterile surgical marker, the primary defect shape was transferred to the donor site. The area thus outlined was incised deep to adipose tissue with a #15 scalpel blade.  The harvested graft was then trimmed of adipose tissue until only dermis and epidermis was left.  The skin margins of the secondary defect were undermined to an appropriate distance in all directions utilizing iris scissors.  The secondary defect was closed with interrupted buried subcutaneous sutures.  The skin edges were then re-apposed with running  sutures.  The skin graft was then placed in the primary defect and oriented appropriately.
V-Y Plasty Text: The defect edges were debeveled with a #15 scalpel blade.  Given the location of the defect, shape of the defect and the proximity to free margins an V-Y advancement flap was deemed most appropriate.  Using a sterile surgical marker, an appropriate advancement flap was drawn incorporating the defect and placing the expected incisions within the relaxed skin tension lines where possible.    The area thus outlined was incised deep to adipose tissue with a #15 scalpel blade.  The skin margins were undermined to an appropriate distance in all directions utilizing iris scissors.
Complex Repair And W Plasty Text: The defect edges were debeveled with a #15 scalpel blade.  The primary defect was closed partially with a complex linear closure.  Given the location of the remaining defect, shape of the defect and the proximity to free margins a W plasty was deemed most appropriate for complete closure of the defect.  Using a sterile surgical marker, an appropriate advancement flap was drawn incorporating the defect and placing the expected incisions within the relaxed skin tension lines where possible.    The area thus outlined was incised deep to adipose tissue with a #15 scalpel blade.  The skin margins were undermined to an appropriate distance in all directions utilizing iris scissors.
Curvilinear Excision Additional Text (Leave Blank If You Do Not Want): The margin was drawn around the clinically apparent lesion.  A curvilinear shape was then drawn on the skin incorporating the lesion and margins.  Incisions were then made along these lines to the appropriate tissue plane and the lesion was extirpated.
Lab: [id number]
Complex Repair And M Plasty Text: The defect edges were debeveled with a #15 scalpel blade.  The primary defect was closed partially with a complex linear closure.  Given the location of the remaining defect, shape of the defect and the proximity to free margins an M plasty was deemed most appropriate for complete closure of the defect.  Using a sterile surgical marker, an appropriate advancement flap was drawn incorporating the defect and placing the expected incisions within the relaxed skin tension lines where possible.    The area thus outlined was incised deep to adipose tissue with a #15 scalpel blade.  The skin margins were undermined to an appropriate distance in all directions utilizing iris scissors.
Excisional Biopsy Additional Text (Leave Blank If You Do Not Want): The margin was drawn around the clinically apparent lesion. An elliptical shape was then drawn on the skin incorporating the lesion and margins.  Incisions were then made along these lines to the appropriate tissue plane and the lesion was extirpated.
Ear Star Wedge Flap Text: The defect edges were debeveled with a #15 blade scalpel.  Given the location of the defect and the proximity to free margins (helical rim) an ear star wedge flap was deemed most appropriate.  Using a sterile surgical marker, the appropriate flap was drawn incorporating the defect and placing the expected incisions between the helical rim and antihelix where possible.  The area thus outlined was incised through and through with a #15 scalpel blade.
Suture Removal: 14 days
Perilesional Excision Additional Text (Leave Blank If You Do Not Want): The margin was drawn around the clinically apparent lesion. Incisions were then made along these lines to the appropriate tissue plane and the lesion was extirpated.
Island Pedicle Flap With Canthal Suspension Text: The defect edges were debeveled with a #15 scalpel blade.  Given the location of the defect, shape of the defect and the proximity to free margins an island pedicle advancement flap was deemed most appropriate.  Using a sterile surgical marker, an appropriate advancement flap was drawn incorporating the defect, outlining the appropriate donor tissue and placing the expected incisions within the relaxed skin tension lines where possible. The area thus outlined was incised deep to adipose tissue with a #15 scalpel blade.  The skin margins were undermined to an appropriate distance in all directions around the primary defect and laterally outward around the island pedicle utilizing iris scissors.  There was minimal undermining beneath the pedicle flap. A suspension suture was placed in the canthal tendon to prevent tension and prevent ectropion.
Path Notes (To The Dermatopathologist): Please check margins.
Excision Method: Elliptical
Body Location Override (Optional - Billing Will Still Be Based On Selected Body Map Location If Applicable): right distal dorsal forearm
Paramedian Forehead Flap Text: A decision was made to reconstruct the defect utilizing an interpolation axial flap and a staged reconstruction.  A telfa template was made of the defect.  This telfa template was then used to outline the paramedian forehead pedicle flap.  The donor area for the pedicle flap was then injected with anesthesia.  The flap was excised through the skin and subcutaneous tissue down to the layer of the underlying musculature.  The pedicle flap was carefully excised within this deep plane to maintain its blood supply.  The edges of the donor site were undermined.   The donor site was closed in a primary fashion.  The pedicle was then rotated into position and sutured.  Once the tube was sutured into place, adequate blood supply was confirmed with blanching and refill.  The pedicle was then wrapped with xeroform gauze and dressed appropriately with a telfa and gauze bandage to ensure continued blood supply and protect the attached pedicle.
Consent was obtained from the patient. The risks and benefits to therapy were discussed in detail. Specifically, the risks of infection, scarring, bleeding, prolonged wound healing, incomplete removal, allergy to anesthesia, nerve injury and recurrence were addressed. Prior to the procedure, the treatment site was clearly identified and confirmed by the patient. All components of Universal Protocol/PAUSE Rule completed.
Partial Purse String (Simple) Text: Given the location of the defect and the characteristics of the surrounding skin a simple purse string closure was deemed most appropriate.  Undermining was performed circumferentially around the surgical defect.  A purse string suture was then placed and tightened. Wound tension of the circular defect prevented complete closure of the wound.
Complex Repair And Tissue Cultured Epidermal Autograft Text: The defect edges were debeveled with a #15 scalpel blade.  The primary defect was closed partially with a complex linear closure.  Given the location of the defect, shape of the defect and the proximity to free margins an tissue cultured epidermal autograft was deemed most appropriate to repair the remaining defect.  The graft was trimmed to fit the size of the remaining defect.  The graft was then placed in the primary defect, oriented appropriately, and sutured into place.
Complex Repair And V-Y Plasty Text: The defect edges were debeveled with a #15 scalpel blade.  The primary defect was closed partially with a complex linear closure.  Given the location of the remaining defect, shape of the defect and the proximity to free margins a V-Y plasty was deemed most appropriate for complete closure of the defect.  Using a sterile surgical marker, an appropriate advancement flap was drawn incorporating the defect and placing the expected incisions within the relaxed skin tension lines where possible.    The area thus outlined was incised deep to adipose tissue with a #15 scalpel blade.  The skin margins were undermined to an appropriate distance in all directions utilizing iris scissors.
Complex Repair And Double M Plasty Text: The defect edges were debeveled with a #15 scalpel blade.  The primary defect was closed partially with a complex linear closure.  Given the location of the remaining defect, shape of the defect and the proximity to free margins a double M plasty was deemed most appropriate for complete closure of the defect.  Using a sterile surgical marker, an appropriate advancement flap was drawn incorporating the defect and placing the expected incisions within the relaxed skin tension lines where possible.    The area thus outlined was incised deep to adipose tissue with a #15 scalpel blade.  The skin margins were undermined to an appropriate distance in all directions utilizing iris scissors.
Complex Repair And A-T Advancement Flap Text: The defect edges were debeveled with a #15 scalpel blade.  The primary defect was closed partially with a complex linear closure.  Given the location of the remaining defect, shape of the defect and the proximity to free margins an A-T advancement flap was deemed most appropriate for complete closure of the defect.  Using a sterile surgical marker, an appropriate advancement flap was drawn incorporating the defect and placing the expected incisions within the relaxed skin tension lines where possible.    The area thus outlined was incised deep to adipose tissue with a #15 scalpel blade.  The skin margins were undermined to an appropriate distance in all directions utilizing iris scissors.
S Plasty Text: Given the location and shape of the defect, and the orientation of relaxed skin tension lines, an S-plasty was deemed most appropriate for repair.  Using a sterile surgical marker, the appropriate outline of the S-plasty was drawn, incorporating the defect and placing the expected incisions within the relaxed skin tension lines where possible.  The area thus outlined was incised deep to adipose tissue with a #15 scalpel blade.  The skin margins were undermined to an appropriate distance in all directions utilizing iris scissors. The skin flaps were advanced over the defect.  The opposing margins were then approximated with interrupted buried subcutaneous sutures.
Hatchet Flap Text: The defect edges were debeveled with a #15 scalpel blade.  Given the location of the defect, shape of the defect and the proximity to free margins a hatchet flap was deemed most appropriate.  Using a sterile surgical marker, an appropriate hatchet flap was drawn incorporating the defect and placing the expected incisions within the relaxed skin tension lines where possible.    The area thus outlined was incised deep to adipose tissue with a #15 scalpel blade.  The skin margins were undermined to an appropriate distance in all directions utilizing iris scissors.
Interpolation Flap Text: A decision was made to reconstruct the defect utilizing an interpolation axial flap and a staged reconstruction.  A telfa template was made of the defect.  This telfa template was then used to outline the interpolation flap.  The donor area for the pedicle flap was then injected with anesthesia.  The flap was excised through the skin and subcutaneous tissue down to the layer of the underlying musculature.  The interpolation flap was carefully excised within this deep plane to maintain its blood supply.  The edges of the donor site were undermined.   The donor site was closed in a primary fashion.  The pedicle was then rotated into position and sutured.  Once the tube was sutured into place, adequate blood supply was confirmed with blanching and refill.  The pedicle was then wrapped with xeroform gauze and dressed appropriately with a telfa and gauze bandage to ensure continued blood supply and protect the attached pedicle.
Complex Repair And Melolabial Flap Text: The defect edges were debeveled with a #15 scalpel blade.  The primary defect was closed partially with a complex linear closure.  Given the location of the remaining defect, shape of the defect and the proximity to free margins a melolabial flap was deemed most appropriate for complete closure of the defect.  Using a sterile surgical marker, an appropriate advancement flap was drawn incorporating the defect and placing the expected incisions within the relaxed skin tension lines where possible.    The area thus outlined was incised deep to adipose tissue with a #15 scalpel blade.  The skin margins were undermined to an appropriate distance in all directions utilizing iris scissors.
Tissue Cultured Epidermal Autograft Text: The defect edges were debeveled with a #15 scalpel blade.  Given the location of the defect, shape of the defect and the proximity to free margins a tissue cultured epidermal autograft was deemed most appropriate.  The graft was then trimmed to fit the size of the defect.  The graft was then placed in the primary defect and oriented appropriately.
Complex Repair And Xenograft Text: The defect edges were debeveled with a #15 scalpel blade.  The primary defect was closed partially with a complex linear closure.  Given the location of the defect, shape of the defect and the proximity to free margins a xenograft was deemed most appropriate to repair the remaining defect.  The graft was trimmed to fit the size of the remaining defect.  The graft was then placed in the primary defect, oriented appropriately, and sutured into place.
O-T Plasty Text: The defect edges were debeveled with a #15 scalpel blade.  Given the location of the defect, shape of the defect and the proximity to free margins an O-T plasty was deemed most appropriate.  Using a sterile surgical marker, an appropriate O-T plasty was drawn incorporating the defect and placing the expected incisions within the relaxed skin tension lines where possible.    The area thus outlined was incised deep to adipose tissue with a #15 scalpel blade.  The skin margins were undermined to an appropriate distance in all directions utilizing iris scissors.
Double Island Pedicle Flap Text: The defect edges were debeveled with a #15 scalpel blade.  Given the location of the defect, shape of the defect and the proximity to free margins a double island pedicle advancement flap was deemed most appropriate.  Using a sterile surgical marker, an appropriate advancement flap was drawn incorporating the defect, outlining the appropriate donor tissue and placing the expected incisions within the relaxed skin tension lines where possible.    The area thus outlined was incised deep to adipose tissue with a #15 scalpel blade.  The skin margins were undermined to an appropriate distance in all directions around the primary defect and laterally outward around the island pedicle utilizing iris scissors.  There was minimal undermining beneath the pedicle flap.
Slit Excision Additional Text (Leave Blank If You Do Not Want): A linear line was drawn on the skin overlying the lesion. An incision was made slowly until the lesion was visualized.  Once visualized, the lesion was removed with blunt dissection.
Bilateral Helical Rim Advancement Flap Text: The defect edges were debeveled with a #15 blade scalpel.  Given the location of the defect and the proximity to free margins (helical rim) a bilateral helical rim advancement flap was deemed most appropriate.  Using a sterile surgical marker, the appropriate advancement flaps were drawn incorporating the defect and placing the expected incisions between the helical rim and antihelix where possible.  The area thus outlined was incised through and through with a #15 scalpel blade.  With a skin hook and iris scissors, the flaps were gently and sharply undermined and freed up.
Cheek Interpolation Flap Text: A decision was made to reconstruct the defect utilizing an interpolation axial flap and a staged reconstruction.  A telfa template was made of the defect.  This telfa template was then used to outline the Cheek Interpolation flap.  The donor area for the pedicle flap was then injected with anesthesia.  The flap was excised through the skin and subcutaneous tissue down to the layer of the underlying musculature.  The interpolation flap was carefully excised within this deep plane to maintain its blood supply.  The edges of the donor site were undermined.   The donor site was closed in a primary fashion.  The pedicle was then rotated into position and sutured.  Once the tube was sutured into place, adequate blood supply was confirmed with blanching and refill.  The pedicle was then wrapped with xeroform gauze and dressed appropriately with a telfa and gauze bandage to ensure continued blood supply and protect the attached pedicle.
Z Plasty Text: The lesion was extirpated to the level of the fat with a #15 scalpel blade.  Given the location of the defect, shape of the defect and the proximity to free margins a Z-plasty was deemed most appropriate for repair.  Using a sterile surgical marker, the appropriate transposition arms of the Z-plasty were drawn incorporating the defect and placing the expected incisions within the relaxed skin tension lines where possible.    The area thus outlined was incised deep to adipose tissue with a #15 scalpel blade.  The skin margins were undermined to an appropriate distance in all directions utilizing iris scissors.  The opposing transposition arms were then transposed into place in opposite direction and anchored with interrupted buried subcutaneous sutures.
Graft Donor Site Bandage (Optional-Leave Blank If You Don't Want In Note): Steri-strips and a pressure bandage were applied to the donor site.
Estimated Blood Loss (Cc): minimal
Repair Performed By Another Provider Text (Leave Blank If You Do Not Want): After the tissue was excised the defect was repaired by another provider.
Banner Transposition Flap Text: The defect edges were debeveled with a #15 scalpel blade.  Given the location of the defect and the proximity to free margins a Banner transposition flap was deemed most appropriate.  Using a sterile surgical marker, an appropriate flap drawn around the defect. The area thus outlined was incised deep to adipose tissue with a #15 scalpel blade.  The skin margins were undermined to an appropriate distance in all directions utilizing iris scissors.
Dorsal Nasal Flap Text: The defect edges were debeveled with a #15 scalpel blade.  Given the location of the defect and the proximity to free margins a dorsal nasal flap was deemed most appropriate.  Using a sterile surgical marker, an appropriate dorsal nasal flap was drawn around the defect.    The area thus outlined was incised deep to adipose tissue with a #15 scalpel blade.  The skin margins were undermined to an appropriate distance in all directions utilizing iris scissors.
Complex Repair And Dorsal Nasal Flap Text: The defect edges were debeveled with a #15 scalpel blade.  The primary defect was closed partially with a complex linear closure.  Given the location of the remaining defect, shape of the defect and the proximity to free margins a dorsal nasal flap was deemed most appropriate for complete closure of the defect.  Using a sterile surgical marker, an appropriate flap was drawn incorporating the defect and placing the expected incisions within the relaxed skin tension lines where possible.    The area thus outlined was incised deep to adipose tissue with a #15 scalpel blade.  The skin margins were undermined to an appropriate distance in all directions utilizing iris scissors.
Rotation Flap Text: The defect edges were debeveled with a #15 scalpel blade.  Given the location of the defect, shape of the defect and the proximity to free margins a rotation flap was deemed most appropriate.  Using a sterile surgical marker, an appropriate rotation flap was drawn incorporating the defect and placing the expected incisions within the relaxed skin tension lines where possible.    The area thus outlined was incised deep to adipose tissue with a #15 scalpel blade.  The skin margins were undermined to an appropriate distance in all directions utilizing iris scissors.
Lip Wedge Excision Repair Text: Given the location of the defect and the proximity to free margins a full thickness wedge repair was deemed most appropriate.  Using a sterile surgical marker, the appropriate repair was drawn incorporating the defect and placing the expected incisions perpendicular to the vermilion border.  The vermilion border was also meticulously outlined to ensure appropriate reapproximation during the repair.  The area thus outlined was incised through and through with a #15 scalpel blade.  The muscularis and dermis were reaproximated with deep sutures following hemostasis. Care was taken to realign the vermilion border before proceeding with the superficial closure.  Once the vermilion was realigned the superfical and mucosal closure was finished.
Detail Level: Detailed
Skin Substitute Text: The defect edges were debeveled with a #15 scalpel blade.  Given the location of the defect, shape of the defect and the proximity to free margins a skin substitute graft was deemed most appropriate.  The graft material was trimmed to fit the size of the defect. The graft was then placed in the primary defect and oriented appropriately.
W Plasty Text: The lesion was extirpated to the level of the fat with a #15 scalpel blade.  Given the location of the defect, shape of the defect and the proximity to free margins a W-plasty was deemed most appropriate for repair.  Using a sterile surgical marker, the appropriate transposition arms of the W-plasty were drawn incorporating the defect and placing the expected incisions within the relaxed skin tension lines where possible.    The area thus outlined was incised deep to adipose tissue with a #15 scalpel blade.  The skin margins were undermined to an appropriate distance in all directions utilizing iris scissors.  The opposing transposition arms were then transposed into place in opposite direction and anchored with interrupted buried subcutaneous sutures.
Alar Island Pedicle Flap Text: The defect edges were debeveled with a #15 scalpel blade.  Given the location of the defect, shape of the defect and the proximity to the alar rim an island pedicle advancement flap was deemed most appropriate.  Using a sterile surgical marker, an appropriate advancement flap was drawn incorporating the defect, outlining the appropriate donor tissue and placing the expected incisions within the nasal ala running parallel to the alar rim. The area thus outlined was incised with a #15 scalpel blade.  The skin margins were undermined minimally to an appropriate distance in all directions around the primary defect and laterally outward around the island pedicle utilizing iris scissors.  There was minimal undermining beneath the pedicle flap.
Mercedes Flap Text: The defect edges were debeveled with a #15 scalpel blade.  Given the location of the defect, shape of the defect and the proximity to free margins a Mercedes flap was deemed most appropriate.  Using a sterile surgical marker, an appropriate advancement flap was drawn incorporating the defect and placing the expected incisions within the relaxed skin tension lines where possible. The area thus outlined was incised deep to adipose tissue with a #15 scalpel blade.  The skin margins were undermined to an appropriate distance in all directions utilizing iris scissors.
Complex Repair And O-L Flap Text: The defect edges were debeveled with a #15 scalpel blade.  The primary defect was closed partially with a complex linear closure.  Given the location of the remaining defect, shape of the defect and the proximity to free margins an O-L flap was deemed most appropriate for complete closure of the defect.  Using a sterile surgical marker, an appropriate flap was drawn incorporating the defect and placing the expected incisions within the relaxed skin tension lines where possible.    The area thus outlined was incised deep to adipose tissue with a #15 scalpel blade.  The skin margins were undermined to an appropriate distance in all directions utilizing iris scissors.
Cheek-To-Nose Interpolation Flap Text: A decision was made to reconstruct the defect utilizing an interpolation axial flap and a staged reconstruction.  A telfa template was made of the defect.  This telfa template was then used to outline the Cheek-To-Nose Interpolation flap.  The donor area for the pedicle flap was then injected with anesthesia.  The flap was excised through the skin and subcutaneous tissue down to the layer of the underlying musculature.  The interpolation flap was carefully excised within this deep plane to maintain its blood supply.  The edges of the donor site were undermined.   The donor site was closed in a primary fashion.  The pedicle was then rotated into position and sutured.  Once the tube was sutured into place, adequate blood supply was confirmed with blanching and refill.  The pedicle was then wrapped with xeroform gauze and dressed appropriately with a telfa and gauze bandage to ensure continued blood supply and protect the attached pedicle.
Lab Facility: 
Epidermal Sutures: 4-0 Prolene
Trilobed Flap Text: The defect edges were debeveled with a #15 scalpel blade.  Given the location of the defect and the proximity to free margins a trilobed flap was deemed most appropriate.  Using a sterile surgical marker, an appropriate trilobed flap drawn around the defect.    The area thus outlined was incised deep to adipose tissue with a #15 scalpel blade.  The skin margins were undermined to an appropriate distance in all directions utilizing iris scissors.
Complex Repair And Skin Substitute Graft Text: The defect edges were debeveled with a #15 scalpel blade.  The primary defect was closed partially with a complex linear closure.  Given the location of the remaining defect, shape of the defect and the proximity to free margins a skin substitute graft was deemed most appropriate to repair the remaining defect.  The graft was trimmed to fit the size of the remaining defect.  The graft was then placed in the primary defect, oriented appropriately, and sutured into place.
Complex Repair And O-T Advancement Flap Text: The defect edges were debeveled with a #15 scalpel blade.  The primary defect was closed partially with a complex linear closure.  Given the location of the remaining defect, shape of the defect and the proximity to free margins an O-T advancement flap was deemed most appropriate for complete closure of the defect.  Using a sterile surgical marker, an appropriate advancement flap was drawn incorporating the defect and placing the expected incisions within the relaxed skin tension lines where possible.    The area thus outlined was incised deep to adipose tissue with a #15 scalpel blade.  The skin margins were undermined to an appropriate distance in all directions utilizing iris scissors.
Positioning (Leave Blank If You Do Not Want): The patient was placed in a comfortable position exposing the surgical site.
Partial Purse String (Intermediate) Text: Given the location of the defect and the characteristics of the surrounding skin an intermediate purse string closure was deemed most appropriate.  Undermining was performed circumferentially around the surgical defect.  A purse string suture was then placed and tightened. Wound tension of the circular defect prevented complete closure of the wound.
O-T Advancement Flap Text: The defect edges were debeveled with a #15 scalpel blade.  Given the location of the defect, shape of the defect and the proximity to free margins an O-T advancement flap was deemed most appropriate.  Using a sterile surgical marker, an appropriate advancement flap was drawn incorporating the defect and placing the expected incisions within the relaxed skin tension lines where possible.    The area thus outlined was incised deep to adipose tissue with a #15 scalpel blade.  The skin margins were undermined to an appropriate distance in all directions utilizing iris scissors.
Saucerization Excision Additional Text (Leave Blank If You Do Not Want): The margin was drawn around the clinically apparent lesion.  Incisions were then made along these lines, in a tangential fashion, to the appropriate tissue plane and the lesion was extirpated.
O-Z Plasty Text: The defect edges were debeveled with a #15 scalpel blade.  Given the location of the defect, shape of the defect and the proximity to free margins an O-Z plasty (double transposition flap) was deemed most appropriate.  Using a sterile surgical marker, the appropriate transposition flaps were drawn incorporating the defect and placing the expected incisions within the relaxed skin tension lines where possible.    The area thus outlined was incised deep to adipose tissue with a #15 scalpel blade.  The skin margins were undermined to an appropriate distance in all directions utilizing iris scissors.  Hemostasis was achieved with electrocautery.  The flaps were then transposed into place, one clockwise and the other counterclockwise, and anchored with interrupted buried subcutaneous sutures.
A-T Advancement Flap Text: The defect edges were debeveled with a #15 scalpel blade.  Given the location of the defect, shape of the defect and the proximity to free margins an A-T advancement flap was deemed most appropriate.  Using a sterile surgical marker, an appropriate advancement flap was drawn incorporating the defect and placing the expected incisions within the relaxed skin tension lines where possible.    The area thus outlined was incised deep to adipose tissue with a #15 scalpel blade.  The skin margins were undermined to an appropriate distance in all directions utilizing iris scissors.
Cartilage Graft Text: The defect edges were debeveled with a #15 scalpel blade.  Given the location of the defect, shape of the defect, the fact the defect involved a full thickness cartilage defect a cartilage graft was deemed most appropriate.  An appropriate donor site was identified, cleansed, and anesthetized. The cartilage graft was then harvested and transferred to the recipient site, oriented appropriately and then sutured into place.  The secondary defect was then repaired using a primary closure.

## 2018-10-01 ENCOUNTER — APPOINTMENT (RX ONLY)
Dept: URBAN - METROPOLITAN AREA CLINIC 4 | Facility: CLINIC | Age: 83
Setting detail: DERMATOLOGY
End: 2018-10-01

## 2018-10-01 ENCOUNTER — HOSPITAL ENCOUNTER (OUTPATIENT)
Dept: LAB | Facility: MEDICAL CENTER | Age: 83
End: 2018-10-01
Attending: NURSE PRACTITIONER
Payer: MEDICARE

## 2018-10-01 DIAGNOSIS — Z48.817 ENCOUNTER FOR SURGICAL AFTERCARE FOLLOWING SURGERY ON THE SKIN AND SUBCUTANEOUS TISSUE: ICD-10-CM

## 2018-10-01 PROCEDURE — 87077 CULTURE AEROBIC IDENTIFY: CPT

## 2018-10-01 PROCEDURE — 87205 SMEAR GRAM STAIN: CPT

## 2018-10-01 PROCEDURE — 87070 CULTURE OTHR SPECIMN AEROBIC: CPT

## 2018-10-01 PROCEDURE — ? DRESSING CHANGE

## 2018-10-01 PROCEDURE — 87186 SC STD MICRODIL/AGAR DIL: CPT

## 2018-10-01 PROCEDURE — ? PRESCRIPTION

## 2018-10-01 PROCEDURE — ? ORDER TESTS

## 2018-10-01 PROCEDURE — ? COUNSELING

## 2018-10-01 RX ORDER — MUPIROCIN 20 MG/G
OINTMENT TOPICAL
Qty: 1 | Refills: 1 | Status: ERX | COMMUNITY
Start: 2018-10-01

## 2018-10-01 RX ADMIN — MUPIROCIN: 20 OINTMENT TOPICAL at 00:00

## 2018-10-01 ASSESSMENT — LOCATION ZONE DERM: LOCATION ZONE: ARM

## 2018-10-01 ASSESSMENT — LOCATION SIMPLE DESCRIPTION DERM: LOCATION SIMPLE: RIGHT FOREARM

## 2018-10-01 ASSESSMENT — LOCATION DETAILED DESCRIPTION DERM: LOCATION DETAILED: RIGHT PROXIMAL DORSAL FOREARM

## 2018-10-01 NOTE — PROCEDURE: DRESSING CHANGE
Detail Level: Detailed
Add 03446 Cpt? (Important Note: In 2017 The Use Of 45229 Is Being Tracked By Cms To Determine Future Global Period Reimbursement For Global Periods): no

## 2018-10-02 LAB
AMBIGUOUS DTTM AMBI4: NORMAL
SIGNIFICANT IND 70042: NORMAL
SITE SITE: NORMAL
SOURCE SOURCE: NORMAL

## 2018-10-03 LAB
GRAM STN SPEC: NORMAL
SIGNIFICANT IND 70042: NORMAL
SITE SITE: NORMAL
SOURCE SOURCE: NORMAL

## 2018-10-04 ENCOUNTER — APPOINTMENT (RX ONLY)
Dept: URBAN - METROPOLITAN AREA CLINIC 4 | Facility: CLINIC | Age: 83
Setting detail: DERMATOLOGY
End: 2018-10-04

## 2018-10-04 PROBLEM — C44.629 SQUAMOUS CELL CARCINOMA OF SKIN OF LEFT UPPER LIMB, INCLUDING SHOULDER: Status: ACTIVE | Noted: 2018-10-04

## 2018-10-04 PROBLEM — C44.529 SQUAMOUS CELL CARCINOMA OF SKIN OF OTHER PART OF TRUNK: Status: ACTIVE | Noted: 2018-10-04

## 2018-10-04 LAB
BACTERIA WND AEROBE CULT: ABNORMAL
BACTERIA WND AEROBE CULT: ABNORMAL
GRAM STN SPEC: ABNORMAL
SIGNIFICANT IND 70042: ABNORMAL
SITE SITE: ABNORMAL
SOURCE SOURCE: ABNORMAL

## 2018-10-04 PROCEDURE — ? ADDITIONAL NOTES

## 2018-10-04 PROCEDURE — 12032 INTMD RPR S/A/T/EXT 2.6-7.5: CPT | Mod: 59

## 2018-10-04 PROCEDURE — ? EXCISION

## 2018-10-04 PROCEDURE — 17261 DSTRJ MAL LES T/A/L .6-1.0CM: CPT

## 2018-10-04 PROCEDURE — ? CURETTAGE AND DESTRUCTION

## 2018-10-04 PROCEDURE — 11602 EXC TR-EXT MAL+MARG 1.1-2 CM: CPT | Mod: 59

## 2018-10-04 NOTE — PROCEDURE: ADDITIONAL NOTES
Detail Level: Simple
Additional Notes: Pt to apply mupirocin previously prescribed in place of Vaseline.

## 2018-10-04 NOTE — PROCEDURE: EXCISION
Post-Care Instructions: I reviewed with the patient in detail post-care instructions. Patient is not to engage in any heavy lifting, exercise, or swimming for the next 14 days. Should the patient develop any fevers, chills, bleeding, severe pain patient will contact the office immediately.
Complex Repair And M Plasty Text: The defect edges were debeveled with a #15 scalpel blade.  The primary defect was closed partially with a complex linear closure.  Given the location of the remaining defect, shape of the defect and the proximity to free margins an M plasty was deemed most appropriate for complete closure of the defect.  Using a sterile surgical marker, an appropriate advancement flap was drawn incorporating the defect and placing the expected incisions within the relaxed skin tension lines where possible.    The area thus outlined was incised deep to adipose tissue with a #15 scalpel blade.  The skin margins were undermined to an appropriate distance in all directions utilizing iris scissors.
Bill [id number] For Specimen Handling/Conveyance To Laboratory?: no
Melolabial Interpolation Flap Text: A decision was made to reconstruct the defect utilizing an interpolation axial flap and a staged reconstruction.  A telfa template was made of the defect.  This telfa template was then used to outline the melolabial interpolation flap.  The donor area for the pedicle flap was then injected with anesthesia.  The flap was excised through the skin and subcutaneous tissue down to the layer of the underlying musculature.  The pedicle flap was carefully excised within this deep plane to maintain its blood supply.  The edges of the donor site were undermined.   The donor site was closed in a primary fashion.  The pedicle was then rotated into position and sutured.  Once the tube was sutured into place, adequate blood supply was confirmed with blanching and refill.  The pedicle was then wrapped with xeroform gauze and dressed appropriately with a telfa and gauze bandage to ensure continued blood supply and protect the attached pedicle.
Complex Repair And W Plasty Text: The defect edges were debeveled with a #15 scalpel blade.  The primary defect was closed partially with a complex linear closure.  Given the location of the remaining defect, shape of the defect and the proximity to free margins a W plasty was deemed most appropriate for complete closure of the defect.  Using a sterile surgical marker, an appropriate advancement flap was drawn incorporating the defect and placing the expected incisions within the relaxed skin tension lines where possible.    The area thus outlined was incised deep to adipose tissue with a #15 scalpel blade.  The skin margins were undermined to an appropriate distance in all directions utilizing iris scissors.
Z Plasty Text: The lesion was extirpated to the level of the fat with a #15 scalpel blade.  Given the location of the defect, shape of the defect and the proximity to free margins a Z-plasty was deemed most appropriate for repair.  Using a sterile surgical marker, the appropriate transposition arms of the Z-plasty were drawn incorporating the defect and placing the expected incisions within the relaxed skin tension lines where possible.    The area thus outlined was incised deep to adipose tissue with a #15 scalpel blade.  The skin margins were undermined to an appropriate distance in all directions utilizing iris scissors.  The opposing transposition arms were then transposed into place in opposite direction and anchored with interrupted buried subcutaneous sutures.
O-Z Plasty Text: The defect edges were debeveled with a #15 scalpel blade.  Given the location of the defect, shape of the defect and the proximity to free margins an O-Z plasty (double transposition flap) was deemed most appropriate.  Using a sterile surgical marker, the appropriate transposition flaps were drawn incorporating the defect and placing the expected incisions within the relaxed skin tension lines where possible.    The area thus outlined was incised deep to adipose tissue with a #15 scalpel blade.  The skin margins were undermined to an appropriate distance in all directions utilizing iris scissors.  Hemostasis was achieved with electrocautery.  The flaps were then transposed into place, one clockwise and the other counterclockwise, and anchored with interrupted buried subcutaneous sutures.
Complex Repair And Bilobe Flap Text: The defect edges were debeveled with a #15 scalpel blade.  The primary defect was closed partially with a complex linear closure.  Given the location of the remaining defect, shape of the defect and the proximity to free margins a bilobe flap was deemed most appropriate for complete closure of the defect.  Using a sterile surgical marker, an appropriate advancement flap was drawn incorporating the defect and placing the expected incisions within the relaxed skin tension lines where possible.    The area thus outlined was incised deep to adipose tissue with a #15 scalpel blade.  The skin margins were undermined to an appropriate distance in all directions utilizing iris scissors.
Muscle Hinge Flap Text: The defect edges were debeveled with a #15 scalpel blade.  Given the size, depth and location of the defect and the proximity to free margins a muscle hinge flap was deemed most appropriate.  Using a sterile surgical marker, an appropriate hinge flap was drawn incorporating the defect. The area thus outlined was incised with a #15 scalpel blade.  The skin margins were undermined to an appropriate distance in all directions utilizing iris scissors.
Show Referring Physician Variable: Yes
Purse String (Simple) Text: Given the location of the defect and the characteristics of the surrounding skin a purse string simple closure was deemed most appropriate.  Undermining was performed circumferentially around the surgical defect.  A purse string suture was then placed and tightened.
Anesthesia Volume In Cc: 3
Split-Thickness Skin Graft Text: The defect edges were debeveled with a #15 scalpel blade.  Given the location of the defect, shape of the defect and the proximity to free margins a split thickness skin graft was deemed most appropriate.  Using a sterile surgical marker, the primary defect shape was transferred to the donor site. The split thickness graft was then harvested.  The skin graft was then placed in the primary defect and oriented appropriately.
Complex Repair And Skin Substitute Graft Text: The defect edges were debeveled with a #15 scalpel blade.  The primary defect was closed partially with a complex linear closure.  Given the location of the remaining defect, shape of the defect and the proximity to free margins a skin substitute graft was deemed most appropriate to repair the remaining defect.  The graft was trimmed to fit the size of the remaining defect.  The graft was then placed in the primary defect, oriented appropriately, and sutured into place.
Double M-Plasty Complex Repair Preamble Text (Leave Blank If You Do Not Want): Extensive wide undermining was performed.
Hemostasis: Electrocautery
Dressing: dry sterile dressing
Size Of Margin In Cm: 0.4
Primary Defect Width (In Cm): 0
Scalpel Size: 15 blade
Graft Donor Site Bandage (Optional-Leave Blank If You Don't Want In Note): Steri-strips and a pressure bandage were applied to the donor site.
Mucosal Advancement Flap Text: Given the location of the defect, shape of the defect and the proximity to free margins a mucosal advancement flap was deemed most appropriate. Incisions were made with a 15 blade scalpel in the appropriate fashion along the cutaneous vermilion border and the mucosal lip. The remaining actinically damaged mucosal tissue was excised.  The mucosal advancement flap was then elevated to the gingival sulcus with care taken to preserve the neurovascular structures and advanced into the primary defect. Care was taken to ensure that precise realignment of the vermilion border was achieved.
Home Suture Removal Text: Patient was provided a home suture removal kit and will remove their sutures at home.  If they have any questions or difficulties they will call the office.
Melolabial Transposition Flap Text: The defect edges were debeveled with a #15 scalpel blade.  Given the location of the defect and the proximity to free margins a melolabial flap was deemed most appropriate.  Using a sterile surgical marker, an appropriate melolabial transposition flap was drawn incorporating the defect.    The area thus outlined was incised deep to adipose tissue with a #15 scalpel blade.  The skin margins were undermined to an appropriate distance in all directions utilizing iris scissors.
Anesthesia Volume In Cc: 6
Cheek Interpolation Flap Text: A decision was made to reconstruct the defect utilizing an interpolation axial flap and a staged reconstruction.  A telfa template was made of the defect.  This telfa template was then used to outline the Cheek Interpolation flap.  The donor area for the pedicle flap was then injected with anesthesia.  The flap was excised through the skin and subcutaneous tissue down to the layer of the underlying musculature.  The interpolation flap was carefully excised within this deep plane to maintain its blood supply.  The edges of the donor site were undermined.   The donor site was closed in a primary fashion.  The pedicle was then rotated into position and sutured.  Once the tube was sutured into place, adequate blood supply was confirmed with blanching and refill.  The pedicle was then wrapped with xeroform gauze and dressed appropriately with a telfa and gauze bandage to ensure continued blood supply and protect the attached pedicle.
Mercedes Flap Text: The defect edges were debeveled with a #15 scalpel blade.  Given the location of the defect, shape of the defect and the proximity to free margins a Mercedes flap was deemed most appropriate.  Using a sterile surgical marker, an appropriate advancement flap was drawn incorporating the defect and placing the expected incisions within the relaxed skin tension lines where possible. The area thus outlined was incised deep to adipose tissue with a #15 scalpel blade.  The skin margins were undermined to an appropriate distance in all directions utilizing iris scissors.
Complex Repair And Epidermal Autograft Text: The defect edges were debeveled with a #15 scalpel blade.  The primary defect was closed partially with a complex linear closure.  Given the location of the defect, shape of the defect and the proximity to free margins an epidermal autograft was deemed most appropriate to repair the remaining defect.  The graft was trimmed to fit the size of the remaining defect.  The graft was then placed in the primary defect, oriented appropriately, and sutured into place.
Detail Level: Detailed
Crescentic Advancement Flap Text: The defect edges were debeveled with a #15 scalpel blade.  Given the location of the defect and the proximity to free margins a crescentic advancement flap was deemed most appropriate.  Using a sterile surgical marker, the appropriate advancement flap was drawn incorporating the defect and placing the expected incisions within the relaxed skin tension lines where possible.    The area thus outlined was incised deep to adipose tissue with a #15 scalpel blade.  The skin margins were undermined to an appropriate distance in all directions utilizing iris scissors.
Ftsg Text: The defect edges were debeveled with a #15 scalpel blade.  Given the location of the defect, shape of the defect and the proximity to free margins a full thickness skin graft was deemed most appropriate.  Using a sterile surgical marker, the primary defect shape was transferred to the donor site. The area thus outlined was incised deep to adipose tissue with a #15 scalpel blade.  The harvested graft was then trimmed of adipose tissue until only dermis and epidermis was left.  The skin margins of the secondary defect were undermined to an appropriate distance in all directions utilizing iris scissors.  The secondary defect was closed with interrupted buried subcutaneous sutures.  The skin edges were then re-apposed with running  sutures.  The skin graft was then placed in the primary defect and oriented appropriately.
Repair Type: Intermediate
Fusiform Excision Additional Text (Leave Blank If You Do Not Want): The margin was drawn around the clinically apparent lesion.  A fusiform shape was then drawn on the skin incorporating the lesion and margins.  Incisions were then made along these lines to the appropriate tissue plane and the lesion was extirpated.
S Plasty Text: Given the location and shape of the defect, and the orientation of relaxed skin tension lines, an S-plasty was deemed most appropriate for repair.  Using a sterile surgical marker, the appropriate outline of the S-plasty was drawn, incorporating the defect and placing the expected incisions within the relaxed skin tension lines where possible.  The area thus outlined was incised deep to adipose tissue with a #15 scalpel blade.  The skin margins were undermined to an appropriate distance in all directions utilizing iris scissors. The skin flaps were advanced over the defect.  The opposing margins were then approximated with interrupted buried subcutaneous sutures.
Complex Repair And Z Plasty Text: The defect edges were debeveled with a #15 scalpel blade.  The primary defect was closed partially with a complex linear closure.  Given the location of the remaining defect, shape of the defect and the proximity to free margins a Z plasty was deemed most appropriate for complete closure of the defect.  Using a sterile surgical marker, an appropriate advancement flap was drawn incorporating the defect and placing the expected incisions within the relaxed skin tension lines where possible.    The area thus outlined was incised deep to adipose tissue with a #15 scalpel blade.  The skin margins were undermined to an appropriate distance in all directions utilizing iris scissors.
Body Location Override (Optional - Billing Will Still Be Based On Selected Body Map Location If Applicable): left lateral proximal upper arm
Complex Repair And Single Advancement Flap Text: The defect edges were debeveled with a #15 scalpel blade.  The primary defect was closed partially with a complex linear closure.  Given the location of the remaining defect, shape of the defect and the proximity to free margins a single advancement flap was deemed most appropriate for complete closure of the defect.  Using a sterile surgical marker, an appropriate advancement flap was drawn incorporating the defect and placing the expected incisions within the relaxed skin tension lines where possible.    The area thus outlined was incised deep to adipose tissue with a #15 scalpel blade.  The skin margins were undermined to an appropriate distance in all directions utilizing iris scissors.
Complex Repair And Double Advancement Flap Text: The defect edges were debeveled with a #15 scalpel blade.  The primary defect was closed partially with a complex linear closure.  Given the location of the remaining defect, shape of the defect and the proximity to free margins a double advancement flap was deemed most appropriate for complete closure of the defect.  Using a sterile surgical marker, an appropriate advancement flap was drawn incorporating the defect and placing the expected incisions within the relaxed skin tension lines where possible.    The area thus outlined was incised deep to adipose tissue with a #15 scalpel blade.  The skin margins were undermined to an appropriate distance in all directions utilizing iris scissors.
Path Notes (To The Dermatopathologist): Please check margins.
Complex Repair And Double M Plasty Text: The defect edges were debeveled with a #15 scalpel blade.  The primary defect was closed partially with a complex linear closure.  Given the location of the remaining defect, shape of the defect and the proximity to free margins a double M plasty was deemed most appropriate for complete closure of the defect.  Using a sterile surgical marker, an appropriate advancement flap was drawn incorporating the defect and placing the expected incisions within the relaxed skin tension lines where possible.    The area thus outlined was incised deep to adipose tissue with a #15 scalpel blade.  The skin margins were undermined to an appropriate distance in all directions utilizing iris scissors.
Bi-Rhombic Flap Text: The defect edges were debeveled with a #15 scalpel blade.  Given the location of the defect and the proximity to free margins a bi-rhombic flap was deemed most appropriate.  Using a sterile surgical marker, an appropriate rhombic flap was drawn incorporating the defect. The area thus outlined was incised deep to adipose tissue with a #15 scalpel blade.  The skin margins were undermined to an appropriate distance in all directions utilizing iris scissors.
A-T Advancement Flap Text: The defect edges were debeveled with a #15 scalpel blade.  Given the location of the defect, shape of the defect and the proximity to free margins an A-T advancement flap was deemed most appropriate.  Using a sterile surgical marker, an appropriate advancement flap was drawn incorporating the defect and placing the expected incisions within the relaxed skin tension lines where possible.    The area thus outlined was incised deep to adipose tissue with a #15 scalpel blade.  The skin margins were undermined to an appropriate distance in all directions utilizing iris scissors.
Keystone Flap Text: The defect edges were debeveled with a #15 scalpel blade.  Given the location of the defect, shape of the defect a keystone flap was deemed most appropriate.  Using a sterile surgical marker, an appropriate keystone flap was drawn incorporating the defect, outlining the appropriate donor tissue and placing the expected incisions within the relaxed skin tension lines where possible. The area thus outlined was incised deep to adipose tissue with a #15 scalpel blade.  The skin margins were undermined to an appropriate distance in all directions around the primary defect and laterally outward around the flap utilizing iris scissors.
Perilesional Excision Additional Text (Leave Blank If You Do Not Want): The margin was drawn around the clinically apparent lesion. Incisions were then made along these lines to the appropriate tissue plane and the lesion was extirpated.
Bilobed Flap Text: The defect edges were debeveled with a #15 scalpel blade.  Given the location of the defect and the proximity to free margins a bilobe flap was deemed most appropriate.  Using a sterile surgical marker, an appropriate bilobe flap drawn around the defect.    The area thus outlined was incised deep to adipose tissue with a #15 scalpel blade.  The skin margins were undermined to an appropriate distance in all directions utilizing iris scissors.
Complex Repair And Ftsg Text: The defect edges were debeveled with a #15 scalpel blade.  The primary defect was closed partially with a complex linear closure.  Given the location of the defect, shape of the defect and the proximity to free margins a full thickness skin graft was deemed most appropriate to repair the remaining defect.  The graft was trimmed to fit the size of the remaining defect.  The graft was then placed in the primary defect, oriented appropriately, and sutured into place.
Interpolation Flap Text: A decision was made to reconstruct the defect utilizing an interpolation axial flap and a staged reconstruction.  A telfa template was made of the defect.  This telfa template was then used to outline the interpolation flap.  The donor area for the pedicle flap was then injected with anesthesia.  The flap was excised through the skin and subcutaneous tissue down to the layer of the underlying musculature.  The interpolation flap was carefully excised within this deep plane to maintain its blood supply.  The edges of the donor site were undermined.   The donor site was closed in a primary fashion.  The pedicle was then rotated into position and sutured.  Once the tube was sutured into place, adequate blood supply was confirmed with blanching and refill.  The pedicle was then wrapped with xeroform gauze and dressed appropriately with a telfa and gauze bandage to ensure continued blood supply and protect the attached pedicle.
Complex Repair And O-T Advancement Flap Text: The defect edges were debeveled with a #15 scalpel blade.  The primary defect was closed partially with a complex linear closure.  Given the location of the remaining defect, shape of the defect and the proximity to free margins an O-T advancement flap was deemed most appropriate for complete closure of the defect.  Using a sterile surgical marker, an appropriate advancement flap was drawn incorporating the defect and placing the expected incisions within the relaxed skin tension lines where possible.    The area thus outlined was incised deep to adipose tissue with a #15 scalpel blade.  The skin margins were undermined to an appropriate distance in all directions utilizing iris scissors.
Lab: [id number]
Advancement Flap (Single) Text: The defect edges were debeveled with a #15 scalpel blade.  Given the location of the defect and the proximity to free margins a single advancement flap was deemed most appropriate.  Using a sterile surgical marker, an appropriate advancement flap was drawn incorporating the defect and placing the expected incisions within the relaxed skin tension lines where possible.    The area thus outlined was incised deep to adipose tissue with a #15 scalpel blade.  The skin margins were undermined to an appropriate distance in all directions utilizing iris scissors.
Crescentic Intermediate Repair Preamble Text (Leave Blank If You Do Not Want): Undermining was performed with blunt dissection.
Positioning (Leave Blank If You Do Not Want): The patient was placed in a comfortable position exposing the surgical site.
Complex Repair And Rotation Flap Text: The defect edges were debeveled with a #15 scalpel blade.  The primary defect was closed partially with a complex linear closure.  Given the location of the remaining defect, shape of the defect and the proximity to free margins a rotation flap was deemed most appropriate for complete closure of the defect.  Using a sterile surgical marker, an appropriate advancement flap was drawn incorporating the defect and placing the expected incisions within the relaxed skin tension lines where possible.    The area thus outlined was incised deep to adipose tissue with a #15 scalpel blade.  The skin margins were undermined to an appropriate distance in all directions utilizing iris scissors.
Hatchet Flap Text: The defect edges were debeveled with a #15 scalpel blade.  Given the location of the defect, shape of the defect and the proximity to free margins a hatchet flap was deemed most appropriate.  Using a sterile surgical marker, an appropriate hatchet flap was drawn incorporating the defect and placing the expected incisions within the relaxed skin tension lines where possible.    The area thus outlined was incised deep to adipose tissue with a #15 scalpel blade.  The skin margins were undermined to an appropriate distance in all directions utilizing iris scissors.
Lip Wedge Excision Repair Text: Given the location of the defect and the proximity to free margins a full thickness wedge repair was deemed most appropriate.  Using a sterile surgical marker, the appropriate repair was drawn incorporating the defect and placing the expected incisions perpendicular to the vermilion border.  The vermilion border was also meticulously outlined to ensure appropriate reapproximation during the repair.  The area thus outlined was incised through and through with a #15 scalpel blade.  The muscularis and dermis were reaproximated with deep sutures following hemostasis. Care was taken to realign the vermilion border before proceeding with the superficial closure.  Once the vermilion was realigned the superfical and mucosal closure was finished.
Banner Transposition Flap Text: The defect edges were debeveled with a #15 scalpel blade.  Given the location of the defect and the proximity to free margins a Banner transposition flap was deemed most appropriate.  Using a sterile surgical marker, an appropriate flap drawn around the defect. The area thus outlined was incised deep to adipose tissue with a #15 scalpel blade.  The skin margins were undermined to an appropriate distance in all directions utilizing iris scissors.
Complex Repair And O-L Flap Text: The defect edges were debeveled with a #15 scalpel blade.  The primary defect was closed partially with a complex linear closure.  Given the location of the remaining defect, shape of the defect and the proximity to free margins an O-L flap was deemed most appropriate for complete closure of the defect.  Using a sterile surgical marker, an appropriate flap was drawn incorporating the defect and placing the expected incisions within the relaxed skin tension lines where possible.    The area thus outlined was incised deep to adipose tissue with a #15 scalpel blade.  The skin margins were undermined to an appropriate distance in all directions utilizing iris scissors.
O-T Plasty Text: The defect edges were debeveled with a #15 scalpel blade.  Given the location of the defect, shape of the defect and the proximity to free margins an O-T plasty was deemed most appropriate.  Using a sterile surgical marker, an appropriate O-T plasty was drawn incorporating the defect and placing the expected incisions within the relaxed skin tension lines where possible.    The area thus outlined was incised deep to adipose tissue with a #15 scalpel blade.  The skin margins were undermined to an appropriate distance in all directions utilizing iris scissors.
Complex Repair And Split-Thickness Skin Graft Text: The defect edges were debeveled with a #15 scalpel blade.  The primary defect was closed partially with a complex linear closure.  Given the location of the defect, shape of the defect and the proximity to free margins a split thickness skin graft was deemed most appropriate to repair the remaining defect.  The graft was trimmed to fit the size of the remaining defect.  The graft was then placed in the primary defect, oriented appropriately, and sutured into place.
Excisional Biopsy Additional Text (Leave Blank If You Do Not Want): The margin was drawn around the clinically apparent lesion. An elliptical shape was then drawn on the skin incorporating the lesion and margins.  Incisions were then made along these lines to the appropriate tissue plane and the lesion was extirpated.
Advancement-Rotation Flap Text: The defect edges were debeveled with a #15 scalpel blade.  Given the location of the defect, shape of the defect and the proximity to free margins an advancement-rotation flap was deemed most appropriate.  Using a sterile surgical marker, an appropriate flap was drawn incorporating the defect and placing the expected incisions within the relaxed skin tension lines where possible. The area thus outlined was incised deep to adipose tissue with a #15 scalpel blade.  The skin margins were undermined to an appropriate distance in all directions utilizing iris scissors.
Complex Repair And Dorsal Nasal Flap Text: The defect edges were debeveled with a #15 scalpel blade.  The primary defect was closed partially with a complex linear closure.  Given the location of the remaining defect, shape of the defect and the proximity to free margins a dorsal nasal flap was deemed most appropriate for complete closure of the defect.  Using a sterile surgical marker, an appropriate flap was drawn incorporating the defect and placing the expected incisions within the relaxed skin tension lines where possible.    The area thus outlined was incised deep to adipose tissue with a #15 scalpel blade.  The skin margins were undermined to an appropriate distance in all directions utilizing iris scissors.
Epidermal Closure: running
Billing Type: Third-Party Bill
Lab Facility: 
Composite Graft Text: The defect edges were debeveled with a #15 scalpel blade.  Given the location of the defect, shape of the defect, the proximity to free margins and the fact the defect was full thickness a composite graft was deemed most appropriate.  The defect was outline and then transferred to the donor site.  A full thickness graft was then excised from the donor site. The graft was then placed in the primary defect, oriented appropriately and then sutured into place.  The secondary defect was then repaired using a primary closure.
Complex Repair And Rhombic Flap Text: The defect edges were debeveled with a #15 scalpel blade.  The primary defect was closed partially with a complex linear closure.  Given the location of the remaining defect, shape of the defect and the proximity to free margins a rhombic flap was deemed most appropriate for complete closure of the defect.  Using a sterile surgical marker, an appropriate advancement flap was drawn incorporating the defect and placing the expected incisions within the relaxed skin tension lines where possible.    The area thus outlined was incised deep to adipose tissue with a #15 scalpel blade.  The skin margins were undermined to an appropriate distance in all directions utilizing iris scissors.
V-Y Plasty Text: The defect edges were debeveled with a #15 scalpel blade.  Given the location of the defect, shape of the defect and the proximity to free margins an V-Y advancement flap was deemed most appropriate.  Using a sterile surgical marker, an appropriate advancement flap was drawn incorporating the defect and placing the expected incisions within the relaxed skin tension lines where possible.    The area thus outlined was incised deep to adipose tissue with a #15 scalpel blade.  The skin margins were undermined to an appropriate distance in all directions utilizing iris scissors.
Partial Purse String (Simple) Text: Given the location of the defect and the characteristics of the surrounding skin a simple purse string closure was deemed most appropriate.  Undermining was performed circumferentially around the surgical defect.  A purse string suture was then placed and tightened. Wound tension of the circular defect prevented complete closure of the wound.
Transposition Flap Text: The defect edges were debeveled with a #15 scalpel blade.  Given the location of the defect and the proximity to free margins a transposition flap was deemed most appropriate.  Using a sterile surgical marker, an appropriate transposition flap was drawn incorporating the defect.    The area thus outlined was incised deep to adipose tissue with a #15 scalpel blade.  The skin margins were undermined to an appropriate distance in all directions utilizing iris scissors.
Partial Purse String (Intermediate) Text: Given the location of the defect and the characteristics of the surrounding skin an intermediate purse string closure was deemed most appropriate.  Undermining was performed circumferentially around the surgical defect.  A purse string suture was then placed and tightened. Wound tension of the circular defect prevented complete closure of the wound.
Posterior Auricular Interpolation Flap Text: A decision was made to reconstruct the defect utilizing an interpolation axial flap and a staged reconstruction.  A telfa template was made of the defect.  This telfa template was then used to outline the posterior auricular interpolation flap.  The donor area for the pedicle flap was then injected with anesthesia.  The flap was excised through the skin and subcutaneous tissue down to the layer of the underlying musculature.  The pedicle flap was carefully excised within this deep plane to maintain its blood supply.  The edges of the donor site were undermined.   The donor site was closed in a primary fashion.  The pedicle was then rotated into position and sutured.  Once the tube was sutured into place, adequate blood supply was confirmed with blanching and refill.  The pedicle was then wrapped with xeroform gauze and dressed appropriately with a telfa and gauze bandage to ensure continued blood supply and protect the attached pedicle.
Spiral Flap Text: The defect edges were debeveled with a #15 scalpel blade.  Given the location of the defect, shape of the defect and the proximity to free margins a spiral flap was deemed most appropriate.  Using a sterile surgical marker, an appropriate rotation flap was drawn incorporating the defect and placing the expected incisions within the relaxed skin tension lines where possible. The area thus outlined was incised deep to adipose tissue with a #15 scalpel blade.  The skin margins were undermined to an appropriate distance in all directions utilizing iris scissors.
Double Island Pedicle Flap Text: The defect edges were debeveled with a #15 scalpel blade.  Given the location of the defect, shape of the defect and the proximity to free margins a double island pedicle advancement flap was deemed most appropriate.  Using a sterile surgical marker, an appropriate advancement flap was drawn incorporating the defect, outlining the appropriate donor tissue and placing the expected incisions within the relaxed skin tension lines where possible.    The area thus outlined was incised deep to adipose tissue with a #15 scalpel blade.  The skin margins were undermined to an appropriate distance in all directions around the primary defect and laterally outward around the island pedicle utilizing iris scissors.  There was minimal undermining beneath the pedicle flap.
Complex Repair And A-T Advancement Flap Text: The defect edges were debeveled with a #15 scalpel blade.  The primary defect was closed partially with a complex linear closure.  Given the location of the remaining defect, shape of the defect and the proximity to free margins an A-T advancement flap was deemed most appropriate for complete closure of the defect.  Using a sterile surgical marker, an appropriate advancement flap was drawn incorporating the defect and placing the expected incisions within the relaxed skin tension lines where possible.    The area thus outlined was incised deep to adipose tissue with a #15 scalpel blade.  The skin margins were undermined to an appropriate distance in all directions utilizing iris scissors.
Cheek-To-Nose Interpolation Flap Text: A decision was made to reconstruct the defect utilizing an interpolation axial flap and a staged reconstruction.  A telfa template was made of the defect.  This telfa template was then used to outline the Cheek-To-Nose Interpolation flap.  The donor area for the pedicle flap was then injected with anesthesia.  The flap was excised through the skin and subcutaneous tissue down to the layer of the underlying musculature.  The interpolation flap was carefully excised within this deep plane to maintain its blood supply.  The edges of the donor site were undermined.   The donor site was closed in a primary fashion.  The pedicle was then rotated into position and sutured.  Once the tube was sutured into place, adequate blood supply was confirmed with blanching and refill.  The pedicle was then wrapped with xeroform gauze and dressed appropriately with a telfa and gauze bandage to ensure continued blood supply and protect the attached pedicle.
Previous Accession (Optional): [id number]
Tissue Cultured Epidermal Autograft Text: The defect edges were debeveled with a #15 scalpel blade.  Given the location of the defect, shape of the defect and the proximity to free margins a tissue cultured epidermal autograft was deemed most appropriate.  The graft was then trimmed to fit the size of the defect.  The graft was then placed in the primary defect and oriented appropriately.
Wound Care: Petrolatum
Ear Star Wedge Flap Text: The defect edges were debeveled with a #15 blade scalpel.  Given the location of the defect and the proximity to free margins (helical rim) an ear star wedge flap was deemed most appropriate.  Using a sterile surgical marker, the appropriate flap was drawn incorporating the defect and placing the expected incisions between the helical rim and antihelix where possible.  The area thus outlined was incised through and through with a #15 scalpel blade.
Complex Repair And Tissue Cultured Epidermal Autograft Text: The defect edges were debeveled with a #15 scalpel blade.  The primary defect was closed partially with a complex linear closure.  Given the location of the defect, shape of the defect and the proximity to free margins an tissue cultured epidermal autograft was deemed most appropriate to repair the remaining defect.  The graft was trimmed to fit the size of the remaining defect.  The graft was then placed in the primary defect, oriented appropriately, and sutured into place.
Excision Depth: adipose tissue
Repair Performed By Another Provider Text (Leave Blank If You Do Not Want): After the tissue was excised the defect was repaired by another provider.
Mastoid Interpolation Flap Text: A decision was made to reconstruct the defect utilizing an interpolation axial flap and a staged reconstruction.  A telfa template was made of the defect.  This telfa template was then used to outline the mastoid interpolation flap.  The donor area for the pedicle flap was then injected with anesthesia.  The flap was excised through the skin and subcutaneous tissue down to the layer of the underlying musculature.  The pedicle flap was carefully excised within this deep plane to maintain its blood supply.  The edges of the donor site were undermined.   The donor site was closed in a primary fashion.  The pedicle was then rotated into position and sutured.  Once the tube was sutured into place, adequate blood supply was confirmed with blanching and refill.  The pedicle was then wrapped with xeroform gauze and dressed appropriately with a telfa and gauze bandage to ensure continued blood supply and protect the attached pedicle.
Deep Sutures: 3-0 Polysorb
Star Wedge Flap Text: The defect edges were debeveled with a #15 scalpel blade.  Given the location of the defect, shape of the defect and the proximity to free margins a star wedge flap was deemed most appropriate.  Using a sterile surgical marker, an appropriate rotation flap was drawn incorporating the defect and placing the expected incisions within the relaxed skin tension lines where possible. The area thus outlined was incised deep to adipose tissue with a #15 scalpel blade.  The skin margins were undermined to an appropriate distance in all directions utilizing iris scissors.
Rotation Flap Text: The defect edges were debeveled with a #15 scalpel blade.  Given the location of the defect, shape of the defect and the proximity to free margins a rotation flap was deemed most appropriate.  Using a sterile surgical marker, an appropriate rotation flap was drawn incorporating the defect and placing the expected incisions within the relaxed skin tension lines where possible.    The area thus outlined was incised deep to adipose tissue with a #15 scalpel blade.  The skin margins were undermined to an appropriate distance in all directions utilizing iris scissors.
H Plasty Text: Given the location of the defect, shape of the defect and the proximity to free margins a H-plasty was deemed most appropriate for repair.  Using a sterile surgical marker, the appropriate advancement arms of the H-plasty were drawn incorporating the defect and placing the expected incisions within the relaxed skin tension lines where possible. The area thus outlined was incised deep to adipose tissue with a #15 scalpel blade. The skin margins were undermined to an appropriate distance in all directions utilizing iris scissors.  The opposing advancement arms were then advanced into place in opposite direction and anchored with interrupted buried subcutaneous sutures.
Complex Repair And Xenograft Text: The defect edges were debeveled with a #15 scalpel blade.  The primary defect was closed partially with a complex linear closure.  Given the location of the defect, shape of the defect and the proximity to free margins a xenograft was deemed most appropriate to repair the remaining defect.  The graft was trimmed to fit the size of the remaining defect.  The graft was then placed in the primary defect, oriented appropriately, and sutured into place.
Dermal Autograft Text: The defect edges were debeveled with a #15 scalpel blade.  Given the location of the defect, shape of the defect and the proximity to free margins a dermal autograft was deemed most appropriate.  Using a sterile surgical marker, the primary defect shape was transferred to the donor site. The area thus outlined was incised deep to adipose tissue with a #15 scalpel blade.  The harvested graft was then trimmed of adipose and epidermal tissue until only dermis was left.  The skin graft was then placed in the primary defect and oriented appropriately.
Curvilinear Excision Additional Text (Leave Blank If You Do Not Want): The margin was drawn around the clinically apparent lesion.  A curvilinear shape was then drawn on the skin incorporating the lesion and margins.  Incisions were then made along these lines to the appropriate tissue plane and the lesion was extirpated.
Trilobed Flap Text: The defect edges were debeveled with a #15 scalpel blade.  Given the location of the defect and the proximity to free margins a trilobed flap was deemed most appropriate.  Using a sterile surgical marker, an appropriate trilobed flap drawn around the defect.    The area thus outlined was incised deep to adipose tissue with a #15 scalpel blade.  The skin margins were undermined to an appropriate distance in all directions utilizing iris scissors.
Complex Repair And Transposition Flap Text: The defect edges were debeveled with a #15 scalpel blade.  The primary defect was closed partially with a complex linear closure.  Given the location of the remaining defect, shape of the defect and the proximity to free margins a transposition flap was deemed most appropriate for complete closure of the defect.  Using a sterile surgical marker, an appropriate advancement flap was drawn incorporating the defect and placing the expected incisions within the relaxed skin tension lines where possible.    The area thus outlined was incised deep to adipose tissue with a #15 scalpel blade.  The skin margins were undermined to an appropriate distance in all directions utilizing iris scissors.
Bilobed Transposition Flap Text: The defect edges were debeveled with a #15 scalpel blade.  Given the location of the defect and the proximity to free margins a bilobed transposition flap was deemed most appropriate.  Using a sterile surgical marker, an appropriate bilobe flap drawn around the defect.    The area thus outlined was incised deep to adipose tissue with a #15 scalpel blade.  The skin margins were undermined to an appropriate distance in all directions utilizing iris scissors.
Paramedian Forehead Flap Text: A decision was made to reconstruct the defect utilizing an interpolation axial flap and a staged reconstruction.  A telfa template was made of the defect.  This telfa template was then used to outline the paramedian forehead pedicle flap.  The donor area for the pedicle flap was then injected with anesthesia.  The flap was excised through the skin and subcutaneous tissue down to the layer of the underlying musculature.  The pedicle flap was carefully excised within this deep plane to maintain its blood supply.  The edges of the donor site were undermined.   The donor site was closed in a primary fashion.  The pedicle was then rotated into position and sutured.  Once the tube was sutured into place, adequate blood supply was confirmed with blanching and refill.  The pedicle was then wrapped with xeroform gauze and dressed appropriately with a telfa and gauze bandage to ensure continued blood supply and protect the attached pedicle.
Epidermal Autograft Text: The defect edges were debeveled with a #15 scalpel blade.  Given the location of the defect, shape of the defect and the proximity to free margins an epidermal autograft was deemed most appropriate.  Using a sterile surgical marker, the primary defect shape was transferred to the donor site. The epidermal graft was then harvested.  The skin graft was then placed in the primary defect and oriented appropriately.
V-Y Flap Text: The defect edges were debeveled with a #15 scalpel blade.  Given the location of the defect, shape of the defect and the proximity to free margins a V-Y flap was deemed most appropriate.  Using a sterile surgical marker, an appropriate advancement flap was drawn incorporating the defect and placing the expected incisions within the relaxed skin tension lines where possible.    The area thus outlined was incised deep to adipose tissue with a #15 scalpel blade.  The skin margins were undermined to an appropriate distance in all directions utilizing iris scissors.
Alar Island Pedicle Flap Text: The defect edges were debeveled with a #15 scalpel blade.  Given the location of the defect, shape of the defect and the proximity to the alar rim an island pedicle advancement flap was deemed most appropriate.  Using a sterile surgical marker, an appropriate advancement flap was drawn incorporating the defect, outlining the appropriate donor tissue and placing the expected incisions within the nasal ala running parallel to the alar rim. The area thus outlined was incised with a #15 scalpel blade.  The skin margins were undermined minimally to an appropriate distance in all directions around the primary defect and laterally outward around the island pedicle utilizing iris scissors.  There was minimal undermining beneath the pedicle flap.
Island Pedicle Flap With Canthal Suspension Text: The defect edges were debeveled with a #15 scalpel blade.  Given the location of the defect, shape of the defect and the proximity to free margins an island pedicle advancement flap was deemed most appropriate.  Using a sterile surgical marker, an appropriate advancement flap was drawn incorporating the defect, outlining the appropriate donor tissue and placing the expected incisions within the relaxed skin tension lines where possible. The area thus outlined was incised deep to adipose tissue with a #15 scalpel blade.  The skin margins were undermined to an appropriate distance in all directions around the primary defect and laterally outward around the island pedicle utilizing iris scissors.  There was minimal undermining beneath the pedicle flap. A suspension suture was placed in the canthal tendon to prevent tension and prevent ectropion.
Consent was obtained from the patient. The risks and benefits to therapy were discussed in detail. Specifically, the risks of infection, scarring, bleeding, prolonged wound healing, incomplete removal, allergy to anesthesia, nerve injury and recurrence were addressed. Prior to the procedure, the treatment site was clearly identified and confirmed by the patient. All components of Universal Protocol/PAUSE Rule completed.
O-T Advancement Flap Text: The defect edges were debeveled with a #15 scalpel blade.  Given the location of the defect, shape of the defect and the proximity to free margins an O-T advancement flap was deemed most appropriate.  Using a sterile surgical marker, an appropriate advancement flap was drawn incorporating the defect and placing the expected incisions within the relaxed skin tension lines where possible.    The area thus outlined was incised deep to adipose tissue with a #15 scalpel blade.  The skin margins were undermined to an appropriate distance in all directions utilizing iris scissors.
Slit Excision Additional Text (Leave Blank If You Do Not Want): A linear line was drawn on the skin overlying the lesion. An incision was made slowly until the lesion was visualized.  Once visualized, the lesion was removed with blunt dissection.
Size Of Lesion In Cm: 0.8
Estimated Blood Loss (Cc): minimal
Bilateral Helical Rim Advancement Flap Text: The defect edges were debeveled with a #15 blade scalpel.  Given the location of the defect and the proximity to free margins (helical rim) a bilateral helical rim advancement flap was deemed most appropriate.  Using a sterile surgical marker, the appropriate advancement flaps were drawn incorporating the defect and placing the expected incisions between the helical rim and antihelix where possible.  The area thus outlined was incised through and through with a #15 scalpel blade.  With a skin hook and iris scissors, the flaps were gently and sharply undermined and freed up.
Epidermal Sutures: 4-0 Nylon
Anesthesia Type: 1% lidocaine with epinephrine
Burow's Advancement Flap Text: The defect edges were debeveled with a #15 scalpel blade.  Given the location of the defect and the proximity to free margins a Burow's advancement flap was deemed most appropriate.  Using a sterile surgical marker, the appropriate advancement flap was drawn incorporating the defect and placing the expected incisions within the relaxed skin tension lines where possible.    The area thus outlined was incised deep to adipose tissue with a #15 scalpel blade.  The skin margins were undermined to an appropriate distance in all directions utilizing iris scissors.
Purse String (Intermediate) Text: Given the location of the defect and the characteristics of the surrounding skin a purse string intermediate closure was deemed most appropriate.  Undermining was performed circumfirentially around the surgical defect.  A purse string suture was then placed and tightened.
Complex Repair And Dermal Autograft Text: The defect edges were debeveled with a #15 scalpel blade.  The primary defect was closed partially with a complex linear closure.  Given the location of the defect, shape of the defect and the proximity to free margins an dermal autograft was deemed most appropriate to repair the remaining defect.  The graft was trimmed to fit the size of the remaining defect.  The graft was then placed in the primary defect, oriented appropriately, and sutured into place.
Helical Rim Advancement Flap Text: The defect edges were debeveled with a #15 blade scalpel.  Given the location of the defect and the proximity to free margins (helical rim) a double helical rim advancement flap was deemed most appropriate.  Using a sterile surgical marker, the appropriate advancement flaps were drawn incorporating the defect and placing the expected incisions between the helical rim and antihelix where possible.  The area thus outlined was incised through and through with a #15 scalpel blade.  With a skin hook and iris scissors, the flaps were gently and sharply undermined and freed up.
W Plasty Text: The lesion was extirpated to the level of the fat with a #15 scalpel blade.  Given the location of the defect, shape of the defect and the proximity to free margins a W-plasty was deemed most appropriate for repair.  Using a sterile surgical marker, the appropriate transposition arms of the W-plasty were drawn incorporating the defect and placing the expected incisions within the relaxed skin tension lines where possible.    The area thus outlined was incised deep to adipose tissue with a #15 scalpel blade.  The skin margins were undermined to an appropriate distance in all directions utilizing iris scissors.  The opposing transposition arms were then transposed into place in opposite direction and anchored with interrupted buried subcutaneous sutures.
Saucerization Excision Additional Text (Leave Blank If You Do Not Want): The margin was drawn around the clinically apparent lesion.  Incisions were then made along these lines, in a tangential fashion, to the appropriate tissue plane and the lesion was extirpated.
Xenograft Text: The defect edges were debeveled with a #15 scalpel blade.  Given the location of the defect, shape of the defect and the proximity to free margins a xenograft was deemed most appropriate.  The graft was then trimmed to fit the size of the defect.  The graft was then placed in the primary defect and oriented appropriately.
Complex Repair And V-Y Plasty Text: The defect edges were debeveled with a #15 scalpel blade.  The primary defect was closed partially with a complex linear closure.  Given the location of the remaining defect, shape of the defect and the proximity to free margins a V-Y plasty was deemed most appropriate for complete closure of the defect.  Using a sterile surgical marker, an appropriate advancement flap was drawn incorporating the defect and placing the expected incisions within the relaxed skin tension lines where possible.    The area thus outlined was incised deep to adipose tissue with a #15 scalpel blade.  The skin margins were undermined to an appropriate distance in all directions utilizing iris scissors.
Skin Substitute Text: The defect edges were debeveled with a #15 scalpel blade.  Given the location of the defect, shape of the defect and the proximity to free margins a skin substitute graft was deemed most appropriate.  The graft material was trimmed to fit the size of the defect. The graft was then placed in the primary defect and oriented appropriately.
Dorsal Nasal Flap Text: The defect edges were debeveled with a #15 scalpel blade.  Given the location of the defect and the proximity to free margins a dorsal nasal flap was deemed most appropriate.  Using a sterile surgical marker, an appropriate dorsal nasal flap was drawn around the defect.    The area thus outlined was incised deep to adipose tissue with a #15 scalpel blade.  The skin margins were undermined to an appropriate distance in all directions utilizing iris scissors.
Modified Advancement Flap Text: The defect edges were debeveled with a #15 scalpel blade.  Given the location of the defect, shape of the defect and the proximity to free margins a modified advancement flap was deemed most appropriate.  Using a sterile surgical marker, an appropriate advancement flap was drawn incorporating the defect and placing the expected incisions within the relaxed skin tension lines where possible.    The area thus outlined was incised deep to adipose tissue with a #15 scalpel blade.  The skin margins were undermined to an appropriate distance in all directions utilizing iris scissors.
Complex Repair And Modified Advancement Flap Text: The defect edges were debeveled with a #15 scalpel blade.  The primary defect was closed partially with a complex linear closure.  Given the location of the remaining defect, shape of the defect and the proximity to free margins a modified advancement flap was deemed most appropriate for complete closure of the defect.  Using a sterile surgical marker, an appropriate advancement flap was drawn incorporating the defect and placing the expected incisions within the relaxed skin tension lines where possible.    The area thus outlined was incised deep to adipose tissue with a #15 scalpel blade.  The skin margins were undermined to an appropriate distance in all directions utilizing iris scissors.
Advancement Flap (Double) Text: The defect edges were debeveled with a #15 scalpel blade.  Given the location of the defect and the proximity to free margins a double advancement flap was deemed most appropriate.  Using a sterile surgical marker, the appropriate advancement flaps were drawn incorporating the defect and placing the expected incisions within the relaxed skin tension lines where possible.    The area thus outlined was incised deep to adipose tissue with a #15 scalpel blade.  The skin margins were undermined to an appropriate distance in all directions utilizing iris scissors.
No Repair - Repaired With Adjacent Surgical Defect Text (Leave Blank If You Do Not Want): After the excision the defect was repaired concurrently with another surgical defect which was in close approximation.
Elliptical Excision Additional Text (Leave Blank If You Do Not Want): The margin was drawn around the clinically apparent lesion.  An elliptical shape was then drawn on the skin incorporating the lesion and margins.  Incisions were then made along these lines to the appropriate tissue plane and the lesion was extirpated.
Cartilage Graft Text: The defect edges were debeveled with a #15 scalpel blade.  Given the location of the defect, shape of the defect, the fact the defect involved a full thickness cartilage defect a cartilage graft was deemed most appropriate.  An appropriate donor site was identified, cleansed, and anesthetized. The cartilage graft was then harvested and transferred to the recipient site, oriented appropriately and then sutured into place.  The secondary defect was then repaired using a primary closure.
Pre-Excision Curettage Text (Leave Blank If You Do Not Want): Prior to drawing the surgical margin the visible lesion was removed with electrodesiccation and curettage to clearly define the lesion size.
Rhombic Flap Text: The defect edges were debeveled with a #15 scalpel blade.  Given the location of the defect and the proximity to free margins a rhombic flap was deemed most appropriate.  Using a sterile surgical marker, an appropriate rhombic flap was drawn incorporating the defect.    The area thus outlined was incised deep to adipose tissue with a #15 scalpel blade.  The skin margins were undermined to an appropriate distance in all directions utilizing iris scissors.
Complex Repair And Melolabial Flap Text: The defect edges were debeveled with a #15 scalpel blade.  The primary defect was closed partially with a complex linear closure.  Given the location of the remaining defect, shape of the defect and the proximity to free margins a melolabial flap was deemed most appropriate for complete closure of the defect.  Using a sterile surgical marker, an appropriate advancement flap was drawn incorporating the defect and placing the expected incisions within the relaxed skin tension lines where possible.    The area thus outlined was incised deep to adipose tissue with a #15 scalpel blade.  The skin margins were undermined to an appropriate distance in all directions utilizing iris scissors.
Suture Removal: 14 days
Epidermal Closure Graft Donor Site (Optional): simple interrupted
O-L Flap Text: The defect edges were debeveled with a #15 scalpel blade.  Given the location of the defect, shape of the defect and the proximity to free margins an O-L flap was deemed most appropriate.  Using a sterile surgical marker, an appropriate advancement flap was drawn incorporating the defect and placing the expected incisions within the relaxed skin tension lines where possible.    The area thus outlined was incised deep to adipose tissue with a #15 scalpel blade.  The skin margins were undermined to an appropriate distance in all directions utilizing iris scissors.
Island Pedicle Flap Text: The defect edges were debeveled with a #15 scalpel blade.  Given the location of the defect, shape of the defect and the proximity to free margins an island pedicle advancement flap was deemed most appropriate.  Using a sterile surgical marker, an appropriate advancement flap was drawn incorporating the defect, outlining the appropriate donor tissue and placing the expected incisions within the relaxed skin tension lines where possible.    The area thus outlined was incised deep to adipose tissue with a #15 scalpel blade.  The skin margins were undermined to an appropriate distance in all directions around the primary defect and laterally outward around the island pedicle utilizing iris scissors.  There was minimal undermining beneath the pedicle flap.
Island Pedicle Flap-Requiring Vessel Identification Text: The defect edges were debeveled with a #15 scalpel blade.  Given the location of the defect, shape of the defect and the proximity to free margins an island pedicle advancement flap was deemed most appropriate.  Using a sterile surgical marker, an appropriate advancement flap was drawn, based on the axial vessel mentioned above, incorporating the defect, outlining the appropriate donor tissue and placing the expected incisions within the relaxed skin tension lines where possible.    The area thus outlined was incised deep to adipose tissue with a #15 scalpel blade.  The skin margins were undermined to an appropriate distance in all directions around the primary defect and laterally outward around the island pedicle utilizing iris scissors.  There was minimal undermining beneath the pedicle flap.
Excision Method: Elliptical

## 2018-10-04 NOTE — PROCEDURE: CURETTAGE AND DESTRUCTION
Size Of Lesion After Curettage: 1
Size Of Lesion In Cm: 0.8
Cautery Type: electrodesiccation
Detail Level: Detailed
Bill For Surgical Tray: no
Additional Information: (Optional): The wound was cleaned, and a pressure dressing was applied.  The patient received detailed post-op instructions.
Bill As A Line Item Or As Units: Line Item
Post-Care Instructions: I reviewed with the patient in detail post-care instructions. Patient is to keep the area dry for 48 hours, and not to engage in any swimming until the area is healed. Should the patient develop any fevers, chills, bleeding, severe pain patient will contact the office immediately.
Number Of Curettages: 3
Anesthesia Type: 1% lidocaine with epinephrine
Consent was obtained from the patient. The risks, benefits and alternatives to therapy were discussed in detail. Specifically, the risks of infection, scarring, bleeding, prolonged wound healing, nerve injury, incomplete removal, allergy to anesthesia and recurrence were addressed. Alternatives to ED&C, such as: surgical removal and XRT were also discussed.  Prior to the procedure, the treatment site was clearly identified and confirmed by the patient. All components of Universal Protocol/PAUSE Rule completed.
What Was Performed First?: Curettage

## 2018-10-10 ENCOUNTER — HOSPITAL ENCOUNTER (OUTPATIENT)
Facility: MEDICAL CENTER | Age: 83
End: 2018-10-11
Attending: EMERGENCY MEDICINE | Admitting: SURGERY
Payer: MEDICARE

## 2018-10-10 ENCOUNTER — APPOINTMENT (OUTPATIENT)
Dept: RADIOLOGY | Facility: MEDICAL CENTER | Age: 83
End: 2018-10-10
Attending: EMERGENCY MEDICINE
Payer: MEDICARE

## 2018-10-10 ENCOUNTER — OFFICE VISIT (OUTPATIENT)
Dept: URGENT CARE | Facility: CLINIC | Age: 83
End: 2018-10-10
Payer: MEDICARE

## 2018-10-10 VITALS
TEMPERATURE: 98.4 F | HEART RATE: 64 BPM | DIASTOLIC BLOOD PRESSURE: 80 MMHG | HEIGHT: 64 IN | SYSTOLIC BLOOD PRESSURE: 120 MMHG | BODY MASS INDEX: 22.94 KG/M2 | OXYGEN SATURATION: 98 % | RESPIRATION RATE: 16 BRPM | WEIGHT: 134.4 LBS

## 2018-10-10 DIAGNOSIS — R10.30 LOWER ABDOMINAL PAIN: ICD-10-CM

## 2018-10-10 DIAGNOSIS — K35.30 ACUTE APPENDICITIS WITH LOCALIZED PERITONITIS, WITHOUT PERFORATION, ABSCESS, OR GANGRENE: ICD-10-CM

## 2018-10-10 LAB
ALBUMIN SERPL BCP-MCNC: 4.2 G/DL (ref 3.2–4.9)
ALBUMIN/GLOB SERPL: 1.5 G/DL
ALP SERPL-CCNC: 48 U/L (ref 30–99)
ALT SERPL-CCNC: 24 U/L (ref 2–50)
ANION GAP SERPL CALC-SCNC: 10 MMOL/L (ref 0–11.9)
APPEARANCE UR: CLEAR
AST SERPL-CCNC: 30 U/L (ref 12–45)
BASOPHILS # BLD AUTO: 0.7 % (ref 0–1.8)
BASOPHILS # BLD: 0.06 K/UL (ref 0–0.12)
BILIRUB SERPL-MCNC: 0.9 MG/DL (ref 0.1–1.5)
BILIRUB UR QL STRIP.AUTO: NEGATIVE
BUN SERPL-MCNC: 13 MG/DL (ref 8–22)
CALCIUM SERPL-MCNC: 10 MG/DL (ref 8.5–10.5)
CHLORIDE SERPL-SCNC: 104 MMOL/L (ref 96–112)
CO2 SERPL-SCNC: 26 MMOL/L (ref 20–33)
COLOR UR: YELLOW
CREAT SERPL-MCNC: 0.58 MG/DL (ref 0.5–1.4)
EKG IMPRESSION: NORMAL
EOSINOPHIL # BLD AUTO: 0.43 K/UL (ref 0–0.51)
EOSINOPHIL NFR BLD: 5.2 % (ref 0–6.9)
ERYTHROCYTE [DISTWIDTH] IN BLOOD BY AUTOMATED COUNT: 48.5 FL (ref 35.9–50)
GLOBULIN SER CALC-MCNC: 2.8 G/DL (ref 1.9–3.5)
GLUCOSE SERPL-MCNC: 88 MG/DL (ref 65–99)
GLUCOSE UR STRIP.AUTO-MCNC: NEGATIVE MG/DL
HCT VFR BLD AUTO: 39.6 % (ref 37–47)
HGB BLD-MCNC: 13 G/DL (ref 12–16)
IMM GRANULOCYTES # BLD AUTO: 0.02 K/UL (ref 0–0.11)
IMM GRANULOCYTES NFR BLD AUTO: 0.2 % (ref 0–0.9)
KETONES UR STRIP.AUTO-MCNC: ABNORMAL MG/DL
LEUKOCYTE ESTERASE UR QL STRIP.AUTO: NEGATIVE
LIPASE SERPL-CCNC: 9 U/L (ref 11–82)
LYMPHOCYTES # BLD AUTO: 2.15 K/UL (ref 1–4.8)
LYMPHOCYTES NFR BLD: 26.1 % (ref 22–41)
MCH RBC QN AUTO: 32.4 PG (ref 27–33)
MCHC RBC AUTO-ENTMCNC: 32.8 G/DL (ref 33.6–35)
MCV RBC AUTO: 98.8 FL (ref 81.4–97.8)
MICRO URNS: ABNORMAL
MONOCYTES # BLD AUTO: 0.71 K/UL (ref 0–0.85)
MONOCYTES NFR BLD AUTO: 8.6 % (ref 0–13.4)
NEUTROPHILS # BLD AUTO: 4.86 K/UL (ref 2–7.15)
NEUTROPHILS NFR BLD: 59.2 % (ref 44–72)
NITRITE UR QL STRIP.AUTO: NEGATIVE
NRBC # BLD AUTO: 0 K/UL
NRBC BLD-RTO: 0 /100 WBC
PH UR STRIP.AUTO: 6 [PH]
PLATELET # BLD AUTO: 196 K/UL (ref 164–446)
PMV BLD AUTO: 11.3 FL (ref 9–12.9)
POTASSIUM SERPL-SCNC: 3.9 MMOL/L (ref 3.6–5.5)
PROT SERPL-MCNC: 7 G/DL (ref 6–8.2)
PROT UR QL STRIP: NEGATIVE MG/DL
RBC # BLD AUTO: 4.01 M/UL (ref 4.2–5.4)
RBC UR QL AUTO: NEGATIVE
SODIUM SERPL-SCNC: 140 MMOL/L (ref 135–145)
SP GR UR STRIP.AUTO: 1.01
UROBILINOGEN UR STRIP.AUTO-MCNC: 0.2 MG/DL
WBC # BLD AUTO: 8.2 K/UL (ref 4.8–10.8)

## 2018-10-10 PROCEDURE — 700105 HCHG RX REV CODE 258: Performed by: EMERGENCY MEDICINE

## 2018-10-10 PROCEDURE — 700111 HCHG RX REV CODE 636 W/ 250 OVERRIDE (IP): Performed by: EMERGENCY MEDICINE

## 2018-10-10 PROCEDURE — 501450 HCHG STAPLES, ENDO MULTIFIRE: Performed by: SURGERY

## 2018-10-10 PROCEDURE — 71045 X-RAY EXAM CHEST 1 VIEW: CPT

## 2018-10-10 PROCEDURE — 81003 URINALYSIS AUTO W/O SCOPE: CPT

## 2018-10-10 PROCEDURE — 500868 HCHG NEEDLE, SURGI(VARES): Performed by: SURGERY

## 2018-10-10 PROCEDURE — 88304 TISSUE EXAM BY PATHOLOGIST: CPT

## 2018-10-10 PROCEDURE — 160028 HCHG SURGERY MINUTES - 1ST 30 MINS LEVEL 3: Performed by: SURGERY

## 2018-10-10 PROCEDURE — 501338 HCHG SHEARS, ENDO: Performed by: SURGERY

## 2018-10-10 PROCEDURE — 502571 HCHG PACK, LAP CHOLE: Performed by: SURGERY

## 2018-10-10 PROCEDURE — 85025 COMPLETE CBC W/AUTO DIFF WBC: CPT

## 2018-10-10 PROCEDURE — 160035 HCHG PACU - 1ST 60 MINS PHASE I: Performed by: SURGERY

## 2018-10-10 PROCEDURE — 700102 HCHG RX REV CODE 250 W/ 637 OVERRIDE(OP)

## 2018-10-10 PROCEDURE — 501838 HCHG SUTURE GENERAL: Performed by: SURGERY

## 2018-10-10 PROCEDURE — 700101 HCHG RX REV CODE 250

## 2018-10-10 PROCEDURE — 74177 CT ABD & PELVIS W/CONTRAST: CPT

## 2018-10-10 PROCEDURE — 93005 ELECTROCARDIOGRAM TRACING: CPT | Performed by: EMERGENCY MEDICINE

## 2018-10-10 PROCEDURE — 160039 HCHG SURGERY MINUTES - EA ADDL 1 MIN LEVEL 3: Performed by: SURGERY

## 2018-10-10 PROCEDURE — 36415 COLL VENOUS BLD VENIPUNCTURE: CPT

## 2018-10-10 PROCEDURE — 700111 HCHG RX REV CODE 636 W/ 250 OVERRIDE (IP)

## 2018-10-10 PROCEDURE — 99291 CRITICAL CARE FIRST HOUR: CPT

## 2018-10-10 PROCEDURE — 700117 HCHG RX CONTRAST REV CODE 255: Performed by: EMERGENCY MEDICINE

## 2018-10-10 PROCEDURE — 83690 ASSAY OF LIPASE: CPT

## 2018-10-10 PROCEDURE — 80053 COMPREHEN METABOLIC PANEL: CPT

## 2018-10-10 PROCEDURE — 501583 HCHG TROCAR, THRD CAN&SEAL 5X100: Performed by: SURGERY

## 2018-10-10 PROCEDURE — 96365 THER/PROPH/DIAG IV INF INIT: CPT | Mod: XU

## 2018-10-10 PROCEDURE — 160002 HCHG RECOVERY MINUTES (STAT): Performed by: SURGERY

## 2018-10-10 PROCEDURE — A9270 NON-COVERED ITEM OR SERVICE: HCPCS

## 2018-10-10 PROCEDURE — 160048 HCHG OR STATISTICAL LEVEL 1-5: Performed by: SURGERY

## 2018-10-10 PROCEDURE — 501570 HCHG TROCAR, SEPARATOR: Performed by: SURGERY

## 2018-10-10 PROCEDURE — 160009 HCHG ANES TIME/MIN: Performed by: SURGERY

## 2018-10-10 PROCEDURE — 99213 OFFICE O/P EST LOW 20 MIN: CPT | Performed by: PHYSICIAN ASSISTANT

## 2018-10-10 PROCEDURE — 501571 HCHG TROCAR, SEPARATOR 12X100: Performed by: SURGERY

## 2018-10-10 PROCEDURE — 501399 HCHG SPECIMAN BAG, ENDO CATC: Performed by: SURGERY

## 2018-10-10 RX ORDER — SIMVASTATIN 10 MG
10 TABLET ORAL EVERY EVENING
Status: DISCONTINUED | OUTPATIENT
Start: 2018-10-11 | End: 2018-10-11 | Stop reason: HOSPADM

## 2018-10-10 RX ORDER — OXYCODONE HCL 5 MG/5 ML
SOLUTION, ORAL ORAL
Status: COMPLETED
Start: 2018-10-10 | End: 2018-10-10

## 2018-10-10 RX ORDER — MORPHINE SULFATE 10 MG/ML
4 INJECTION, SOLUTION INTRAMUSCULAR; INTRAVENOUS
Status: DISCONTINUED | OUTPATIENT
Start: 2018-10-10 | End: 2018-10-11 | Stop reason: HOSPADM

## 2018-10-10 RX ORDER — DIPHENHYDRAMINE HYDROCHLORIDE 50 MG/ML
12.5 INJECTION INTRAMUSCULAR; INTRAVENOUS
Status: DISCONTINUED | OUTPATIENT
Start: 2018-10-10 | End: 2018-10-11 | Stop reason: HOSPADM

## 2018-10-10 RX ORDER — SODIUM CHLORIDE, SODIUM LACTATE, POTASSIUM CHLORIDE, CALCIUM CHLORIDE 600; 310; 30; 20 MG/100ML; MG/100ML; MG/100ML; MG/100ML
INJECTION, SOLUTION INTRAVENOUS CONTINUOUS
Status: DISCONTINUED | OUTPATIENT
Start: 2018-10-10 | End: 2018-10-11 | Stop reason: HOSPADM

## 2018-10-10 RX ORDER — HALOPERIDOL 5 MG/ML
1 INJECTION INTRAMUSCULAR
Status: DISCONTINUED | OUTPATIENT
Start: 2018-10-10 | End: 2018-10-11 | Stop reason: HOSPADM

## 2018-10-10 RX ORDER — SODIUM CHLORIDE 9 MG/ML
500 INJECTION, SOLUTION INTRAVENOUS ONCE
Status: COMPLETED | OUTPATIENT
Start: 2018-10-10 | End: 2018-10-10

## 2018-10-10 RX ORDER — ONDANSETRON 2 MG/ML
4 INJECTION INTRAMUSCULAR; INTRAVENOUS EVERY 4 HOURS PRN
Status: DISCONTINUED | OUTPATIENT
Start: 2018-10-10 | End: 2018-10-11 | Stop reason: HOSPADM

## 2018-10-10 RX ORDER — BUPIVACAINE HYDROCHLORIDE AND EPINEPHRINE 5; 5 MG/ML; UG/ML
INJECTION, SOLUTION EPIDURAL; INTRACAUDAL; PERINEURAL
Status: DISCONTINUED | OUTPATIENT
Start: 2018-10-10 | End: 2018-10-10 | Stop reason: HOSPADM

## 2018-10-10 RX ORDER — METOPROLOL TARTRATE 50 MG/1
50 TABLET, FILM COATED ORAL 2 TIMES DAILY
Status: DISCONTINUED | OUTPATIENT
Start: 2018-10-11 | End: 2018-10-11 | Stop reason: HOSPADM

## 2018-10-10 RX ORDER — ACETAMINOPHEN 325 MG/1
650 TABLET ORAL EVERY 6 HOURS
Status: DISCONTINUED | OUTPATIENT
Start: 2018-10-11 | End: 2018-10-11 | Stop reason: HOSPADM

## 2018-10-10 RX ORDER — OXYCODONE HYDROCHLORIDE 10 MG/1
10 TABLET ORAL
Status: DISCONTINUED | OUTPATIENT
Start: 2018-10-10 | End: 2018-10-11 | Stop reason: HOSPADM

## 2018-10-10 RX ORDER — OXYCODONE HYDROCHLORIDE 5 MG/1
5 TABLET ORAL
Status: DISCONTINUED | OUTPATIENT
Start: 2018-10-10 | End: 2018-10-11 | Stop reason: HOSPADM

## 2018-10-10 RX ORDER — ONDANSETRON 2 MG/ML
4 INJECTION INTRAMUSCULAR; INTRAVENOUS
Status: DISCONTINUED | OUTPATIENT
Start: 2018-10-10 | End: 2018-10-11 | Stop reason: HOSPADM

## 2018-10-10 RX ADMIN — PIPERACILLIN AND TAZOBACTAM 3.38 G: 3; .375 INJECTION, POWDER, LYOPHILIZED, FOR SOLUTION INTRAVENOUS; PARENTERAL at 19:48

## 2018-10-10 RX ADMIN — IOHEXOL 100 ML: 350 INJECTION, SOLUTION INTRAVENOUS at 18:35

## 2018-10-10 RX ADMIN — SODIUM CHLORIDE 500 ML: 9 INJECTION, SOLUTION INTRAVENOUS at 17:27

## 2018-10-10 RX ADMIN — OXYCODONE HYDROCHLORIDE 5 MG: 5 SOLUTION ORAL at 23:27

## 2018-10-10 ASSESSMENT — ENCOUNTER SYMPTOMS
CHILLS: 0
BLOOD IN STOOL: 0
FEVER: 0
MUSCULOSKELETAL NEGATIVE: 1
ABDOMINAL PAIN: 1
DIARRHEA: 0
ABDOMINAL PAIN: 1
CONSTIPATION: 0
VOMITING: 0
CONSTITUTIONAL NEGATIVE: 1
NAUSEA: 0
VOMITING: 0
NAUSEA: 0
NEUROLOGICAL NEGATIVE: 1
FEVER: 0

## 2018-10-10 ASSESSMENT — CROHNS DISEASE ACTIVITY INDEX (CDAI): CDAI SCORE: 0

## 2018-10-10 ASSESSMENT — PAIN SCALES - GENERAL
PAINLEVEL_OUTOF10: 1
PAINLEVEL_OUTOF10: 0

## 2018-10-10 ASSESSMENT — LIFESTYLE VARIABLES: DO YOU DRINK ALCOHOL: NO

## 2018-10-10 NOTE — ED TRIAGE NOTES
".Lisa Garza  .  Chief Complaint   Patient presents with   • Sent from Urgent Care   • Abdominal Pain     RLQ abd x 3 days     Patient ambulatory to triage with above complaint. ./73   Pulse 68   Temp 36.8 °C (98.3 °F)   Resp 17   Ht 1.6 m (5' 3\")   Wt 61.2 kg (134 lb 14.7 oz)   SpO2 95%   BMI 23.90 kg/m²     Patient to senior marioe and instructed to inform staff of any needs.  "

## 2018-10-11 VITALS
WEIGHT: 134.92 LBS | OXYGEN SATURATION: 98 % | HEART RATE: 60 BPM | BODY MASS INDEX: 23.91 KG/M2 | HEIGHT: 63 IN | SYSTOLIC BLOOD PRESSURE: 107 MMHG | RESPIRATION RATE: 18 BRPM | DIASTOLIC BLOOD PRESSURE: 51 MMHG | TEMPERATURE: 97 F

## 2018-10-11 PROBLEM — K35.30 ACUTE APPENDICITIS WITH LOCALIZED PERITONITIS, WITHOUT PERFORATION, ABSCESS, OR GANGRENE: Status: ACTIVE | Noted: 2018-10-11

## 2018-10-11 LAB
BASOPHILS # BLD AUTO: 0.3 % (ref 0–1.8)
BASOPHILS # BLD: 0.02 K/UL (ref 0–0.12)
EOSINOPHIL # BLD AUTO: 0.01 K/UL (ref 0–0.51)
EOSINOPHIL NFR BLD: 0.1 % (ref 0–6.9)
ERYTHROCYTE [DISTWIDTH] IN BLOOD BY AUTOMATED COUNT: 49.1 FL (ref 35.9–50)
HCT VFR BLD AUTO: 35.5 % (ref 37–47)
HGB BLD-MCNC: 11.3 G/DL (ref 12–16)
IMM GRANULOCYTES # BLD AUTO: 0.03 K/UL (ref 0–0.11)
IMM GRANULOCYTES NFR BLD AUTO: 0.4 % (ref 0–0.9)
LYMPHOCYTES # BLD AUTO: 0.48 K/UL (ref 1–4.8)
LYMPHOCYTES NFR BLD: 6.3 % (ref 22–41)
MCH RBC QN AUTO: 31.8 PG (ref 27–33)
MCHC RBC AUTO-ENTMCNC: 31.8 G/DL (ref 33.6–35)
MCV RBC AUTO: 100 FL (ref 81.4–97.8)
MONOCYTES # BLD AUTO: 0.13 K/UL (ref 0–0.85)
MONOCYTES NFR BLD AUTO: 1.7 % (ref 0–13.4)
NEUTROPHILS # BLD AUTO: 6.95 K/UL (ref 2–7.15)
NEUTROPHILS NFR BLD: 91.2 % (ref 44–72)
NRBC # BLD AUTO: 0 K/UL
NRBC BLD-RTO: 0 /100 WBC
PATHOLOGY CONSULT NOTE: NORMAL
PLATELET # BLD AUTO: 168 K/UL (ref 164–446)
PMV BLD AUTO: 10.9 FL (ref 9–12.9)
RBC # BLD AUTO: 3.55 M/UL (ref 4.2–5.4)
WBC # BLD AUTO: 7.6 K/UL (ref 4.8–10.8)

## 2018-10-11 PROCEDURE — 36415 COLL VENOUS BLD VENIPUNCTURE: CPT

## 2018-10-11 PROCEDURE — 700105 HCHG RX REV CODE 258: Performed by: SURGERY

## 2018-10-11 PROCEDURE — A9270 NON-COVERED ITEM OR SERVICE: HCPCS | Performed by: SURGERY

## 2018-10-11 PROCEDURE — 94760 N-INVAS EAR/PLS OXIMETRY 1: CPT

## 2018-10-11 PROCEDURE — G0378 HOSPITAL OBSERVATION PER HR: HCPCS

## 2018-10-11 PROCEDURE — 85025 COMPLETE CBC W/AUTO DIFF WBC: CPT

## 2018-10-11 PROCEDURE — 700102 HCHG RX REV CODE 250 W/ 637 OVERRIDE(OP): Performed by: SURGERY

## 2018-10-11 RX ORDER — OXYCODONE HYDROCHLORIDE 5 MG/1
5 TABLET ORAL EVERY 4 HOURS PRN
Qty: 15 TAB | Refills: 0 | Status: SHIPPED | OUTPATIENT
Start: 2018-10-11 | End: 2018-10-18

## 2018-10-11 RX ADMIN — SODIUM CHLORIDE, POTASSIUM CHLORIDE, SODIUM LACTATE AND CALCIUM CHLORIDE: 600; 310; 30; 20 INJECTION, SOLUTION INTRAVENOUS at 01:56

## 2018-10-11 RX ADMIN — METOPROLOL TARTRATE 50 MG: 50 TABLET ORAL at 06:18

## 2018-10-11 RX ADMIN — ACETAMINOPHEN 650 MG: 325 TABLET, FILM COATED ORAL at 01:55

## 2018-10-11 RX ADMIN — ACETAMINOPHEN 650 MG: 325 TABLET, FILM COATED ORAL at 06:17

## 2018-10-11 RX ADMIN — OXYCODONE HYDROCHLORIDE 10 MG: 10 TABLET ORAL at 01:55

## 2018-10-11 ASSESSMENT — PAIN SCALES - GENERAL
PAINLEVEL_OUTOF10: 0
PAINLEVEL_OUTOF10: 8

## 2018-10-11 ASSESSMENT — COGNITIVE AND FUNCTIONAL STATUS - GENERAL
CLIMB 3 TO 5 STEPS WITH RAILING: A LITTLE
HELP NEEDED FOR BATHING: A LITTLE
MOVING TO AND FROM BED TO CHAIR: A LITTLE
SUGGESTED CMS G CODE MODIFIER MOBILITY: CK
SUGGESTED CMS G CODE MODIFIER DAILY ACTIVITY: CJ
DRESSING REGULAR UPPER BODY CLOTHING: A LITTLE
WALKING IN HOSPITAL ROOM: A LITTLE
TURNING FROM BACK TO SIDE WHILE IN FLAT BAD: A LITTLE
TOILETING: A LITTLE
MOVING FROM LYING ON BACK TO SITTING ON SIDE OF FLAT BED: A LITTLE
DRESSING REGULAR LOWER BODY CLOTHING: A LITTLE
MOBILITY SCORE: 18
STANDING UP FROM CHAIR USING ARMS: A LITTLE
DAILY ACTIVITIY SCORE: 20

## 2018-10-11 ASSESSMENT — PATIENT HEALTH QUESTIONNAIRE - PHQ9
1. LITTLE INTEREST OR PLEASURE IN DOING THINGS: NOT AT ALL
2. FEELING DOWN, DEPRESSED, IRRITABLE, OR HOPELESS: NOT AT ALL
SUM OF ALL RESPONSES TO PHQ9 QUESTIONS 1 AND 2: 0

## 2018-10-11 ASSESSMENT — LIFESTYLE VARIABLES
ALCOHOL_USE: NO
EVER_SMOKED: YES

## 2018-10-11 ASSESSMENT — COPD QUESTIONNAIRES
DO YOU EVER COUGH UP ANY MUCUS OR PHLEGM?: NO/ONLY WITH OCCASIONAL COLDS OR INFECTIONS
IN THE PAST 12 MONTHS DO YOU DO LESS THAN YOU USED TO BECAUSE OF YOUR BREATHING PROBLEMS: DISAGREE/UNSURE
HAVE YOU SMOKED AT LEAST 100 CIGARETTES IN YOUR ENTIRE LIFE: NO/DON'T KNOW
COPD SCREENING SCORE: 2
DURING THE PAST 4 WEEKS HOW MUCH DID YOU FEEL SHORT OF BREATH: NONE/LITTLE OF THE TIME

## 2018-10-11 NOTE — PROGRESS NOTES
"/51   Pulse 60   Temp 36.1 °C (97 °F)   Resp 18   Ht 1.6 m (5' 3\")   Wt 61.2 kg (134 lb 14.7 oz)   SpO2 98%   Breastfeeding? No   BMI 23.90 kg/m²     POD # 1 - Lap appy    Adequate pain control  Tolerating diet  No N/V    Abd soft  Minimally tender RUQ as expected  Lap sites intact  Ambulating  No resp distress    Home with   Questions answered  Follow up discussed     "

## 2018-10-11 NOTE — DISCHARGE INSTRUCTIONS
with Dr. Dalton in 1-2 weeks time   - Follow up with primary care provider within one weeks time   - Resume regular diet   - May take over the counter acetaminophen or ibuprofen as needed for pain   - Continue daily over the counter stool softener while on narcotics   - No operation of machinery or motorized vehicles while under the influence of narcotics   - No alcohol use while under the influence of narcotics   - No swimming, hot tubs, baths or wound submersion until cleared by outpatient provider. May shower   - No contact sports, strenuous activities, or heavy lifting until cleared by outpatient provider   - If respiratory decompensation, change in condition or worsening condition, or signs or symptoms of infection occur seek medical attention    Discharge Instructions    Discharged to home by car with relative. Discharged via wheelchair, hospital escort: Yes.  Special equipment needed: Not Applicable    Be sure to schedule a follow-up appointment with your primary care doctor or any specialists as instructed.     Discharge Plan:   Pneumococcal Vaccine Administered/Refused: Not given - Patient refused pneumococcal vaccine  Influenza Vaccine Indication: Not indicated: Previously immunized this influenza season and > 8 years of age    I understand that a diet low in cholesterol, fat, and sodium is recommended for good health. Unless I have been given specific instructions below for another diet, I accept this instruction as my diet prescription.   Other diet: per MD order    Special Instructions: None    · Is patient discharged on Warfarin / Coumadin?   No     Depression / Suicide Risk    As you are discharged from this [facility] facility, it is important to learn how to keep safe from harming yourself.    Recognize the warning signs:  · Abrupt changes in personality, positive or negative- including increase in energy   · Giving away possessions  · Change in eating patterns- significant weight changes-   positive or negative  · Change in sleeping patterns- unable to sleep or sleeping all the time   · Unwillingness or inability to communicate  · Depression  · Unusual sadness, discouragement and loneliness  · Talk of wanting to die  · Neglect of personal appearance   · Rebelliousness- reckless behavior  · Withdrawal from people/activities they love  · Confusion- inability to concentrate     If you or a loved one observes any of these behaviors or has concerns about self-harm, here's what you can do:  · Talk about it- your feelings and reasons for harming yourself  · Remove any means that you might use to hurt yourself (examples: pills, rope, extension cords, firearm)  · Get professional help from the community (Mental Health, Substance Abuse, psychological counseling)  · Do not be alone:Call your Safe Contact- someone whom you trust who will be there for you.  · Call your local CRISIS HOTLINE 842-0236 or 019-331-5922  · Call your local Children's Mobile Crisis Response Team [place] (557) 557-2325 or www.[employer]  · Call the toll free National Suicide Prevention Hotlines   · National Suicide Prevention Lifeline 651-347-VLDT (9151)  · National Hope Line Network 800-SUICIDE (466-1907)    Laparoscopic Appendectomy, Adult, Care After  These instructions give you information on caring for yourself after your procedure. Your doctor may also give you more specific instructions. Call your doctor if you have any problems or questions after your procedure.  HOME CARE  · Do not drive while taking pain medicine (narcotics).  · Take medicine (stool softener) if you cannot poop (constipated).  · Change your bandages (dressings) as told by your doctor.  · Keep your wounds clean and dry. Wash the wounds gently with soap and water. Gently pat the wounds dry with a clean towel.  · Do not take baths, swim, or use hot tubs for 10 days, or as told by your doctor.  · Only take medicine as told by your doctor.  · Continue your  normal diet as told by your doctor.  · Do not lift more than 10 pounds (4.5 kilograms) or play contact sports for 3 weeks, or as told by your doctor.  · Slowly increase your activity.  · Take deep breaths to avoid a lung infection (pneumonia).  GET HELP RIGHT AWAY IF:   · You have a fever.  · You have a rash.  · You have trouble breathing or sharp chest pain.  · You have a reaction to the medicine you are taking.  · Your wound is red, puffy (swollen), or painful.  · You have yellowish-white fluid (pus) coming from the wound.  · You have fluid coming from the wound for longer than 1 day.  · You notice a bad smell coming from the wound or bandage.  · Your wound breaks open after stitches (sutures) or staples are removed.  · You have pain in the shoulders or shoulder blades.  · You feel dizzy or pass out (faint).  · You are short of breath.  · You feel sick to your stomach (nauseous) or throw up (vomit).  · You cannot control when you poop, or you lose your appetite.  · You have watery poop (diarrhea).  MAKE SURE YOU:  · Understand these instructions.  · Will watch your condition.  · Will get help right away if you are not doing well or get worse.     This information is not intended to replace advice given to you by your health care provider. Make sure you discuss any questions you have with your health care provider.     Document Released: 10/14/2010 Document Revised: 01/08/2016 Document Reviewed: 06/26/2012  [employer] Patient Education ©2016 [employer]

## 2018-10-11 NOTE — ED NOTES
Pt off to PACU with surgery. Pt sent with all belongings. CT contacted to see if they found an ear ring that pt lost. CT to contact RN.

## 2018-10-11 NOTE — OR SURGEON
Immediate Post OP Note    PreOp Diagnosis: acute appendicitis    PostOp Diagnosis: acute appendicitis    Procedure(s):  APPENDECTOMY LAPAROSCOPIC - Wound Class: Clean Contaminated    Surgeon(s):  Sushil Dalton M.D.    Anesthesiologist/Type of Anesthesia:  Anesthesiologist: Mary Ellen Campbell M.D./General    Surgical Staff:  Circulator: Kayleigh Santiago R.N.  Relief Circulator: Tawanda Arnold R.N.  Scrub Person: Leopold Von C Garcia    Specimens removed if any:  ID Type Source Tests Collected by Time Destination   A :  Tissue Appendix PATHOLOGY SPECIMEN Sushil Dalton M.D. 10/10/2018 10:51 PM        Estimated Blood Loss: 20 cc    Findings: inflamed appendic    Complications: none        10/10/2018 11:05 PM Sushil Dalton M.D.

## 2018-10-11 NOTE — RESPIRATORY CARE
COPD EDUCATION by COPD CLINICAL EDUCATOR  10/11/2018 at 7:33 AM by Linn Bowman     Patient reviewed by COPD education team. Patient does not qualify for COPD program.

## 2018-10-11 NOTE — PROGRESS NOTES
h &P  Dictated  82 yof with acute appendicits  Plan lap appy  Discussed risks and benefits   Wishes to proceed

## 2018-10-11 NOTE — PROGRESS NOTES
Bedside report received.  Assessment complete.  A&O x 4. Patient calls appropriately.  Patient up with stand-by assist. Bed alarm NI.   Patient has 0/10 pain.   Denies N&V. Tolerating full liquid diet, will advance per order.  Surgical lap sites to abd x3.  + void, + flatus  Patient denies SOB.  SCD's in palce.  Patient expected to DC this am.  Review plan with of care with patient. Call light and personal belongings with in reach. Hourly rounding in place. All needs met at this time.

## 2018-10-11 NOTE — ED PROVIDER NOTES
"ED Provider Note    Scribed for Crispin Goldberg M.D. by Patrick Kurtz. 10/10/2018, 5:01 PM.    Primary care provider: Brandon Pearl M.D.  Means of arrival: walk In  History obtained from: patient  History limited by: None     CHIEF COMPLAINT  Chief Complaint   Patient presents with   • Sent from Urgent Care   • Abdominal Pain     RLQ abd x 3 days     HPI  Lisa Garza is a 82 y.o. female who presents to the Emergency Department complaining of abdominal pain.   The patient complains of an onset of right lower quadrant abdominal pain two days ago. She states her pain has been constant, which she describes as \"sharp\". She rates her current abdominal pain as 7/10 in severity. The patient states her abdominal pain is exacerbated with movement.   The patient complains of a headache associated with her abdominal pain. She has treated her headache with Motrin PO, which has alleviated her headache.   The patient presented to Urgent Care earlier today for her abdominal pain, who instructed the patient to present to the ED for concern for appendicitis.     The patient denies any fever, chills, nausea, vomiting, dysuria, or hematuria.      REVIEW OF SYSTEMS  Review of Systems   Constitutional: Negative for chills and fever.   Gastrointestinal: Positive for abdominal pain. Negative for nausea and vomiting.   Genitourinary: Negative for dysuria and hematuria.   All other systems reviewed and are negative.    PAST MEDICAL HISTORY   has a past medical history of Arthritis; Cancer (HCC); Heart murmur; High cholesterol; Unspecified cataract; and Unspecified urinary incontinence.    SURGICAL HISTORY   has a past surgical history that includes eye surgery; other (Right, 2006); and hip arthroplasty total (Left, 7/6/2015).    SOCIAL HISTORY  Social History   Substance Use Topics   • Smoking status: Former Smoker     Packs/day: 1.00     Years: 33.00     Types: Cigarettes     Quit date: 1/1/1991   • Smokeless tobacco: Never Used     " " Comment: Started smoking at age 23   • Alcohol use 7.0 oz/week     14 Glasses of wine per week      Comment: 1 glass wine/day      History   Drug Use No     FAMILY HISTORY  Family History   Problem Relation Age of Onset   • Cancer Father         GI   • Heart Disease Father    • Lung Disease Father    • Stroke Sister    • No Known Problems Son    • No Known Problems Son    • No Known Problems Maternal Grandmother    • No Known Problems Maternal Grandfather    • No Known Problems Paternal Grandmother    • No Known Problems Paternal Grandfather    • Hypertension Sister    • Hyperlipidemia Sister      CURRENT MEDICATIONS  Home Medications     Reviewed by Yue Mccarthy (Pharmacy Tech) on 10/10/18 at 1951  Med List Status: Complete   Medication Last Dose Status   Calcium 500 MG CHEW 10/10/2018 Active   Cholecalciferol (VITAMIN D-3) 1000 UNITS CAPS 10/10/2018 Active   metoprolol (LOPRESSOR) 25 MG Tab 10/10/2018 Active   Multiple Vitamins-Minerals (VITRUM 50+ SENIOR MULTI PO) 10/10/2018 Active   mupirocin (BACTROBAN) 2 % Ointment 10/10/2018 Active   [place] 3 1200 MG CAPS 10/10/2018 Active   rivaroxaban (XARELTO) 20 MG Tab tablet 10/9/2018 Active   simvastatin (ZOCOR) 10 MG Tab 10/9/2018 Active              ALLERGIES  Allergies   Allergen Reactions   • Clindamycin Hcl-Fd&C Blue #1      unknown   • Food      Scallops-vomiting, citrus-itching, redness, oatmeal-diarrhea     PHYSICAL EXAM  VITAL SIGNS: /77   Pulse 63   Temp 36.8 °C (98.3 °F)   Resp 14   Ht 1.6 m (5' 3\")   Wt 61.2 kg (134 lb 14.7 oz)   SpO2 93%   BMI 23.90 kg/m²     Constitutional: Well developed, Well nourished, No acute distress, Non-toxic appearance.   HENT: Normocephalic, Atraumatic, Bilateral external ears normal, Nose normal. Dry mucous membranes.   Eyes:conjunctiva is normal, there are no signs of exudate.   Neck: Supple, no meningeal signs.  Lymphatic: No lymphadenopathy noted.   Cardiovascular: Regular rate and rhythm without gallops or " rubs. Grade 3/6 systolic injection murmur heard over the left sternal border.   Thorax & Lungs: No respiratory distress. Breathing comfortably. Lungs are clear to auscultation bilaterally, there are no wheezes no rales. Chest wall is nontender.  Abdomen: Soft, Right lower quadrant tenderness with mild rebound, nondistended. Bowel sounds are present.   Skin: Warm, Dry, No erythema,   Back: No tenderness, No CVA tenderness.  Musculoskeletal: Good range of motion in all major joints. No tenderness to palpation or major deformities noted. Intact distal pulses, no clubbing, no cyanosis, no edema,   Neurologic: Alert & oriented x 3, Moving all extremities. No gross abnormalities.    Psychiatric: Affect normal, Judgment normal, Mood normal.     LABS  Results for orders placed or performed during the hospital encounter of 10/10/18   CBC WITH DIFFERENTIAL   Result Value Ref Range    WBC 8.2 4.8 - 10.8 K/uL    RBC 4.01 (L) 4.20 - 5.40 M/uL    Hemoglobin 13.0 12.0 - 16.0 g/dL    Hematocrit 39.6 37.0 - 47.0 %    MCV 98.8 (H) 81.4 - 97.8 fL    MCH 32.4 27.0 - 33.0 pg    MCHC 32.8 (L) 33.6 - 35.0 g/dL    RDW 48.5 35.9 - 50.0 fL    Platelet Count 196 164 - 446 K/uL    MPV 11.3 9.0 - 12.9 fL    Neutrophils-Polys 59.20 44.00 - 72.00 %    Lymphocytes 26.10 22.00 - 41.00 %    Monocytes 8.60 0.00 - 13.40 %    Eosinophils 5.20 0.00 - 6.90 %    Basophils 0.70 0.00 - 1.80 %    Immature Granulocytes 0.20 0.00 - 0.90 %    Nucleated RBC 0.00 /100 WBC    Neutrophils (Absolute) 4.86 2.00 - 7.15 K/uL    Lymphs (Absolute) 2.15 1.00 - 4.80 K/uL    Monos (Absolute) 0.71 0.00 - 0.85 K/uL    Eos (Absolute) 0.43 0.00 - 0.51 K/uL    Baso (Absolute) 0.06 0.00 - 0.12 K/uL    Immature Granulocytes (abs) 0.02 0.00 - 0.11 K/uL    NRBC (Absolute) 0.00 K/uL   COMP METABOLIC PANEL   Result Value Ref Range    Sodium 140 135 - 145 mmol/L    Potassium 3.9 3.6 - 5.5 mmol/L    Chloride 104 96 - 112 mmol/L    Co2 26 20 - 33 mmol/L    Anion Gap 10.0 0.0 - 11.9     Glucose 88 65 - 99 mg/dL    Bun 13 8 - 22 mg/dL    Creatinine 0.58 0.50 - 1.40 mg/dL    Calcium 10.0 8.5 - 10.5 mg/dL    AST(SGOT) 30 12 - 45 U/L    ALT(SGPT) 24 2 - 50 U/L    Alkaline Phosphatase 48 30 - 99 U/L    Total Bilirubin 0.9 0.1 - 1.5 mg/dL    Albumin 4.2 3.2 - 4.9 g/dL    Total Protein 7.0 6.0 - 8.2 g/dL    Globulin 2.8 1.9 - 3.5 g/dL    A-G Ratio 1.5 g/dL   LIPASE   Result Value Ref Range    Lipase 9 (L) 11 - 82 U/L   URINALYSIS,CULTURE IF INDICATED   Result Value Ref Range    Color Yellow     Character Clear     Specific Gravity 1.009 <1.035    Ph 6.0 5.0 - 8.0    Glucose Negative Negative mg/dL    Ketones Trace (A) Negative mg/dL    Protein Negative Negative mg/dL    Bilirubin Negative Negative    Urobilinogen, Urine 0.2 Negative    Nitrite Negative Negative    Leukocyte Esterase Negative Negative    Occult Blood Negative Negative    Micro Urine Req see below    ESTIMATED GFR   Result Value Ref Range    GFR If African American >60 >60 mL/min/1.73 m 2    GFR If Non African American >60 >60 mL/min/1.73 m 2   EKG   Result Value Ref Range    Report       [facility] Emergency Dept.    Test Date:  2018-10-10  Pt Name:    ADALID REDDY                 Department: ER  MRN:        [id number]                      Room:       [facility]  Gender:     Female                       Technician: [id number]  :        [id number]                   Requested By:AMY MURPHY  Order #:    [id number]                    Reading MD: AMY MURPHY MD    Measurements  Intervals                                Axis  Rate:       66                           P:          81  IN:         236                          QRS:        -46  QRSD:       76                           T:          25  QT:         408  QTc:        428    Interpretive Statements  SINUS RHYTHM  FIRST DEGREE AV BLOCK  PROBABLE INFERIOR INFARCT, OLD  BASELINE WANDER IN LEAD(S) V5  Compared to ECG 2018 10:07:49  No significant  changes    Electronically Signed On 10- 18:05:41 PDT by AMY MURPHY MD        All labs reviewed by me.    RADIOLOGY  CT-ABDOMEN-PELVIS WITH   Final Result      1.  Acute appendicitis.   2.  No evidence for perforation.      DX-CHEST-PORTABLE (1 VIEW)   Final Result      Decreased prominence of airspace disease in the left lower lobe which may represent infiltrate or atelectasis.        The radiologist's interpretation of all radiological studies have been reviewed by me.    COURSE & MEDICAL DECISION MAKING  Pertinent Labs & Imaging studies reviewed. (See chart for details)    5:01 PM - Patient seen and examined at bedside. Patient was resuscitated with IV fluids for clincical evidence of dehydration. Ordered Chest X Ray, Abdomen X Ray, GFR, CBC, CMP, lipase, urinalysis to evaluate her symptoms. The differential diagnoses include but are not limited to: appendicitis, diverticulitis, UTI.     7:07 PM Paged General Surgery.     7:25 PM Spoke with Dr. Dalton, General Surgery, about the patient's condition. Dr. Dalton will admit the patient for surgery.         Decision Making:  Patient presents for evaluation.  Clinically she is tender in the right lower quadrant region consistent with possible appendicitis.  Laboratory studies are completely normal with a normal white blood cell count however on CT scan she does show signs of acute appendicitis.  This point I started the patient is singular dose of Zosyn she does have a history of paroxysmal atrial fibrillation her EKG is normal at this time no signs of A. fib she currently is on Xarelto last dose was yesterday.  At this point I spoke with Dr. Thakkar who is on for general surgery who will take the patient operating room for operative appendectomy and further treatment and care.    Disposition:  Patient will be admitted to the hospital under the care of Dr. Dalton (Surgery) in guarded condition.      FINAL IMPRESSION  1. Acute appendicitis with localized  peritonitis, without perforation, abscess, or gangrene          IPatrick (Scribe), am scribing for, and in the presence of, Crispin Goldberg M.D..    Electronically signed by: Patrick Kurtz (Scribe), 10/10/2018    ICrispin M.D. personally performed the services described in this documentation, as scribed by Patrick Kurtz in my presence, and it is both accurate and complete.    The note accurately reflects work and decisions made by me.  Crispin Goldberg  10/10/2018  9:38 PM

## 2018-10-11 NOTE — PROGRESS NOTES
Pt discharged with staff in W/C. Prescriptions were given x 1. D/C instructions signed and placed in chart. Chart given to U/C. Charge notified. Medications from pts drawer returned to pharmacy. Controlled substance use informed consent signed.

## 2018-10-11 NOTE — PROGRESS NOTES
Patient alert and oriented x 4.  2 RNs skin check completed.  3 lap sites to abdomin with dermabond.  Dressing to left upper arm. Patient stated that  She had skin cancer removed and dressing is to be in place.

## 2018-10-11 NOTE — H&P
DATE OF SERVICE:  10/10/2018.    HISTORY OF PRESENT ILLNESS:  The patient is an 82-year-old woman who presented   to the emergency room for further evaluation of abdominal pain.  She gives a   2-day history of increasing abdominal pain.  The pain has localized to the   right lower quadrant and is described as constant and sharp.  She feels the   pain is worse with movement and palpation.  She has had a headache, but   otherwise has not had any nausea, frequency, dysuria, or change in her bowel   habits.  She is otherwise at her baseline state of health.    PAST MEDICAL HISTORY:  Significant for arthritis, dyslipidemia, and atrial   fibrillation.    PAST SURGICAL HISTORY:  Left hip replacement, and eye surgery.    MEDICATIONS:  Prior to this admission include calcium, vitamin D3, Lopressor,   Bactroban, Xarelto, and Zocor.  Of note, she states she took the Xarelto   yesterday for the last time.    ALLERGIES:  SHE HAS ALLERGY TO CLINDAMYCIN.    SOCIAL HISTORY:  She has not smoked for about 30 years.  Has a 33-pack-year   history of smoking.  She drinks moderately.    FAMILY HISTORY:  Essentially negative.    REVIEW OF SYSTEMS:  Got good state of health prior to this.  Negative per AMA   and CMS criteria until the acute onset abdominal pain 2 days ago.    PHYSICAL EXAMINATION:  VITAL SIGNS:  She has a temperature of 36.8, pulse 67, blood pressure of   137/62.  GENERAL:  She is lying in bed and is in no distress.  HEENT:  Unremarkable.  Pupils are equal.  Oropharynx without lesions.  NECK:  Supple.  LUNGS:  Clear.  CARDIOVASCULAR:  Reveals regular rate and rhythm.  ABDOMEN:  Soft.  She does have focal tenderness in the right lower quadrant.  EXTREMITIES:  Symmetrical, without clubbing, cyanosis or edema.  NEUROLOGIC:  She is neurologically intact without any grossly detectable motor   or sensory deficits.  SKIN:  Warm and dry.    LABORATORY DATA:  She has laboratory data which includes a white count of 8.2,   hematocrit  39.6, platelets of 196.  Sodium is 140, potassium is 3.9, chloride   104, CO2 is 26, BUN 13, creatinine 0.58.  Transaminases are normal.    DIAGNOSTIC DATA:  She had a CT scan of her abdomen and pelvis, which did   demonstrate an 11 mm in diameter appendix with wall thickening and   periappendiceal edema consistent with acute appendicitis.    IMPRESSION AND PLAN:  An 82-year-old woman with evidence by history, physical,   and imaging of acute appendicitis.  An appendectomy is indicated.  I   discussed this in detail with her including laparoscopic approach, potential   for converting to an open procedure, associated risk of bleeding, infection,   abscess, hematoma.  I also discussed risk of appendiceal stump leak, postop   abscess, and injury to intraperitoneal organs.  She understands all the above   and does wish to proceed.  We will make arrangements.       ____________________________________     MD LORIN BOLES / CONSTANCE    DD:  10/10/2018 21:23:49  DT:  10/10/2018 22:20:34    D#:  [id number]  Job#:  [id number]    cc: Brandon Pearl MD

## 2018-10-11 NOTE — CARE PLAN
Problem: Safety  Goal: Will remain free from falls  Outcome: PROGRESSING AS EXPECTED  Call light within reach. Treaded socks on. Bed in lowest and locked position.    Problem: Pain Management  Goal: Pain level will decrease to patient's comfort goal  Outcome: PROGRESSING AS EXPECTED  Patient complained of abdominal pain. Medicated as ordered.

## 2018-10-11 NOTE — ED NOTES
Verbalizes understanding of POC. PIV established and medicated per MAR. VSS, call light within reach.

## 2018-10-11 NOTE — OR NURSING
Pt sleeping btwn care, however arouses easily to voice. VSS on 1 LNC. Pt denies pain and nausea. Ice pack in place. Surgical incisions CDI.   Report called to [facility]. Waiting for transport.

## 2018-10-11 NOTE — ED NOTES
Med rec updated and complete.  Allergies reviewed.  Pt denies oral antibiotic use at home in  The past 30 days.  However, pt has a a couple of surgical sites that she has been applying topical.

## 2018-10-11 NOTE — OP REPORT
DATE OF SERVICE:  10/10/2018    SURGEON:  Sushil Dalton MD    PREOPERATIVE DIAGNOSIS:  Acute appendicitis.    POSTOPERATIVE DIAGNOSIS:  Acute appendicitis.    PROCEDURE PERFORMED:  Laparoscopic appendectomy.    ANESTHESIA:  General endotracheal anesthesia.    ANESTHESIOLOGIST:  Mary Ellen Campbell MD    INDICATIONS:  An 82-year-old woman with evidence by history, physical, and   imaging of acute appendicitis.  An appendectomy is indicated.    DESCRIPTION OF PROCEDURE:  Procedure discussed in detail with the patient   including laparoscopic approach, potential for converting to an open   procedure, associated risk of bleeding, infection, abscess, hematoma.  I also   discussed risk of appendiceal stump leak, and injury to intraperitoneal   organs.  She understood all the above and wished to proceed.  She was placed   under anesthesia by Dr. Campbell.  Abdomen was prepped and draped with   chlorhexidine prep and sterile drapes.  After a timeout, an infraumbilical   incision was made and through this incision, a Veress needle was placed.  Low   pressure was confirmed and CO2 insufflation was used to achieve   pneumoperitoneum of 50 mmHg.  Through the same incision, a 5 mm port was   placed and laparoscope was inserted.  Under direct visualization, a   right-sided 5 mm low midline 12 mm port was placed.  The cecum was identified   and an acutely inflamed appendix was identified.  The mesoappendix was divided   with an EndoGIA 2.5 mm staple load.  The appendix was then divided at its   base with the cecum with an Endo-ZACHARY 2.5 mm staple load.  Appendix was placed   in a bag retrieval device and removed.  Staple lines were inspected and   hemostasis assured with cautery.  Staple lines were noted to be intact.  The   12 mm port was removed and the fascia at that site was approximated with 0   Vicryl stitch using the Endoclose device.  Remaining ports were removed.    Pneumoperitoneum was released.  The skin on all incisions was  approximated   with 4-0 Vicryl sutures.  Dermabond was placed.  Patient tolerated the   procedure well without apparent complication.  Final counts were reported as   correct.       ____________________________________     MD LORIN BOLES / CONSTANCE    DD:  10/10/2018 23:08:45  DT:  10/10/2018 23:29:13    D#:  [id number]  Job#:  [id number]    cc: Brandon Pearl MD

## 2018-10-18 ENCOUNTER — APPOINTMENT (RX ONLY)
Dept: URBAN - METROPOLITAN AREA CLINIC 4 | Facility: CLINIC | Age: 83
Setting detail: DERMATOLOGY
End: 2018-10-18

## 2018-10-18 DIAGNOSIS — Z48.02 ENCOUNTER FOR REMOVAL OF SUTURES: ICD-10-CM

## 2018-10-18 PROCEDURE — ? SUTURE REMOVAL (GLOBAL PERIOD)

## 2018-10-18 ASSESSMENT — LOCATION ZONE DERM: LOCATION ZONE: ARM

## 2018-10-18 ASSESSMENT — LOCATION SIMPLE DESCRIPTION DERM: LOCATION SIMPLE: LEFT UPPER ARM

## 2018-10-18 ASSESSMENT — LOCATION DETAILED DESCRIPTION DERM: LOCATION DETAILED: LEFT LATERAL PROXIMAL UPPER ARM

## 2018-10-18 NOTE — PROCEDURE: SUTURE REMOVAL (GLOBAL PERIOD)
Add 83672 Cpt? (Important Note: In 2017 The Use Of 10141 Is Being Tracked By Cms To Determine Future Global Period Reimbursement For Global Periods): no
Detail Level: Detailed

## 2018-11-02 ENCOUNTER — OFFICE VISIT (OUTPATIENT)
Dept: MEDICAL GROUP | Facility: MEDICAL CENTER | Age: 83
End: 2018-11-02
Payer: MEDICARE

## 2018-11-02 VITALS
HEART RATE: 59 BPM | SYSTOLIC BLOOD PRESSURE: 100 MMHG | BODY MASS INDEX: 22.2 KG/M2 | WEIGHT: 130 LBS | DIASTOLIC BLOOD PRESSURE: 70 MMHG | TEMPERATURE: 98.6 F | OXYGEN SATURATION: 100 % | HEIGHT: 64 IN | RESPIRATION RATE: 16 BRPM

## 2018-11-02 DIAGNOSIS — I10 ESSENTIAL HYPERTENSION: ICD-10-CM

## 2018-11-02 DIAGNOSIS — Z00.00 HEALTH CARE MAINTENANCE: ICD-10-CM

## 2018-11-02 DIAGNOSIS — M15.9 PRIMARY OSTEOARTHRITIS INVOLVING MULTIPLE JOINTS: ICD-10-CM

## 2018-11-02 DIAGNOSIS — B07.0 PLANTAR WART: ICD-10-CM

## 2018-11-02 DIAGNOSIS — I48.20 CHRONIC ATRIAL FIBRILLATION (HCC): ICD-10-CM

## 2018-11-02 DIAGNOSIS — J30.1 CHRONIC SEASONAL ALLERGIC RHINITIS DUE TO POLLEN: ICD-10-CM

## 2018-11-02 DIAGNOSIS — E78.2 MIXED HYPERLIPIDEMIA: ICD-10-CM

## 2018-11-02 DIAGNOSIS — Z85.820 HISTORY OF MELANOMA EXCISION: ICD-10-CM

## 2018-11-02 DIAGNOSIS — Z98.890 HISTORY OF MELANOMA EXCISION: ICD-10-CM

## 2018-11-02 DIAGNOSIS — Z00.00 MEDICARE ANNUAL WELLNESS VISIT, SUBSEQUENT: ICD-10-CM

## 2018-11-02 PROBLEM — F10.10 ALCOHOL ABUSE: Status: RESOLVED | Noted: 2018-03-27 | Resolved: 2018-11-02

## 2018-11-02 PROCEDURE — 99213 OFFICE O/P EST LOW 20 MIN: CPT | Mod: 25 | Performed by: FAMILY MEDICINE

## 2018-11-02 ASSESSMENT — ENCOUNTER SYMPTOMS: GENERAL WELL-BEING: GOOD

## 2018-11-02 ASSESSMENT — ACTIVITIES OF DAILY LIVING (ADL): BATHING_REQUIRES_ASSISTANCE: 0

## 2018-11-02 ASSESSMENT — PATIENT HEALTH QUESTIONNAIRE - PHQ9: CLINICAL INTERPRETATION OF PHQ2 SCORE: 0

## 2018-11-02 NOTE — PROGRESS NOTES
Chief Complaint   Patient presents with   • Follow-Up         HPI:  Lisa Garza is a 82 y.o. here for Medicare Annual Wellness Visit     Medicare annual wellness visit, subsequent  Preventive measures and chronic medical issues reviewed    Chronic atrial fibrillation (HCC)  Denies chest pain, shortness of breath, palpitation.  Currently has normal rate and rhythm.  Has been on metoprolol 25 mg twice a day and Xarelto 20 mg daily.    Essential hypertension  Has been adequately controlled on current medication. Denies headache, chest pain, and SOB.Has been on metoprolol 25 mg daily.  No side effects.    Mixed hyperlipidemia  He has been tolerating the statin. Denies muscle pain LFTs has been normal, has been on simvastatin 10 mg daily.  No side effects.    Primary osteoarthritis involving multiple joints  Patient has been having joint pain especially the knee and hip joint on half, however, has been doing fine on all over-the-counter pain medication.    Chronic seasonal allergic rhinitis due to pollen  Patient is currently asymptomatic, has been using over-the-counter antihistamine and Flonase as needed..    Health care maintenance  Pneumonia and flu shot and colonoscopy are up-to-date.    History of melanoma excision  Removed a year ago.  Currently asymptomatic continue follow-up with dermatology as needed    Plantar wart  Patient is currently has having been fully plantar wart of the left foot.  Has used over-the-counter keratolytics which is still getting it recurrently on the same place.  He requested referral to dermatology        Patient Active Problem List    Diagnosis Date Noted   • Acute appendicitis with localized peritonitis, without perforation, abscess, or gangrene 10/11/2018     Priority: High   • Sepsis due to pneumonia ([facility]) 03/27/2018     Priority: High   • PAF (paroxysmal atrial fibrillation) ([facility]) 08/14/2018   • Aortic stenosis 08/14/2018   • Chronic anticoagulation 08/14/2018   • Pneumonia due  to infectious organism 03/29/2018   • Acute respiratory failure with hypoxia ([facility]) 03/28/2018   • Abnormal LFTs 03/27/2018   • Atrial fibrillation with RVR ([facility]) 03/27/2018   • Anemia 03/27/2018   • Hyponatremia 03/27/2018   • Hypoalbuminemia 03/27/2018   • Alcohol abuse 03/27/2018   • Mixed hyperlipidemia 11/16/2017   • Chronic seasonal allergic rhinitis due to pollen 11/16/2017   • Systolic murmur 11/30/2015   • Osteoarthritis of right hip 07/06/2015   • History of total left hip arthroplasty 05/21/2015   • Osteoarthritis 05/21/2015   • Osteoarthritis, multiple sites 05/21/2015       Current Outpatient Prescriptions   Medication Sig Dispense Refill   • mupirocin (BACTROBAN) 2 % Ointment Apply 1 Application to affected area(s) 2 Times a Day. On surgical sites  Both arms,and upper  Right back/shoulder area.     • rivaroxaban (XARELTO) 20 MG Tab tablet Take 1 Tab by mouth with dinner. 90 Tab 3   • simvastatin (ZOCOR) 10 MG Tab Take 1 Tab by mouth every evening. 90 Tab 3   • metoprolol (LOPRESSOR) 25 MG Tab Take 2 Tabs by mouth 2 times a day. 360 Tab 1   • Multiple Vitamins-Minerals (VITRUM 50+ SENIOR MULTI PO) Take 1 Tab by mouth every day.     • Cholecalciferol (VITAMIN D-3) 1000 UNITS CAPS Take 1 Tab by mouth every morning.     • Omega 3 1200 MG CAPS Take 1 Cap by mouth every morning.     • Calcium 500 MG CHEW Take 1 Tab by mouth every morning.       No current facility-administered medications for this visit.             Current supplements as per medication list.       Allergies: Clindamycin   Current social contact/activities:     She  reports that she quit smoking about 27 years ago. Her smoking use included Cigarettes. She has a 33.00 pack-year smoking history. She has never used smokeless tobacco. She reports that she drinks about 7.0 oz of alcohol per week . She reports that she does not use drugs.  Counseling given: Not Answered      DPA/Advanced Directive:      ROS:    Gait: None  Ostomy: None  Other  tubes: None  Amputations: None  Chronic oxygen use: None  Last eye exam: None  Wears hearing aids: None  : None    Screening:    Depression Screening    Little interest or pleasure in doing things?  0 - not at all  Feeling down, depressed , or hopeless? 0 - not at all  Patient Health Questionnaire Score: 0     If depressive symptoms identified deferred to follow up visit unless specifically addressed in assessment and plan.    Interpretation of PHQ-9 Total Score   Score Severity   1-4 No Depression   5-9 Mild Depression   10-14 Moderate Depression   15-19 Moderately Severe Depression   20-27 Severe Depression    Screening for Cognitive Impairment    Three Minute Recall (leader, season, table) 1/3    Jorje clock face with all 12 numbers and set the hands to show 10 past 11.  Yes    Cognitive concerns identified deferred for follow up unless specifically addressed in assessment and plan.    Fall Risk Assessment    Has the patient had two or more falls in the last year or any fall with injury in the last year?  No    Safety Assessment    Throw rugs on floor.  Yes  Handrails on all stairs.  Yes  Good lighting in all hallways.  Yes  Difficulty hearing.  Yes  Patient counseled about all safety risks that were identified.    Functional Assessment ADLs    Are there any barriers preventing you from cooking for yourself or meeting nutritional needs?  No.    Are there any barriers preventing you from driving safely or obtaining transportation?  No.    Are there any barriers preventing you from using a telephone or calling for help?  No.    Are there any barriers preventing you from shopping?  No.    Are there any barriers preventing you from taking care of your own finances?  No.    Are there any barriers preventing you from managing your medications?  No.    Are there any barriers preventing you from showering, bathing or dressing yourself?  No.    Are you currently engaging in any exercise or physical activity?  Yes.      What is your perception of your health?  Good.      Health Maintenance Summary                IMM HEP B VACCINE Overdue 11/9/1954     IMM ZOSTER VACCINES Overdue 11/9/1985     Annual Wellness Visit Next Due 11/17/2018      Done 11/16/2017 Visit Dx: Medicare annual wellness visit, subsequent     Patient has more history with this topic...    MAMMOGRAM Next Due 6/18/2019      Done 6/18/2018 MA-MAMMO SCREENING BILAT W/TOMOSYNTHESIS W/CAD     Patient has more history with this topic...    BONE DENSITY Next Due 12/7/2021      Done 12/7/2016 DS-BONE DENSITY STUDY (DEXA)     Patient has more history with this topic...    COLONOSCOPY Next Due 11/30/2025      Postponed 11/30/2015     IMM DTaP/Tdap/Td Vaccine Next Due 11/22/2026      Done 11/22/2016 Imm Admin: Tdap Vaccine          Patient Care Team:  Brandon Pearl M.D. as PCP - General (Geriatrics)  [facility] as [facility] Health Provider        Social History   Substance Use Topics   • Smoking status: Former Smoker     Packs/day: 1.00     Years: 33.00     Types: Cigarettes     Quit date: 1/1/1991   • Smokeless tobacco: Never Used      Comment: Started smoking at age 23   • Alcohol use 7.0 oz/week     14 Glasses of wine per week      Comment: 1 glass wine/day     Family History   Problem Relation Age of Onset   • Cancer Father         GI   • Heart Disease Father    • Lung Disease Father    • Stroke Sister    • No Known Problems Son    • No Known Problems Son    • No Known Problems Maternal Grandmother    • No Known Problems Maternal Grandfather    • No Known Problems Paternal Grandmother    • No Known Problems Paternal Grandfather    • Hypertension Sister    • Hyperlipidemia Sister      She  has a past medical history of Arthritis; Cancer (HCC); Heart murmur; High cholesterol; Unspecified cataract; and Unspecified urinary incontinence.   Past Surgical History:   Procedure Laterality Date   • APPENDECTOMY LAPAROSCOPIC  10/10/2018    Procedure: APPENDECTOMY  "LAPAROSCOPIC;  Surgeon: Sushil Dalton M.D.;  Location: SURGERY [facility];  Service: General   • HIP ARTHROPLASTY TOTAL Left 7/6/2015    Procedure: HIP ARTHROPLASTY TOTAL;  Surgeon: Navid Howard M.D.;  Location: SURGERY [facility];  Service:    • OTHER Right 2006    cyst & bone spur on right hand   • EYE SURGERY      bilateral IOL       Exam:   Blood pressure 100/70, pulse (!) 59, temperature 37 °C (98.6 °F), resp. rate 16, height 1.626 m (5' 4\"), weight 59 kg (130 lb), SpO2 100 %, not currently breastfeeding. Body mass index is 22.31 kg/m².    Hearing, good  Dentition, good  Alert, oriented in no acute distress.  Eye contact is good, speech goal directed, affect calm    Assessment and Plan. The following treatment and monitoring plan is recommended:    82 y.o. female     1. Medicare annual wellness visit, subsequent  Preventive measures and chronic medical issues reviewed    - ANNUAL WELLNESS VISIT SUBSEQ    2. Chronic atrial fibrillation (HCC)  Stable asymptomatic.  Continue metoprolol and Xarelto.    - ANNUAL WELLNESS VISIT SUBSEQ    3. Essential hypertension  Adequately controlled  Continue metoprolol 25 mg twice a day.    4. Mixed hyperlipidemia  He has been tolerating the statin. Denies muscle pain LFTs has been normal  Continue simvastatin 10 mg daily.    - ANNUAL WELLNESS VISIT SUBSEQ    5. Primary osteoarthritis involving multiple joints  Patient has been doing fine on all over-the-counter pain medication.    - ANNUAL WELLNESS VISIT SUBSEQ    6. Chronic seasonal allergic rhinitis due to pollen  Currently asymptomatic.  Has been on Flonase as needed.    - ANNUAL WELLNESS VISIT SUBSEQ    7. Health care maintenance  Pneumonia and flu shot and colonoscopy are up-to-date.    - ANNUAL WELLNESS VISIT SUBSEQ    8. History of melanoma excision  Removed a year ago.  Currently asymptomatic continue follow-up with dermatology as needed    - ANNUAL WELLNESS VISIT SUBSEQ    9. Plantar wart  Patient " requested referral to dermatology.    - REFERRAL TO PODIATRY      Services suggested:  Health Care Screening: Age-appropriate preventive services recommended by USPTF and ACIP covered by Medicare were discussed today. Services ordered if indicated and agreed upon by the patient.  Referrals offered: Community-based lifestyle interventions to reduce health risks and promote self-management and wellness, fall prevention, nutrition, physical activity, tobacco-use cessation, weight loss, and mental health services as per orders if indicated.    Discussion today about general wellness and lifestyle habits:    · Prevent falls and reduce trip hazards; Cautioned about securing or removing rugs.  · Have a working fire alarm and carbon monoxide detector;   · Engage in regular physical activity and social activities     Follow-up: No Follow-up on file.

## 2019-01-01 DIAGNOSIS — I48.91 ATRIAL FIBRILLATION WITH RVR (HCC): ICD-10-CM

## 2019-03-07 ENCOUNTER — OFFICE VISIT (OUTPATIENT)
Dept: CARDIOLOGY | Facility: MEDICAL CENTER | Age: 84
End: 2019-03-07
Payer: MEDICARE

## 2019-03-07 VITALS
HEART RATE: 56 BPM | BODY MASS INDEX: 22.53 KG/M2 | WEIGHT: 132 LBS | OXYGEN SATURATION: 91 % | HEIGHT: 64 IN | SYSTOLIC BLOOD PRESSURE: 110 MMHG | DIASTOLIC BLOOD PRESSURE: 78 MMHG

## 2019-03-07 DIAGNOSIS — I48.0 PAF (PAROXYSMAL ATRIAL FIBRILLATION) (HCC): ICD-10-CM

## 2019-03-07 DIAGNOSIS — I35.0 AORTIC VALVE STENOSIS, ETIOLOGY OF CARDIAC VALVE DISEASE UNSPECIFIED: ICD-10-CM

## 2019-03-07 DIAGNOSIS — E78.2 MIXED HYPERLIPIDEMIA: ICD-10-CM

## 2019-03-07 DIAGNOSIS — Z79.01 CHRONIC ANTICOAGULATION: ICD-10-CM

## 2019-03-07 PROCEDURE — 99214 OFFICE O/P EST MOD 30 MIN: CPT | Performed by: INTERNAL MEDICINE

## 2019-03-07 ASSESSMENT — ENCOUNTER SYMPTOMS
COUGH: 0
DIZZINESS: 0
PALPITATIONS: 0
SHORTNESS OF BREATH: 0
LOSS OF CONSCIOUSNESS: 0
MYALGIAS: 0
BRUISES/BLEEDS EASILY: 0

## 2019-03-07 NOTE — PROGRESS NOTES
Chief Complaint   Patient presents with   • Atrial Fibrillation     follow up       Subjective:   Lisa Garza is a 83 y.o. female who presents today for follow-up evaluation of PAF, chronic atrial fibrillation and mild aortic stenosis.    Last seen on 8/14/2018.    Since 8/14/2018 the patient's had no cardiac symptoms.  Required emergent appendectomy 10/2018.  No perioperative cardiac problems.  No palpitations.  No bleeding problems.   has been placed in a group home.    Since 4/12/2018 the patient denies any cardiac symptoms.  No bleeding problems on Xarelto.  States that she may have had a prior episode of atrial fibrillation 14 years ago when she had pneumonia while living in [place].    The patient previously smoked but quit 20 years ago.  History of alcohol abuse.    Patient was hospitalized from 3/26 to 30/2018 for pneumonia and atrial fibrillation. She had a new onset of atrial fibrillation she was started on Xarelto for anticoagulation. Her atrial fibrillation reverted back to normal rhythm. Echocardiogram showed diastolic dysfunction grade 2 and ejection fraction 70%.    Past Medical History:   Diagnosis Date   • Arthritis     osteo, hip   • Cancer (HCC)     skin on left shoulder   • Heart murmur    • High cholesterol    • Unspecified cataract     tony IOL   • Unspecified urinary incontinence      Past Surgical History:   Procedure Laterality Date   • APPENDECTOMY LAPAROSCOPIC  10/10/2018    Procedure: APPENDECTOMY LAPAROSCOPIC;  Surgeon: Sushil Dalton M.D.;  Location: SURGERY [facility];  Service: General   • HIP ARTHROPLASTY TOTAL Left 7/6/2015    Procedure: HIP ARTHROPLASTY TOTAL;  Surgeon: Navid Howard M.D.;  Location: SURGERY [facility];  Service:    • OTHER Right 2006    cyst & bone spur on right hand   • EYE SURGERY      bilateral IOL     Family History   Problem Relation Age of Onset   • Cancer Father         GI   • Heart Disease Father    • Lung Disease Father    •  Stroke Sister    • No Known Problems Son    • No Known Problems Son    • No Known Problems Maternal Grandmother    • No Known Problems Maternal Grandfather    • No Known Problems Paternal Grandmother    • No Known Problems Paternal Grandfather    • Hypertension Sister    • Hyperlipidemia Sister      Social History     Social History   • Marital status:      Spouse name: N/A   • Number of children: N/A   • Years of education: N/A     Occupational History   • Not on file.     Social History Main Topics   • Smoking status: Former Smoker     Packs/day: 1.00     Years: 33.00     Types: Cigarettes     Quit date: 1/1/1991   • Smokeless tobacco: Never Used      Comment: Started smoking at age 23   • Alcohol use 7.0 oz/week     14 Glasses of wine per week      Comment: 1 glass wine/day   • Drug use: No   • Sexual activity: No     Other Topics Concern   • Not on file     Social History Narrative   • No narrative on file     Allergies   Allergen Reactions   • Clindamycin Hcl-Fd&C Blue #1      unknown   • Food      Scallops-vomiting, citrus-itching, redness, oatmeal-diarrhea     Outpatient Encounter Prescriptions as of 3/7/2019   Medication Sig Dispense Refill   • Psyllium (METAMUCIL FIBER PO) Take  by mouth.     • metoprolol (LOPRESSOR) 25 MG Tab TAKE 2 TABLETS BY MOUTH 2 TIMES A  Tab 0   • rivaroxaban (XARELTO) 20 MG Tab tablet Take 1 Tab by mouth with dinner. 90 Tab 3   • simvastatin (ZOCOR) 10 MG Tab Take 1 Tab by mouth every evening. 90 Tab 3   • Multiple Vitamins-Minerals (VITRUM 50+ SENIOR MULTI PO) Take 1 Tab by mouth every day.     • Cholecalciferol (VITAMIN D-3) 1000 UNITS CAPS Take 1 Tab by mouth every morning.     • Omega 3 1200 MG CAPS Take 1 Cap by mouth every morning.     • Calcium 500 MG CHEW Take 1 Tab by mouth every morning.     • mupirocin (BACTROBAN) 2 % Ointment Apply 1 Application to affected area(s) 2 Times a Day. On surgical sites  Both arms,and upper  Right back/shoulder area.       No  "facility-administered encounter medications on file as of 3/7/2019.      Review of Systems   Respiratory: Negative for cough and shortness of breath.    Cardiovascular: Negative for chest pain and palpitations.   Musculoskeletal: Negative for myalgias.   Neurological: Negative for dizziness and loss of consciousness.   Endo/Heme/Allergies: Does not bruise/bleed easily.        Objective:   /78 (BP Location: Left arm, Patient Position: Sitting, BP Cuff Size: Adult)   Pulse (!) 56   Ht 1.626 m (5' 4\")   Wt 59.9 kg (132 lb)   SpO2 91%   BMI 22.66 kg/m²     Physical Exam   Constitutional: She is oriented to person, place, and time. She appears well-developed and well-nourished. No distress.   Neck: No JVD present.   Cardiovascular: Normal rate, normal heart sounds and intact distal pulses.  An irregularly irregular rhythm present. Exam reveals no gallop and no friction rub.    No murmur heard.  Pulmonary/Chest: Effort normal and breath sounds normal. No respiratory distress. She has no wheezes. She has no rales.   Abdominal: Soft. She exhibits no distension and no mass. There is no tenderness.   Musculoskeletal: She exhibits no edema.   Neurological: She is alert and oriented to person, place, and time.   Skin: Skin is warm and dry.   Psychiatric: She has a normal mood and affect. Her behavior is normal.     ECHOCARDIOGRAM 3/28/2018  Left ventricular ejection fraction is visually estimated to be 70%.  Mild concentric left ventricular hypertrophy.  Grade II diastolic dysfunction.  Moderately dilated left atrium.  Mild mitral regurgitation.  Mild aortic stenosis.    08/14/2018 EKG: Normal sinus rhythm, rate 60.  First-degree AV block.  Possible previous inferior myocardial infarction.  Reviewed by myself.    Assessment:     1. PAF (paroxysmal atrial fibrillation) ([facility])     2. Chronic anticoagulation     3. Aortic valve stenosis, etiology of cardiac valve disease unspecified     4. Mixed hyperlipidemia  LIPID " PANEL       Medical Decision Making:  Today's Assessment / Status / Plan:   1.  PAF.  Maintaining NSR.  2.  Chronic anticoagulation.  On Xarelto.  3.  Aortic stenosis.  Mild.  4.  Coronary calcification by CT scan.  5.  Dyslipidemia  6.  History of excessive alcohol use.    Recommendation Discussion  1.  Patient's current cardiac status is stable.  2.  Lipid panel.  3.  Continue current cardiac therapy.  4.  Follow-up 6 months.

## 2019-03-08 ENCOUNTER — HOSPITAL ENCOUNTER (OUTPATIENT)
Dept: LAB | Facility: MEDICAL CENTER | Age: 84
End: 2019-03-08
Attending: INTERNAL MEDICINE
Payer: MEDICARE

## 2019-03-08 LAB
CHOLEST SERPL-MCNC: 192 MG/DL (ref 100–199)
FASTING STATUS PATIENT QL REPORTED: NORMAL
HDLC SERPL-MCNC: 64 MG/DL
LDLC SERPL CALC-MCNC: 114 MG/DL
TRIGL SERPL-MCNC: 72 MG/DL (ref 0–149)

## 2019-03-08 PROCEDURE — 36415 COLL VENOUS BLD VENIPUNCTURE: CPT

## 2019-03-08 PROCEDURE — 80061 LIPID PANEL: CPT

## 2019-04-01 DIAGNOSIS — I48.91 ATRIAL FIBRILLATION WITH RVR (HCC): ICD-10-CM

## 2019-04-11 ENCOUNTER — APPOINTMENT (RX ONLY)
Dept: URBAN - METROPOLITAN AREA CLINIC 4 | Facility: CLINIC | Age: 84
Setting detail: DERMATOLOGY
End: 2019-04-11

## 2019-04-11 DIAGNOSIS — L82.1 OTHER SEBORRHEIC KERATOSIS: ICD-10-CM

## 2019-04-11 DIAGNOSIS — Z85.828 PERSONAL HISTORY OF OTHER MALIGNANT NEOPLASM OF SKIN: ICD-10-CM

## 2019-04-11 DIAGNOSIS — Z86.007 PERSONAL HISTORY OF IN-SITU NEOPLASM OF SKIN: ICD-10-CM

## 2019-04-11 DIAGNOSIS — L57.0 ACTINIC KERATOSIS: ICD-10-CM

## 2019-04-11 DIAGNOSIS — L81.4 OTHER MELANIN HYPERPIGMENTATION: ICD-10-CM

## 2019-04-11 DIAGNOSIS — Z86.006 PERSONAL HISTORY OF MELANOMA IN-SITU: ICD-10-CM

## 2019-04-11 PROBLEM — Z85.820 PERSONAL HISTORY OF MALIGNANT MELANOMA OF SKIN: Status: ACTIVE | Noted: 2019-04-11

## 2019-04-11 PROCEDURE — 17003 DESTRUCT PREMALG LES 2-14: CPT

## 2019-04-11 PROCEDURE — ? LIQUID NITROGEN

## 2019-04-11 PROCEDURE — ? ADDITIONAL NOTES

## 2019-04-11 PROCEDURE — 99214 OFFICE O/P EST MOD 30 MIN: CPT | Mod: 25

## 2019-04-11 PROCEDURE — 17000 DESTRUCT PREMALG LESION: CPT

## 2019-04-11 PROCEDURE — ? COUNSELING

## 2019-04-11 ASSESSMENT — LOCATION SIMPLE DESCRIPTION DERM
LOCATION SIMPLE: LEFT UPPER ARM
LOCATION SIMPLE: RIGHT UPPER BACK
LOCATION SIMPLE: RIGHT FOREARM
LOCATION SIMPLE: ABDOMEN
LOCATION SIMPLE: RIGHT UPPER ARM
LOCATION SIMPLE: LEFT POSTERIOR UPPER ARM
LOCATION SIMPLE: NOSE
LOCATION SIMPLE: UPPER BACK
LOCATION SIMPLE: CHEST
LOCATION SIMPLE: RIGHT BACK

## 2019-04-11 ASSESSMENT — LOCATION DETAILED DESCRIPTION DERM
LOCATION DETAILED: LEFT LATERAL SUPERIOR CHEST
LOCATION DETAILED: NASAL SUPRATIP
LOCATION DETAILED: SUPERIOR THORACIC SPINE
LOCATION DETAILED: LEFT PROXIMAL LATERAL POSTERIOR UPPER ARM
LOCATION DETAILED: RIGHT SUPERIOR LATERAL UPPER BACK
LOCATION DETAILED: NASAL DORSUM
LOCATION DETAILED: RIGHT PROXIMAL DORSAL FOREARM
LOCATION DETAILED: LEFT ANTERIOR PROXIMAL UPPER ARM
LOCATION DETAILED: EPIGASTRIC SKIN
LOCATION DETAILED: RIGHT ANTERIOR PROXIMAL UPPER ARM
LOCATION DETAILED: RIGHT INFERIOR MEDIAL UPPER BACK
LOCATION DETAILED: RIGHT LATERAL SUPERIOR CHEST

## 2019-04-11 ASSESSMENT — LOCATION ZONE DERM
LOCATION ZONE: ARM
LOCATION ZONE: NOSE
LOCATION ZONE: TRUNK

## 2019-04-11 NOTE — PROCEDURE: ADDITIONAL NOTES
Detail Level: Simple
Additional Notes: Discussed the following treatments: photodynamic therapy vs efudex vs cryotherapy. \\nPt decided to peruse photodynamic therapy. Return six weeks after photodynamic therapy.

## 2019-04-18 ENCOUNTER — PATIENT OUTREACH (OUTPATIENT)
Dept: HEALTH INFORMATION MANAGEMENT | Facility: OTHER | Age: 84
End: 2019-04-18

## 2019-04-18 NOTE — PROGRESS NOTES
Outcome: Left Message    Please transfer to Patient Outreach Team at [phone] when patient returns call.    WebIZ Checked & Epic Updated:  yes    [facility] Verified: yes    Attempt # 1

## 2019-05-15 DIAGNOSIS — E78.5 HYPERLIPIDEMIA, UNSPECIFIED HYPERLIPIDEMIA TYPE: ICD-10-CM

## 2019-05-15 RX ORDER — SIMVASTATIN 10 MG
10 TABLET ORAL EVERY EVENING
Qty: 100 TAB | Refills: 3 | Status: SHIPPED | OUTPATIENT
Start: 2019-05-15 | End: 2019-07-29 | Stop reason: SDUPTHER

## 2019-05-31 ENCOUNTER — OFFICE VISIT (OUTPATIENT)
Dept: URGENT CARE | Facility: CLINIC | Age: 84
End: 2019-05-31
Payer: MEDICARE

## 2019-05-31 ENCOUNTER — APPOINTMENT (OUTPATIENT)
Dept: RADIOLOGY | Facility: IMAGING CENTER | Age: 84
End: 2019-05-31
Attending: EMERGENCY MEDICINE
Payer: MEDICARE

## 2019-05-31 VITALS
OXYGEN SATURATION: 91 % | HEIGHT: 63 IN | SYSTOLIC BLOOD PRESSURE: 100 MMHG | WEIGHT: 133 LBS | BODY MASS INDEX: 23.57 KG/M2 | HEART RATE: 63 BPM | TEMPERATURE: 98.1 F | DIASTOLIC BLOOD PRESSURE: 70 MMHG | RESPIRATION RATE: 15 BRPM

## 2019-05-31 DIAGNOSIS — R05.9 COUGH: ICD-10-CM

## 2019-05-31 DIAGNOSIS — J40 BRONCHITIS: ICD-10-CM

## 2019-05-31 DIAGNOSIS — J30.2 SEASONAL ALLERGIC RHINITIS, UNSPECIFIED TRIGGER: ICD-10-CM

## 2019-05-31 PROCEDURE — 71046 X-RAY EXAM CHEST 2 VIEWS: CPT | Mod: 26 | Performed by: EMERGENCY MEDICINE

## 2019-05-31 PROCEDURE — 99203 OFFICE O/P NEW LOW 30 MIN: CPT | Performed by: EMERGENCY MEDICINE

## 2019-05-31 RX ORDER — DIPHENHYDRAMINE HCL 25 MG
25 TABLET ORAL EVERY 6 HOURS PRN
COMMUNITY
End: 2023-07-11

## 2019-05-31 ASSESSMENT — ENCOUNTER SYMPTOMS
SHORTNESS OF BREATH: 0
COUGH: 1
CHILLS: 0
RHINORRHEA: 0
SORE THROAT: 0
SWEATS: 0
MYALGIAS: 0
FEVER: 0
SPUTUM PRODUCTION: 1
HEARTBURN: 0
DIARRHEA: 0
WHEEZING: 0
VOMITING: 0
HEMOPTYSIS: 0

## 2019-05-31 NOTE — PROGRESS NOTES
Subjective:      Lisa Garza is a 83 y.o. female who presents with Cough (hx of bronchitis and pneumonia, x1wk yellow phlegm with cough)            Cough   This is a new problem. The current episode started in the past 7 days. The problem has been unchanged. The cough is productive of sputum. Associated symptoms include nasal congestion. Pertinent negatives include no chest pain, chills, ear congestion, fever, heartburn, hemoptysis, myalgias, rash, rhinorrhea, sore throat, shortness of breath, sweats or wheezing. Nothing aggravates the symptoms. She has tried OTC cough suppressant for the symptoms. The treatment provided no relief. Her past medical history is significant for bronchitis, environmental allergies and pneumonia.       Review of Systems   Constitutional: Negative for chills and fever.   HENT: Positive for congestion. Negative for nosebleeds, rhinorrhea and sore throat.    Respiratory: Positive for cough and sputum production. Negative for hemoptysis, shortness of breath and wheezing.    Cardiovascular: Negative for chest pain.   Gastrointestinal: Negative for diarrhea, heartburn and vomiting.   Musculoskeletal: Negative for myalgias.   Skin: Negative for rash.   Endo/Heme/Allergies: Positive for environmental allergies.     PMH:  has a past medical history of Arthritis; Cancer (HCC); Heart murmur; High cholesterol; Unspecified cataract; and Unspecified urinary incontinence.  MEDS:   Current Outpatient Prescriptions:   •  diphenhydrAMINE (BENADRYL) 25 MG Tab, Take 25 mg by mouth every 6 hours as needed for Sleep., Disp: , Rfl:   •  simvastatin (ZOCOR) 10 MG Tab, Take 1 Tab by mouth every evening., Disp: 100 Tab, Rfl: 3  •  rivaroxaban (XARELTO) 20 MG Tab tablet, Take 1 Tab by mouth with dinner., Disp: 90 Tab, Rfl: 3  •  Multiple Vitamins-Minerals (VITRUM 50+ SENIOR MULTI PO), Take 1 Tab by mouth every day., Disp: , Rfl:   •  Cholecalciferol (VITAMIN D-3) 1000 UNITS CAPS, Take 1 Tab by mouth every  morning., Disp: , Rfl:   •  Omega 3 1200 MG CAPS, Take 1 Cap by mouth every morning., Disp: , Rfl:   •  Calcium 500 MG CHEW, Take 1 Tab by mouth every morning., Disp: , Rfl:   •  metoprolol (LOPRESSOR) 25 MG Tab, TAKE 2 TABLETS BY MOUTH 2 TIMES A DAY (Patient not taking: Reported on 5/31/2019), Disp: 360 Tab, Rfl: 0  •  Psyllium (METAMUCIL FIBER PO), Take  by mouth., Disp: , Rfl:   •  mupirocin (BACTROBAN) 2 % Ointment, Apply 1 Application to affected area(s) 2 Times a Day. On surgical sites Both arms,and upper  Right back/shoulder area., Disp: , Rfl:   ALLERGIES:   Allergies   Allergen Reactions   • Clindamycin Hcl-Fd&C Blue #1      unknown   • Food      Scallops-vomiting, citrus-itching, redness, oatmeal-diarrhea     SURGHX:   Past Surgical History:   Procedure Laterality Date   • APPENDECTOMY LAPAROSCOPIC  10/10/2018    Procedure: APPENDECTOMY LAPAROSCOPIC;  Surgeon: Sushil Dalton M.D.;  Location: SURGERY [facility];  Service: General   • HIP ARTHROPLASTY TOTAL Left 7/6/2015    Procedure: HIP ARTHROPLASTY TOTAL;  Surgeon: Navid Howard M.D.;  Location: SURGERY [facility];  Service:    • OTHER Right 2006    cyst & bone spur on right hand   • EYE SURGERY      bilateral IOL     SOCHX:  reports that she quit smoking about 28 years ago. Her smoking use included Cigarettes. She has a 33.00 pack-year smoking history. She has never used smokeless tobacco. She reports that she drinks about 7.0 oz of alcohol per week . She reports that she does not use drugs.  FH: family history includes Cancer in her father; Heart Disease in her father; Hyperlipidemia in her sister; Hypertension in her sister; Lung Disease in her father; No Known Problems in her maternal grandfather, maternal grandmother, paternal grandfather, paternal grandmother, son, and son; Stroke in her sister.     Objective:     /70 (BP Location: Left arm, Patient Position: Sitting, BP Cuff Size: Adult)   Pulse 63   Temp 36.7 °C (98.1 °F)  "(Temporal)   Resp 15   Ht 1.6 m (5' 3\")   Wt 60.3 kg (133 lb)   SpO2 91%   BMI 23.56 kg/m²      Physical Exam   Constitutional: She is oriented to person, place, and time. She appears well-developed and well-nourished. She is cooperative.  Non-toxic appearance. No distress.   HENT:   Right Ear: Tympanic membrane and ear canal normal.   Left Ear: Tympanic membrane and ear canal normal.   Nose: Mucosal edema present. No rhinorrhea.   Mouth/Throat: No trismus in the jaw. No uvula swelling. No oropharyngeal exudate, posterior oropharyngeal edema or posterior oropharyngeal erythema.   Eyes: Conjunctivae are normal.   Neck: Trachea normal and phonation normal. Neck supple. No JVD present.   Cardiovascular: Normal rate and regular rhythm.   No extrasystoles are present. Exam reveals no gallop.    Murmur heard.   Systolic murmur is present with a grade of 3/6   No significant pedal edema.   Pulmonary/Chest: Effort normal. She has no wheezes. She has no rhonchi. She has no rales.   Lymphadenopathy:     She has no cervical adenopathy.   Neurological: She is alert and oriented to person, place, and time.   Skin: Skin is warm and dry.   Psychiatric: She has a normal mood and affect.               Assessment/Plan:     1. Bronchitis  Rx ProAir    2. Seasonal allergic rhinitis, unspecified trigger  Recommended nasal saline irrigation daily, OTC inhaled nasal steroid daily, OTC nonsedating antihistamine when necessary as tolerated.    3. Cough  - DX-CHEST-2 VIEWS; per radiologist:  Lungs are hyperinflated. The cardiomediastinal silhouette is stable. Atherosclerotic calcification is seen.  No focal consolidation, pleural effusion or pneumothorax is identified.  Costophrenic angles are clear. Degenerative changes are seen in the   spine.      "

## 2019-06-17 ENCOUNTER — APPOINTMENT (RX ONLY)
Dept: URBAN - METROPOLITAN AREA CLINIC 36 | Facility: CLINIC | Age: 84
Setting detail: DERMATOLOGY
End: 2019-06-17

## 2019-06-17 DIAGNOSIS — L57.0 ACTINIC KERATOSIS: ICD-10-CM

## 2019-06-17 PROCEDURE — ? PHOTODYNAMIC THERAPY COUNSELING

## 2019-06-17 PROCEDURE — 96574 DBRDMT PRMLG LES W/PDT: CPT

## 2019-06-17 PROCEDURE — ? PDT: RED

## 2019-06-17 ASSESSMENT — LOCATION ZONE DERM: LOCATION ZONE: FACE

## 2019-06-17 ASSESSMENT — LOCATION DETAILED DESCRIPTION DERM: LOCATION DETAILED: SUPERIOR MID FOREHEAD

## 2019-06-17 ASSESSMENT — LOCATION SIMPLE DESCRIPTION DERM: LOCATION SIMPLE: SUPERIOR FOREHEAD

## 2019-06-17 NOTE — PROCEDURE: PDT: RED
Illumination Time: 7 min 07 sec
Ndc# (Optional): [id number]
Post-Care Instructions: I reviewed with the patient in detail post-care instructions. Patient is to avoid sunlight for the next 2 days, and wear sun protection. Patients may expect sunburn like redness, discomfort and scabbing.
Debridement Text (Will Only Render In Visit Note If You Select Debridement Option Under Who Performed The Pdt Field): Prior to application of the photodynamic medication the hyperkeratotic lesions were curetted to make them more amenable to therapy.
Incubation Time (Set To 00:00:00 If Not Wanted): 01:30:00
Expiration Date (Optional): 06.2020
Anesthesia Type: 1% lidocaine with epinephrine
Consent: Written consent obtained.  The risks were reviewed with the patient including but not limited to: pigmentary changes, pain, blistering, scabbing, redness, and the remote possibility of scarring.
Who Performed The Pdt?: Performed by MD SONALI, DESIREE or TERRIE with Pre-Procedure Debridement of Hyperkeratotic Lesions ([id number])
Photosensitizer: Ameluz
Application Method: under occlusion
Detail Level: Zone
Lot # (Optional): [id number]
Number Of Kerasticks/Tubes Of Metvixia/Tubes Of Ameluz Used: 1
Total Number Of Aks Treated (Optional To Report): 0

## 2019-07-01 NOTE — PROGRESS NOTES
Outcome: **Returning patients call // Vm not set up // no message was left at this time. **    Please transfer to Patient Outreach Team at [phone] when patient returns call.      Attempt # 2

## 2019-07-03 DIAGNOSIS — I48.91 ATRIAL FIBRILLATION WITH RVR (HCC): ICD-10-CM

## 2019-07-08 ENCOUNTER — OFFICE VISIT (OUTPATIENT)
Dept: MEDICAL GROUP | Facility: MEDICAL CENTER | Age: 84
End: 2019-07-08
Payer: MEDICARE

## 2019-07-08 VITALS
BODY MASS INDEX: 23.44 KG/M2 | HEART RATE: 66 BPM | TEMPERATURE: 98.7 F | RESPIRATION RATE: 16 BRPM | HEIGHT: 63 IN | OXYGEN SATURATION: 94 % | SYSTOLIC BLOOD PRESSURE: 120 MMHG | WEIGHT: 132.28 LBS | DIASTOLIC BLOOD PRESSURE: 62 MMHG

## 2019-07-08 DIAGNOSIS — I48.0 PAF (PAROXYSMAL ATRIAL FIBRILLATION) (HCC): ICD-10-CM

## 2019-07-08 DIAGNOSIS — Z98.890 HISTORY OF MELANOMA EXCISION: ICD-10-CM

## 2019-07-08 DIAGNOSIS — Z85.820 HISTORY OF MELANOMA EXCISION: ICD-10-CM

## 2019-07-08 DIAGNOSIS — I10 ESSENTIAL HYPERTENSION: ICD-10-CM

## 2019-07-08 DIAGNOSIS — I51.89 DIASTOLIC DYSFUNCTION: ICD-10-CM

## 2019-07-08 DIAGNOSIS — E78.2 MIXED HYPERLIPIDEMIA: ICD-10-CM

## 2019-07-08 PROBLEM — K35.30 ACUTE APPENDICITIS WITH LOCALIZED PERITONITIS, WITHOUT PERFORATION, ABSCESS, OR GANGRENE: Status: RESOLVED | Noted: 2018-10-11 | Resolved: 2019-07-08

## 2019-07-08 PROBLEM — A41.9 SEPSIS DUE TO PNEUMONIA (HCC): Status: RESOLVED | Noted: 2018-03-27 | Resolved: 2019-07-08

## 2019-07-08 PROBLEM — J18.9 SEPSIS DUE TO PNEUMONIA (HCC): Status: RESOLVED | Noted: 2018-03-27 | Resolved: 2019-07-08

## 2019-07-08 PROCEDURE — 8041 PR SCP AHA: Performed by: FAMILY MEDICINE

## 2019-07-08 PROCEDURE — 99214 OFFICE O/P EST MOD 30 MIN: CPT | Performed by: FAMILY MEDICINE

## 2019-07-08 ASSESSMENT — PATIENT HEALTH QUESTIONNAIRE - PHQ9: CLINICAL INTERPRETATION OF PHQ2 SCORE: 0

## 2019-07-08 NOTE — PROGRESS NOTES
Annual Health Assessment Questions:    1.  Are you currently engaging in any exercise or physical activity? Yes    2.  How would you describe your mood or emotional well-being today? good    3.  Have you had any falls in the last year? No    4.  Have you noticed any problems with your balance or had difficulty walking? No    5.  In the last six months have you experienced any leakage of urine? No    6. DPA/Advanced Directive: Patient has Advanced Directive on file.      CC: Hypertension, hyperlipidemia, A. fib, history of melanoma excision, diastolic dysfunction    HPI:   Lisa presents today discuss upon    Essential hypertension  Pressure has been adequately controlled.  Denies headache, chest pain, shortness of breath.  Patient has been on metoprolol 25 mg twice a day.  No side effects.    Mixed hyperlipidemia  Her most recent lipid panel showed normal total cholesterol, triglycerides, but her LDL was 114.  Patient has been on simvastatin 10 mg daily.  No history of diabetes, CAD, or stroke.    PAF (paroxysmal atrial fibrillation) ([facility])  Patient has been asymptomatic.  Denies palpitation, chest pain, shortness of breath.  She is currently on metoprolol, and Xarelto.    History of melanoma excision  Patient has history of melanoma that was removed a year and a half ago.  Currently asymptomatic continue follow-up with dermatology as needed    Diastolic dysfunction  Denies shortness of breath or leg swelling.  Last echocardiogram in March 2018 showed normal ejection fraction(70%), and diastolic dysfunction grade 2.      Patient Active Problem List    Diagnosis Date Noted   • PAF (paroxysmal atrial fibrillation) ([facility]) 08/14/2018   • Aortic stenosis 08/14/2018   • Chronic anticoagulation 08/14/2018   • Pneumonia due to infectious organism 03/29/2018   • Acute respiratory failure with hypoxia ([facility]) 03/28/2018   • Abnormal LFTs 03/27/2018   • Atrial fibrillation with RVR ([facility]) 03/27/2018   • Anemia 03/27/2018   •  Hyponatremia 03/27/2018   • Hypoalbuminemia 03/27/2018   • Mixed hyperlipidemia 11/16/2017   • Chronic seasonal allergic rhinitis due to pollen 11/16/2017   • Osteoarthritis of right hip 07/06/2015   • History of total left hip arthroplasty 05/21/2015   • Osteoarthritis 05/21/2015   • Osteoarthritis, multiple sites 05/21/2015       Current Outpatient Prescriptions   Medication Sig Dispense Refill   • metoprolol (LOPRESSOR) 25 MG Tab take 2 tablets by mouth twice daily 360 Tab 0   • diphenhydrAMINE (BENADRYL) 25 MG Tab Take 25 mg by mouth every 6 hours as needed for Sleep.     • Albuterol Sulfate 108 (90 Base) MCG/ACT AEROSOL POWDER, BREATH ACTIVATED Inhale 1-2 Puffs by mouth every 6 hours as needed (coughing, wheezing). 1 Each 0   • simvastatin (ZOCOR) 10 MG Tab Take 1 Tab by mouth every evening. 100 Tab 3   • metoprolol (LOPRESSOR) 25 MG Tab TAKE 2 TABLETS BY MOUTH 2 TIMES A DAY (Patient not taking: Reported on 5/31/2019) 360 Tab 0   • Psyllium (METAMUCIL FIBER PO) Take  by mouth.     • mupirocin (BACTROBAN) 2 % Ointment Apply 1 Application to affected area(s) 2 Times a Day. On surgical sites  Both arms,and upper  Right back/shoulder area.     • rivaroxaban (XARELTO) 20 MG Tab tablet Take 1 Tab by mouth with dinner. 90 Tab 3   • Multiple Vitamins-Minerals (VITRUM 50+ SENIOR MULTI PO) Take 1 Tab by mouth every day.     • Cholecalciferol (VITAMIN D-3) 1000 UNITS CAPS Take 1 Tab by mouth every morning.     • Omega 3 1200 MG CAPS Take 1 Cap by mouth every morning.     • Calcium 500 MG CHEW Take 1 Tab by mouth every morning.       No current facility-administered medications for this visit.          Allergies as of 07/08/2019 - Reviewed 07/08/2019   Allergen Reaction Noted   • Clindamycin hcl-fd&c blue #1  05/29/2015   • Food  07/02/2015        ROS: Denies any chest pain, Shortness of breath, Changes bowel or bladder, Lower extremity edema.    Physical Exam:  /62 (BP Location: Right arm, Patient Position: Sitting,  "BP Cuff Size: Adult)   Pulse 66   Temp 37.1 °C (98.7 °F) (Temporal)   Resp 16   Ht 1.6 m (5' 3\")   Wt 60 kg (132 lb 4.4 oz)   SpO2 94%   BMI 23.43 kg/m²   Gen.: Well-developed, well-nourished, no apparent distress,pleasant and cooperative with the examination  Skin:  Warm and dry with good turgor. No rashes or suspicious lesions in visible areas  HEENT:Sinuses nontender with palpation, TMs clear, nares patent with pink mucosa and clear rhinorrhea,no septal deviation ,polyps or lesions. lips without lesions, oropharynx clear.  Neck: Trachea midline,no masses or adenopathy. No JVD.  Cor: Regular rate and rhythm without murmur, gallop or rub.  Lungs: Respirations unlabored.Clear to auscultation with equal breath sounds bilaterally. No wheezes, rhonchi.  Extremities: No cyanosis, clubbing or edema.      Assessment and Plan.   83 y.o. female     1. Essential hypertension  Adequately controlled.  Continue on metoprolol 25 mg twice a day.    2. Mixed hyperlipidemia  Most recent lipid panel showed .  Will increase simvastatin to from 10 mg to 20 mg.    3. PAF (paroxysmal atrial fibrillation) (HCC)  Stable.  Asymptomatic.  Continue on metoprolol, and Xarelto.    4. History of melanoma excision  Stable.  Asymptomatic.    5. Diastolic dysfunction  Stable.  Asymptomatic.  Last echocardiogram in March 2018 showed normal ejection fraction(70%), and diastolic dysfunction grade 2.          "

## 2019-07-14 ENCOUNTER — OFFICE VISIT (OUTPATIENT)
Dept: URGENT CARE | Facility: CLINIC | Age: 84
End: 2019-07-14
Payer: MEDICARE

## 2019-07-14 VITALS
RESPIRATION RATE: 14 BRPM | DIASTOLIC BLOOD PRESSURE: 70 MMHG | HEART RATE: 76 BPM | TEMPERATURE: 98.6 F | BODY MASS INDEX: 23.21 KG/M2 | SYSTOLIC BLOOD PRESSURE: 122 MMHG | HEIGHT: 63 IN | WEIGHT: 131 LBS | OXYGEN SATURATION: 95 %

## 2019-07-14 DIAGNOSIS — R07.89 STERNUM PAIN: ICD-10-CM

## 2019-07-14 PROCEDURE — 99214 OFFICE O/P EST MOD 30 MIN: CPT | Performed by: PHYSICIAN ASSISTANT

## 2019-07-19 NOTE — PROGRESS NOTES
Chief Complaint   Patient presents with   • Chest Pain     pain in sternum area and arms x 5 days       HISTORY OF PRESENT ILLNESS: Patient is a 83 y.o. female who presents today for 5 days of intermittent discomfort in sternum/chest wall.  Patient states she was doing gripping,arm strength testing at her PCP office but only for a few seconds. Denies any other strain or trauma.  She notes the pain is intermittent and sometimes feels it in her upper back.  She notes it is slightly more the right than the left but admits she researched that women can have atypical chest pain presentations. She states that she does not have pain with activity and denies any SOB, coughing, dizziness, upset stomach, nausea or vomiting.  No numbness or tingling.   No attempted interventions.     Patient Active Problem List    Diagnosis Date Noted   • PAF (paroxysmal atrial fibrillation) ([facility]) 08/14/2018   • Aortic stenosis 08/14/2018   • Chronic anticoagulation 08/14/2018   • Pneumonia due to infectious organism 03/29/2018   • Acute respiratory failure with hypoxia ([facility]) 03/28/2018   • Abnormal LFTs 03/27/2018   • Atrial fibrillation with RVR ([facility]) 03/27/2018   • Anemia 03/27/2018   • Hyponatremia 03/27/2018   • Hypoalbuminemia 03/27/2018   • Mixed hyperlipidemia 11/16/2017   • Chronic seasonal allergic rhinitis due to pollen 11/16/2017   • Osteoarthritis of right hip 07/06/2015   • History of total left hip arthroplasty 05/21/2015   • Osteoarthritis 05/21/2015   • Osteoarthritis, multiple sites 05/21/2015       Allergies:Clindamycin hcl-fd&c blue #1 and Food    Current Outpatient Prescriptions Ordered in [facility]   Medication Sig Dispense Refill   • metoprolol (LOPRESSOR) 25 MG Tab take 2 tablets by mouth twice daily 360 Tab 0   • diphenhydrAMINE (BENADRYL) 25 MG Tab Take 25 mg by mouth every 6 hours as needed for Sleep.     • Albuterol Sulfate 108 (90 Base) MCG/ACT AEROSOL POWDER, BREATH ACTIVATED Inhale 1-2 Puffs by mouth every 6 hours as  needed (coughing, wheezing). 1 Each 0   • simvastatin (ZOCOR) 10 MG Tab Take 1 Tab by mouth every evening. 100 Tab 3   • Psyllium (METAMUCIL FIBER PO) Take  by mouth.     • mupirocin (BACTROBAN) 2 % Ointment Apply 1 Application to affected area(s) 2 Times a Day. On surgical sites  Both arms,and upper  Right back/shoulder area.     • rivaroxaban (XARELTO) 20 MG Tab tablet Take 1 Tab by mouth with dinner. 90 Tab 3   • Multiple Vitamins-Minerals (VITRUM 50+ SENIOR MULTI PO) Take 1 Tab by mouth every day.     • Cholecalciferol (VITAMIN D-3) 1000 UNITS CAPS Take 1 Tab by mouth every morning.     • Omega 3 1200 MG CAPS Take 1 Cap by mouth every morning.     • Calcium 500 MG CHEW Take 1 Tab by mouth every morning.       No current [facility]-ordered facility-administered medications on file.        Past Medical History:   Diagnosis Date   • Arthritis     osteo, hip   • Cancer (HCC)     skin on left shoulder   • Heart murmur    • High cholesterol    • Unspecified cataract     tony IOL   • Unspecified urinary incontinence        Social History   Substance Use Topics   • Smoking status: Former Smoker     Packs/day: 1.00     Years: 33.00     Types: Cigarettes     Quit date: 1991   • Smokeless tobacco: Never Used      Comment: Started smoking at age 23   • Alcohol use 7.0 oz/week     14 Glasses of wine per week      Comment: 1 glass wine/day       Family Status   Relation Status   • Mo    • Fa    • Sis    • Bro    • Son Alive   • Son Alive   • MGMo    • MGFa    • PGMo    • PGFa    • Sis Alive     Family History   Problem Relation Age of Onset   • Cancer Father         GI   • Heart Disease Father    • Lung Disease Father    • Stroke Sister    • No Known Problems Son    • No Known Problems Son    • No Known Problems Maternal Grandmother    • No Known Problems Maternal Grandfather    • No Known Problems Paternal Grandmother    • No Known Problems Paternal Grandfather   "  • Hypertension Sister    • Hyperlipidemia Sister        ROS:  Review of Systems   Constitutional: Negative for fever, chills, weight loss and malaise/fatigue.   HENT: Negative for ear pain, nosebleeds, congestion, sore throat and neck pain.    Eyes: Negative for blurred vision.   Respiratory: Negative for cough, sputum production, shortness of breath and wheezing.    Cardiovascular: SEE HPI  Gastrointestinal: Negative for heartburn, nausea, vomiting and abdominal pain.       Exam:  /70 (BP Location: Left arm, Patient Position: Sitting, BP Cuff Size: Adult)   Pulse 76   Temp 37 °C (98.6 °F) (Temporal)   Resp 14   Ht 1.6 m (5' 2.99\")   Wt 59.4 kg (131 lb)   SpO2 95%   General:  Well nourished, well developed female in NAD  Eyes: PERRLA, EOM within normal limits, no conjunctival injection, no scleral icterus, visual fields and acuity grossly intact.  Mouth: reasonable hygiene, no erythema exudates or tonsillar enlargement.  Neck: no masses, range of motion within normal limits, no tracheal deviation. No lymphadenopathy  Pulmonary: Normal respiratory effort, no wheezes, crackles, or rhonchi.  Cardiovascular: regular rate and rhythm without murmurs, rubs, or gallops. No reproducible chest wall TTP.   Abdomen: Soft, nontender, nondistended. Normal bowel sounds. No hepatosplenomegaly or masses, or hernias. No rebound or guarding.  Skin: No visible rashes or lesion. Warm, pink, dry.   Extremities: no clubbing, cyanosis, or edema.  Neuro: A&O x 3. Speech normal/clear.  Normal gait.         Assessment/Plan:  1. Sternum pain  EKG       -EKG without changes, compared to on file from 10/10/18.  -did discuss with patient that I advise she get further work up in the ED based on her presenting symptoms. Although atypical she does have known cardiac hx.  EKG is not comprehensive cardiac work up and patient expresses understanding of this.  She declines any transport to ED.          Latisha Meier P.A.-C.    "

## 2019-07-29 DIAGNOSIS — E78.5 HYPERLIPIDEMIA, UNSPECIFIED HYPERLIPIDEMIA TYPE: ICD-10-CM

## 2019-07-29 RX ORDER — SIMVASTATIN 10 MG
10 TABLET ORAL EVERY EVENING
Qty: 100 TAB | Refills: 3 | Status: SHIPPED | OUTPATIENT
Start: 2019-07-29 | End: 2019-08-01 | Stop reason: SDUPTHER

## 2019-07-29 NOTE — TELEPHONE ENCOUNTER
Was the patient seen in the last year in this department? Yes7/8/2018    Does patient have an active prescription for medications requested? No     Received Request Via: Pharmacy

## 2019-08-01 DIAGNOSIS — E78.5 HYPERLIPIDEMIA, UNSPECIFIED HYPERLIPIDEMIA TYPE: ICD-10-CM

## 2019-08-01 RX ORDER — SIMVASTATIN 10 MG
TABLET ORAL
Qty: 100 TAB | Refills: 3 | Status: SHIPPED | OUTPATIENT
Start: 2019-08-01 | End: 2020-05-14

## 2019-08-01 NOTE — TELEPHONE ENCOUNTER
Patient states she is to take 1 tab daily now. Can you send the script to her pharmacy.     Thanks.

## 2019-08-27 ENCOUNTER — APPOINTMENT (RX ONLY)
Dept: URBAN - METROPOLITAN AREA CLINIC 4 | Facility: CLINIC | Age: 84
Setting detail: DERMATOLOGY
End: 2019-08-27

## 2019-08-27 DIAGNOSIS — Z86.006 PERSONAL HISTORY OF MELANOMA IN-SITU: ICD-10-CM

## 2019-08-27 DIAGNOSIS — L81.4 OTHER MELANIN HYPERPIGMENTATION: ICD-10-CM

## 2019-08-27 DIAGNOSIS — B07.8 OTHER VIRAL WARTS: ICD-10-CM

## 2019-08-27 DIAGNOSIS — Z86.007 PERSONAL HISTORY OF IN-SITU NEOPLASM OF SKIN: ICD-10-CM

## 2019-08-27 DIAGNOSIS — L82.1 OTHER SEBORRHEIC KERATOSIS: ICD-10-CM

## 2019-08-27 DIAGNOSIS — L82.0 INFLAMED SEBORRHEIC KERATOSIS: ICD-10-CM

## 2019-08-27 DIAGNOSIS — Z85.828 PERSONAL HISTORY OF OTHER MALIGNANT NEOPLASM OF SKIN: ICD-10-CM

## 2019-08-27 PROBLEM — D48.5 NEOPLASM OF UNCERTAIN BEHAVIOR OF SKIN: Status: ACTIVE | Noted: 2019-08-27

## 2019-08-27 PROBLEM — Z85.820 PERSONAL HISTORY OF MALIGNANT MELANOMA OF SKIN: Status: ACTIVE | Noted: 2019-08-27

## 2019-08-27 PROCEDURE — 11102 TANGNTL BX SKIN SINGLE LES: CPT | Mod: 59

## 2019-08-27 PROCEDURE — ? LIQUID NITROGEN

## 2019-08-27 PROCEDURE — ? COUNSELING

## 2019-08-27 PROCEDURE — 17110 DESTRUCTION B9 LES UP TO 14: CPT

## 2019-08-27 PROCEDURE — ? BIOPSY BY SHAVE METHOD

## 2019-08-27 PROCEDURE — 99214 OFFICE O/P EST MOD 30 MIN: CPT | Mod: 25

## 2019-08-27 ASSESSMENT — LOCATION SIMPLE DESCRIPTION DERM
LOCATION SIMPLE: RIGHT GREAT TOE
LOCATION SIMPLE: LEFT POSTERIOR UPPER ARM
LOCATION SIMPLE: RIGHT FOREARM
LOCATION SIMPLE: RIGHT LOWER BACK
LOCATION SIMPLE: LEFT ZYGOMA
LOCATION SIMPLE: RIGHT BACK

## 2019-08-27 ASSESSMENT — LOCATION DETAILED DESCRIPTION DERM
LOCATION DETAILED: RIGHT LATERAL GREAT TOE
LOCATION DETAILED: RIGHT SUPERIOR LATERAL UPPER BACK
LOCATION DETAILED: LEFT PROXIMAL LATERAL POSTERIOR UPPER ARM
LOCATION DETAILED: RIGHT PROXIMAL DORSAL FOREARM
LOCATION DETAILED: LEFT MEDIAL ZYGOMA
LOCATION DETAILED: RIGHT SUPERIOR MEDIAL MIDBACK

## 2019-08-27 ASSESSMENT — LOCATION ZONE DERM
LOCATION ZONE: ARM
LOCATION ZONE: TRUNK
LOCATION ZONE: TOE
LOCATION ZONE: FACE

## 2019-08-27 NOTE — PROCEDURE: BIOPSY BY SHAVE METHOD
Depth Of Biopsy: dermis
Consent: Written consent was obtained and risks were reviewed including but not limited to scarring, infection, bleeding, scabbing, incomplete removal, nerve damage and allergy to anesthesia.
X Size Of Lesion In Cm: 0
Electrodesiccation Text: The wound bed was treated with electrodesiccation after the biopsy was performed.
Bill For Surgical Tray: no
Notification Instructions: Patient will be notified of biopsy results. However, patient instructed to call the office if not contacted within 2 weeks.
Wound Care: Petrolatum
Lab Facility: 
Dressing: bandage
Electrodesiccation And Curettage Text: The wound bed was treated with electrodesiccation and curettage after the biopsy was performed.
Type Of Destruction Used: Curettage
Anesthesia Volume In Cc: 0.5
Biopsy Type: H and E
Detail Level: Detailed
Biopsy Method: Dermablade
Billing Type: Third-Party Bill
Was A Bandage Applied: Yes
Hemostasis: Drysol
Silver Nitrate Text: The wound bed was treated with silver nitrate after the biopsy was performed.
Cryotherapy Text: The wound bed was treated with cryotherapy after the biopsy was performed.
Anesthesia Type: 1% lidocaine with epinephrine
Post-Care Instructions: I reviewed with the patient in detail post-care instructions. Patient is to keep the biopsy site dry overnight, and then apply bacitracin twice daily until healed. Patient may apply hydrogen peroxide soaks to remove any crusting.
Lab: [id number]
Curettage Text: The wound bed was treated with curettage after the biopsy was performed.

## 2019-08-27 NOTE — PROCEDURE: LIQUID NITROGEN
Detail Level: Detailed
Consent: The patient's consent was obtained including but not limited to risks of crusting, scabbing, blistering, scarring, darker or lighter pigmentary change, recurrence, incomplete removal and infection.
Post-Care Instructions: I reviewed with the patient in detail post-care instructions. Patient is to wear sunprotection, and avoid picking at any of the treated lesions. Pt may apply Vaseline to crusted or scabbing areas.
Add 52 Modifier (Optional): no
Medical Necessity Clause: This procedure was medically necessary because the lesions that were treated were:
Include Z78.9 (Other Specified Conditions Influencing Health Status) As An Associated Diagnosis?: Yes
Medical Necessity Information: It is in your best interest to select a reason for this procedure from the list below. All of these items fulfill various CMS LCD requirements except the new and changing color options.

## 2019-09-16 ENCOUNTER — OFFICE VISIT (OUTPATIENT)
Dept: CARDIOLOGY | Facility: MEDICAL CENTER | Age: 84
End: 2019-09-16
Payer: MEDICARE

## 2019-09-16 VITALS
DIASTOLIC BLOOD PRESSURE: 70 MMHG | BODY MASS INDEX: 22.77 KG/M2 | SYSTOLIC BLOOD PRESSURE: 118 MMHG | WEIGHT: 133.4 LBS | HEART RATE: 64 BPM | HEIGHT: 64 IN | OXYGEN SATURATION: 94 %

## 2019-09-16 DIAGNOSIS — I25.10 CORONARY ARTERY CALCIFICATION SEEN ON CAT SCAN: ICD-10-CM

## 2019-09-16 DIAGNOSIS — E78.2 MIXED HYPERLIPIDEMIA: ICD-10-CM

## 2019-09-16 DIAGNOSIS — I35.0 AORTIC VALVE STENOSIS, ETIOLOGY OF CARDIAC VALVE DISEASE UNSPECIFIED: ICD-10-CM

## 2019-09-16 DIAGNOSIS — I48.0 PAF (PAROXYSMAL ATRIAL FIBRILLATION) (HCC): ICD-10-CM

## 2019-09-16 DIAGNOSIS — Z79.01 CHRONIC ANTICOAGULATION: ICD-10-CM

## 2019-09-16 PROBLEM — I48.91 ATRIAL FIBRILLATION WITH RVR (HCC): Status: RESOLVED | Noted: 2018-03-27 | Resolved: 2019-09-16

## 2019-09-16 PROCEDURE — 99214 OFFICE O/P EST MOD 30 MIN: CPT | Performed by: INTERNAL MEDICINE

## 2019-09-16 ASSESSMENT — ENCOUNTER SYMPTOMS
SHORTNESS OF BREATH: 0
BRUISES/BLEEDS EASILY: 1
MYALGIAS: 0
PALPITATIONS: 0
DIZZINESS: 0
LOSS OF CONSCIOUSNESS: 0
COUGH: 0

## 2019-09-16 NOTE — PROGRESS NOTES
Chief Complaint   Patient presents with   • Atrial Fibrillation       Subjective:   Lisa Garza is a 83 y.o. female who presents today for follow-up evaluation of PAF, chronic anticoagulation and mild aortic stenosis.    Last seen on 3/7/2019    Since 3/7/2019 the patient said no cardiac symptoms or problems.  Still under a lot of stress related to her 's illness who is now on hospice in an extended care facility.  Has some bruisability and a rare mild nosebleed.  Her PCP increased her simvastatin twice daily.    Since 8/14/2018 the patient's had no cardiac symptoms.  Required emergent appendectomy 10/2018.  No perioperative cardiac problems.  No palpitations.  No bleeding problems.   has been placed in a group home.    Since 4/12/2018 the patient denies any cardiac symptoms.  No bleeding problems on Xarelto.  States that she may have had a prior episode of atrial fibrillation 14 years ago when she had pneumonia while living in [place].    The patient previously smoked but quit 20 years ago.  History of alcohol abuse.    Patient was hospitalized from 3/26 to 30/2018 for pneumonia and atrial fibrillation. She had a new onset of atrial fibrillation she was started on Xarelto for anticoagulation. Her atrial fibrillation reverted back to normal rhythm. Echocardiogram showed diastolic dysfunction grade 2 and ejection fraction 70%.    Past Medical History:   Diagnosis Date   • Arthritis     osteo, hip   • Cancer (HCC)     skin on left shoulder   • Heart murmur    • High cholesterol    • Unspecified cataract     tony IOL   • Unspecified urinary incontinence      Past Surgical History:   Procedure Laterality Date   • APPENDECTOMY LAPAROSCOPIC  10/10/2018    Procedure: APPENDECTOMY LAPAROSCOPIC;  Surgeon: Sushil Dalton M.D.;  [facility]ERY [facility];  Service: General   • HIP ARTHROPLASTY TOTAL Left 7/6/2015    Procedure: HIP ARTHROPLASTY TOTAL;  Surgeon: Navid Howard M.D.;  Location:  [facility]URGERY [facility];  Service:    • OTHER Right 2006    cyst & bone spur on right hand   • EYE SURGERY      bilateral IOL     Family History   Problem Relation Age of Onset   • Cancer Father         GI   • Heart Disease Father    • Lung Disease Father    • Stroke Sister    • No Known Problems Son    • No Known Problems Son    • No Known Problems Maternal Grandmother    • No Known Problems Maternal Grandfather    • No Known Problems Paternal Grandmother    • No Known Problems Paternal Grandfather    • Hypertension Sister    • Hyperlipidemia Sister      Social History     Socioeconomic History   • Marital status:      Spouse name: Not on file   • Number of children: Not on file   • Years of education: Not on file   • Highest education level: Not on file   Occupational History   • Not on file   Social Needs   • Financial resource strain: Not on file   • Food insecurity:     Worry: Not on file     Inability: Not on file   • Transportation needs:     Medical: Not on file     Non-medical: Not on file   Tobacco Use   • Smoking status: Former Smoker     Packs/day: 1.00     Years: 33.00     Pack years: 33.00     Types: Cigarettes     Last attempt to quit: 1991     Years since quittin.7   • Smokeless tobacco: Never Used   • Tobacco comment: Started smoking at age 23   Substance and Sexual Activity   • Alcohol use: Yes     Alcohol/week: 7.0 oz     Types: 14 Glasses of wine per week     Comment: 1 glass wine/day   • Drug use: No   • Sexual activity: Never     Partners: Male   Lifestyle   • Physical activity:     Days per week: Not on file     Minutes per session: Not on file   • Stress: Not on file   Relationships   • Social connections:     Talks on phone: Not on file     Gets together: Not on file     Attends Confucianist service: Not on file     Active member of club or organization: Not on file     Attends meetings of clubs or organizations: Not on file     Relationship status: Not on file   • Intimate  partner violence:     Fear of current or ex partner: Not on file     Emotionally abused: Not on file     Physically abused: Not on file     Forced sexual activity: Not on file   Other Topics Concern   • Not on file   Social History Narrative   • Not on file     Allergies   Allergen Reactions   • Clindamycin Hcl-Fd&C Blue #1      unknown   • Food      Scallops-vomiting, citrus-itching, redness, oatmeal-diarrhea     Outpatient Encounter Medications as of 9/16/2019   Medication Sig Dispense Refill   • simvastatin (ZOCOR) 10 MG Tab Take 1 tab daily (Patient taking differently: 10 mg 2 Times a Day. Take 1 tab daily) 100 Tab 3   • metoprolol (LOPRESSOR) 25 MG Tab take 2 tablets by mouth twice daily 360 Tab 0   • diphenhydrAMINE (BENADRYL) 25 MG Tab Take 25 mg by mouth every 6 hours as needed for Sleep.     • Psyllium (METAMUCIL FIBER PO) Take  by mouth.     • rivaroxaban (XARELTO) 20 MG Tab tablet Take 1 Tab by mouth with dinner. 90 Tab 3   • Multiple Vitamins-Minerals (VITRUM 50+ SENIOR MULTI PO) Take 1 Tab by mouth every day.     • Cholecalciferol (VITAMIN D-3) 1000 UNITS CAPS Take 1 Tab by mouth every morning.     • Omega 3 1200 MG CAPS Take 1 Cap by mouth every morning.     • Calcium 500 MG CHEW Take 1 Tab by mouth every morning.     • Albuterol Sulfate 108 (90 Base) MCG/ACT AEROSOL POWDER, BREATH ACTIVATED Inhale 1-2 Puffs by mouth every 6 hours as needed (coughing, wheezing). (Patient not taking: Reported on 9/16/2019) 1 Each 0   • [DISCONTINUED] mupirocin (BACTROBAN) 2 % Ointment Apply 1 Application to affected area(s) 2 Times a Day. On surgical sites  Both arms,and upper  Right back/shoulder area.       No facility-administered encounter medications on file as of 9/16/2019.      Review of Systems   HENT: Positive for nosebleeds.    Respiratory: Negative for cough and shortness of breath.    Cardiovascular: Negative for chest pain and palpitations.   Musculoskeletal: Negative for myalgias.   Neurological: Negative  "for dizziness and loss of consciousness.   Endo/Heme/Allergies: Bruises/bleeds easily.        Objective:   /70 (BP Location: Left arm, Patient Position: Sitting, BP Cuff Size: Adult)   Pulse 64   Ht 1.626 m (5' 4\")   Wt 60.5 kg (133 lb 6.4 oz)   SpO2 94%   BMI 22.90 kg/m²     Physical Exam   Constitutional: She is oriented to person, place, and time. She appears well-developed and well-nourished. No distress.   Neck: No JVD present.   Cardiovascular: Normal rate, normal heart sounds and intact distal pulses. An irregularly irregular rhythm present. Exam reveals no gallop and no friction rub.   No murmur heard.  Pulmonary/Chest: Effort normal and breath sounds normal. No respiratory distress. She has no wheezes. She has no rales.   Abdominal: Soft. She exhibits no distension and no mass. There is no tenderness.   Musculoskeletal: She exhibits no edema.   Neurological: She is alert and oriented to person, place, and time.   Skin: Skin is warm and dry.   Psychiatric: She has a normal mood and affect. Her behavior is normal.     ECHOCARDIOGRAM 3/28/2018  Left ventricular ejection fraction is visually estimated to be 70%.  Mild concentric left ventricular hypertrophy.  Grade II diastolic dysfunction.  Moderately dilated left atrium.  Mild mitral regurgitation.    Mild aortic stenosis.    08/14/2018 EKG: Normal sinus rhythm, rate 60.  First-degree AV block.  Possible previous inferior myocardial infarction.  Reviewed by myself.    Assessment:     1. PAF (paroxysmal atrial fibrillation) (HCC)     2. Chronic anticoagulation     3. Aortic valve stenosis, etiology of cardiac valve disease unspecified     4. Coronary artery calcification seen on CAT scan     5. Mixed hyperlipidemia         Medical Decision Making:  Today's Assessment / Status / Plan:   1.  PAF.  Maintaining NSR.  2.  Chronic anticoagulation.  On Xarelto.  3.  Aortic stenosis.  Mild.  4.  Coronary calcification by CT scan.  5.  Dyslipidemia, on " simvastatin with recent dose increase.  6.  History of excessive alcohol use.    Recommendation Discussion  1.  Patient's current cardiac status is stable.  2.  Lipid panel reviewed.  3.  Offered to switch from Xarelto to Eliquis but the patient declines.  4.  Continue current cardiac therapy.  5.  Follow-up 6 months.

## 2019-10-01 DIAGNOSIS — I48.91 ATRIAL FIBRILLATION WITH RVR (HCC): ICD-10-CM

## 2019-10-04 RX ORDER — RIVAROXABAN 20 MG/1
TABLET, FILM COATED ORAL
Qty: 90 TAB | Refills: 2 | Status: SHIPPED | OUTPATIENT
Start: 2019-10-04 | End: 2020-07-08

## 2019-10-10 DIAGNOSIS — I48.91 ATRIAL FIBRILLATION WITH RVR (HCC): ICD-10-CM

## 2019-11-15 ENCOUNTER — OFFICE VISIT (OUTPATIENT)
Dept: MEDICAL GROUP | Facility: MEDICAL CENTER | Age: 84
End: 2019-11-15
Payer: MEDICARE

## 2019-11-15 VITALS
SYSTOLIC BLOOD PRESSURE: 122 MMHG | WEIGHT: 133 LBS | DIASTOLIC BLOOD PRESSURE: 68 MMHG | HEIGHT: 64 IN | HEART RATE: 72 BPM | RESPIRATION RATE: 12 BRPM | TEMPERATURE: 98.4 F | BODY MASS INDEX: 22.71 KG/M2 | OXYGEN SATURATION: 94 %

## 2019-11-15 DIAGNOSIS — Z00.00 HEALTHCARE MAINTENANCE: ICD-10-CM

## 2019-11-15 DIAGNOSIS — I34.0 MILD MITRAL REGURGITATION: ICD-10-CM

## 2019-11-15 DIAGNOSIS — I35.0 MILD AORTIC STENOSIS: ICD-10-CM

## 2019-11-15 DIAGNOSIS — I25.10 CORONARY ARTERY CALCIFICATION SEEN ON CAT SCAN: ICD-10-CM

## 2019-11-15 DIAGNOSIS — E78.2 MIXED HYPERLIPIDEMIA: ICD-10-CM

## 2019-11-15 DIAGNOSIS — I48.0 PAF (PAROXYSMAL ATRIAL FIBRILLATION) (HCC): ICD-10-CM

## 2019-11-15 DIAGNOSIS — I10 ESSENTIAL HYPERTENSION: ICD-10-CM

## 2019-11-15 DIAGNOSIS — I51.89 DIASTOLIC DYSFUNCTION: ICD-10-CM

## 2019-11-15 PROCEDURE — G0439 PPPS, SUBSEQ VISIT: HCPCS | Performed by: FAMILY MEDICINE

## 2019-11-15 ASSESSMENT — ENCOUNTER SYMPTOMS: GENERAL WELL-BEING: GOOD

## 2019-11-15 ASSESSMENT — ACTIVITIES OF DAILY LIVING (ADL): BATHING_REQUIRES_ASSISTANCE: 0

## 2019-11-15 ASSESSMENT — PATIENT HEALTH QUESTIONNAIRE - PHQ9: CLINICAL INTERPRETATION OF PHQ2 SCORE: 0

## 2019-11-15 NOTE — PROGRESS NOTES
Chief Complaint   Patient presents with   • Annual Wellness Visit         HPI:  Lisa Garza is a 84 y.o. here for Medicare Annual Wellness Visit     PAF (paroxysmal atrial fibrillation) ([facility])  Denies any palpitation, chest pain, shortness of breath.  Patient is currently on metoprolol 25 mg twice a day and Xarelto 20 mg daily.  No side effects.  Patient has been following up with cardiology in a regular basis.    Mixed hyperlipidemia  He has been tolerating the statin. Denies muscle pain LFTs has been normal, patient has been on simvastatin 10 mg daily.    Essential hypertension  Has been adequately controlled on current medication. Denies headache, chest pain, and SOB.He is currently on metoprolol 25 mg daily.no side effects.    Coronary artery calcification seen on CAT scan  Patient has been asymptomatic denies any chest pain, shortness of breath.  He has no history of ACS.  Has been on simvastatin 10 mg daily.      Diastolic dysfunction/ Mild mitral regurgitation/Mild aortic stenosis  Last echocardiogram showed :  Left ventricular ejection fraction is visually estimated to be 70%.  Mild concentric left ventricular hypertrophy.  Grade II diastolic dysfunction.  Moderately dilated left atrium.  Mild mitral regurgitation.  Mild aortic stenosis.    Patient has been asymptomatic.  Denies any chest pain, shortness of breath, on the swelling.    Flu shot on pneumonia shot is up-to-date.  She usually does a mammogram every 2 years, last one was done a year ago.      Patient Active Problem List    Diagnosis Date Noted   • Essential hypertension 11/15/2019   • Mild mitral regurgitation 11/15/2019   • Diastolic dysfunction 11/15/2019   • Coronary artery calcification seen on CAT scan 09/16/2019   • PAF (paroxysmal atrial fibrillation) (HCC) 08/14/2018   • Mild aortic stenosis 08/14/2018   • Chronic anticoagulation 08/14/2018   • Pneumonia due to infectious organism 03/29/2018   • Acute respiratory failure with hypoxia  ([facility]) 03/28/2018   • Abnormal LFTs 03/27/2018   • Anemia 03/27/2018   • Hyponatremia 03/27/2018   • Hypoalbuminemia 03/27/2018   • Mixed hyperlipidemia 11/16/2017   • Chronic seasonal allergic rhinitis due to pollen 11/16/2017   • Osteoarthritis of right hip 07/06/2015   • History of total left hip arthroplasty 05/21/2015   • Osteoarthritis 05/21/2015   • Osteoarthritis, multiple sites 05/21/2015       Current Outpatient Medications   Medication Sig Dispense Refill   • XARELTO 20 MG Tab tablet take 1 tablet by mouth nightly with dinner 90 Tab 2   • simvastatin (ZOCOR) 10 MG Tab Take 1 tab daily (Patient taking differently: 10 mg 2 Times a Day. Take 1 tab daily) 100 Tab 3   • metoprolol (LOPRESSOR) 25 MG Tab take 2 tablets by mouth twice daily 360 Tab 0   • Multiple Vitamins-Minerals (VITRUM 50+ SENIOR MULTI PO) Take 1 Tab by mouth every day.     • Cholecalciferol (VITAMIN D-3) 1000 UNITS CAPS Take 1 Tab by mouth every morning.     • Omega 3 1200 MG CAPS Take 1 Cap by mouth every morning.     • metoprolol (LOPRESSOR) 25 MG Tab take 2 tablets by mouth twice daily 400 Tab 2   • diphenhydrAMINE (BENADRYL) 25 MG Tab Take 25 mg by mouth every 6 hours as needed for Sleep.     • Albuterol Sulfate 108 (90 Base) MCG/ACT AEROSOL POWDER, BREATH ACTIVATED Inhale 1-2 Puffs by mouth every 6 hours as needed (coughing, wheezing). (Patient not taking: Reported on 9/16/2019) 1 Each 0   • Psyllium (METAMUCIL FIBER PO) Take  by mouth.     • Calcium 500 MG CHEW Take 1 Tab by mouth every morning.       No current facility-administered medications for this visit.             Current supplements as per medication list.       Allergies: Clindamycin hcl-fd&c blue #1 and Food    Current social contact/activities:      She  reports that she quit smoking about 28 years ago. Her smoking use included cigarettes. She has a 33.00 pack-year smoking history. She has never used smokeless tobacco. She reports current alcohol use of about 7.0 oz of  alcohol per week. She reports that she does not use drugs.  Counseling given: Not Answered  Comment: Started smoking at age 23      DPA/Advanced Directive:      ROS:    Gait: Uses no assistive device  Ostomy: No  Other tubes: No  Amputations: No  Chronic oxygen use: No  Last eye exam: Yearly  Wears hearing aids: No  : No leak    Screening:    Depression Screening    Little interest or pleasure in doing things?  0 - not at all  Feeling down, depressed , or hopeless? 0 - not at all  Patient Health Questionnaire Score: 0     If depressive symptoms identified deferred to follow up visit unless specifically addressed in assessment and plan.    Interpretation of PHQ-9 Total Score   Score Severity   1-4 No Depression   5-9 Mild Depression   10-14 Moderate Depression   15-19 Moderately Severe Depression   20-27 Severe Depression    Screening for Cognitive Impairment    Three Minute Recall (village, kitchen, baby) 3/3    Jorje clock face with all 12 numbers and set the hands to show 10 past 10.  Yes    Cognitive concerns identified deferred for follow up unless specifically addressed in assessment and plan.    Fall Risk Assessment    Has the patient had two or more falls in the last year or any fall with injury in the last year?  No    Safety Assessment    Throw rugs on floor.  Yes  Handrails on all stairs.  Yes  Good lighting in all hallways.  Yes  Difficulty hearing.  Yes  Patient counseled about all safety risks that were identified.    Functional Assessment ADLs    Are there any barriers preventing you from cooking for yourself or meeting nutritional needs?  No.    Are there any barriers preventing you from driving safely or obtaining transportation?  No.    Are there any barriers preventing you from using a telephone or calling for help?  No.    Are there any barriers preventing you from shopping?  No.    Are there any barriers preventing you from taking care of your own finances?  No.    Are there any barriers  preventing you from managing your medications?  No.    Are there any barriers preventing you from showering, bathing or dressing yourself?  No.    Are you currently engaging in any exercise or physical activity?  No.     What is your perception of your health?  Good.      Health Maintenance Summary                IMM ZOSTER VACCINES Overdue 1985     MAMMOGRAM Overdue 2019      Done 2018 MA-MAMMO SCREENING BILAT W/TOMOSYNTHESIS W/CAD     Patient has more history with this topic...    Annual Wellness Visit Overdue 11/3/2019      Done 2018 Visit Dx: Medicare annual wellness visit, subsequent     Patient has more history with this topic...    BONE DENSITY Next Due 2021      Done 2016 DS-BONE DENSITY STUDY (DEXA)     Patient has more history with this topic...    COLONOSCOPY Next Due 2025      Postponed 2015     IMM DTaP/Tdap/Td Vaccine Next Due 2026      Done 2016 Imm Admin: Tdap Vaccine          Patient Care Team:  Brandon Pearl M.D. as PCP - General (Geriatrics)  [facility] as Home Health Provider  Risa Bryant, [facility] as          Social History     Tobacco Use   • Smoking status: Former Smoker     Packs/day: 1.00     Years: 33.00     Pack years: 33.00     Types: Cigarettes     Last attempt to quit: 1991     Years since quittin.8   • Smokeless tobacco: Never Used   • Tobacco comment: Started smoking at age 23   Substance Use Topics   • Alcohol use: Yes     Alcohol/week: 7.0 oz     Types: 14 Glasses of wine per week     Comment: 1 glass wine/day   • Drug use: No     Family History   Problem Relation Age of Onset   • Cancer Father         GI   • Heart Disease Father    • Lung Disease Father    • Stroke Sister    • No Known Problems Son    • No Known Problems Son    • No Known Problems Maternal Grandmother    • No Known Problems Maternal Grandfather    • No Known Problems Paternal Grandmother    •  "No Known Problems Paternal Grandfather    • Hypertension Sister    • Hyperlipidemia Sister      She  has a past medical history of Arthritis, Cancer (HCC), Heart murmur, High cholesterol, Unspecified cataract, and Unspecified urinary incontinence.   Past Surgical History:   Procedure Laterality Date   • APPENDECTOMY LAPAROSCOPIC  10/10/2018    Procedure: APPENDECTOMY LAPAROSCOPIC;  Surgeon: Sushil Dalton M.D.;  Location: SURGERY [facility];  Service: General   • HIP ARTHROPLASTY TOTAL Left 7/6/2015    Procedure: HIP ARTHROPLASTY TOTAL;  Surgeon: Navid Howard M.D.;  Location: SURGERY [facility];  Service:    • OTHER Right 2006    cyst & bone spur on right hand   • EYE SURGERY      bilateral IOL       Exam:   /68 (BP Location: Left arm, Patient Position: Sitting)   Pulse 72   Temp 36.9 °C (98.4 °F) (Temporal)   Resp 12   Ht 1.626 m (5' 4\")   Wt 60.3 kg (133 lb)   SpO2 94%  Body mass index is 22.83 kg/m².    Hearing, good .    Dentition, goiod   Alert, oriented in no acute distress.  Eye contact is good, speech goal directed, affect calm    Assessment and Plan. The following treatment and monitoring plan is recommended:    84 y.o. female     1. PAF (paroxysmal atrial fibrillation) (HCC)  Stable, no RVR,  Continue on metoprolol and Xarelto.  Continue follow-up with cardiology.    - Subsequent Annual Wellness Visit - Includes PPPS ()    2. Mixed hyperlipidemia  He has been tolerating the statin. Denies muscle pain LFTs has been normal  Continue simvastatin 10 mg daily.    - Subsequent Annual Wellness Visit - Includes PPPS ()  - TSH; Future    3. Essential hypertension  Adequately controlled.  Continue metoprolol 25 mg daily.    - Subsequent Annual Wellness Visit - Includes PPPS ()  - CBC WITH DIFFERENTIAL; Future  - Comp Metabolic Panel; Future  - TSH; Future    4. Coronary artery calcification seen on CAT scan  Stable.  No history of ACS.  Continue on statin.    - Subsequent " Annual Wellness Visit - Includes PPPS ()    5. Healthcare maintenance  Flu shot on pneumonia shot is up-to-date.  She usually does a mammogram every 2 years, last one was done a year ago.    - Subsequent Annual Wellness Visit - Includes PPPS ()    6. Mild aortic stenosis  Stable.  Asymptomatic.  We will continue monitor.    - Subsequent Annual Wellness Visit - Includes PPPS ()    7. Mild mitral regurgitation  Stable.  Asymptomatic.  We will continue monitor.    - Subsequent Annual Wellness Visit - Includes PPPS ()    8. Diastolic dysfunction  Last echocardiogram showed grade 2 diastolic dysfunction, however patient has been less than 20.  We will continue monitor.    - Subsequent Annual Wellness Visit - Includes PPPS ()        Services suggested:   Health Care Screening: Age-appropriate preventive services recommended by USPTF and ACIP covered by Medicare were discussed today. Services ordered if indicated and agreed upon by the patient.  Referrals offered: Community-based lifestyle interventions to reduce health risks and promote self-management and wellness, fall prevention, nutrition, physical activity, tobacco-use cessation, weight loss, and mental health services as per orders if indicated.    Discussion today about general wellness and lifestyle habits:    · Prevent falls and reduce trip hazards; Cautioned about securing or removing rugs.  · Have a working fire alarm and carbon monoxide detector;   · Engage in regular physical activity and social activities     Follow-up: No follow-ups on file.

## 2019-11-15 NOTE — PROGRESS NOTES
CC: ***    HPI:   Lisa presents today ***      Patient Active Problem List    Diagnosis Date Noted   • Essential hypertension 11/15/2019   • Coronary artery calcification seen on CAT scan 09/16/2019   • PAF (paroxysmal atrial fibrillation) ([facility]) 08/14/2018   • Aortic stenosis 08/14/2018   • Chronic anticoagulation 08/14/2018   • Pneumonia due to infectious organism 03/29/2018   • Acute respiratory failure with hypoxia ([facility]) 03/28/2018   • Abnormal LFTs 03/27/2018   • Anemia 03/27/2018   • Hyponatremia 03/27/2018   • Hypoalbuminemia 03/27/2018   • Mixed hyperlipidemia 11/16/2017   • Chronic seasonal allergic rhinitis due to pollen 11/16/2017   • Osteoarthritis of right hip 07/06/2015   • History of total left hip arthroplasty 05/21/2015   • Osteoarthritis 05/21/2015   • Osteoarthritis, multiple sites 05/21/2015       Current Outpatient Medications   Medication Sig Dispense Refill   • XARELTO 20 MG Tab tablet take 1 tablet by mouth nightly with dinner 90 Tab 2   • simvastatin (ZOCOR) 10 MG Tab Take 1 tab daily (Patient taking differently: 10 mg 2 Times a Day. Take 1 tab daily) 100 Tab 3   • metoprolol (LOPRESSOR) 25 MG Tab take 2 tablets by mouth twice daily 360 Tab 0   • Multiple Vitamins-Minerals (VITRUM 50+ SENIOR MULTI PO) Take 1 Tab by mouth every day.     • Cholecalciferol (VITAMIN D-3) 1000 UNITS CAPS Take 1 Tab by mouth every morning.     • Omega 3 1200 MG CAPS Take 1 Cap by mouth every morning.     • metoprolol (LOPRESSOR) 25 MG Tab take 2 tablets by mouth twice daily 400 Tab 2   • diphenhydrAMINE (BENADRYL) 25 MG Tab Take 25 mg by mouth every 6 hours as needed for Sleep.     • Albuterol Sulfate 108 (90 Base) MCG/ACT AEROSOL POWDER, BREATH ACTIVATED Inhale 1-2 Puffs by mouth every 6 hours as needed (coughing, wheezing). (Patient not taking: Reported on 9/16/2019) 1 Each 0   • Psyllium (METAMUCIL FIBER PO) Take  by mouth.     • Calcium 500 MG CHEW Take 1 Tab by mouth every morning.       No current  facility-administered medications for this visit.          Allergies as of 11/15/2019 - Reviewed 11/15/2019   Allergen Reaction Noted   • Clindamycin hcl-fd&c blue #1  05/29/2015   • Food  07/02/2015        ROS: Denies any chest pain, Shortness of breath, Changes bowel or bladder, Lower extremity edema.    Physical Exam:  Gen.: Well-developed, well-nourished, no apparent distress,pleasant and cooperative with the examination  Skin:  Warm and dry with good turgor. No rashes or suspicious lesions in visible areas  Eye: PERRLA, conjunctiva and sclera clear, lids normal  HEENT: Normocephalic/atraumatic, sinuses nontender with palpation, TMs clear, nares patent with pink mucosa and clear rhinorrhea, lips without lesions, oropharynx clear.  Neck: Trachea midline,no masses or adenopathy  Thyroid: normal consistency and size. No masses or nodules. Not tender with palpation.  Cor: Regular rate and rhythm without murmur, gallop or rub.  Lungs: Respirations unlabored.Clear to auscultation with equal breath sounds bilaterally. No wheezes, rhonchi.  Abdomen: Soft nontender without hepatosplenomegaly or masses appreciated, normoactive bowel sounds. No hernias.  Extremities: No cyanosis, clubbing or edema, Symmetrical without deformities or malformations. Pulses 2+ and symmetrical both upper and lower extremities  Lymphatic: No abnormal adenopathy of the neck groin or axillae.  Psych: Alert and oriented x 3.Normal affect, judgement,insight and memory.        Assessment and Plan.   84 y.o. female ***    1. PAF (paroxysmal atrial fibrillation) (HCC)  ***    2. Mixed hyperlipidemia  ***    3. Essential hypertension  ***    PAF (paroxysmal atrial fibrillation) (HCC)  Patient has been asymptomatic.  Denies palpitation, chest pain, shortness of breath.  She is currently on metoprolol, and Xarelto.    Mixed hyperlipidemia  Her most recent lipid panel showed normal total cholesterol, triglycerides, but her LDL was 114.  Patient has been  on simvastatin 10 mg daily.  No history of diabetes, CAD, or stroke.     Essential hypertension  Pressure has been adequately controlled.  Denies headache, chest pain, shortness of breath.  Patient has been on metoprolol 25 mg twice a day.  No side effects.

## 2019-11-20 ENCOUNTER — HOSPITAL ENCOUNTER (OUTPATIENT)
Dept: LAB | Facility: MEDICAL CENTER | Age: 84
End: 2019-11-20
Attending: FAMILY MEDICINE
Payer: MEDICARE

## 2019-11-20 DIAGNOSIS — E78.2 MIXED HYPERLIPIDEMIA: ICD-10-CM

## 2019-11-20 DIAGNOSIS — I10 ESSENTIAL HYPERTENSION: ICD-10-CM

## 2019-11-20 LAB
ALBUMIN SERPL BCP-MCNC: 4.3 G/DL (ref 3.2–4.9)
ALBUMIN/GLOB SERPL: 1.5 G/DL
ALP SERPL-CCNC: 53 U/L (ref 30–99)
ALT SERPL-CCNC: 15 U/L (ref 2–50)
ANION GAP SERPL CALC-SCNC: 8 MMOL/L (ref 0–11.9)
AST SERPL-CCNC: 21 U/L (ref 12–45)
BASOPHILS # BLD AUTO: 0.6 % (ref 0–1.8)
BASOPHILS # BLD: 0.05 K/UL (ref 0–0.12)
BILIRUB SERPL-MCNC: 0.8 MG/DL (ref 0.1–1.5)
BUN SERPL-MCNC: 20 MG/DL (ref 8–22)
CALCIUM SERPL-MCNC: 9.7 MG/DL (ref 8.5–10.5)
CHLORIDE SERPL-SCNC: 103 MMOL/L (ref 96–112)
CO2 SERPL-SCNC: 30 MMOL/L (ref 20–33)
CREAT SERPL-MCNC: 0.66 MG/DL (ref 0.5–1.4)
EOSINOPHIL # BLD AUTO: 0.4 K/UL (ref 0–0.51)
EOSINOPHIL NFR BLD: 5 % (ref 0–6.9)
ERYTHROCYTE [DISTWIDTH] IN BLOOD BY AUTOMATED COUNT: 50.2 FL (ref 35.9–50)
GLOBULIN SER CALC-MCNC: 2.8 G/DL (ref 1.9–3.5)
GLUCOSE SERPL-MCNC: 88 MG/DL (ref 65–99)
HCT VFR BLD AUTO: 46.4 % (ref 37–47)
HGB BLD-MCNC: 14.6 G/DL (ref 12–16)
IMM GRANULOCYTES # BLD AUTO: 0.02 K/UL (ref 0–0.11)
IMM GRANULOCYTES NFR BLD AUTO: 0.2 % (ref 0–0.9)
LYMPHOCYTES # BLD AUTO: 1.84 K/UL (ref 1–4.8)
LYMPHOCYTES NFR BLD: 22.9 % (ref 22–41)
MCH RBC QN AUTO: 32.8 PG (ref 27–33)
MCHC RBC AUTO-ENTMCNC: 31.5 G/DL (ref 33.6–35)
MCV RBC AUTO: 104.3 FL (ref 81.4–97.8)
MONOCYTES # BLD AUTO: 0.65 K/UL (ref 0–0.85)
MONOCYTES NFR BLD AUTO: 8.1 % (ref 0–13.4)
NEUTROPHILS # BLD AUTO: 5.06 K/UL (ref 2–7.15)
NEUTROPHILS NFR BLD: 63.2 % (ref 44–72)
NRBC # BLD AUTO: 0 K/UL
NRBC BLD-RTO: 0 /100 WBC
PLATELET # BLD AUTO: 215 K/UL (ref 164–446)
PMV BLD AUTO: 11.9 FL (ref 9–12.9)
POTASSIUM SERPL-SCNC: 4.4 MMOL/L (ref 3.6–5.5)
PROT SERPL-MCNC: 7.1 G/DL (ref 6–8.2)
RBC # BLD AUTO: 4.45 M/UL (ref 4.2–5.4)
SODIUM SERPL-SCNC: 141 MMOL/L (ref 135–145)
TSH SERPL DL<=0.005 MIU/L-ACNC: 2.61 UIU/ML (ref 0.38–5.33)
WBC # BLD AUTO: 8 K/UL (ref 4.8–10.8)

## 2019-11-20 PROCEDURE — 80053 COMPREHEN METABOLIC PANEL: CPT

## 2019-11-20 PROCEDURE — 36415 COLL VENOUS BLD VENIPUNCTURE: CPT

## 2019-11-20 PROCEDURE — 84443 ASSAY THYROID STIM HORMONE: CPT

## 2019-11-20 PROCEDURE — 85025 COMPLETE CBC W/AUTO DIFF WBC: CPT

## 2019-12-12 NOTE — PROGRESS NOTES
1. HealthConnect Verified: yes    2. Verify PCP: yes    3. Review and add  to Care Team: yes    4. WebIZ Checked & Epic Updated: No WebIZ record  WebIZ Recommendations: SHINGRIX (Shingles)  Is patient due for Tdap? NO  Is patient due for Shingles? YES. Patient was not notified of copay/out of pocket cost.    5. Reviewed/Updated the following with patient:       •   Communication Preference Obtained? YES  • MyChart Activation: declined       •   E-Mail Address Obtained? YES       •   Appointment Day and Time Preferences? YES       •   Preferred Pharmacy? YES       •   Preferred Lab? YES    6. Care Gap Scheduling (Attempt to Schedule EACH Overdue Care Gap!)    PA Intro completed & Diabetic prevention class - Declined

## 2020-05-07 ENCOUNTER — TELEPHONE (OUTPATIENT)
Dept: MEDICAL GROUP | Facility: MEDICAL CENTER | Age: 85
End: 2020-05-07

## 2020-05-07 NOTE — TELEPHONE ENCOUNTER
IS the patient supposed to take 10 mg of simvastatin or 20mg? Patient stated she is taking 20mg if so can you change the dosage

## 2020-05-13 DIAGNOSIS — E78.5 HYPERLIPIDEMIA, UNSPECIFIED HYPERLIPIDEMIA TYPE: ICD-10-CM

## 2020-05-14 DIAGNOSIS — E78.5 HYPERLIPIDEMIA, UNSPECIFIED HYPERLIPIDEMIA TYPE: ICD-10-CM

## 2020-05-14 RX ORDER — SIMVASTATIN 20 MG
TABLET ORAL
Qty: 100 TAB | Refills: 1 | Status: SHIPPED | OUTPATIENT
Start: 2020-05-14 | End: 2020-11-30

## 2020-05-14 RX ORDER — SIMVASTATIN 10 MG
TABLET ORAL
Qty: 100 TAB | Refills: 2 | Status: SHIPPED | OUTPATIENT
Start: 2020-05-14 | End: 2020-05-14 | Stop reason: SDUPTHER

## 2020-07-14 ENCOUNTER — TELEPHONE (OUTPATIENT)
Dept: CARDIOLOGY | Facility: MEDICAL CENTER | Age: 85
End: 2020-07-14

## 2020-08-09 DIAGNOSIS — I48.91 ATRIAL FIBRILLATION WITH RVR (HCC): ICD-10-CM

## 2020-08-13 NOTE — PROGRESS NOTES
Outcome: Left Message- [facility] awv call    Please transfer to Patient Outreach Team at [phone] when patient returns call.      Attempt # 2

## 2020-10-21 ENCOUNTER — PATIENT OUTREACH (OUTPATIENT)
Dept: HEALTH INFORMATION MANAGEMENT | Facility: OTHER | Age: 85
End: 2020-10-21

## 2020-11-12 DIAGNOSIS — I48.91 ATRIAL FIBRILLATION WITH RVR (HCC): ICD-10-CM

## 2020-11-30 DIAGNOSIS — E78.5 HYPERLIPIDEMIA, UNSPECIFIED HYPERLIPIDEMIA TYPE: ICD-10-CM

## 2020-11-30 RX ORDER — SIMVASTATIN 20 MG
TABLET ORAL
Qty: 100 TAB | Refills: 0 | Status: SHIPPED | OUTPATIENT
Start: 2020-11-30 | End: 2021-03-09

## 2020-11-30 NOTE — TELEPHONE ENCOUNTER
Received request via: Pharmacy    Was the patient seen in the last year in this department? No (11/15/2019)    Does the patient have an active prescription (recently filled or refills available) for medication(s) requested? No

## 2020-12-02 ENCOUNTER — TELEPHONE (OUTPATIENT)
Dept: MEDICAL GROUP | Facility: MEDICAL CENTER | Age: 85
End: 2020-12-02
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
## 2020-12-04 ENCOUNTER — OFFICE VISIT (OUTPATIENT)
Dept: MEDICAL GROUP | Facility: MEDICAL CENTER | Age: 85
End: 2020-12-04
Payer: MEDICARE

## 2020-12-04 VITALS
RESPIRATION RATE: 16 BRPM | BODY MASS INDEX: 23.57 KG/M2 | DIASTOLIC BLOOD PRESSURE: 70 MMHG | HEIGHT: 63 IN | TEMPERATURE: 97.8 F | OXYGEN SATURATION: 95 % | WEIGHT: 133 LBS | HEART RATE: 64 BPM | SYSTOLIC BLOOD PRESSURE: 112 MMHG

## 2020-12-04 DIAGNOSIS — I48.0 PAF (PAROXYSMAL ATRIAL FIBRILLATION) (HCC): ICD-10-CM

## 2020-12-04 DIAGNOSIS — E78.2 MIXED HYPERLIPIDEMIA: ICD-10-CM

## 2020-12-04 DIAGNOSIS — M54.50 ACUTE BILATERAL LOW BACK PAIN WITHOUT SCIATICA: ICD-10-CM

## 2020-12-04 DIAGNOSIS — I10 ESSENTIAL HYPERTENSION: ICD-10-CM

## 2020-12-04 PROCEDURE — 99214 OFFICE O/P EST MOD 30 MIN: CPT | Performed by: FAMILY MEDICINE

## 2020-12-04 RX ORDER — CYCLOBENZAPRINE HCL 5 MG
5 TABLET ORAL NIGHTLY PRN
Qty: 15 TAB | Refills: 0 | Status: SHIPPED | OUTPATIENT
Start: 2020-12-04 | End: 2021-11-03

## 2020-12-04 ASSESSMENT — PATIENT HEALTH QUESTIONNAIRE - PHQ9: CLINICAL INTERPRETATION OF PHQ2 SCORE: 0

## 2020-12-04 ASSESSMENT — FIBROSIS 4 INDEX: FIB4 SCORE: 2.14
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
## 2020-12-11 ENCOUNTER — HOSPITAL ENCOUNTER (OUTPATIENT)
Dept: LAB | Facility: MEDICAL CENTER | Age: 85
End: 2020-12-11
Attending: INTERNAL MEDICINE
Payer: MEDICARE

## 2020-12-11 ENCOUNTER — OFFICE VISIT (OUTPATIENT)
Dept: CARDIOLOGY | Facility: MEDICAL CENTER | Age: 85
End: 2020-12-11
Payer: MEDICARE

## 2020-12-11 VITALS
DIASTOLIC BLOOD PRESSURE: 52 MMHG | BODY MASS INDEX: 23.39 KG/M2 | SYSTOLIC BLOOD PRESSURE: 102 MMHG | OXYGEN SATURATION: 96 % | WEIGHT: 132 LBS | HEIGHT: 63 IN | HEART RATE: 68 BPM

## 2020-12-11 DIAGNOSIS — Z79.01 CHRONIC ANTICOAGULATION: ICD-10-CM

## 2020-12-11 DIAGNOSIS — I48.0 PAF (PAROXYSMAL ATRIAL FIBRILLATION) (HCC): ICD-10-CM

## 2020-12-11 DIAGNOSIS — I35.0 MILD AORTIC STENOSIS: ICD-10-CM

## 2020-12-11 DIAGNOSIS — I25.10 CORONARY ARTERY CALCIFICATION SEEN ON CAT SCAN: ICD-10-CM

## 2020-12-11 DIAGNOSIS — I34.0 MILD MITRAL REGURGITATION: ICD-10-CM

## 2020-12-11 DIAGNOSIS — E78.2 MIXED HYPERLIPIDEMIA: ICD-10-CM

## 2020-12-11 DIAGNOSIS — I10 ESSENTIAL HYPERTENSION: ICD-10-CM

## 2020-12-11 LAB
ALBUMIN SERPL BCP-MCNC: 4.5 G/DL (ref 3.2–4.9)
ALBUMIN/GLOB SERPL: 1.5 G/DL
ALP SERPL-CCNC: 58 U/L (ref 30–99)
ALT SERPL-CCNC: 13 U/L (ref 2–50)
ANION GAP SERPL CALC-SCNC: 8 MMOL/L (ref 7–16)
AST SERPL-CCNC: 20 U/L (ref 12–45)
BILIRUB SERPL-MCNC: 0.5 MG/DL (ref 0.1–1.5)
BUN SERPL-MCNC: 15 MG/DL (ref 8–22)
CALCIUM SERPL-MCNC: 10.9 MG/DL (ref 8.5–10.5)
CHLORIDE SERPL-SCNC: 102 MMOL/L (ref 96–112)
CO2 SERPL-SCNC: 30 MMOL/L (ref 20–33)
CREAT SERPL-MCNC: 0.65 MG/DL (ref 0.5–1.4)
GLOBULIN SER CALC-MCNC: 3 G/DL (ref 1.9–3.5)
GLUCOSE SERPL-MCNC: 92 MG/DL (ref 65–99)
POTASSIUM SERPL-SCNC: 5.3 MMOL/L (ref 3.6–5.5)
PROT SERPL-MCNC: 7.5 G/DL (ref 6–8.2)
SODIUM SERPL-SCNC: 140 MMOL/L (ref 135–145)

## 2020-12-11 PROCEDURE — 99214 OFFICE O/P EST MOD 30 MIN: CPT | Performed by: INTERNAL MEDICINE

## 2020-12-11 PROCEDURE — 36415 COLL VENOUS BLD VENIPUNCTURE: CPT

## 2020-12-11 PROCEDURE — 80053 COMPREHEN METABOLIC PANEL: CPT

## 2020-12-11 RX ORDER — A/SINGAPORE/GP1908/2015 IVR-180 (AN A/MICHIGAN/45/2015 (H1N1)PDM09-LIKE VIRUS, A/HONG KONG/4801/2014, NYMC X-263B (H3N2) (AN A/HONG KONG/4801/2014-LIKE VIRUS), AND B/BRISBANE/60/2008, WILD TYPE (A B/BRISBANE/60/2008-LIKE VIRUS) 15; 15; 15 UG/.5ML; UG/.5ML; UG/.5ML
INJECTION, SUSPENSION INTRAMUSCULAR
COMMUNITY
Start: 2020-09-29 | End: 2021-11-03

## 2020-12-11 ASSESSMENT — ENCOUNTER SYMPTOMS
MYALGIAS: 0
BRUISES/BLEEDS EASILY: 0
DIZZINESS: 0
LOSS OF CONSCIOUSNESS: 0
SHORTNESS OF BREATH: 0
PALPITATIONS: 0
COUGH: 0

## 2020-12-11 ASSESSMENT — FIBROSIS 4 INDEX: FIB4 SCORE: 2.14

## 2020-12-11 NOTE — PROGRESS NOTES
Chief Complaint   Patient presents with   • Hyperlipidemia   • Atrial Fibrillation   • Coronary Artery Disease   • HTN (Controlled)       Subjective:   Lisa Garza is a 85 y.o. female who presents today for follow-up evaluation of PAF, chronic anticoagulation, hypertension and mild aortic stenosis.    Last seen on 9/16/2019.    Since 9/16/2019 the patient has had no cardiac problems or symptoms.  Has had only 2 mild nosebleeds.    Since 3/7/2019 the patient said no cardiac symptoms or problems.  Still under a lot of stress related to her 's illness who is now on hospice in an extended care facility.  Has some bruisability and a rare mild nosebleed.  Her PCP increased her simvastatin twice daily.    Since 8/14/2018 the patient's had no cardiac symptoms.  Required emergent appendectomy 10/2018.  No perioperative cardiac problems.  No palpitations.  No bleeding problems.   has been placed in a group home.    Since 4/12/2018 the patient denies any cardiac symptoms.  No bleeding problems on Xarelto.  States that she may have had a prior episode of atrial fibrillation 14 years ago when she had pneumonia while living in [place].    The patient previously smoked but quit 20 years ago.  History of alcohol abuse.    Patient was hospitalized from 3/26 to 30/2018 for pneumonia and atrial fibrillation. She had a new onset of atrial fibrillation she was started on Xarelto for anticoagulation. Her atrial fibrillation reverted back to normal rhythm. Echocardiogram showed diastolic dysfunction grade 2 and ejection fraction 70%.    Past Medical History:   Diagnosis Date   • Arthritis     osteo, hip   • Cancer (HCC)     skin on left shoulder   • Heart murmur    • High cholesterol    • Unspecified cataract     tony IOL   • Unspecified urinary incontinence      Past Surgical History:   Procedure Laterality Date   • APPENDECTOMY LAPAROSCOPIC  10/10/2018    Procedure: APPENDECTOMY LAPAROSCOPIC;  Surgeon: Sushil TOBIN  NATE Dalton;  Location: SURGERY [facility];  Service: General   • HIP ARTHROPLASTY TOTAL Left 2015    Procedure: HIP ARTHROPLASTY TOTAL;  Surgeon: Navid Howard M.D.;  Location: SURGERY [facility];  Service:    • OTHER Right 2006    cyst & bone spur on right hand   • EYE SURGERY      bilateral IOL     Family History   Problem Relation Age of Onset   • Cancer Father         GI   • Heart Disease Father    • Lung Disease Father    • Stroke Sister    • No Known Problems Son    • No Known Problems Son    • No Known Problems Maternal Grandmother    • No Known Problems Maternal Grandfather    • No Known Problems Paternal Grandmother    • No Known Problems Paternal Grandfather    • Hypertension Sister    • Hyperlipidemia Sister      Social History     Socioeconomic History   • Marital status:      Spouse name: Not on file   • Number of children: Not on file   • Years of education: Not on file   • Highest education level: Not on file   Occupational History   • Not on file   Social Needs   • Financial resource strain: Not on file   • Food insecurity     Worry: Not on file     Inability: Not on file   • Transportation needs     Medical: Not on file     Non-medical: Not on file   Tobacco Use   • Smoking status: Former Smoker     Packs/day: 1.00     Years: 33.00     Pack years: 33.00     Types: Cigarettes     Quit date: 1991     Years since quittin.9   • Smokeless tobacco: Never Used   • Tobacco comment: Started smoking at age 23   Substance and Sexual Activity   • Alcohol use: Yes     Alcohol/week: 7.0 oz     Types: 14 Glasses of wine per week     Comment: 1 glass wine/day   • Drug use: No   • Sexual activity: Never     Partners: Male   Lifestyle   • Physical activity     Days per week: Not on file     Minutes per session: Not on file   • Stress: Not on file   Relationships   • Social connections     Talks on phone: Not on file     Gets together: Not on file     Attends [employer] service: Not on  file     Active member of club or organization: Not on file     Attends meetings of clubs or organizations: Not on file     Relationship status: Not on file   • Intimate partner violence     Fear of current or ex partner: Not on file     Emotionally abused: Not on file     Physically abused: Not on file     Forced sexual activity: Not on file   Other Topics Concern   • Not on file   Social History Narrative   • Not on file     Allergies   Allergen Reactions   • Clindamycin Hcl-Fd&C Blue #1      unknown   • Food      Scallops-vomiting, citrus-itching, redness, oatmeal-diarrhea     Outpatient Encounter Medications as of 12/11/2020   Medication Sig Dispense Refill   • FLUAD QUADRIVALENT 0.5 ML Prefilled Syringe PHARMACY ADMINISTERED     • cyclobenzaprine (FLEXERIL) 5 mg tablet Take 1 Tab by mouth at bedtime as needed. 15 Tab 0   • simvastatin (ZOCOR) 20 MG Tab TAKE ONE TABLET BY MOUTH ONE TIME DAILY  100 Tab 0   • metoprolol (LOPRESSOR) 25 MG Tab TAKE TWO TABLETS BY MOUTH TWICE DAILY  400 Tab 0   • XARELTO 20 MG Tab tablet TAKE 1 TABLET BY MOUTH NIGHTLY WITH DINNER 90 Tab 0   • diphenhydrAMINE (BENADRYL) 25 MG Tab Take 25 mg by mouth every 6 hours as needed for Sleep.     • Albuterol Sulfate 108 (90 Base) MCG/ACT AEROSOL POWDER, BREATH ACTIVATED Inhale 1-2 Puffs by mouth every 6 hours as needed (coughing, wheezing). 1 Each 0   • Psyllium (METAMUCIL FIBER PO) Take  by mouth.     • Multiple Vitamins-Minerals (VITRUM 50+ SENIOR MULTI PO) Take 1 Tab by mouth every day.     • Cholecalciferol (VITAMIN D-3) 1000 UNITS CAPS Take 1 Tab by mouth every morning.     • Omega 3 1200 MG CAPS Take 1 Cap by mouth every morning.     • Calcium 500 MG CHEW Take 1 Tab by mouth every morning.     • metoprolol (LOPRESSOR) 25 MG Tab take 2 tablets by mouth twice daily (Patient not taking: Reported on 12/11/2020) 360 Tab 0     No facility-administered encounter medications on file as of 12/11/2020.      Review of Systems   HENT: Negative for  "nosebleeds.    Respiratory: Negative for cough and shortness of breath.    Cardiovascular: Negative for chest pain and palpitations.   Musculoskeletal: Negative for myalgias.   Neurological: Negative for dizziness and loss of consciousness.   Endo/Heme/Allergies: Does not bruise/bleed easily.        Objective:   /52 (BP Location: Left arm, Patient Position: Sitting, BP Cuff Size: Adult)   Pulse 68   Ht 1.6 m (5' 3\")   Wt 59.9 kg (132 lb)   SpO2 96%   BMI 23.38 kg/m²     Physical Exam   Constitutional: She is oriented to person, place, and time. She appears well-developed and well-nourished. No distress.   Neck: No JVD present.   Cardiovascular: Normal rate and intact distal pulses. An irregularly irregular rhythm present. Exam reveals no gallop and no friction rub.   Murmur heard.  Pulmonary/Chest: Effort normal and breath sounds normal. No respiratory distress. She has no wheezes. She has no rales.   Musculoskeletal:         General: No edema.   Neurological: She is alert and oriented to person, place, and time.   Skin: Skin is warm and dry.   Psychiatric: She has a normal mood and affect. Her behavior is normal.     ECHOCARDIOGRAM 3/28/2018  Left ventricular ejection fraction is visually estimated to be 70%.  Mild concentric left ventricular hypertrophy.  Grade II diastolic dysfunction.  Moderately dilated left atrium.  Mild mitral regurgitation.    Mild aortic stenosis.    08/14/2018 EKG: Normal sinus rhythm, rate 60.  First-degree AV block.  Possible previous inferior myocardial infarction.  Reviewed by myself.    Assessment:     1. PAF (paroxysmal atrial fibrillation) (HCC)     2. Chronic anticoagulation     3. Coronary artery calcification seen on CAT scan     4. Essential hypertension     5. Mild aortic stenosis     6. Mild mitral regurgitation     7. Mixed hyperlipidemia         Medical Decision Making:  Today's Assessment / Status / Plan:     Assessment  1.  PAF.  Maintaining NSR.  2.  Chronic " anticoagulation.  On Xarelto.  3.  Aortic stenosis.  Mild.  4.  Mitral regurgitation.  Mild.  5.  Coronary calcification by CT scan.  6.  Hypertension.  7.  Dyslipidemia.  8.  History of excessive alcohol use.    Recommendation Discussion  1.  Patient's current cardiac status is stable regarding her PAF, anticoagulation, aortic stenosis, regurgitation, CAD with no chest pain, hypertension with normal BP.  2.  Repeat CMP for renal function.  3.  Continue current cardiac therapy.  4.  RTC 1 year.

## 2020-12-23 ENCOUNTER — TELEPHONE (OUTPATIENT)
Dept: MEDICAL GROUP | Facility: MEDICAL CENTER | Age: 85
End: 2020-12-23

## 2020-12-23 DIAGNOSIS — M72.2 PLANTAR FASCIITIS: ICD-10-CM

## 2021-01-11 DIAGNOSIS — Z23 NEED FOR VACCINATION: ICD-10-CM

## 2021-01-21 ENCOUNTER — HOSPITAL ENCOUNTER (OUTPATIENT)
Facility: MEDICAL CENTER | Age: 86
End: 2021-01-21
Attending: NURSE PRACTITIONER
Payer: MEDICARE

## 2021-01-21 ENCOUNTER — OFFICE VISIT (OUTPATIENT)
Dept: URGENT CARE | Facility: CLINIC | Age: 86
End: 2021-01-21
Payer: MEDICARE

## 2021-01-21 VITALS
DIASTOLIC BLOOD PRESSURE: 82 MMHG | BODY MASS INDEX: 23.04 KG/M2 | OXYGEN SATURATION: 95 % | RESPIRATION RATE: 15 BRPM | TEMPERATURE: 98 F | HEART RATE: 83 BPM | HEIGHT: 63 IN | SYSTOLIC BLOOD PRESSURE: 126 MMHG | WEIGHT: 130 LBS

## 2021-01-21 DIAGNOSIS — R52 BODY ACHES: ICD-10-CM

## 2021-01-21 LAB
FLUAV+FLUBV AG SPEC QL IA: NEGATIVE
INT CON NEG: NORMAL
INT CON POS: NORMAL

## 2021-01-21 PROCEDURE — U0005 INFEC AGEN DETEC AMPLI PROBE: HCPCS

## 2021-01-21 PROCEDURE — 99214 OFFICE O/P EST MOD 30 MIN: CPT | Performed by: NURSE PRACTITIONER

## 2021-01-21 PROCEDURE — 87804 INFLUENZA ASSAY W/OPTIC: CPT | Performed by: NURSE PRACTITIONER

## 2021-01-21 PROCEDURE — U0003 INFECTIOUS AGENT DETECTION BY NUCLEIC ACID (DNA OR RNA); SEVERE ACUTE RESPIRATORY SYNDROME CORONAVIRUS 2 (SARS-COV-2) (CORONAVIRUS DISEASE [COVID-19]), AMPLIFIED PROBE TECHNIQUE, MAKING USE OF HIGH THROUGHPUT TECHNOLOGIES AS DESCRIBED BY CMS-2020-01-R: HCPCS

## 2021-01-21 ASSESSMENT — FIBROSIS 4 INDEX: FIB4 SCORE: 2.19

## 2021-01-21 ASSESSMENT — ENCOUNTER SYMPTOMS
SHORTNESS OF BREATH: 0
NAUSEA: 0
SORE THROAT: 0
HEADACHES: 1
MYALGIAS: 1
FEVER: 1
SINUS PAIN: 0
WHEEZING: 0
ABDOMINAL PAIN: 0
WEAKNESS: 0
CHILLS: 1
EYE REDNESS: 0
EYE DISCHARGE: 0
VOMITING: 0
NECK PAIN: 0
CONSTIPATION: 0
DIZZINESS: 0
DIARRHEA: 0
COUGH: 0

## 2021-01-22 DIAGNOSIS — R52 BODY ACHES: ICD-10-CM

## 2021-01-22 LAB
COVID ORDER STATUS COVID19: NORMAL
SARS-COV-2 RNA RESP QL NAA+PROBE: NOTDETECTED
SPECIMEN SOURCE: NORMAL

## 2021-01-23 ENCOUNTER — TELEPHONE (OUTPATIENT)
Dept: URGENT CARE | Facility: PHYSICIAN GROUP | Age: 86
End: 2021-01-23

## 2021-02-10 ENCOUNTER — IMMUNIZATION (OUTPATIENT)
Dept: FAMILY PLANNING/WOMEN'S HEALTH CLINIC | Facility: IMMUNIZATION CENTER | Age: 86
End: 2021-02-10
Attending: INTERNAL MEDICINE
Payer: MEDICARE

## 2021-02-10 DIAGNOSIS — Z23 NEED FOR VACCINATION: ICD-10-CM

## 2021-02-10 DIAGNOSIS — Z23 ENCOUNTER FOR VACCINATION: Primary | ICD-10-CM

## 2021-02-10 DIAGNOSIS — I48.91 ATRIAL FIBRILLATION WITH RVR (HCC): ICD-10-CM

## 2021-02-10 PROCEDURE — 91300 PFIZER SARS-COV-2 VACCINE: CPT

## 2021-02-10 PROCEDURE — 0001A PFIZER SARS-COV-2 VACCINE: CPT

## 2021-03-03 ENCOUNTER — IMMUNIZATION (OUTPATIENT)
Dept: FAMILY PLANNING/WOMEN'S HEALTH CLINIC | Facility: IMMUNIZATION CENTER | Age: 86
End: 2021-03-03
Attending: INTERNAL MEDICINE
Payer: MEDICARE

## 2021-03-03 DIAGNOSIS — Z23 ENCOUNTER FOR VACCINATION: Primary | ICD-10-CM

## 2021-03-03 PROCEDURE — 0002A PFIZER SARS-COV-2 VACCINE: CPT | Performed by: INTERNAL MEDICINE

## 2021-03-03 PROCEDURE — 91300 PFIZER SARS-COV-2 VACCINE: CPT | Performed by: INTERNAL MEDICINE

## 2021-03-08 DIAGNOSIS — E78.5 HYPERLIPIDEMIA, UNSPECIFIED HYPERLIPIDEMIA TYPE: ICD-10-CM

## 2021-03-09 RX ORDER — SIMVASTATIN 20 MG
TABLET ORAL
Qty: 100 TABLET | Refills: 2 | Status: SHIPPED
Start: 2021-03-09 | End: 2021-04-29 | Stop reason: DRUGHIGH

## 2021-03-17 ENCOUNTER — TELEPHONE (OUTPATIENT)
Dept: MEDICAL GROUP | Facility: PHYSICIAN GROUP | Age: 86
End: 2021-03-17

## 2021-03-17 NOTE — TELEPHONE ENCOUNTER
New to you PRE-VISIT PLANNING     Patient was contacted to complete PVP.     Note: Patient will not be contacted if there is no indication to call.     1.  Reviewed notes from the last few office visits within the medical group: Yes    2.  If any orders were placed at last visit or intended to be done for this visit (i.e. 6 mos follow-up), do we have Results/Consult Notes?         •  Labs - Labs were not ordered at last office visit.  Note: If patient appointment is for lab review and patient did not complete labs, check with provider if OK to reschedule patient until labs completed.       •  Imaging - Imaging was not ordered at last office visit.       •  Referrals - No referrals were ordered at last office visit.    3. Is this appointment scheduled as a Hospital Follow-Up? No    4.  Immunizations were updated in Epic using Reconcile Outside Information activity? Yes    5.  Patient is due for the following Health Maintenance Topics:   Health Maintenance Due   Topic Date Due   • IMM ZOSTER VACCINES (1 of 2) Never done   • Annual Wellness Visit  11/15/2020       6.  AHA (Pulse8) form printed for Provider? Email sent to [facility] requesting form

## 2021-03-18 ENCOUNTER — APPOINTMENT (OUTPATIENT)
Dept: MEDICAL GROUP | Facility: PHYSICIAN GROUP | Age: 86
End: 2021-03-18
Payer: MEDICARE

## 2021-03-31 DIAGNOSIS — I48.91 ATRIAL FIBRILLATION WITH RVR (HCC): ICD-10-CM

## 2021-04-01 RX ORDER — RIVAROXABAN 20 MG/1
TABLET, FILM COATED ORAL
Qty: 90 TABLET | Refills: 2 | Status: SHIPPED | OUTPATIENT
Start: 2021-04-01 | End: 2021-06-04 | Stop reason: SDUPTHER

## 2021-04-23 ENCOUNTER — TELEPHONE (OUTPATIENT)
Dept: MEDICAL GROUP | Facility: PHYSICIAN GROUP | Age: 86
End: 2021-04-23

## 2021-04-23 NOTE — TELEPHONE ENCOUNTER
New to youESTABLISHED PATIENT PRE-VISIT PLANNING     Patient was NOT contacted to complete PVP.     Note: Patient will not be contacted if there is no indication to call.     1.  Reviewed notes from the last few office visits within the medical group: Yes    2.  If any orders were placed at last visit or intended to be done for this visit (i.e. 6 mos follow-up), do we have Results/Consult Notes?         •  Labs - Labs were not ordered at last office visit.  Note: If patient appointment is for lab review and patient did not complete labs, check with provider if OK to reschedule patient until labs completed.       •  Imaging - Imaging was not ordered at last office visit.       •  Referrals - No referrals were ordered at last office visit.    3. Is this appointment scheduled as a Hospital Follow-Up? No    4.  Immunizations were updated in Epic using Reconcile Outside Information activity? Yes    5.  Patient is due for the following Health Maintenance Topics:   Health Maintenance Due   Topic Date Due   • IMM ZOSTER VACCINES (1 of 2) Never done   • Annual Wellness Visit  11/15/2020         6.  AHA (Pulse8) form printed for Provider? Email sent to [facility] requesting form

## 2021-04-29 ENCOUNTER — OFFICE VISIT (OUTPATIENT)
Dept: MEDICAL GROUP | Facility: PHYSICIAN GROUP | Age: 86
End: 2021-04-29
Payer: MEDICARE

## 2021-04-29 VITALS
OXYGEN SATURATION: 95 % | HEART RATE: 61 BPM | WEIGHT: 132.7 LBS | BODY MASS INDEX: 23.51 KG/M2 | HEIGHT: 63 IN | TEMPERATURE: 98.9 F | SYSTOLIC BLOOD PRESSURE: 110 MMHG | DIASTOLIC BLOOD PRESSURE: 70 MMHG | RESPIRATION RATE: 15 BRPM

## 2021-04-29 DIAGNOSIS — Z23 NEED FOR SHINGLES VACCINE: ICD-10-CM

## 2021-04-29 DIAGNOSIS — I48.91 HYPERCOAGULABILITY DUE TO ATRIAL FIBRILLATION (HCC): ICD-10-CM

## 2021-04-29 DIAGNOSIS — Z78.0 ASYMPTOMATIC MENOPAUSE: ICD-10-CM

## 2021-04-29 DIAGNOSIS — D75.89 MACROCYTOSIS: ICD-10-CM

## 2021-04-29 DIAGNOSIS — E78.2 MIXED HYPERLIPIDEMIA: ICD-10-CM

## 2021-04-29 DIAGNOSIS — Z78.9 FULL CODE STATUS: ICD-10-CM

## 2021-04-29 DIAGNOSIS — M85.80 OSTEOPENIA, UNSPECIFIED LOCATION: ICD-10-CM

## 2021-04-29 DIAGNOSIS — I48.0 PAF (PAROXYSMAL ATRIAL FIBRILLATION) (HCC): ICD-10-CM

## 2021-04-29 DIAGNOSIS — M54.50 LOW BACK PAIN WITHOUT SCIATICA, UNSPECIFIED BACK PAIN LATERALITY, UNSPECIFIED CHRONICITY: ICD-10-CM

## 2021-04-29 DIAGNOSIS — Z79.01 CHRONIC ANTICOAGULATION: ICD-10-CM

## 2021-04-29 DIAGNOSIS — N39.41 URGE INCONTINENCE: ICD-10-CM

## 2021-04-29 DIAGNOSIS — I70.0 ABDOMINAL AORTIC ATHEROSCLEROSIS (HCC): ICD-10-CM

## 2021-04-29 DIAGNOSIS — Z85.820 HISTORY OF MELANOMA: ICD-10-CM

## 2021-04-29 DIAGNOSIS — Z12.31 ENCOUNTER FOR SCREENING MAMMOGRAM FOR MALIGNANT NEOPLASM OF BREAST: ICD-10-CM

## 2021-04-29 DIAGNOSIS — I10 ESSENTIAL HYPERTENSION: ICD-10-CM

## 2021-04-29 DIAGNOSIS — D68.69 HYPERCOAGULABILITY DUE TO ATRIAL FIBRILLATION (HCC): ICD-10-CM

## 2021-04-29 PROBLEM — J96.01 ACUTE RESPIRATORY FAILURE WITH HYPOXIA (HCC): Status: RESOLVED | Noted: 2018-03-28 | Resolved: 2021-04-29

## 2021-04-29 PROBLEM — D64.9 ANEMIA: Status: RESOLVED | Noted: 2018-03-27 | Resolved: 2021-04-29

## 2021-04-29 PROBLEM — E87.1 HYPONATREMIA: Status: RESOLVED | Noted: 2018-03-27 | Resolved: 2021-04-29

## 2021-04-29 PROBLEM — J18.9 PNEUMONIA DUE TO INFECTIOUS ORGANISM: Status: RESOLVED | Noted: 2018-03-29 | Resolved: 2021-04-29

## 2021-04-29 PROBLEM — E88.09 HYPOALBUMINEMIA: Status: RESOLVED | Noted: 2018-03-27 | Resolved: 2021-04-29

## 2021-04-29 PROBLEM — R79.89 ABNORMAL LFTS: Status: RESOLVED | Noted: 2018-03-27 | Resolved: 2021-04-29

## 2021-04-29 PROCEDURE — 99214 OFFICE O/P EST MOD 30 MIN: CPT | Mod: 25 | Performed by: FAMILY MEDICINE

## 2021-04-29 PROCEDURE — 90750 HZV VACC RECOMBINANT IM: CPT | Performed by: FAMILY MEDICINE

## 2021-04-29 PROCEDURE — 90471 IMMUNIZATION ADMIN: CPT | Performed by: FAMILY MEDICINE

## 2021-04-29 RX ORDER — SIMVASTATIN 40 MG
40 TABLET ORAL EVERY EVENING
Qty: 100 TABLET | Refills: 11 | Status: SHIPPED | OUTPATIENT
Start: 2021-04-29 | End: 2021-06-04 | Stop reason: SDUPTHER

## 2021-04-29 ASSESSMENT — FIBROSIS 4 INDEX: FIB4 SCORE: 2.19

## 2021-04-29 ASSESSMENT — PATIENT HEALTH QUESTIONNAIRE - PHQ9: CLINICAL INTERPRETATION OF PHQ2 SCORE: 0

## 2021-04-29 NOTE — PROGRESS NOTES
Annual Health Assessment Questions:    1.  Are you currently engaging in any exercise or physical activity? Yes, 45 minute workout     2.  How would you describe your mood or emotional well-being today? good    3.  Have you had any falls in the last year? No    4.  Have you noticed any problems with your balance or had difficulty walking? No    5.  In the last six months have you experienced any leakage of urine? Yes    6. DPA/Advanced Directive: Patient has Advanced Directive, Living Will and Durable Power of  on file.
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
## 2021-05-04 ENCOUNTER — APPOINTMENT (RX ONLY)
Dept: URBAN - METROPOLITAN AREA CLINIC 4 | Facility: CLINIC | Age: 86
Setting detail: DERMATOLOGY
End: 2021-05-04

## 2021-05-04 DIAGNOSIS — Z86.006 PERSONAL HISTORY OF MELANOMA IN-SITU: ICD-10-CM

## 2021-05-04 DIAGNOSIS — D485 NEOPLASM OF UNCERTAIN BEHAVIOR OF SKIN: ICD-10-CM

## 2021-05-04 DIAGNOSIS — L82.1 OTHER SEBORRHEIC KERATOSIS: ICD-10-CM

## 2021-05-04 DIAGNOSIS — L81.4 OTHER MELANIN HYPERPIGMENTATION: ICD-10-CM

## 2021-05-04 PROBLEM — Z85.820 PERSONAL HISTORY OF MALIGNANT MELANOMA OF SKIN: Status: ACTIVE | Noted: 2021-05-04

## 2021-05-04 PROBLEM — D48.5 NEOPLASM OF UNCERTAIN BEHAVIOR OF SKIN: Status: ACTIVE | Noted: 2021-05-04

## 2021-05-04 PROCEDURE — ? ADDITIONAL NOTES

## 2021-05-04 PROCEDURE — ? COUNSELING

## 2021-05-04 PROCEDURE — 99213 OFFICE O/P EST LOW 20 MIN: CPT

## 2021-05-04 ASSESSMENT — LOCATION DETAILED DESCRIPTION DERM
LOCATION DETAILED: RIGHT INFERIOR UPPER BACK
LOCATION DETAILED: RIGHT DISTAL DORSAL FOREARM
LOCATION DETAILED: LEFT MID-UPPER BACK
LOCATION DETAILED: LEFT SUPERIOR LATERAL LOWER BACK
LOCATION DETAILED: MIDDLE STERNUM

## 2021-05-04 ASSESSMENT — LOCATION ZONE DERM
LOCATION ZONE: TRUNK
LOCATION ZONE: ARM

## 2021-05-04 ASSESSMENT — LOCATION SIMPLE DESCRIPTION DERM
LOCATION SIMPLE: RIGHT FOREARM
LOCATION SIMPLE: LEFT UPPER BACK
LOCATION SIMPLE: LEFT LOWER BACK
LOCATION SIMPLE: CHEST
LOCATION SIMPLE: RIGHT UPPER BACK

## 2021-05-07 ENCOUNTER — TELEPHONE (OUTPATIENT)
Dept: MEDICAL GROUP | Facility: PHYSICIAN GROUP | Age: 86
End: 2021-05-07

## 2021-05-07 NOTE — TELEPHONE ENCOUNTER
I only adjusted her simvastatin, she should be on 40 mg nightly.  Her metoprolol was last prescribed by a different doctor.  I did not make any changes to that.

## 2021-05-07 NOTE — TELEPHONE ENCOUNTER
1. Caller Name: Lisa Garza                      Call Back Number: [phone] (home)       How would the patient prefer to be contacted with a response: Phone call OK to leave a detailed message    Patient called asking if her medication Simvastatin was the medication that Dr. Stubbs upped the dosage on so she would only take it once a day, instead of twice a day. She wanted to make sure it wasn't the Metroprolol.    Please Advise!

## 2021-05-18 ENCOUNTER — PATIENT OUTREACH (OUTPATIENT)
Dept: HEALTH INFORMATION MANAGEMENT | Facility: OTHER | Age: 86
End: 2021-05-18

## 2021-05-18 NOTE — PROGRESS NOTES
Outcome: Declined Comprehensive Health Assessment    Please transfer to Patient Outreach Team at [phone] when patient returns call.      Attempt # 1

## 2021-05-20 ENCOUNTER — HOSPITAL ENCOUNTER (OUTPATIENT)
Dept: LAB | Facility: MEDICAL CENTER | Age: 86
End: 2021-05-20
Attending: FAMILY MEDICINE
Payer: MEDICARE

## 2021-05-20 DIAGNOSIS — I48.0 PAF (PAROXYSMAL ATRIAL FIBRILLATION) (HCC): ICD-10-CM

## 2021-05-20 DIAGNOSIS — I10 ESSENTIAL HYPERTENSION: ICD-10-CM

## 2021-05-20 DIAGNOSIS — D75.89 MACROCYTOSIS: ICD-10-CM

## 2021-05-20 LAB
ALBUMIN SERPL BCP-MCNC: 4.4 G/DL (ref 3.2–4.9)
ALBUMIN/GLOB SERPL: 1.5 G/DL
ALP SERPL-CCNC: 61 U/L (ref 30–99)
ALT SERPL-CCNC: 31 U/L (ref 2–50)
ANION GAP SERPL CALC-SCNC: 10 MMOL/L (ref 7–16)
AST SERPL-CCNC: 38 U/L (ref 12–45)
BASOPHILS # BLD AUTO: 0.7 % (ref 0–1.8)
BASOPHILS # BLD: 0.05 K/UL (ref 0–0.12)
BILIRUB SERPL-MCNC: 0.8 MG/DL (ref 0.1–1.5)
BUN SERPL-MCNC: 14 MG/DL (ref 8–22)
CALCIUM SERPL-MCNC: 10.2 MG/DL (ref 8.5–10.5)
CHLORIDE SERPL-SCNC: 104 MMOL/L (ref 96–112)
CHOLEST SERPL-MCNC: 177 MG/DL (ref 100–199)
CO2 SERPL-SCNC: 28 MMOL/L (ref 20–33)
CREAT SERPL-MCNC: 0.55 MG/DL (ref 0.5–1.4)
EOSINOPHIL # BLD AUTO: 0.27 K/UL (ref 0–0.51)
EOSINOPHIL NFR BLD: 3.9 % (ref 0–6.9)
ERYTHROCYTE [DISTWIDTH] IN BLOOD BY AUTOMATED COUNT: 50.9 FL (ref 35.9–50)
EST. AVERAGE GLUCOSE BLD GHB EST-MCNC: 123 MG/DL
FOLATE SERPL-MCNC: 27.5 NG/ML
GLOBULIN SER CALC-MCNC: 3 G/DL (ref 1.9–3.5)
GLUCOSE SERPL-MCNC: 100 MG/DL (ref 65–99)
HBA1C MFR BLD: 5.9 % (ref 4–5.6)
HCT VFR BLD AUTO: 44 % (ref 37–47)
HDLC SERPL-MCNC: 73 MG/DL
HGB BLD-MCNC: 14.1 G/DL (ref 12–16)
IMM GRANULOCYTES # BLD AUTO: 0.02 K/UL (ref 0–0.11)
IMM GRANULOCYTES NFR BLD AUTO: 0.3 % (ref 0–0.9)
LDLC SERPL CALC-MCNC: 77 MG/DL
LYMPHOCYTES # BLD AUTO: 1.5 K/UL (ref 1–4.8)
LYMPHOCYTES NFR BLD: 21.5 % (ref 22–41)
MCH RBC QN AUTO: 33 PG (ref 27–33)
MCHC RBC AUTO-ENTMCNC: 32 G/DL (ref 33.6–35)
MCV RBC AUTO: 103 FL (ref 81.4–97.8)
MONOCYTES # BLD AUTO: 0.71 K/UL (ref 0–0.85)
MONOCYTES NFR BLD AUTO: 10.2 % (ref 0–13.4)
NEUTROPHILS # BLD AUTO: 4.44 K/UL (ref 2–7.15)
NEUTROPHILS NFR BLD: 63.4 % (ref 44–72)
NRBC # BLD AUTO: 0 K/UL
NRBC BLD-RTO: 0 /100 WBC
PLATELET # BLD AUTO: 207 K/UL (ref 164–446)
PMV BLD AUTO: 12 FL (ref 9–12.9)
POTASSIUM SERPL-SCNC: 4.3 MMOL/L (ref 3.6–5.5)
PROT SERPL-MCNC: 7.4 G/DL (ref 6–8.2)
RBC # BLD AUTO: 4.27 M/UL (ref 4.2–5.4)
SODIUM SERPL-SCNC: 142 MMOL/L (ref 135–145)
TRIGL SERPL-MCNC: 137 MG/DL (ref 0–149)
TSH SERPL DL<=0.005 MIU/L-ACNC: 2.14 UIU/ML (ref 0.38–5.33)
VIT B12 SERPL-MCNC: 556 PG/ML (ref 211–911)
WBC # BLD AUTO: 7 K/UL (ref 4.8–10.8)

## 2021-05-20 PROCEDURE — 36415 COLL VENOUS BLD VENIPUNCTURE: CPT

## 2021-05-20 PROCEDURE — 80053 COMPREHEN METABOLIC PANEL: CPT

## 2021-05-20 PROCEDURE — 84443 ASSAY THYROID STIM HORMONE: CPT

## 2021-05-20 PROCEDURE — 83036 HEMOGLOBIN GLYCOSYLATED A1C: CPT

## 2021-05-20 PROCEDURE — 85025 COMPLETE CBC W/AUTO DIFF WBC: CPT

## 2021-05-20 PROCEDURE — 82607 VITAMIN B-12: CPT

## 2021-05-20 PROCEDURE — 82746 ASSAY OF FOLIC ACID SERUM: CPT

## 2021-05-20 PROCEDURE — 80061 LIPID PANEL: CPT

## 2021-05-26 ENCOUNTER — HOSPITAL ENCOUNTER (OUTPATIENT)
Dept: RADIOLOGY | Facility: MEDICAL CENTER | Age: 86
End: 2021-05-26
Attending: FAMILY MEDICINE
Payer: MEDICARE

## 2021-05-26 DIAGNOSIS — Z12.31 ENCOUNTER FOR SCREENING MAMMOGRAM FOR MALIGNANT NEOPLASM OF BREAST: ICD-10-CM

## 2021-05-26 DIAGNOSIS — M85.80 OSTEOPENIA, UNSPECIFIED LOCATION: ICD-10-CM

## 2021-05-26 DIAGNOSIS — Z78.0 ASYMPTOMATIC MENOPAUSE: ICD-10-CM

## 2021-05-26 PROCEDURE — 77063 BREAST TOMOSYNTHESIS BI: CPT

## 2021-05-26 PROCEDURE — 77080 DXA BONE DENSITY AXIAL: CPT

## 2021-06-04 DIAGNOSIS — I48.91 ATRIAL FIBRILLATION WITH RVR (HCC): ICD-10-CM

## 2021-06-04 RX ORDER — SIMVASTATIN 40 MG
40 TABLET ORAL EVERY EVENING
Qty: 100 TABLET | Refills: 11 | Status: SHIPPED | OUTPATIENT
Start: 2021-06-04 | End: 2022-08-29 | Stop reason: SDUPTHER

## 2021-06-04 NOTE — TELEPHONE ENCOUNTER
Received request via: Patient    Was the patient seen in the last year in this department? Yes    Does the patient have an active prescription (recently filled or refills available) for medication(s) requested? Yes. Refill request has been refused in Epic. Contacted pharmacy and called in most recent prescription.      Pt called in asking for more of a refill on the medication she stated she will be on vacation for a few months and doesn't want to run out of her meds please advise thank you.

## 2021-10-19 ENCOUNTER — TELEPHONE (OUTPATIENT)
Dept: MEDICAL GROUP | Facility: PHYSICIAN GROUP | Age: 86
End: 2021-10-19

## 2021-10-19 ENCOUNTER — NON-PROVIDER VISIT (OUTPATIENT)
Dept: MEDICAL GROUP | Facility: PHYSICIAN GROUP | Age: 86
End: 2021-10-19
Payer: MEDICARE

## 2021-10-19 DIAGNOSIS — Z23 NEED FOR VACCINATION: ICD-10-CM

## 2021-10-19 PROCEDURE — G0008 ADMIN INFLUENZA VIRUS VAC: HCPCS | Performed by: FAMILY MEDICINE

## 2021-10-19 PROCEDURE — 90662 IIV NO PRSV INCREASED AG IM: CPT | Performed by: FAMILY MEDICINE

## 2021-10-19 NOTE — TELEPHONE ENCOUNTER
Phone Number Called: [phone] (home)   MarielaYulisa Garza    Call outcome: Spoke to patient regarding message below.    Message: Scheduled MA Visit for flu vaccine today at 11:40.

## 2021-10-19 NOTE — PROGRESS NOTES
"Lisa Garza is a 85 y.o. female here for a non-provider visit for:   FLU    Reason for immunization: Annual Flu Vaccine  Immunization records indicate need for vaccine: Yes, confirmed with Epic  Minimum interval has been met for this vaccine: Yes  ABN completed: Yes    VIS Dated  8/6/21 was given to patient: Yes  All IAC Questionnaire questions were answered \"No.\"    Patient tolerated injection and no adverse effects were observed or reported: Yes    Pt scheduled for next dose in series: No  "

## 2021-11-02 ENCOUNTER — APPOINTMENT (RX ONLY)
Dept: URBAN - METROPOLITAN AREA CLINIC 4 | Facility: CLINIC | Age: 86
Setting detail: DERMATOLOGY
End: 2021-11-02

## 2021-11-02 DIAGNOSIS — L81.4 OTHER MELANIN HYPERPIGMENTATION: ICD-10-CM

## 2021-11-02 DIAGNOSIS — Z86.006 PERSONAL HISTORY OF MELANOMA IN-SITU: ICD-10-CM

## 2021-11-02 DIAGNOSIS — D485 NEOPLASM OF UNCERTAIN BEHAVIOR OF SKIN: ICD-10-CM

## 2021-11-02 DIAGNOSIS — L82.1 OTHER SEBORRHEIC KERATOSIS: ICD-10-CM

## 2021-11-02 DIAGNOSIS — L82.0 INFLAMED SEBORRHEIC KERATOSIS: ICD-10-CM

## 2021-11-02 DIAGNOSIS — B07.0 PLANTAR WART: ICD-10-CM

## 2021-11-02 PROBLEM — Z85.820 PERSONAL HISTORY OF MALIGNANT MELANOMA OF SKIN: Status: ACTIVE | Noted: 2021-11-02

## 2021-11-02 PROBLEM — D48.5 NEOPLASM OF UNCERTAIN BEHAVIOR OF SKIN: Status: ACTIVE | Noted: 2021-11-02

## 2021-11-02 PROCEDURE — ? ADDITIONAL NOTES

## 2021-11-02 PROCEDURE — ? COUNSELING

## 2021-11-02 PROCEDURE — ? SEPARATE AND IDENTIFIABLE DOCUMENTATION

## 2021-11-02 PROCEDURE — 17110 DESTRUCTION B9 LES UP TO 14: CPT

## 2021-11-02 PROCEDURE — 99213 OFFICE O/P EST LOW 20 MIN: CPT | Mod: 25

## 2021-11-02 PROCEDURE — ? LIQUID NITROGEN

## 2021-11-02 ASSESSMENT — LOCATION DETAILED DESCRIPTION DERM
LOCATION DETAILED: RIGHT INFERIOR LATERAL MIDBACK
LOCATION DETAILED: RIGHT PROXIMAL POSTERIOR UPPER ARM
LOCATION DETAILED: LEFT PROXIMAL POSTERIOR UPPER ARM
LOCATION DETAILED: LEFT SUPERIOR LATERAL MIDBACK
LOCATION DETAILED: RIGHT MEDIAL PLANTAR 1ST TOE
LOCATION DETAILED: RIGHT LATERAL GREAT TOE
LOCATION DETAILED: RIGHT DISTAL DORSAL FOREARM
LOCATION DETAILED: RIGHT MID-UPPER BACK
LOCATION DETAILED: MIDDLE STERNUM
LOCATION DETAILED: LEFT SUPERIOR UPPER BACK
LOCATION DETAILED: RIGHT INFERIOR LATERAL UPPER BACK
LOCATION DETAILED: RIGHT MEDIAL PLANTAR 2ND TOE

## 2021-11-02 ASSESSMENT — LOCATION SIMPLE DESCRIPTION DERM
LOCATION SIMPLE: PLANTAR SURFACE OF RIGHT 1ST TOE
LOCATION SIMPLE: CHEST
LOCATION SIMPLE: LEFT UPPER BACK
LOCATION SIMPLE: RIGHT FOREARM
LOCATION SIMPLE: RIGHT GREAT TOE
LOCATION SIMPLE: RIGHT POSTERIOR UPPER ARM
LOCATION SIMPLE: RIGHT LOWER BACK
LOCATION SIMPLE: RIGHT UPPER BACK
LOCATION SIMPLE: PLANTAR SURFACE OF RIGHT 2ND TOE
LOCATION SIMPLE: LEFT POSTERIOR UPPER ARM
LOCATION SIMPLE: LEFT LOWER BACK

## 2021-11-02 ASSESSMENT — LOCATION ZONE DERM
LOCATION ZONE: ARM
LOCATION ZONE: TOE
LOCATION ZONE: TRUNK

## 2021-11-02 NOTE — PROCEDURE: LIQUID NITROGEN
Show Applicator Variable?: Yes
Render Post-Care Instructions In Note?: no
Medical Necessity Information: It is in your best interest to select a reason for this procedure from the list below. All of these items fulfill various CMS LCD requirements except the new and changing color options.
Medical Necessity Clause: This procedure was medically necessary because the lesions that were treated were:
Consent: The patient's consent was obtained including but not limited to risks of crusting, scabbing, blistering, scarring, darker or lighter pigmentary change, recurrence, incomplete removal and infection.
Post-Care Instructions: I reviewed with the patient in detail post-care instructions. Patient is to wear sunprotection, and avoid picking at any of the treated lesions. Pt may apply Vaseline to crusted or scabbing areas.
Detail Level: Detailed

## 2021-11-03 ENCOUNTER — OFFICE VISIT (OUTPATIENT)
Dept: MEDICAL GROUP | Facility: PHYSICIAN GROUP | Age: 86
End: 2021-11-03
Payer: MEDICARE

## 2021-11-03 VITALS
TEMPERATURE: 98.1 F | HEIGHT: 63 IN | DIASTOLIC BLOOD PRESSURE: 80 MMHG | BODY MASS INDEX: 23.92 KG/M2 | RESPIRATION RATE: 15 BRPM | HEART RATE: 55 BPM | OXYGEN SATURATION: 93 % | SYSTOLIC BLOOD PRESSURE: 150 MMHG | WEIGHT: 135 LBS

## 2021-11-03 DIAGNOSIS — I48.0 PAF (PAROXYSMAL ATRIAL FIBRILLATION) (HCC): ICD-10-CM

## 2021-11-03 DIAGNOSIS — I35.0 AORTIC VALVE STENOSIS, ETIOLOGY OF CARDIAC VALVE DISEASE UNSPECIFIED: ICD-10-CM

## 2021-11-03 DIAGNOSIS — I48.91 HYPERCOAGULABILITY DUE TO ATRIAL FIBRILLATION (HCC): ICD-10-CM

## 2021-11-03 DIAGNOSIS — D68.69 HYPERCOAGULABILITY DUE TO ATRIAL FIBRILLATION (HCC): ICD-10-CM

## 2021-11-03 DIAGNOSIS — Z23 NEED FOR VACCINATION: ICD-10-CM

## 2021-11-03 DIAGNOSIS — H91.93 HEARING PROBLEM OF BOTH EARS: ICD-10-CM

## 2021-11-03 DIAGNOSIS — I34.0 MILD MITRAL REGURGITATION: ICD-10-CM

## 2021-11-03 DIAGNOSIS — E78.2 MIXED HYPERLIPIDEMIA: ICD-10-CM

## 2021-11-03 DIAGNOSIS — M85.851 OSTEOPENIA OF NECK OF RIGHT FEMUR: ICD-10-CM

## 2021-11-03 DIAGNOSIS — R04.0 EPISTAXIS: ICD-10-CM

## 2021-11-03 PROBLEM — M85.80 OSTEOPENIA: Status: ACTIVE | Noted: 2021-11-03

## 2021-11-03 PROCEDURE — 90750 HZV VACC RECOMBINANT IM: CPT | Performed by: FAMILY MEDICINE

## 2021-11-03 PROCEDURE — 90471 IMMUNIZATION ADMIN: CPT | Performed by: FAMILY MEDICINE

## 2021-11-03 PROCEDURE — 99213 OFFICE O/P EST LOW 20 MIN: CPT | Mod: 25 | Performed by: FAMILY MEDICINE

## 2021-11-03 ASSESSMENT — FIBROSIS 4 INDEX: FIB4 SCORE: 2.8

## 2021-11-03 NOTE — PROGRESS NOTES
Subjective:     CC:   Chief Complaint   Patient presents with   • Lab Results   • Medication Management     Wants to know if she should still be taking the metoprolol   • Epistaxis     From Xarelto         HPI:   Lisa presents today with from her last visit when she established with me April 29.  Recently sold her house in [facility] and is happy about that.  No plans for upcoming birthday (86). She lives alone and does all her IADLs w/o problems, her son comes to change air filters since he doesn't want her climbing ladders. She does the rest. Currently bagging leaves.    Problem   Osteopenia    5/21 dexa: The mean bone mineral density for the lumbar spine is 1.229 g/cm2, which corresponds to a T score of 0.4 and a Z score of 2.5.     The proximal right femur has a mean bone mineral density of 0.782 g/cm2, with a T score of -1.8 and a Z score of 0.6.     When compared with the most recent study dated 12/7/2016, there has been a 1.7% increase in the bone mineral density of the lumbar spine and a 4.9% decrease in the bone mineral density of the proximal right femur.  She does supplement calcium daily and does wt bearing exercises.      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 I suggested she use a humidifier.        Paf (Paroxysmal Atrial Fibrillation) (Hcc)    Saw her cardiologist 12/21, continues on xarelto 20mg/d and lopressor 50mg bid. Has yearly f/u. Stable, continue current plan of care       Mixed Hyperlipidemia    LDL < 100 on zoco 50mg/d         Current Outpatient Medications Ordered in Epic   Medication Sig Dispense Refill   • rivaroxaban (XARELTO) 20 MG Tab tablet Take 1 tablet by mouth with dinner. 90 tablet 2   • simvastatin (ZOCOR) 40 MG Tab Take 1 tablet by mouth every evening. 100 tablet 11   • metoprolol tartrate (LOPRESSOR) 25 MG Tab TAKE TWO TABLETS BY MOUTH TWICE DAILY 400 tablet 11   • diphenhydrAMINE  "(BENADRYL) 25 MG Tab Take 25 mg by mouth every 6 hours as needed for Sleep.     • Multiple Vitamins-Minerals (VITRUM 50+ SENIOR MULTI PO) Take 1 Tab by mouth every day.     • Cholecalciferol (VITAMIN D-3) 1000 UNITS CAPS Take 1 Tab by mouth every morning.     • Omega 3 1200 MG CAPS Take 1 Cap by mouth every morning.     • Calcium 500 MG CHEW Take 1 Tab by mouth every morning.       No current [facility]-ordered facility-administered medications on file.       Past Medical History:   Diagnosis Date   • Arthritis     osteo, hip   • Cancer (HCC)     skin on left shoulder   • Heart murmur    • High cholesterol    • Unspecified cataract     tony IOL   • Unspecified urinary incontinence        Social History     Tobacco Use   • Smoking status: Former Smoker     Packs/day: 1.00     Years: 33.00     Pack years: 33.00     Types: Cigarettes     Quit date: 1991     Years since quittin.8   • Smokeless tobacco: Never Used   • Tobacco comment: Started smoking at age 23   Substance Use Topics   • Alcohol use: Yes     Alcohol/week: 7.0 oz     Types: 14 Glasses of wine per week     Comment: 1 glass wine/day   • Drug use: No       Allergies:  Clindamycin hcl-fd&c blue #1 and Food    Health Maintenance: Completed    ROS:  Per HPI      Objective:       Exam:  /80 (BP Location: Right arm, Patient Position: Sitting, BP Cuff Size: Adult)   Pulse (!) 55   Temp 36.7 °C (98.1 °F) (Temporal)   Resp 15   Ht 1.6 m (5' 3\")   Wt 61.2 kg (135 lb)   SpO2 93%   BMI 23.91 kg/m²   Body mass index is 23.91 kg/m².  Wt Readings from Last 4 Encounters:   21 61.2 kg (135 lb)   21 60.2 kg (132 lb 11.2 oz)   21 59 kg (130 lb)   20 59.9 kg (132 lb)       Gen: Alert and oriented, No apparent distress. Appropriately groomed.  Tm's and canals clear, nares clear bilat.   Neck: Neck is supple without lymphadenopathy.No thyromegaly.   Lungs: Normal effort, CTA bilaterally, no wheezes, rhonchi, or rales.  CV: Regular rate and " rhythm. No rubs, or gallops. +4/6 systolic murmur.   ABD:  Soft, nontender, nondistended, NABSx4, no HSM or RBT or guarding or masses.  Ext: No clubbing, cyanosis, edema.  Skin: No rash noted.       Echo 3/18 with EF 70%, mild MR, mild AS.   Labs:   Results for orders placed or performed during the hospital encounter of 05/20/21   FOLATE   Result Value Ref Range    Folate -Folic Acid 27.5 >4.0 ng/mL   VITAMIN B12   Result Value Ref Range    Vitamin B12 -True Cobalamin 556 211 - 911 pg/mL   CBC WITH DIFFERENTIAL   Result Value Ref Range    WBC 7.0 4.8 - 10.8 K/uL    RBC 4.27 4.20 - 5.40 M/uL    Hemoglobin 14.1 12.0 - 16.0 g/dL    Hematocrit 44.0 37.0 - 47.0 %    .0 (H) 81.4 - 97.8 fL    MCH 33.0 27.0 - 33.0 pg    MCHC 32.0 (L) 33.6 - 35.0 g/dL    RDW 50.9 (H) 35.9 - 50.0 fL    Platelet Count 207 164 - 446 K/uL    MPV 12.0 9.0 - 12.9 fL    Neutrophils-Polys 63.40 44.00 - 72.00 %    Lymphocytes 21.50 (L) 22.00 - 41.00 %    Monocytes 10.20 0.00 - 13.40 %    Eosinophils 3.90 0.00 - 6.90 %    Basophils 0.70 0.00 - 1.80 %    Immature Granulocytes 0.30 0.00 - 0.90 %    Nucleated RBC 0.00 /100 WBC    Neutrophils (Absolute) 4.44 2.00 - 7.15 K/uL    Lymphs (Absolute) 1.50 1.00 - 4.80 K/uL    Monos (Absolute) 0.71 0.00 - 0.85 K/uL    Eos (Absolute) 0.27 0.00 - 0.51 K/uL    Baso (Absolute) 0.05 0.00 - 0.12 K/uL    Immature Granulocytes (abs) 0.02 0.00 - 0.11 K/uL    NRBC (Absolute) 0.00 K/uL   HEMOGLOBIN A1C   Result Value Ref Range    Glycohemoglobin 5.9 (H) 4.0 - 5.6 %    Est Avg Glucose 123 mg/dL   TSH   Result Value Ref Range    TSH 2.140 0.380 - 5.330 uIU/mL   Lipid Profile   Result Value Ref Range    Cholesterol,Tot 177 100 - 199 mg/dL    Triglycerides 137 0 - 149 mg/dL    HDL 73 >=40 mg/dL    LDL 77 <100 mg/dL   Comp Metabolic Panel   Result Value Ref Range    Sodium 142 135 - 145 mmol/L    Potassium 4.3 3.6 - 5.5 mmol/L    Chloride 104 96 - 112 mmol/L    Co2 28 20 - 33 mmol/L    Anion Gap 10.0 7.0 - 16.0     Glucose 100 (H) 65 - 99 mg/dL    Bun 14 8 - 22 mg/dL    Creatinine 0.55 0.50 - 1.40 mg/dL    Calcium 10.2 8.5 - 10.5 mg/dL    AST(SGOT) 38 12 - 45 U/L    ALT(SGPT) 31 2 - 50 U/L    Alkaline Phosphatase 61 30 - 99 U/L    Total Bilirubin 0.8 0.1 - 1.5 mg/dL    Albumin 4.4 3.2 - 4.9 g/dL    Total Protein 7.4 6.0 - 8.2 g/dL    Globulin 3.0 1.9 - 3.5 g/dL    A-G Ratio 1.5 g/dL   ESTIMATED GFR   Result Value Ref Range    GFR If African American >60 >60 mL/min/1.73 m 2    GFR If Non African American >60 >60 mL/min/1.73 m 2         Assessment & Plan:     85 y.o. female with the following -   Doing well overall, her repeat blood pressure is still slightly elevated but is usually normal, she will discuss this further with her cardiologist if it is still elevated at her follow-up next month.  Will check echo to r/o worsening of AS. F/u cards this winter.   wnl ear and nose exam, f/u ENT.     1. Osteopenia of neck of right femur    2. Need for vaccination  - Shingles Vaccine (Shingrix)    3. Hypercoagulability due to atrial fibrillation (HCC)  - Referral to ENT    4. Mixed hyperlipidemia    5. PAF (paroxysmal atrial fibrillation) (HCC)  - EC-ECHOCARDIOGRAM COMPLETE W/O CONT; Future    6. Epistaxis  - Referral to ENT    7. Aortic valve stenosis, etiology of cardiac valve disease unspecified  - EC-ECHOCARDIOGRAM COMPLETE W/O CONT; Future    8. Mild mitral regurgitation  - EC-ECHOCARDIOGRAM COMPLETE W/O CONT; Future    9. Hearing problem of both ears  - Referral to Audiology      Return in about 1 year (around 11/3/2022) for AWV.    Please note that this dictation was created using voice recognition software. I have made every reasonable attempt to correct obvious errors, but I expect that there are errors of grammar and possibly content that I did not discover before finalizing the note.

## 2021-11-03 NOTE — PATIENT INSTRUCTIONS
Get the echo of your heart then f/u with your cardiologist.    Get your hearing checked then see ENT regarding hearing and nose bleeds.

## 2021-11-12 NOTE — PROCEDURE: COUNSELING
Detail Level: Detailed
Quality 137: Melanoma: Continuity Of Care - Recall System: Patient information entered into a recall system that includes: target date for the next exam specified AND a process to follow up with patients regarding missed or unscheduled appointments
no...

## 2021-11-23 ENCOUNTER — APPOINTMENT (RX ONLY)
Dept: URBAN - METROPOLITAN AREA CLINIC 4 | Facility: CLINIC | Age: 86
Setting detail: DERMATOLOGY
End: 2021-11-23

## 2021-11-23 DIAGNOSIS — B07.0 PLANTAR WART: ICD-10-CM

## 2021-11-23 PROCEDURE — ? COUNSELING

## 2021-11-23 PROCEDURE — 17110 DESTRUCTION B9 LES UP TO 14: CPT

## 2021-11-23 PROCEDURE — ? LIQUID NITROGEN

## 2021-11-23 ASSESSMENT — LOCATION ZONE DERM: LOCATION ZONE: TOE

## 2021-11-23 ASSESSMENT — LOCATION DETAILED DESCRIPTION DERM
LOCATION DETAILED: RIGHT MEDIAL PLANTAR 2ND TOE
LOCATION DETAILED: RIGHT GREAT TOE

## 2021-11-23 ASSESSMENT — LOCATION SIMPLE DESCRIPTION DERM
LOCATION SIMPLE: RIGHT GREAT TOE
LOCATION SIMPLE: PLANTAR SURFACE OF RIGHT 2ND TOE

## 2021-11-23 NOTE — PROCEDURE: LIQUID NITROGEN
Show Applicator Variable?: Yes
Add 52 Modifier (Optional): no
Medical Necessity Information: It is in your best interest to select a reason for this procedure from the list below. All of these items fulfill various CMS LCD requirements except the new and changing color options.
Post-Care Instructions: I reviewed with the patient in detail post-care instructions. Patient is to wear sunprotection, and avoid picking at any of the treated lesions. Pt may apply Vaseline to crusted or scabbing areas.
Medical Necessity Clause: This procedure was medically necessary because the lesions that were treated were:
Consent: The patient's consent was obtained including but not limited to risks of crusting, scabbing, blistering, scarring, darker or lighter pigmentary change, recurrence, incomplete removal and infection.
Detail Level: Detailed

## 2021-11-24 ENCOUNTER — OFFICE VISIT (OUTPATIENT)
Dept: AUDIOLOGY | Facility: OTHER | Age: 86
End: 2021-11-24
Payer: MEDICARE

## 2021-11-24 DIAGNOSIS — H90.3 SENSORINEURAL HEARING LOSS, BILATERAL: ICD-10-CM

## 2021-11-24 PROCEDURE — 92557 COMPREHENSIVE HEARING TEST: CPT | Performed by: AUDIOLOGIST

## 2021-11-24 PROCEDURE — 92567 TYMPANOMETRY: CPT | Performed by: AUDIOLOGIST

## 2021-11-24 NOTE — PROGRESS NOTES
Lisa was seen for a hearing evaluation due to concerns of hearing loss in the past couple years. She said she had her hearing tested years ago at [facility], but was unsure of the results.  She denied otalgia, otorrhea, tinnitus, and vertigo.  Otoscopy revealed a clear ear canal bilaterally.   Tympanometry found a normal Type A tymp bilaterally.  Pure tone air and bone conduction thresholds found a bilateral, symmetrical mild to essentially profound, SNHL in the low to high frequencies.  SRT 30 dB HL bilaterally.  WR was 92% at 75 dB HL bilaterally.    Binaural ampflication was recommended. She was given a list of clinics near her home to pursue hearing aids.  Annual hearing eval to monitor hearing loss.  Please contact with any medical recommendation.

## 2022-01-13 ENCOUNTER — HOSPITAL ENCOUNTER (OUTPATIENT)
Dept: CARDIOLOGY | Facility: MEDICAL CENTER | Age: 87
End: 2022-01-13
Attending: FAMILY MEDICINE
Payer: MEDICARE

## 2022-01-13 DIAGNOSIS — I34.0 MILD MITRAL REGURGITATION: ICD-10-CM

## 2022-01-13 DIAGNOSIS — I35.0 AORTIC VALVE STENOSIS, ETIOLOGY OF CARDIAC VALVE DISEASE UNSPECIFIED: ICD-10-CM

## 2022-01-13 DIAGNOSIS — I48.0 PAF (PAROXYSMAL ATRIAL FIBRILLATION) (HCC): ICD-10-CM

## 2022-01-13 LAB
LV EJECT FRACT  99904: 75
LV EJECT FRACT MOD 2C 99903: 78.59
LV EJECT FRACT MOD 4C 99902: 84.56
LV EJECT FRACT MOD BP 99901: 81.82

## 2022-01-13 PROCEDURE — 93306 TTE W/DOPPLER COMPLETE: CPT | Mod: 26 | Performed by: INTERNAL MEDICINE

## 2022-01-13 PROCEDURE — 93306 TTE W/DOPPLER COMPLETE: CPT

## 2022-01-14 ENCOUNTER — TELEPHONE (OUTPATIENT)
Dept: MEDICAL GROUP | Facility: PHYSICIAN GROUP | Age: 87
End: 2022-01-14

## 2022-01-14 NOTE — TELEPHONE ENCOUNTER
----- Message from Archana Stubbs M.D. sent at 1/14/2022  9:55 AM PST -----  Results reviewed, released to alondra please let her know that her recent echocardiogram showed normal functioning though she does have some mild abnormalities with her valves and she should discuss this further when she sees her cardiologist on January 17.

## 2022-01-14 NOTE — TELEPHONE ENCOUNTER
Patient returned Tania's voicemail. I relayed the message. Wanted to discuss in detail. Offered to call her back with Dr. Stubbs's details and assured her it doesn't look to be urgent. Patient stated she will just wait until cardiology appointment.

## 2022-01-17 ENCOUNTER — OFFICE VISIT (OUTPATIENT)
Dept: CARDIOLOGY | Facility: MEDICAL CENTER | Age: 87
End: 2022-01-17
Payer: MEDICARE

## 2022-01-17 VITALS
SYSTOLIC BLOOD PRESSURE: 126 MMHG | WEIGHT: 133 LBS | BODY MASS INDEX: 23.57 KG/M2 | RESPIRATION RATE: 18 BRPM | HEART RATE: 68 BPM | DIASTOLIC BLOOD PRESSURE: 68 MMHG | HEIGHT: 63 IN | OXYGEN SATURATION: 93 %

## 2022-01-17 DIAGNOSIS — I05.0 MITRAL VALVE STENOSIS, UNSPECIFIED ETIOLOGY: ICD-10-CM

## 2022-01-17 DIAGNOSIS — I34.0 MILD MITRAL REGURGITATION: ICD-10-CM

## 2022-01-17 DIAGNOSIS — I48.0 PAF (PAROXYSMAL ATRIAL FIBRILLATION) (HCC): ICD-10-CM

## 2022-01-17 DIAGNOSIS — I10 ESSENTIAL HYPERTENSION: ICD-10-CM

## 2022-01-17 DIAGNOSIS — I35.0 MILD AORTIC STENOSIS: ICD-10-CM

## 2022-01-17 DIAGNOSIS — Z79.01 CHRONIC ANTICOAGULATION: ICD-10-CM

## 2022-01-17 PROBLEM — I51.89 DIASTOLIC DYSFUNCTION: Status: RESOLVED | Noted: 2019-11-15 | Resolved: 2022-01-17

## 2022-01-17 PROCEDURE — 99214 OFFICE O/P EST MOD 30 MIN: CPT | Performed by: INTERNAL MEDICINE

## 2022-01-17 RX ORDER — AMOXICILLIN 500 MG/1
2000 CAPSULE ORAL PRN
Status: ON HOLD | COMMUNITY
Start: 2021-11-03 | End: 2022-08-10

## 2022-01-17 ASSESSMENT — ENCOUNTER SYMPTOMS
LOSS OF CONSCIOUSNESS: 0
MYALGIAS: 0
BRUISES/BLEEDS EASILY: 0
PALPITATIONS: 0
SHORTNESS OF BREATH: 0
DIZZINESS: 0
COUGH: 0

## 2022-01-17 ASSESSMENT — FIBROSIS 4 INDEX: FIB4 SCORE: 2.84

## 2022-01-17 NOTE — PROGRESS NOTES
Chief Complaint   Patient presents with   • Coronary Artery Disease     F/V Dx: Coronary artery calcification seen on CAT scan       Subjective     Lisa Garza is a 86 y.o. female who presents today for follow-up evaluation of PAF, chronic anticoagulation, aortic stenosis, mild mitral valve disease and hypertension.     Since 12/11/2020 appointment the patient denies any cardiac symptoms including palpitations, dyspnea, PND or LE edema.  Continues to have intermittent epistaxis.  No related ER visits.  Scheduled to see ENT.  Got a humidifier which has helped with the frequency of epistaxis.  Exercises daily doing steady aerobics for 45 minutes without any symptoms of YOST, lightheadedness or chest pain.    The patient previously smoked but quit 20 years ago.  History of alcohol abuse.     Patient was hospitalized from 3/26 to 30/2018 for pneumonia and atrial fibrillation. She had a new onset of atrial fibrillation she was started on Xarelto for anticoagulation. Her atrial fibrillation reverted back to normal rhythm. Echocardiogram showed diastolic dysfunction grade 2 and ejection fraction 70%.     Past Medical History:   Diagnosis Date   • Arthritis     osteo, hip   • Cancer (HCC)     skin on left shoulder   • Heart murmur    • High cholesterol    • Unspecified cataract     tony IOL   • Unspecified urinary incontinence      Past Surgical History:   Procedure Laterality Date   • APPENDECTOMY LAPAROSCOPIC  10/10/2018    Procedure: APPENDECTOMY LAPAROSCOPIC;  Surgeon: Sushil Dalton M.D.;  Location: SURGERY [facility];  Service: General   • HIP ARTHROPLASTY TOTAL Left 7/6/2015    Procedure: HIP ARTHROPLASTY TOTAL;  Surgeon: Navid Howard M.D.;  Location: SURGERY [facility];  Service:    • OTHER Right 2006    cyst & bone spur on right hand   • EYE SURGERY      bilateral IOL     Family History   Problem Relation Age of Onset   • Cancer Father         GI   • Heart Disease Father    • Lung Disease Father     • Stroke Sister    • No Known Problems Son    • No Known Problems Son    • No Known Problems Maternal Grandmother    • No Known Problems Maternal Grandfather    • No Known Problems Paternal Grandmother    • No Known Problems Paternal Grandfather    • Hypertension Sister    • Hyperlipidemia Sister      Social History     Socioeconomic History   • Marital status:      Spouse name: Not on file   • Number of children: Not on file   • Years of education: Not on file   • Highest education level: Not on file   Occupational History   • Not on file   Tobacco Use   • Smoking status: Former Smoker     Packs/day: 1.00     Years: 33.00     Pack years: 33.00     Types: Cigarettes     Quit date: 1991     Years since quittin.0   • Smokeless tobacco: Never Used   • Tobacco comment: Started smoking at age 23   Substance and Sexual Activity   • Alcohol use: Yes     Alcohol/week: 7.0 oz     Types: 14 Glasses of wine per week     Comment: 1 glass wine/day   • Drug use: No   • Sexual activity: Never     Partners: Male   Other Topics Concern   • Not on file   Social History Narrative    She lost her   to Alzheimer's. She lives alone in her own home, 1 story. Has a son who lives nearby. She does all ADLs and IADLs w/o difficulty though recently hired a  to do her outside work. 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 She enjoys reading, learning to play the piano.      Social Determinants of Health     Financial Resource Strain:    • Difficulty of Paying Living Expenses: Not on file   Food Insecurity:    • Worried About Running Out of Food in the Last Year: Not on file   • Ran Out of Food in the Last Year: Not on file   Transportation Needs:    • Lack of Transportation (Medical): Not on file   • Lack of Transportation (Non-Medical): Not on file   Physical Activity:    • Days of Exercise per Week: Not on file   • Minutes of Exercise per Session: Not on file   Stress:    • Feeling of  Stress : Not on file   Social Connections:    • Frequency of Communication with Friends and Family: Not on file   • Frequency of Social Gatherings with Friends and Family: Not on file   • Attends Taoist Services: Not on file   • Active Member of Clubs or Organizations: Not on file   • Attends Club or Organization Meetings: Not on file   • Marital Status: Not on file   Intimate Partner Violence:    • Fear of Current or Ex-Partner: Not on file   • Emotionally Abused: Not on file   • Physically Abused: Not on file   • Sexually Abused: Not on file   Housing Stability:    • Unable to Pay for Housing in the Last Year: Not on file   • Number of Places Lived in the Last Year: Not on file   • Unstable Housing in the Last Year: Not on file     Allergies   Allergen Reactions   • Clindamycin Hcl-Fd&C Blue #1      unknown   • Food      Scallops-vomiting, citrus-itching, redness, oatmeal-diarrhea     Outpatient Encounter Medications as of 1/17/2022   Medication Sig Dispense Refill   • amoxicillin (AMOXIL) 500 MG Cap      • rivaroxaban (XARELTO) 20 MG Tab tablet Take 1 tablet by mouth with dinner. 90 tablet 2   • simvastatin (ZOCOR) 40 MG Tab Take 1 tablet by mouth every evening. 100 tablet 11   • metoprolol tartrate (LOPRESSOR) 25 MG Tab TAKE TWO TABLETS BY MOUTH TWICE DAILY 400 tablet 11   • diphenhydrAMINE (BENADRYL) 25 MG Tab Take 25 mg by mouth every 6 hours as needed for Sleep.     • Multiple Vitamins-Minerals (VITRUM 50+ SENIOR MULTI PO) Take 1 Tab by mouth every day.     • Cholecalciferol (VITAMIN D-3) 1000 UNITS CAPS Take 1 Tab by mouth every morning.     • Omega 3 1200 MG CAPS Take 1 Cap by mouth every morning.     • Calcium 500 MG CHEW Take 1 Tab by mouth every morning.       No facility-administered encounter medications on file as of 1/17/2022.     Review of Systems   HENT: Negative for nosebleeds.    Respiratory: Negative for cough and shortness of breath.    Cardiovascular: Negative for chest pain and  "palpitations.   Musculoskeletal: Negative for myalgias.   Neurological: Negative for dizziness and loss of consciousness.   Endo/Heme/Allergies: Does not bruise/bleed easily.              Objective     /68 (BP Location: Left arm, Patient Position: Sitting, BP Cuff Size: Adult)   Pulse 68   Resp 18   Ht 1.6 m (5' 3\")   Wt 60.3 kg (133 lb)   SpO2 93%   BMI 23.56 kg/m²     Physical Exam  Constitutional:       General: She is not in acute distress.     Appearance: She is well-developed.   Neck:      Vascular: No JVD.   Cardiovascular:      Rate and Rhythm: Normal rate. Rhythm irregularly irregular.      Heart sounds: Murmur heard.   No friction rub. No gallop.    Pulmonary:      Effort: Pulmonary effort is normal. No respiratory distress.      Breath sounds: Normal breath sounds. No wheezing or rales.   Skin:     General: Skin is warm and dry.   Neurological:      Mental Status: She is alert and oriented to person, place, and time.   Psychiatric:         Behavior: Behavior normal.            ECHOCARDIOGRAM 3/28/2018  Left ventricular ejection fraction is visually estimated to be 70%.  Mild concentric left ventricular hypertrophy.  Grade II diastolic dysfunction.  Moderately dilated left atrium.  Mild mitral regurgitation.    Mild aortic stenosis.     08/14/2018 EKG: Normal sinus rhythm, rate 60.  First-degree AV block.  Possible previous inferior myocardial infarction.  Reviewed by myself.    Assessment & Plan     1. PAF (paroxysmal atrial fibrillation) (HCC)     2. Mild aortic stenosis     3. Mild mitral regurgitation     4. Mitral valve stenosis, unspecified etiology     5. Chronic anticoagulation     6. Essential hypertension         Medical Decision Making: Today's Assessment/Status/Plan:   Assessment  1.  PAF.  Maintaining NSR.  2.  Chronic anticoagulation.  On Xarelto.  3.  Aortic stenosis.  Mild.  4.  Mitral regurgitation.  Mild.  5.  Coronary calcification by CT scan.  6.  Hypertension.  7.  " Dyslipidemia.  8.  History of excessive alcohol use.     Recommendation Discussion  1.  Patient's current cardiac status is stable regarding her PAF, anticoagulation, aortic stenosis, mild regurgitation/stenosis, CAD with no chest pain, hypertension with normal BP.  2.  I reviewed the most recent echocardiogram results with the patient.  3.  Instructed patient to continue her regular exercise activity and monitor for the development of exertional shortness of breath or palpitations.  4.  Continue metoprolol, simvastatin and Xarelto.  5.  Blood pressure normal.  6.  RTC 6 months, sooner if necessary.

## 2022-03-08 ENCOUNTER — TELEPHONE (OUTPATIENT)
Dept: HEALTH INFORMATION MANAGEMENT | Facility: OTHER | Age: 87
End: 2022-03-08
Payer: MEDICARE

## 2022-04-20 DIAGNOSIS — I48.91 ATRIAL FIBRILLATION WITH RVR (HCC): ICD-10-CM

## 2022-04-20 RX ORDER — RIVAROXABAN 20 MG/1
TABLET, FILM COATED ORAL
Qty: 100 TABLET | Refills: 3 | Status: SHIPPED | OUTPATIENT
Start: 2022-04-20 | End: 2023-06-30

## 2022-08-08 ENCOUNTER — APPOINTMENT (OUTPATIENT)
Dept: RADIOLOGY | Facility: IMAGING CENTER | Age: 87
End: 2022-08-08
Attending: FAMILY MEDICINE
Payer: MEDICARE

## 2022-08-08 ENCOUNTER — OFFICE VISIT (OUTPATIENT)
Dept: URGENT CARE | Facility: CLINIC | Age: 87
End: 2022-08-08
Payer: MEDICARE

## 2022-08-08 VITALS
WEIGHT: 133.2 LBS | BODY MASS INDEX: 23.6 KG/M2 | HEIGHT: 63 IN | RESPIRATION RATE: 12 BRPM | OXYGEN SATURATION: 91 % | SYSTOLIC BLOOD PRESSURE: 122 MMHG | TEMPERATURE: 98.8 F | DIASTOLIC BLOOD PRESSURE: 70 MMHG | HEART RATE: 83 BPM

## 2022-08-08 DIAGNOSIS — M79.644 FINGER PAIN, RIGHT: ICD-10-CM

## 2022-08-08 PROCEDURE — 73140 X-RAY EXAM OF FINGER(S): CPT | Mod: TC,RT | Performed by: FAMILY MEDICINE

## 2022-08-08 PROCEDURE — 99213 OFFICE O/P EST LOW 20 MIN: CPT | Performed by: FAMILY MEDICINE

## 2022-08-08 ASSESSMENT — FIBROSIS 4 INDEX: FIB4 SCORE: 2.84

## 2022-08-08 NOTE — PROGRESS NOTES
"Chief Complaint   Patient presents with    Finger Injury     R hand ring finger x 1 day pt was gardening and pinched her finger wants to get it checked out            States that she pinched her rt ring finger between some handheld isabel while gardening yesterday.   Notably, she recounts that she did something similar while making her bed approximately one year ago and she developed a fluid filled cyst over this exact same area and that it never really fully went away.   After she pinched this same finger yesterday, she notes that the cyst on distal finger, near DIP joint, filled with blood.    Area is now tender         OBJECTIVE  /70 (BP Location: Left arm, Patient Position: Sitting, BP Cuff Size: Adult)   Pulse 83   Temp 37.1 °C (98.8 °F) (Temporal)   Resp 12   Ht 1.6 m (5' 3\")   Wt 60.4 kg (133 lb 3.2 oz)   SpO2 91%   RT hand:  full AROM.  + neurovasc intact.    There is a small, fluid filled cyst just distal to DIP joint, with dark red fluid, likely old blood.         A/P:    Friction blister  Advised to try to keep blister intact as long as possible.     F/u in one week if no resolution or sooner if sx worsen.     "

## 2022-08-10 ENCOUNTER — APPOINTMENT (OUTPATIENT)
Dept: CARDIOLOGY | Facility: MEDICAL CENTER | Age: 87
End: 2022-08-10
Attending: GENERAL PRACTICE
Payer: MEDICARE

## 2022-08-10 ENCOUNTER — APPOINTMENT (OUTPATIENT)
Dept: RADIOLOGY | Facility: MEDICAL CENTER | Age: 87
End: 2022-08-10
Attending: EMERGENCY MEDICINE
Payer: MEDICARE

## 2022-08-10 ENCOUNTER — HOSPITAL ENCOUNTER (OUTPATIENT)
Facility: MEDICAL CENTER | Age: 87
End: 2022-08-10
Attending: EMERGENCY MEDICINE | Admitting: GENERAL PRACTICE
Payer: MEDICARE

## 2022-08-10 ENCOUNTER — APPOINTMENT (OUTPATIENT)
Dept: RADIOLOGY | Facility: MEDICAL CENTER | Age: 87
End: 2022-08-10
Attending: GENERAL PRACTICE
Payer: MEDICARE

## 2022-08-10 VITALS
HEART RATE: 68 BPM | BODY MASS INDEX: 23.44 KG/M2 | WEIGHT: 132.28 LBS | OXYGEN SATURATION: 92 % | TEMPERATURE: 97.7 F | HEIGHT: 63 IN | SYSTOLIC BLOOD PRESSURE: 148 MMHG | RESPIRATION RATE: 18 BRPM | DIASTOLIC BLOOD PRESSURE: 67 MMHG

## 2022-08-10 DIAGNOSIS — R07.9 CHEST PAIN, UNSPECIFIED TYPE: ICD-10-CM

## 2022-08-10 DIAGNOSIS — I10 HYPERTENSION, UNSPECIFIED TYPE: ICD-10-CM

## 2022-08-10 LAB
ALBUMIN SERPL BCP-MCNC: 3.3 G/DL (ref 3.2–4.9)
ALBUMIN/GLOB SERPL: 1.4 G/DL
ALP SERPL-CCNC: 48 U/L (ref 30–99)
ALT SERPL-CCNC: 18 U/L (ref 2–50)
ANION GAP SERPL CALC-SCNC: 10 MMOL/L (ref 7–16)
APTT PPP: 35.9 SEC (ref 24.7–36)
AST SERPL-CCNC: 24 U/L (ref 12–45)
BASOPHILS # BLD AUTO: 0.9 % (ref 0–1.8)
BASOPHILS # BLD: 0.06 K/UL (ref 0–0.12)
BILIRUB SERPL-MCNC: 0.4 MG/DL (ref 0.1–1.5)
BUN SERPL-MCNC: 14 MG/DL (ref 8–22)
CALCIUM SERPL-MCNC: 7.8 MG/DL (ref 8.5–10.5)
CHLORIDE SERPL-SCNC: 110 MMOL/L (ref 96–112)
CO2 SERPL-SCNC: 22 MMOL/L (ref 20–33)
CREAT SERPL-MCNC: 0.52 MG/DL (ref 0.5–1.4)
D DIMER PPP IA.FEU-MCNC: 0.43 UG/ML (FEU) (ref 0–0.5)
EKG IMPRESSION: NORMAL
EKG IMPRESSION: NORMAL
EOSINOPHIL # BLD AUTO: 0.42 K/UL (ref 0–0.51)
EOSINOPHIL NFR BLD: 6.5 % (ref 0–6.9)
ERYTHROCYTE [DISTWIDTH] IN BLOOD BY AUTOMATED COUNT: 46.9 FL (ref 35.9–50)
GFR SERPLBLD CREATININE-BSD FMLA CKD-EPI: 90 ML/MIN/1.73 M 2
GLOBULIN SER CALC-MCNC: 2.3 G/DL (ref 1.9–3.5)
GLUCOSE SERPL-MCNC: 81 MG/DL (ref 65–99)
HCT VFR BLD AUTO: 36 % (ref 37–47)
HGB BLD-MCNC: 11.8 G/DL (ref 12–16)
IMM GRANULOCYTES # BLD AUTO: 0.02 K/UL (ref 0–0.11)
IMM GRANULOCYTES NFR BLD AUTO: 0.3 % (ref 0–0.9)
INR PPP: 2.68 (ref 0.87–1.13)
LYMPHOCYTES # BLD AUTO: 1.54 K/UL (ref 1–4.8)
LYMPHOCYTES NFR BLD: 23.9 % (ref 22–41)
MCH RBC QN AUTO: 32.8 PG (ref 27–33)
MCHC RBC AUTO-ENTMCNC: 32.8 G/DL (ref 33.6–35)
MCV RBC AUTO: 100 FL (ref 81.4–97.8)
MONOCYTES # BLD AUTO: 0.65 K/UL (ref 0–0.85)
MONOCYTES NFR BLD AUTO: 10.1 % (ref 0–13.4)
NEUTROPHILS # BLD AUTO: 3.76 K/UL (ref 2–7.15)
NEUTROPHILS NFR BLD: 58.3 % (ref 44–72)
NRBC # BLD AUTO: 0 K/UL
NRBC BLD-RTO: 0 /100 WBC
NT-PROBNP SERPL IA-MCNC: 326 PG/ML (ref 0–125)
PLATELET # BLD AUTO: 156 K/UL (ref 164–446)
PMV BLD AUTO: 11.5 FL (ref 9–12.9)
POTASSIUM SERPL-SCNC: 3.6 MMOL/L (ref 3.6–5.5)
PROT SERPL-MCNC: 5.6 G/DL (ref 6–8.2)
PROTHROMBIN TIME: 27.7 SEC (ref 12–14.6)
RBC # BLD AUTO: 3.6 M/UL (ref 4.2–5.4)
SODIUM SERPL-SCNC: 142 MMOL/L (ref 135–145)
TROPONIN T SERPL-MCNC: 10 NG/L (ref 6–19)
TROPONIN T SERPL-MCNC: 8 NG/L (ref 6–19)
WBC # BLD AUTO: 6.5 K/UL (ref 4.8–10.8)

## 2022-08-10 PROCEDURE — A9502 TC99M TETROFOSMIN: HCPCS

## 2022-08-10 PROCEDURE — 93005 ELECTROCARDIOGRAM TRACING: CPT

## 2022-08-10 PROCEDURE — 80053 COMPREHEN METABOLIC PANEL: CPT

## 2022-08-10 PROCEDURE — 85610 PROTHROMBIN TIME: CPT

## 2022-08-10 PROCEDURE — 99285 EMERGENCY DEPT VISIT HI MDM: CPT

## 2022-08-10 PROCEDURE — A9270 NON-COVERED ITEM OR SERVICE: HCPCS | Performed by: EMERGENCY MEDICINE

## 2022-08-10 PROCEDURE — 85730 THROMBOPLASTIN TIME PARTIAL: CPT

## 2022-08-10 PROCEDURE — 700102 HCHG RX REV CODE 250 W/ 637 OVERRIDE(OP): Performed by: EMERGENCY MEDICINE

## 2022-08-10 PROCEDURE — 85025 COMPLETE CBC W/AUTO DIFF WBC: CPT

## 2022-08-10 PROCEDURE — G0378 HOSPITAL OBSERVATION PER HR: HCPCS

## 2022-08-10 PROCEDURE — 71045 X-RAY EXAM CHEST 1 VIEW: CPT

## 2022-08-10 PROCEDURE — 700111 HCHG RX REV CODE 636 W/ 250 OVERRIDE (IP)

## 2022-08-10 PROCEDURE — 700102 HCHG RX REV CODE 250 W/ 637 OVERRIDE(OP): Performed by: GENERAL PRACTICE

## 2022-08-10 PROCEDURE — 93005 ELECTROCARDIOGRAM TRACING: CPT | Performed by: EMERGENCY MEDICINE

## 2022-08-10 PROCEDURE — 99236 HOSP IP/OBS SAME DATE HI 85: CPT | Performed by: GENERAL PRACTICE

## 2022-08-10 PROCEDURE — 36415 COLL VENOUS BLD VENIPUNCTURE: CPT

## 2022-08-10 PROCEDURE — 700105 HCHG RX REV CODE 258

## 2022-08-10 PROCEDURE — 94760 N-INVAS EAR/PLS OXIMETRY 1: CPT

## 2022-08-10 PROCEDURE — 93306 TTE W/DOPPLER COMPLETE: CPT

## 2022-08-10 PROCEDURE — 84484 ASSAY OF TROPONIN QUANT: CPT

## 2022-08-10 PROCEDURE — 85379 FIBRIN DEGRADATION QUANT: CPT

## 2022-08-10 PROCEDURE — 83880 ASSAY OF NATRIURETIC PEPTIDE: CPT

## 2022-08-10 PROCEDURE — A9270 NON-COVERED ITEM OR SERVICE: HCPCS | Performed by: GENERAL PRACTICE

## 2022-08-10 RX ORDER — SODIUM CHLORIDE 9 MG/ML
500 INJECTION, SOLUTION INTRAVENOUS ONCE
Status: COMPLETED | OUTPATIENT
Start: 2022-08-10 | End: 2022-08-10

## 2022-08-10 RX ORDER — AMOXICILLIN 250 MG
2 CAPSULE ORAL 2 TIMES DAILY
Status: DISCONTINUED | OUTPATIENT
Start: 2022-08-10 | End: 2022-08-10 | Stop reason: HOSPADM

## 2022-08-10 RX ORDER — HEPARIN SODIUM 5000 [USP'U]/ML
5000 INJECTION, SOLUTION INTRAVENOUS; SUBCUTANEOUS EVERY 8 HOURS
Status: DISCONTINUED | OUTPATIENT
Start: 2022-08-10 | End: 2022-08-10

## 2022-08-10 RX ORDER — ATORVASTATIN CALCIUM 40 MG/1
40 TABLET, FILM COATED ORAL EVERY EVENING
Status: DISCONTINUED | OUTPATIENT
Start: 2022-08-10 | End: 2022-08-10 | Stop reason: HOSPADM

## 2022-08-10 RX ORDER — REGADENOSON 0.08 MG/ML
INJECTION, SOLUTION INTRAVENOUS
Status: COMPLETED
Start: 2022-08-10 | End: 2022-08-10

## 2022-08-10 RX ORDER — CETIRIZINE HYDROCHLORIDE 10 MG/1
10 TABLET ORAL EVERY EVENING
Status: SHIPPED | COMMUNITY
End: 2022-09-01

## 2022-08-10 RX ORDER — POLYETHYLENE GLYCOL 3350 17 G/17G
1 POWDER, FOR SOLUTION ORAL
Status: DISCONTINUED | OUTPATIENT
Start: 2022-08-10 | End: 2022-08-10 | Stop reason: HOSPADM

## 2022-08-10 RX ORDER — AMINOPHYLLINE 25 MG/ML
100 INJECTION, SOLUTION INTRAVENOUS
Status: DISCONTINUED | OUTPATIENT
Start: 2022-08-10 | End: 2022-08-10 | Stop reason: HOSPADM

## 2022-08-10 RX ORDER — NITROGLYCERIN 0.4 MG/1
0.4 TABLET SUBLINGUAL
Status: DISCONTINUED | OUTPATIENT
Start: 2022-08-10 | End: 2022-08-10 | Stop reason: HOSPADM

## 2022-08-10 RX ORDER — LISINOPRIL 10 MG/1
5 TABLET ORAL
Status: DISCONTINUED | OUTPATIENT
Start: 2022-08-10 | End: 2022-08-10 | Stop reason: HOSPADM

## 2022-08-10 RX ORDER — ASPIRIN 325 MG
325 TABLET ORAL EVERY 6 HOURS PRN
Status: ON HOLD | COMMUNITY
End: 2022-08-10

## 2022-08-10 RX ORDER — BISACODYL 10 MG
10 SUPPOSITORY, RECTAL RECTAL
Status: DISCONTINUED | OUTPATIENT
Start: 2022-08-10 | End: 2022-08-10 | Stop reason: HOSPADM

## 2022-08-10 RX ORDER — REGADENOSON 0.08 MG/ML
0.4 INJECTION, SOLUTION INTRAVENOUS ONCE
Status: COMPLETED | OUTPATIENT
Start: 2022-08-10 | End: 2022-08-10

## 2022-08-10 RX ORDER — ACETAMINOPHEN 325 MG/1
650 TABLET ORAL EVERY 6 HOURS PRN
Status: DISCONTINUED | OUTPATIENT
Start: 2022-08-10 | End: 2022-08-10 | Stop reason: HOSPADM

## 2022-08-10 RX ADMIN — LISINOPRIL 5 MG: 10 TABLET ORAL at 10:13

## 2022-08-10 RX ADMIN — METOPROLOL TARTRATE 25 MG: 25 TABLET, FILM COATED ORAL at 10:13

## 2022-08-10 RX ADMIN — NITROGLYCERIN 0.4 MG: 0.4 TABLET, ORALLY DISINTEGRATING SUBLINGUAL at 05:58

## 2022-08-10 RX ADMIN — REGADENOSON 0.4 MG: 0.08 INJECTION, SOLUTION INTRAVENOUS at 13:59

## 2022-08-10 RX ADMIN — SODIUM CHLORIDE 500 ML: 9 INJECTION, SOLUTION INTRAVENOUS at 06:20

## 2022-08-10 ASSESSMENT — CHA2DS2 SCORE
VASCULAR DISEASE: NO
PRIOR STROKE OR TIA OR THROMBOEMBOLISM: NO
HYPERTENSION: YES
SEX: FEMALE
AGE 75 OR GREATER: YES
DIABETES: NO
CHA2DS2 VASC SCORE: 4
AGE 65 TO 74: NO
CHF OR LEFT VENTRICULAR DYSFUNCTION: NO

## 2022-08-10 ASSESSMENT — LIFESTYLE VARIABLES
CONSUMPTION TOTAL: NEGATIVE
HAVE YOU EVER FELT YOU SHOULD CUT DOWN ON YOUR DRINKING: NO
HAVE PEOPLE ANNOYED YOU BY CRITICIZING YOUR DRINKING: NO
EVER HAD A DRINK FIRST THING IN THE MORNING TO STEADY YOUR NERVES TO GET RID OF A HANGOVER: NO
HOW MANY TIMES IN THE PAST YEAR HAVE YOU HAD 5 OR MORE DRINKS IN A DAY: 0
TOTAL SCORE: 0
AVERAGE NUMBER OF DAYS PER WEEK YOU HAVE A DRINK CONTAINING ALCOHOL: 7
TOTAL SCORE: 0
EVER FELT BAD OR GUILTY ABOUT YOUR DRINKING: NO
ALCOHOL_USE: YES
DOES PATIENT WANT TO STOP DRINKING: NO
ON A TYPICAL DAY WHEN YOU DRINK ALCOHOL HOW MANY DRINKS DO YOU HAVE: 1
TOTAL SCORE: 0

## 2022-08-10 ASSESSMENT — PATIENT HEALTH QUESTIONNAIRE - PHQ9
2. FEELING DOWN, DEPRESSED, IRRITABLE, OR HOPELESS: NOT AT ALL
1. LITTLE INTEREST OR PLEASURE IN DOING THINGS: NOT AT ALL
SUM OF ALL RESPONSES TO PHQ9 QUESTIONS 1 AND 2: 0

## 2022-08-10 ASSESSMENT — FIBROSIS 4 INDEX: FIB4 SCORE: 3.12

## 2022-08-10 NOTE — DISCHARGE SUMMARY
Discharge Summary    CHIEF COMPLAINT ON ADMISSION  Chief Complaint   Patient presents with    Chest Pain       Reason for Admission  Chest pain    Admission Date  8/10/2022    CODE STATUS  Full Code    HPI & HOSPITAL COURSE  This is an 86-year-old female with past medical history of hypertension, mild aortic stenosis, paroxysmal atrial fibrillation on Xarelto, and CAD who presented to the ED on 08/10/2022 with intermittent left-sided chest pain x7 days.     Patient reports she has been working in her garden extensively and initially thought her chest pressure was associated with that.  She denies any association with dizziness, nausea vomiting or any radiation.     She has history of CAD which is noted on calcifications on CT. denied any prior stress test.  Troponins negative x2, EKG with no signs of acute ischemia.  Stress test negative for any acute ischemia. ECHO pending, will notify patient of any abnormal results.  Patient okay to discharge prior to echo results.    Patient has a scheduled appointment with Dr. Sutherland, on September 1.  Patient to resume all her medications.    Therefore, she is discharged in good and stable condition to home with close outpatient follow-up.    The patient recovered much more quickly than anticipated on admission.    Discharge Date  08/10/2022    FOLLOW UP ITEMS POST DISCHARGE  Primary care physician  Cardiology  - follow-up appointment with Dr. Sutherland on 09/01/2022    DISCHARGE DIAGNOSES  Principal Problem:    Chest pain POA: Yes  Active Problems:    Mixed hyperlipidemia POA: Yes      Overview: LDL < 100 on zoco 50mg/d    PAF (paroxysmal atrial fibrillation) (HCC) POA: Yes      Overview: Saw her cardiologist 12/21, continues on xarelto 20mg/d and lopressor 50mg       bid. Has yearly f/u. Stable, continue current plan of care          Mild aortic stenosis POA: Yes    Coronary artery calcification seen on CAT scan POA: Yes  Resolved Problems:    * No resolved hospital  problems. *      FOLLOW UP  Future Appointments   Date Time Provider Department Center   9/1/2022 10:20 AM Jaydon Sutherland M.D. [facility] None   9/12/2022 12:00 PM MATTHEW Suarez [facility] None   12/6/2022 10:50 AM Kathy Garcia M.D. [facility] None       MEDICATIONS ON DISCHARGE     Medication List        CONTINUE taking these medications        Instructions   Calcium 500 MG Chew   Chew 1,000 mg every morning. Indications: Low Amount of Calcium in the Blood, Vit-D 1000IU, Calcium 500mg, Phosphorus 200mg  Dose: 1,000 mg     cetirizine 10 MG Tabs  Commonly known as: ZYRTEC   Take 10 mg by mouth every evening.  Dose: 10 mg     diphenhydrAMINE 25 MG Tabs  Commonly known as: BENADRYL   Take 25 mg by mouth every 6 hours as needed (allergy).  Dose: 25 mg     metoprolol tartrate 25 MG Tabs  Commonly known as: LOPRESSOR   TAKE TWO TABLETS BY MOUTH TWICE DAILY     [place] 3 1200 MG Caps   Take 1,200 mg by mouth every morning. Indications: Disease of the Heart or Blood Vessels  Dose: 1,200 mg     simvastatin 40 MG Tabs  Commonly known as: ZOCOR   Take 1 tablet by mouth every evening.  Dose: 40 mg     Vitamin D-3 25 MCG (1000 UT) Caps   Take 1,000 Units by mouth every morning. Indications: Vitamin D Deficiency  Dose: 1,000 Units     VITRUM 50+ SENIOR MULTI PO   Take 1 Tablet by mouth every morning. Indications: Vitamin Deficiency  Dose: 1 Tablet     Xarelto 20 MG Tabs tablet  Generic drug: rivaroxaban   TAKE ONE TABLET BY MOUTH WITH DINNER            STOP taking these medications      amoxicillin 500 MG Caps  Commonly known as: AMOXIL     aspirin 325 MG Tabs  Commonly known as: ASA              Allergies  Allergies   Allergen Reactions    [place] Unspecified     Itchy welts      Clindamycin Unspecified     Unknown reaction      Oatmeal Diarrhea     Gas      Scallops Vomiting       DIET  Orders Placed This Encounter   Procedures    Diet Order Diet: Cardiac; Second Modifier: (optional): 2 Gram Sodium     Standing Status:    Standing     Number of Occurrences:   1     Order Specific Question:   Diet:     Answer:   Cardiac [6]     Order Specific Question:   Second Modifier: (optional)     Answer:   2 Gram Sodium [7]       ACTIVITY  As tolerated.  Weight bearing as tolerated    CONSULTATIONS  None    PROCEDURES  None    LABORATORY  Lab Results   Component Value Date    SODIUM 142 08/10/2022    POTASSIUM 3.6 08/10/2022    CHLORIDE 110 08/10/2022    CO2 22 08/10/2022    GLUCOSE 81 08/10/2022    BUN 14 08/10/2022    CREATININE 0.52 08/10/2022        Lab Results   Component Value Date    WBC 6.5 08/10/2022    HEMOGLOBIN 11.8 (L) 08/10/2022    HEMATOCRIT 36.0 (L) 08/10/2022    PLATELETCT 156 (L) 08/10/2022        Total time of the discharge process exceeds 45 minutes.

## 2022-08-10 NOTE — ED NOTES
Pt is hooked up to the zoll in the hallway and is having periods of Afib with RVR going as high as 197 then drops to 78. Pt was given one nitro which dropped her BP please see charting. Charge is notified so is ERP. Pt will move to RM 8 and Demi OROPEZA was given report.

## 2022-08-10 NOTE — DISCHARGE PLANNING
HTH/SCP TCN chart review completed. Collaborated with LA NENA Padilla and Gerber prior to meeting with the pt. The most current review of medical record, knowledge of pt's PLOF and social support, LACE+ score of 58, 6 clicks scores of (none entered) mobility were considered.      Pt seen at bedside,Tanana , iman, reading glasses in place. Mbr endorses living alone, independent with mobility and ADLs without aide of an assistive device. Mbr currently has no functional concerns. Mbr also denies need for additional food/house/transportation. Given this, no choice forms were obtained.    Introduced TCN program. Provided education regarding post acute levels of care. Discussed HTH/SCP plan benefits (Meds to Beds, medical uber and [facility] transitional care). Pt verbalizes understanding.  Mrb agreeable to [facility] services, will send referral to [facility].     TCN will continue to follow and collaborate with discharge planning team as additional post acute needs arise. Thank you.     Completed today:  Choice obtained:NONE  [facility] referral :sent

## 2022-08-10 NOTE — ED TRIAGE NOTES
Pt called [facility] for shoulder/chest pain for the past week. Pt has been gardening lately. [facility] did a 12 lead which was unremarkable. Pt took 324 mg of ASA prior to [facility] arrival. Pt is ambulatory on arrival.

## 2022-08-10 NOTE — ED NOTES
Report given to daniel OROPEZA. Safety measures in place. Call light within reach. Care relinquished.

## 2022-08-10 NOTE — ASSESSMENT & PLAN NOTE
Patient reports she has been working in her garden extensively and initially thought her chest pressure was associated with that.  She denies any association with dizziness, nausea vomiting or any radiation.    She has history of CAD which is noted on calcifications on CT. Denied any prior stress test.  Troponins negative x2, EKG with no signs of acute ischemia.  Patient is for stress test and echocardiogram today.  If normal can likely discharge today.

## 2022-08-10 NOTE — ED PROVIDER NOTES
ED Provider Note    CHIEF COMPLAINT  Chief Complaint   Patient presents with    Chest Pain       HPI  Lisa Garza is a 86 y.o. female who presents to the emergency department by ambulance from home for chest pain.  Patient describes approximately 1.5 weeks of some left-sided chest pain, intermittent radiation to the left shoulder.  Resolves at times.  No worse overnight, anayeli her from sleep, substernal, sharp but nonradiating.  6 out of 10, constant still now.  Mild shortness of breath.  No lightheadedness, dizziness or syncope.  No palpitations.  No nausea or vomiting.  No fever or chills.  No cough or congestion.    History of pneumonia, atrial fibrillation.  Compliant with home medications including Xarelto and metoprolol.  Followed by cardiology, Dr. Morel.  Distant stress test.    REVIEW OF SYSTEMS  See HPI for further details. All other systems are negative.     PAST MEDICAL HISTORY   has a past medical history of Arthritis, Cancer (HCC), Heart murmur, High cholesterol, Unspecified cataract, and Unspecified urinary incontinence.    SOCIAL HISTORY  Social History     Tobacco Use    Smoking status: Former     Packs/day: 1.00     Years: 33.00     Pack years: 33.00     Types: Cigarettes     Quit date: 1991     Years since quittin.6    Smokeless tobacco: Never    Tobacco comments:     Started smoking at age 23   Vaping Use    Vaping Use: Never used   Substance and Sexual Activity    Alcohol use: Not Currently     Alcohol/week: 8.4 oz     Types: 14 Glasses of wine per week     Comment: 1 glass wine/day    Drug use: No    Sexual activity: Never     Partners: Male       SURGICAL HISTORY   has a past surgical history that includes eye surgery; other (Right, ); hip arthroplasty total (Left, 2015); and appendectomy laparoscopic (10/10/2018).    CURRENT MEDICATIONS  Home Medications       Reviewed by Yue Liu (Pharmacy Tech) on 08/10/22 at 0836  Med List Status: Complete      Medication Last Dose Status   amoxicillin (AMOXIL) 500 MG Cap PRN Active   aspirin (ASA) 325 MG Tab 8/10/2022 Active   Calcium 500 MG CHEW 8/9/2022 Active   cetirizine (ZYRTEC) 10 MG Tab 8/8/2022 Active   Cholecalciferol (VITAMIN D-3) 1000 UNITS CAPS 8/9/2022 Active   diphenhydrAMINE (BENADRYL) 25 MG Tab PRN Active   metoprolol tartrate (LOPRESSOR) 25 MG Tab 8/9/2022 Active   Multiple Vitamins-Minerals (VITRUM 50+ SENIOR MULTI PO) 8/9/2022 Active   [place] 3 1200 MG CAPS 8/9/2022 Active   simvastatin (ZOCOR) 40 MG Tab 8/9/2022 Active   XARELTO 20 MG Tab tablet 8/9/2022 Active                    ALLERGIES  Allergies   Allergen Reactions    Clindamycin Hcl-Fd&C Blue #1      unknown    Food      Scallops-vomiting, citrus-itching, redness, oatmeal-diarrhea       PHYSICAL EXAM  VITAL SIGNS: BP (!) 154/67   Pulse 63   Temp 36.3 °C (97.4 °F) (Temporal)   Resp (!) 23   SpO2 96%   Pulse ox interpretation: I interpret this pulse ox as normal.  Constitutional: Alert in no apparent distress.  HENT: Normocephalic, atraumatic. Bilateral external ears normal, Nose normal. Moist mucous membranes.    Eyes: Pupils are equal and reactive, Conjunctiva normal.   Neck: Normal range of motion, Supple, non-tender. No stridor.   Lymphatic: No lymphadenopathy noted.   Cardiovascular: Regular rate and rhythm.  Systolic ejection murmur.  Distal pulses intact.  No peripheral edema.  Thorax & Lungs: Normal breath sounds.  No wheezing/rales/ronchi. No increased work of breathing, clipped speech or retractions.   Abdomen: Soft, non-distended, non-tender to palpation. No palpable or pulsatile masses.   Skin: Warm, Dry, No erythema, No rash.   Musculoskeletal: Good range of motion in all major joints.   Neurologic: Alert and orient x4.  Moves 4 extremity spontaneously.  Psychiatric: Affect normal, Judgment normal, Mood normal.       DIAGNOSTIC STUDIES / PROCEDURES    LABS  Results for orders placed or performed during the hospital encounter of  08/10/22   TROPONIN   Result Value Ref Range    Troponin T 8 6 - 19 ng/L   Comp Metabolic Panel   Result Value Ref Range    Sodium 142 135 - 145 mmol/L    Potassium 3.6 3.6 - 5.5 mmol/L    Chloride 110 96 - 112 mmol/L    Co2 22 20 - 33 mmol/L    Anion Gap 10.0 7.0 - 16.0    Glucose 81 65 - 99 mg/dL    Bun 14 8 - 22 mg/dL    Creatinine 0.52 0.50 - 1.40 mg/dL    Calcium 7.8 (L) 8.5 - 10.5 mg/dL    AST(SGOT) 24 12 - 45 U/L    ALT(SGPT) 18 2 - 50 U/L    Alkaline Phosphatase 48 30 - 99 U/L    Total Bilirubin 0.4 0.1 - 1.5 mg/dL    Albumin 3.3 3.2 - 4.9 g/dL    Total Protein 5.6 (L) 6.0 - 8.2 g/dL    Globulin 2.3 1.9 - 3.5 g/dL    A-G Ratio 1.4 g/dL   CBC WITH DIFFERENTIAL   Result Value Ref Range    WBC 6.5 4.8 - 10.8 K/uL    RBC 3.60 (L) 4.20 - 5.40 M/uL    Hemoglobin 11.8 (L) 12.0 - 16.0 g/dL    Hematocrit 36.0 (L) 37.0 - 47.0 %    .0 (H) 81.4 - 97.8 fL    MCH 32.8 27.0 - 33.0 pg    MCHC 32.8 (L) 33.6 - 35.0 g/dL    RDW 46.9 35.9 - 50.0 fL    Platelet Count 156 (L) 164 - 446 K/uL    MPV 11.5 9.0 - 12.9 fL    Neutrophils-Polys 58.30 44.00 - 72.00 %    Lymphocytes 23.90 22.00 - 41.00 %    Monocytes 10.10 0.00 - 13.40 %    Eosinophils 6.50 0.00 - 6.90 %    Basophils 0.90 0.00 - 1.80 %    Immature Granulocytes 0.30 0.00 - 0.90 %    Nucleated RBC 0.00 /100 WBC    Neutrophils (Absolute) 3.76 2.00 - 7.15 K/uL    Lymphs (Absolute) 1.54 1.00 - 4.80 K/uL    Monos (Absolute) 0.65 0.00 - 0.85 K/uL    Eos (Absolute) 0.42 0.00 - 0.51 K/uL    Baso (Absolute) 0.06 0.00 - 0.12 K/uL    Immature Granulocytes (abs) 0.02 0.00 - 0.11 K/uL    NRBC (Absolute) 0.00 K/uL   APTT   Result Value Ref Range    APTT 35.9 24.7 - 36.0 sec   PT/INR   Result Value Ref Range    PT 27.7 (H) 12.0 - 14.6 sec    INR 2.68 (H) 0.87 - 1.13   D-Dimer (only helpful in low pre-test probability wells critieria. Do not order if patient ruled out by PERC criteria. See Weblinks at top of Labs section)   Result Value Ref Range    D-Dimer Screen 0.43 0.00 - 0.50  ug/mL (FEU)   ESTIMATED GFR   Result Value Ref Range    GFR (CKD-EPI) 90 >60 mL/min/1.73 m 2       RADIOLOGY  DX-CHEST-PORTABLE (1 VIEW)   Final Result      1.  Mild, diffuse interstitial thickening concerning for fluid overload.   2.  Cardiomegaly.      NM-CARDIAC STRESS TEST    (Results Pending)   EC-ECHOCARDIOGRAM COMPLETE W/O CONT    (Results Pending)       COURSE & MEDICAL DECISION MAKING  Nursing notes and vital signs were reviewed. (See chart for details)  The patientsrecords were reviewed, history was obtained from the patient;     Medical record review: Echocardiogram 1/13/2022 LVEF 75%, borderline hyperdynamic.  Mild concentric LVH.  Biatrial enlargement.  Aortic valve leaflets not well visualized, appear heavily calcified.  Calcification of mitral valve leaflets.  Mild to moderate mitral stenosis.  Normal right atrial pressures.  Compared to prior 2018, mild to moderate mitral stenosis now present, moderate AAS now present.    ED evaluation for chest pain is unrevealing, however given her history, cannot exclude ACS.  Pain is been stuttering, but now constant overnight which kept her awake.  She did have improvement of her chest pain with nitroglycerin, but did have some decreased blood pressure and tachyarrhythmia.  This resolved with IV fluid bolus.  No ischemic changes.  Hemodynamically stable otherwise.  First set of labs are within normal limits, troponin and D-dimer negative.  No clinical or radiographic evidence for pneumonia, pneumothorax or thoracic arctic dissection.  Heart score 5.  She will be hospitalized for further evaluation and treatment as she has not had recent stress test.    Dr. Dietrich is aware of the patient agreeable to consultation for CDU.    FINAL IMPRESSION  (R07.9) Chest pain, unspecified type  (I10) Hypertension, unspecified type      Electronically signed by: Mimi Brown D.O., 8/10/2022 5:35 AM      This dictation was created using voice recognition software. The  accuracy of the dictation is limited to the abilities of the software. I expect there may be some errors of grammar and possibly content. The nursing notes were reviewed and certain aspects of this information were incorporated into this note.

## 2022-08-10 NOTE — H&P
Hospital Medicine History & Physical Note    Date of Service  8/10/2022    Primary Care Physician  Archana Stubbs M.D.    Consultants  None    Specialist Names: None    Code Status  Full Code    Chief Complaint  Chief Complaint   Patient presents with    Chest Pain       History of Presenting Illness  This is an 86-year-old female with past medical history of hypertension, mild aortic stenosis, paroxysmal atrial fibrillation on Xarelto, and CAD who presented to the ED on 08/10-20 22 with intermittent left-sided chest pain x7 days.    Patient reports she has been working in her garden extensively and initially thought her chest pressure was associated with that.  She denies any association with dizziness, nausea vomiting or any radiation.    She has history of CAD which is noted on calcifications on CT. denied any prior stress test.  Troponins negative x2, EKG with no signs of acute ischemia.  Patient is for stress test and echocardiogram today.  If normal can likely discharge today.    I discussed the plan of care with patient and bedside RN.    Review of Systems  Review of Systems   All other systems reviewed and are negative.    Past Medical History   has a past medical history of Arthritis, Cancer (HCC), Heart murmur, High cholesterol, Unspecified cataract, and Unspecified urinary incontinence.    Surgical History   has a past surgical history that includes eye surgery; other (Right, 2006); hip arthroplasty total (Left, 7/6/2015); and appendectomy laparoscopic (10/10/2018).     Family History  family history includes Cancer in her father; Heart Disease in her father; Hyperlipidemia in her sister; Hypertension in her sister; Lung Disease in her father; No Known Problems in her maternal grandfather, maternal grandmother, paternal grandfather, paternal grandmother, son, and son; Stroke in her sister.   Family history reviewed with patient. There is no family history that is pertinent to the chief complaint.      Social History   reports that she quit smoking about 31 years ago. Her smoking use included cigarettes. She has a 33.00 pack-year smoking history. She has never used smokeless tobacco. She reports that she does not currently use alcohol after a past usage of about 8.4 oz per week. She reports that she does not use drugs.    Allergies  Allergies   Allergen Reactions    [place] Unspecified     Itchy welts      Clindamycin Unspecified     Unknown reaction      Oatmeal Diarrhea     Gas      Scallops Vomiting       Medications  Prior to Admission Medications   Prescriptions Last Dose Informant Patient Reported? Taking?   Calcium 500 MG CHEW  Patient Yes No   Sig: Take 1 Tab by mouth every morning.   Cholecalciferol (VITAMIN D-3) 1000 UNITS CAPS  Patient Yes No   Sig: Take 1 Tab by mouth every morning.   Multiple Vitamins-Minerals (VITRUM 50+ SENIOR MULTI PO)  Patient Yes No   Sig: Take 1 Tab by mouth every day.   [place] 3 1200 MG CAPS  Patient Yes No   Sig: Take 1 Cap by mouth every morning.   XARELTO 20 MG Tab tablet   No No   Sig: TAKE ONE TABLET BY MOUTH WITH DINNER   amoxicillin (AMOXIL) 500 MG Cap PRN at PRN  Yes No   Sig: Take 2,000 mg by mouth as needed. PRIOR TO DENTAL APPOINTMENT   diphenhydrAMINE (BENADRYL) 25 MG Tab   Yes No   Sig: Take 25 mg by mouth every 6 hours as needed for Sleep.   metoprolol tartrate (LOPRESSOR) 25 MG Tab   No No   Sig: TAKE TWO TABLETS BY MOUTH TWICE DAILY   simvastatin (ZOCOR) 40 MG Tab   No No   Sig: Take 1 tablet by mouth every evening.      Facility-Administered Medications: None       Physical Exam  Temp:  [36.3 °C (97.4 °F)] 36.3 °C (97.4 °F)  Pulse:  [] 58  Resp:  [12-23] 16  BP: ()/(55-67) 142/61  SpO2:  [88 %-98 %] 95 %  Blood Pressure : (!) 154/67   Temperature: 36.3 °C (97.4 °F)   Pulse: 63   Respiration: (!) 23   Pulse Oximetry: 96 %       Physical Exam  Vitals and nursing note reviewed.   Constitutional:       General: She is not in acute distress.      Appearance: Normal appearance.   HENT:      Head: Normocephalic and atraumatic.      Mouth/Throat:      Mouth: Mucous membranes are moist.      Pharynx: No oropharyngeal exudate.   Eyes:      Extraocular Movements: Extraocular movements intact.      Pupils: Pupils are equal, round, and reactive to light.   Cardiovascular:      Rate and Rhythm: Normal rate and regular rhythm.      Pulses: Normal pulses.      Heart sounds: No murmur heard.    No friction rub. No gallop.   Pulmonary:      Effort: Pulmonary effort is normal. No respiratory distress.      Breath sounds: No wheezing, rhonchi or rales.   Abdominal:      General: Bowel sounds are normal. There is no distension.      Palpations: Abdomen is soft. There is no mass.      Tenderness: There is no abdominal tenderness.   Musculoskeletal:         General: No swelling or tenderness. Normal range of motion.      Cervical back: Normal range of motion. No rigidity. No muscular tenderness.      Right lower leg: No edema.      Left lower leg: No edema.   Skin:     General: Skin is warm and dry.      Capillary Refill: Capillary refill takes less than 2 seconds.      Findings: No erythema or rash.   Neurological:      General: No focal deficit present.      Mental Status: She is alert and oriented to person, place, and time.      Motor: No weakness.      Gait: Gait normal.       Laboratory:  Recent Labs     08/10/22  0533   WBC 6.5   RBC 3.60*   HEMOGLOBIN 11.8*   HEMATOCRIT 36.0*   .0*   MCH 32.8   MCHC 32.8*   RDW 46.9   PLATELETCT 156*   MPV 11.5     Recent Labs     08/10/22  0533   SODIUM 142   POTASSIUM 3.6   CHLORIDE 110   CO2 22   GLUCOSE 81   BUN 14   CREATININE 0.52   CALCIUM 7.8*     Recent Labs     08/10/22  0533   ALTSGPT 18   ASTSGOT 24   ALKPHOSPHAT 48   TBILIRUBIN 0.4   GLUCOSE 81     Recent Labs     08/10/22  0533   APTT 35.9   INR 2.68*     Recent Labs     08/10/22  0533   NTPROBNP 326*         Recent Labs     08/10/22  0533 08/10/22  1128    TROPONINT 8 10       Imaging:  DX-CHEST-PORTABLE (1 VIEW)   Final Result      1.  Mild, diffuse interstitial thickening concerning for fluid overload.   2.  Cardiomegaly.      NM-CARDIAC STRESS TEST    (Results Pending)   EC-ECHOCARDIOGRAM COMPLETE W/O CONT    (Results Pending)       X-Ray:  I have personally reviewed the images and compared with prior images.  EKG:  I have personally reviewed the images and compared with prior images.    Assessment/Plan:  Justification for Admission Status  I anticipate this patient is appropriate for observation status at this time because requires stress test and echocardiogram and serial telemetry and troponin monitoring.    Patient will need a Telemetry bed on TELEMETRY service .  The need is secondary to chest pain.    * Chest pain- (present on admission)  Assessment & Plan  Patient reports she has been working in her garden extensively and initially thought her chest pressure was associated with that.  She denies any association with dizziness, nausea vomiting or any radiation.    She has history of CAD which is noted on calcifications on CT. Denied any prior stress test.  Troponins negative x2, EKG with no signs of acute ischemia.  Patient is for stress test and echocardiogram today.  If normal can likely discharge today.      Coronary artery calcification seen on CAT scan- (present on admission)  Assessment & Plan  Noted on previous imaging   Resume statin therapy    Mild aortic stenosis- (present on admission)  Assessment & Plan  Noted on previous echo    PAF (paroxysmal atrial fibrillation) (HCC)- (present on admission)  Assessment & Plan  Resume beta-blocker and Xarelto    Mixed hyperlipidemia- (present on admission)  Assessment & Plan  Resume statin therapy      VTE prophylaxis: SCDs/TEDs and therapeutic anticoagulation with Xarelto

## 2022-08-10 NOTE — ED NOTES
Pharmacy Medication Reconciliation      ~Medication reconciliation updated and complete per patient at bedside  ~Allergies have been verified and updated   ~No oral ABX within the last 30 days  ~Patient home pharmacy: [facility]

## 2022-08-11 ENCOUNTER — PATIENT OUTREACH (OUTPATIENT)
Dept: MEDICAL GROUP | Facility: PHYSICIAN GROUP | Age: 87
End: 2022-08-11
Payer: MEDICARE

## 2022-08-11 LAB
LV EJECT FRACT  99904: 70
LV EJECT FRACT MOD 2C 99903: 65.81
LV EJECT FRACT MOD 4C 99902: 55.3
LV EJECT FRACT MOD BP 99901: 60.37

## 2022-08-11 PROCEDURE — 93306 TTE W/DOPPLER COMPLETE: CPT | Mod: 26 | Performed by: INTERNAL MEDICINE

## 2022-08-11 NOTE — PROGRESS NOTES
Nurse CM post discharge outreach call for TCM. Nurse spoke to patient and reviewed discharge instructions. Pt reports no further episodes of chest pain. States she is had a lot of tests at the hospital and would like to discuss with PCP. Pt has no additional questions regarding discharge. Nurse provided patient with CM contact number.

## 2022-08-11 NOTE — PROGRESS NOTES
4 Eyes Skin Assessment Completed by Lucas Jimenez, SANDIP and Luisana Jung, SANDIP.    Head WDL  Ears WDL  Nose Redness bridge of nose  Mouth WDL  Neck WDL  Breast/Chest WDL  Shoulder Blades WDL  Spine WDL  (R) Arm/Elbow/Hand Rednessblister right index finger, will see dermatologist  (L) Arm/Elbow/Hand WDL  Abdomen WDL  Groin WDL  Scrotum/Coccyx/Buttocks WDL  (R) Leg WDL  (L) Leg WDL  (R) Heel/Foot/Toe WDL  (L) Heel/Foot/Toe WDL          Devices In Places Pulse Ox      Interventions In Place N/A    Possible Skin Injury No    Pictures Uploaded Into Epic N/A  Wound Consult Placed N/A  RN Wound Prevention Protocol Ordered No

## 2022-08-11 NOTE — DISCHARGE INSTRUCTIONS
Discharge Instructions    Discharged to home by car with relative. Discharged via wheelchair, hospital escort: Yes.  Special equipment needed: Not Applicable    Be sure to schedule a follow-up appointment with your primary care doctor or any specialists as instructed.     Discharge Plan:   Diet Plan: Discussed  Activity Level: Discussed  Confirmed Follow up Appointment: Patient to Call and Schedule Appointment  Confirmed Symptoms Management: Discussed  Medication Reconciliation Updated: Yes    I understand that a diet low in cholesterol, fat, and sodium is recommended for good health. Unless I have been given specific instructions below for another diet, I accept this instruction as my diet prescription.   Other diet: heart healthy, 2 gram sodium diet    Special Instructions: chest pain    -Is this patient being discharged with medication to prevent blood clots?  No    Is patient discharged on Warfarin / Coumadin?   No

## 2022-08-11 NOTE — PROGRESS NOTES
DC with family via private vehicle. Escorted to lobby with family, refused wheelchair, steady gait.

## 2022-08-16 ENCOUNTER — APPOINTMENT (RX ONLY)
Dept: URBAN - METROPOLITAN AREA CLINIC 4 | Facility: CLINIC | Age: 87
Setting detail: DERMATOLOGY
End: 2022-08-16

## 2022-08-16 DIAGNOSIS — M71 OTHER BURSOPATHIES: ICD-10-CM

## 2022-08-16 PROBLEM — M71.341 OTHER BURSAL CYST, RIGHT HAND: Status: ACTIVE | Noted: 2022-08-16

## 2022-08-16 PROCEDURE — 11102 TANGNTL BX SKIN SINGLE LES: CPT

## 2022-08-16 PROCEDURE — ? COUNSELING

## 2022-08-16 PROCEDURE — ? BIOPSY BY SHAVE METHOD

## 2022-08-16 ASSESSMENT — LOCATION DETAILED DESCRIPTION DERM: LOCATION DETAILED: RIGHT DISTAL DORSAL RING FINGER

## 2022-08-16 ASSESSMENT — LOCATION ZONE DERM: LOCATION ZONE: FINGER

## 2022-08-16 ASSESSMENT — LOCATION SIMPLE DESCRIPTION DERM: LOCATION SIMPLE: RIGHT RING FINGER

## 2022-08-18 ENCOUNTER — OFFICE VISIT (OUTPATIENT)
Dept: MEDICAL GROUP | Facility: PHYSICIAN GROUP | Age: 87
End: 2022-08-18
Payer: MEDICARE

## 2022-08-18 VITALS
WEIGHT: 134.4 LBS | HEART RATE: 73 BPM | RESPIRATION RATE: 12 BRPM | SYSTOLIC BLOOD PRESSURE: 144 MMHG | DIASTOLIC BLOOD PRESSURE: 70 MMHG | HEIGHT: 63 IN | TEMPERATURE: 97 F | BODY MASS INDEX: 23.81 KG/M2 | OXYGEN SATURATION: 92 %

## 2022-08-18 DIAGNOSIS — D68.69 HYPERCOAGULABILITY DUE TO ATRIAL FIBRILLATION (HCC): ICD-10-CM

## 2022-08-18 DIAGNOSIS — M54.9 UPPER BACK PAIN ON LEFT SIDE: ICD-10-CM

## 2022-08-18 DIAGNOSIS — I10 ESSENTIAL HYPERTENSION: ICD-10-CM

## 2022-08-18 DIAGNOSIS — I48.91 HYPERCOAGULABILITY DUE TO ATRIAL FIBRILLATION (HCC): ICD-10-CM

## 2022-08-18 DIAGNOSIS — R07.9 CHEST PAIN, UNSPECIFIED TYPE: ICD-10-CM

## 2022-08-18 DIAGNOSIS — I70.0 ABDOMINAL AORTIC ATHEROSCLEROSIS (HCC): ICD-10-CM

## 2022-08-18 PROCEDURE — 99214 OFFICE O/P EST MOD 30 MIN: CPT | Performed by: INTERNAL MEDICINE

## 2022-08-18 ASSESSMENT — FIBROSIS 4 INDEX: FIB4 SCORE: 3.12

## 2022-08-19 NOTE — PROGRESS NOTES
CC: Establish medical care.  ER follow-up visit.    HPI:  Lisa presents with the following    1. Chest pain, unspecified type  Patient with a PMH of hypertension, aortic stenosis and paroxysmal atrial fibrillation presented to the emergency room August 10 with complaints of 7 days history of intermittent left-sided upper back, neck and chest pain.  Patient was working in the yard gardening as usual.  Patient did not injure herself or fall.  Lasted about 7 days, it started in her left upper back and moved into her shoulders, her neck and then into her anterior chest wall.  She called 911.  Troponins were negative, EKG with no acute signs of ischemia.  Stress test negative for acute ischemia.  Echocardiogram completed.  Patient has an appointment with her cardiologist on September 1 with Dr. Morel.  Patient was discharged on Xarelto, Lopressor, her outpatient medications.  Patient otherwise doing well, chest pain symptoms almost resolved but is left with left upper back tenderness.  Patient discharged in stable condition.    2. Upper back pain on left side  Patient will is experiencing left upper back tenderness, feels like a muscle spasm.  Using Advil as needed.    3. Essential hypertension  Patient takes Lopressor 25 mg tablets twice daily to manage her blood pressure.  She does not check her blood pressure regularly.  Blood pressure bit elevated today.  Repeat 140/74.    4. Abdominal aortic atherosclerosis (HCC)  Chronic.  Stable.  Calcifications noted on CT scan.  Treated with Zocor.    5. Hypercoagulability due to atrial fibrillation (HCC)  Chronic.  Stable.  Patient treated with Xarelto 20 mg tablets for atrial fibrillation.      Patient Active Problem List    Diagnosis Date Noted    Chest pain 08/10/2022    Mitral stenosis 01/17/2022    Osteopenia 11/03/2021    Low back pain without sciatica 04/29/2021    Abdominal aortic atherosclerosis (HCC) 04/29/2021    History of melanoma 04/29/2021     Hypercoagulability due to atrial fibrillation (HCC) 04/29/2021    Full code status 04/29/2021    Urge incontinence 04/29/2021    Essential hypertension 11/15/2019    Mild mitral regurgitation 11/15/2019    Coronary artery calcification seen on CAT scan 09/16/2019    PAF (paroxysmal atrial fibrillation) (HCC) 08/14/2018    Mild aortic stenosis 08/14/2018    Chronic anticoagulation 08/14/2018    Mixed hyperlipidemia 11/16/2017    Chronic seasonal allergic rhinitis due to pollen 11/16/2017    Osteoarthritis of right hip 07/06/2015    History of total left hip arthroplasty 05/21/2015    Osteoarthritis 05/21/2015    Osteoarthritis, multiple sites 05/21/2015       Current Outpatient Medications   Medication Sig Dispense Refill    cetirizine (ZYRTEC) 10 MG Tab Take 10 mg by mouth every evening.      XARELTO 20 MG Tab tablet TAKE ONE TABLET BY MOUTH WITH DINNER 100 Tablet 3    simvastatin (ZOCOR) 40 MG Tab Take 1 tablet by mouth every evening. 100 tablet 11    metoprolol tartrate (LOPRESSOR) 25 MG Tab TAKE TWO TABLETS BY MOUTH TWICE DAILY 400 tablet 11    diphenhydrAMINE (BENADRYL) 25 MG Tab Take 25 mg by mouth every 6 hours as needed (allergy).      Multiple Vitamins-Minerals (VITRUM 50+ SENIOR MULTI PO) Take 1 Tablet by mouth every morning. Indications: Vitamin Deficiency      Cholecalciferol (VITAMIN D-3) 1000 UNITS CAPS Take 1,000 Units by mouth every morning. Indications: Vitamin D Deficiency      Omega 3 1200 MG CAPS Take 1,200 mg by mouth every morning. Indications: Disease of the Heart or Blood Vessels      Calcium 500 MG CHEW Chew 1,000 mg every morning. Indications: Low Amount of Calcium in the Blood, Vit-D 1000IU, Calcium 500mg, Phosphorus 200mg       No current facility-administered medications for this visit.         Allergies as of 08/18/2022 - Reviewed 08/18/2022   Allergen Reaction Noted    [place] Unspecified 08/10/2022    Clindamycin Unspecified 08/10/2022    Oatmeal Diarrhea 08/10/2022    Scallops Vomiting  08/10/2022        Social History     Socioeconomic History    Marital status:      Spouse name: Not on file    Number of children: Not on file    Years of education: Not on file    Highest education level: Not on file   Occupational History    Not on file   Tobacco Use    Smoking status: Former     Packs/day: 1.00     Years: 33.00     Pack years: 33.00     Types: Cigarettes     Quit date: 1991     Years since quittin.6    Smokeless tobacco: Never    Tobacco comments:     Started smoking at age 23   Vaping Use    Vaping Use: Never used   Substance and Sexual Activity    Alcohol use: Not Currently     Alcohol/week: 8.4 oz     Types: 14 Glasses of wine per week     Comment: 1 glass wine/day    Drug use: No    Sexual activity: Never     Partners: Male   Other Topics Concern    Not on file   Social History Narrative    She lost her   to Alzheimer's. She lives alone in her own home, 1 story. Has a son who lives nearby. She does all ADLs and IADLs w/o difficulty though recently hired a  to do her outside work. 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 She enjoys reading, learning to play the piano.      Social Determinants of Health     Financial Resource Strain: Not on file   Food Insecurity: Not on file   Transportation Needs: Not on file   Physical Activity: Not on file   Stress: Not on file   Social Connections: Not on file   Intimate Partner Violence: Not on file   Housing Stability: Not on file       Family History   Problem Relation Age of Onset    Cancer Father         GI    Heart Disease Father     Lung Disease Father     Stroke Sister     No Known Problems Son     No Known Problems Son     No Known Problems Maternal Grandmother     No Known Problems Maternal Grandfather     No Known Problems Paternal Grandmother     No Known Problems Paternal Grandfather     Hypertension Sister     Hyperlipidemia Sister        Past Surgical History:   Procedure Laterality  "Date    APPENDECTOMY LAPAROSCOPIC  10/10/2018    Procedure: APPENDECTOMY LAPAROSCOPIC;  Surgeon: Sushil Dalton M.D.;  Location: SURGERY [facility];  Service: General    HIP ARTHROPLASTY TOTAL Left 7/6/2015    Procedure: HIP ARTHROPLASTY TOTAL;  Surgeon: Navid Howard M.D.;  Location: SURGERY [facility];  Service:     OTHER Right 2006    cyst & bone spur on right hand    EYE SURGERY      bilateral IOL       ROS:  Denies any Headache,Chest pain,  Shortness of breath,  Abdominal pain, Changes of bowel or bladder, Lower ext edema, Fevers, Nights sweats, Weight Changes, Focal weakness or numbness.  All other systems are negative.    BP (!) 144/70   Pulse 73   Temp 36.1 °C (97 °F) (Temporal)   Resp 12   Ht 1.6 m (5' 3\")   Wt 61 kg (134 lb 6.4 oz)   SpO2 92%   BMI 23.81 kg/m²      Constitutional: Alert, no distress, well-groomed.  Skin: Warm, dry, good turgor, no rashes in visible areas.  Eye: Equal, round and reactive, conjunctiva clear, lids normal.  ENMT: Lips without lesions, good dentition, moist mucous membranes.  Neck: Trachea midline, no masses, no thyromegaly.  Respiratory: Unlabored respiratory effort, no cough.  Abdomen: Soft, no gross masses.  MSK: Normal gait, moves all extremities.  Tenderness and muscle spasm noted over left upper back, interscapular area/muscle group.  Neuro: Grossly non-focal. No cranial nerve deficit. Strength and sensation intact.   Psych: Alert and oriented x3, normal affect and mood.      Assessment and Plan.   86 y.o. female presenting with the following.     1. Chest pain, unspecified type  ER records reviewed.  Patient is stable.  Continue current outpatient medications.  Keep follow-up appointment with cardiology in September.    2. Upper back pain on left side  Stable.  Recommend heat, massage.  NSAIDs as needed.  Patient declines muscle relaxer.    3. Essential hypertension  Continue Lopressor at current dose.  Patient will log blood pressure readings.    4. " Abdominal aortic atherosclerosis (HCC)  Chronic.  Stable.  Continue current management.    5. Hypercoagulability due to atrial fibrillation (HCC)  Chronic.  Stable.  Continue Xarelto at current dose.  Follow-up with cardiology.    My total time spent caring for the patient on the day of the encounter was 33 minutes.   This does not include time spent on separately billable procedures/tests.

## 2022-08-29 ENCOUNTER — TELEPHONE (OUTPATIENT)
Dept: CARDIOLOGY | Facility: MEDICAL CENTER | Age: 87
End: 2022-08-29

## 2022-08-29 DIAGNOSIS — I48.91 ATRIAL FIBRILLATION WITH RVR (HCC): ICD-10-CM

## 2022-08-29 NOTE — TELEPHONE ENCOUNTER
S/W pt, she states she has no concerns at this time, but has questions for her upcoming visit this week with SW. Pt says chest pain has resolved. Pt just asking for her ER notes to be reviewed by SW before this week's appt on 9/1.

## 2022-08-29 NOTE — TELEPHONE ENCOUNTER
"BOBY    Caller: Lisa Garza     Topic/issue: Pt was in the ER on 08- for chest pain. She wants to make sure BOBY reviews that information  She states she \"just does not feel right\", she is concerned but states it is not concerning enough for a call back since she has an appt on Thursday.    Callback Number: [phone] (home) [phone] (work)     Thank You,  Filomena WADSWORTH"

## 2022-08-30 RX ORDER — SIMVASTATIN 40 MG
40 TABLET ORAL EVERY EVENING
Qty: 100 TABLET | Refills: 1 | Status: SHIPPED | OUTPATIENT
Start: 2022-08-30 | End: 2023-03-30

## 2022-09-01 ENCOUNTER — OFFICE VISIT (OUTPATIENT)
Dept: CARDIOLOGY | Facility: MEDICAL CENTER | Age: 87
End: 2022-09-01
Payer: MEDICARE

## 2022-09-01 VITALS
SYSTOLIC BLOOD PRESSURE: 120 MMHG | RESPIRATION RATE: 16 BRPM | OXYGEN SATURATION: 96 % | DIASTOLIC BLOOD PRESSURE: 64 MMHG | BODY MASS INDEX: 23.6 KG/M2 | HEIGHT: 63 IN | WEIGHT: 133.2 LBS | HEART RATE: 62 BPM

## 2022-09-01 DIAGNOSIS — E78.2 MIXED HYPERLIPIDEMIA: ICD-10-CM

## 2022-09-01 DIAGNOSIS — I35.0 MILD AORTIC STENOSIS: ICD-10-CM

## 2022-09-01 DIAGNOSIS — I05.0 MITRAL VALVE STENOSIS, UNSPECIFIED ETIOLOGY: ICD-10-CM

## 2022-09-01 DIAGNOSIS — I48.0 PAF (PAROXYSMAL ATRIAL FIBRILLATION) (HCC): ICD-10-CM

## 2022-09-01 DIAGNOSIS — I10 ESSENTIAL HYPERTENSION: ICD-10-CM

## 2022-09-01 DIAGNOSIS — Z79.01 CHRONIC ANTICOAGULATION: ICD-10-CM

## 2022-09-01 PROCEDURE — 99214 OFFICE O/P EST MOD 30 MIN: CPT | Performed by: INTERNAL MEDICINE

## 2022-09-01 ASSESSMENT — ENCOUNTER SYMPTOMS
SHORTNESS OF BREATH: 0
LOSS OF CONSCIOUSNESS: 0
DIZZINESS: 0
PALPITATIONS: 0
MYALGIAS: 0
BRUISES/BLEEDS EASILY: 0
COUGH: 0

## 2022-09-01 ASSESSMENT — FIBROSIS 4 INDEX: FIB4 SCORE: 3.12

## 2022-09-01 NOTE — PROGRESS NOTES
Chief Complaint   Patient presents with    Atrial Fibrillation     F/V Dx: PAF (paroxysmal atrial fibrillation) (HCC)      Aortic Stenosis     F/V Dx: Mild aortic stenosis      Anticoagulation     F/V Dx:Chronic anticoagulation         Subjective     Lisa Garza is a 86 y.o. female who presents today for follow-up evaluation of PAF, chronic anticoagulation, aortic stenosis, mild mitral valve disease and hypertension.    Since 1/70/2022 appointment the patient was recently hospitalized [facility] 8/10/2022 -for scapular and chest pain, normal troponins, MPI normal, echocardiogram with mild aortic stenosis moderate mitral stenosis, discharged on same medications.  Since discharge has had low energy but is improving has restarted her daily 45-minute DVD exercise programs.  No further scapular chest pain.  Tolerating medications.  Plans a trip to [place] to visit her son and family.       The patient previously smoked but quit 20 years ago.  History of alcohol abuse.     Patient was hospitalized from 3/26 to 30/2018 for pneumonia and atrial fibrillation. She had a new onset of atrial fibrillation she was started on Xarelto for anticoagulation. Her atrial fibrillation reverted back to normal rhythm. Echocardiogram showed diastolic dysfunction grade 2 and ejection fraction 70%.     Past Medical History:   Diagnosis Date    Arthritis     osteo, hip    Cancer (HCC)     skin on left shoulder    Heart murmur     High cholesterol     Unspecified cataract     tony IOL    Unspecified urinary incontinence      Past Surgical History:   Procedure Laterality Date    APPENDECTOMY LAPAROSCOPIC  10/10/2018    Procedure: APPENDECTOMY LAPAROSCOPIC;  Surgeon: Sushil Dalton M.D.;  Location: SURGERY [facility];  Service: General    HIP ARTHROPLASTY TOTAL Left 7/6/2015    Procedure: HIP ARTHROPLASTY TOTAL;  Surgeon: Navid Howard M.D.;  Location: SURGERY [facility];  Service:     OTHER Right 2006    cyst & bone spur on right  hand    EYE SURGERY      bilateral IOL     Family History   Problem Relation Age of Onset    Cancer Father         GI    Heart Disease Father     Lung Disease Father     Stroke Sister     No Known Problems Son     No Known Problems Son     No Known Problems Maternal Grandmother     No Known Problems Maternal Grandfather     No Known Problems Paternal Grandmother     No Known Problems Paternal Grandfather     Hypertension Sister     Hyperlipidemia Sister      Social History     Socioeconomic History    Marital status:      Spouse name: Not on file    Number of children: Not on file    Years of education: Not on file    Highest education level: Not on file   Occupational History    Not on file   Tobacco Use    Smoking status: Former     Packs/day: 1.00     Years: 33.00     Pack years: 33.00     Types: Cigarettes     Quit date: 1991     Years since quittin.6    Smokeless tobacco: Never    Tobacco comments:     Started smoking at age 23   Vaping Use    Vaping Use: Never used   Substance and Sexual Activity    Alcohol use: Not Currently     Alcohol/week: 8.4 oz     Types: 14 Glasses of wine per week     Comment: 1 glass wine/day    Drug use: No    Sexual activity: Never     Partners: Male   Other Topics Concern    Not on file   Social History Narrative    She lost her   to Alzheimer's. She lives alone in her own home, 1 story. Has a son who lives nearby. She does all ADLs and IADLs w/o difficulty though recently hired a  to do her outside work. 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 She enjoys reading, learning to play the piano.      Social Determinants of Health     Financial Resource Strain: Not on file   Food Insecurity: Not on file   Transportation Needs: Not on file   Physical Activity: Not on file   Stress: Not on file   Social Connections: Not on file   Intimate Partner Violence: Not on file   Housing Stability: Not on file     Allergies   Allergen  "Reactions    [place] Unspecified     Itchy welts      Clindamycin Unspecified     Unknown reaction      Oatmeal Diarrhea     Gas      Scallops Vomiting     Outpatient Encounter Medications as of 9/1/2022   Medication Sig Dispense Refill    simvastatin (ZOCOR) 40 MG Tab Take 1 Tablet by mouth every evening. 100 Tablet 1    metoprolol tartrate (LOPRESSOR) 25 MG Tab TAKE TWO TABLETS BY MOUTH TWICE DAILY 400 Tablet 1    XARELTO 20 MG Tab tablet TAKE ONE TABLET BY MOUTH WITH DINNER (Patient taking differently: Take 20 mg by mouth with dinner.) 100 Tablet 3    diphenhydrAMINE (BENADRYL) 25 MG Tab Take 25 mg by mouth every 6 hours as needed (allergy).      Multiple Vitamins-Minerals (VITRUM 50+ SENIOR MULTI PO) Take 1 Tablet by mouth every morning. Indications: Vitamin Deficiency      Cholecalciferol (VITAMIN D-3) 1000 UNITS CAPS Take 1,000 Units by mouth every morning. Indications: Vitamin D Deficiency      Omega 3 1200 MG CAPS Take 1,200 mg by mouth every morning. Indications: Disease of the Heart or Blood Vessels      Calcium 500 MG CHEW Chew 1,000 mg every morning. Indications: Low Amount of Calcium in the Blood, Vit-D 1000IU, Calcium 500mg, Phosphorus 200mg      [DISCONTINUED] cetirizine (ZYRTEC) 10 MG Tab Take 10 mg by mouth every evening. (Patient not taking: Reported on 9/1/2022)       No facility-administered encounter medications on file as of 9/1/2022.     Review of Systems   HENT:  Negative for nosebleeds.    Respiratory:  Negative for cough and shortness of breath.    Cardiovascular:  Negative for chest pain and palpitations.   Musculoskeletal:  Negative for myalgias.   Neurological:  Negative for dizziness and loss of consciousness.   Endo/Heme/Allergies:  Does not bruise/bleed easily.            Objective     /64 (BP Location: Left arm, Patient Position: Sitting, BP Cuff Size: Adult)   Pulse 62   Resp 16   Ht 1.6 m (5' 3\")   Wt 60.4 kg (133 lb 3.2 oz)   SpO2 96%   BMI 23.60 kg/m²     Physical " Exam  Constitutional:       General: She is not in acute distress.     Appearance: She is well-developed.   Neck:      Vascular: No JVD.   Cardiovascular:      Rate and Rhythm: Normal rate. Rhythm irregularly irregular.      Heart sounds: Murmur heard.     No friction rub. No gallop.   Pulmonary:      Effort: Pulmonary effort is normal. No respiratory distress.      Breath sounds: Normal breath sounds. No wheezing or rales.   Musculoskeletal:      Right lower leg: No edema.      Left lower leg: No edema.   Skin:     General: Skin is warm and dry.   Neurological:      Mental Status: She is alert and oriented to person, place, and time.   Psychiatric:         Behavior: Behavior normal.          ECHOCARDIOGRAM 3/28/2018  Left ventricular ejection fraction is visually estimated to be 70%.  Mild concentric left ventricular hypertrophy.  Grade II diastolic dysfunction.  Moderately dilated left atrium.  Mild mitral regurgitation.    Mild aortic stenosis.     ECHOCARDIOGRAM 1/13/2022  The left ventricular ejection fraction is visually estimated to be 75%,   borderline hyperdynamic.  Mild concentric left ventricular hypertrophy.  Biatrial enlargement.  Aortic valve leaflets not well visualized, appear heavily calcified with moderate aortic stenosis:V-max 3.9 m/s, MG 38 mmHg, KONG 1 cm2, DI 0.32.  Calcification of the mitral valve leaflets, consider rheumatic heart disease.  Mild to moderate mitral stenosis: MG 4-5.5 mmHg, HR 58 bpm.  Mild mitral regurgitation, multiple jets present.  Estimated right ventricular systolic pressure is 35 mmHg.  Normal estimated right atrial pressure.    ECHOCARDIOGRAM 8/10/2022  Normal left ventricular size, thickness, systolic function.  Moderate mitral stenosis with restriction in the anterior mitral valve leaflet.  Mild calcification of the aortic valve leaflets and apparently   elongated LVOT with subvalvular flow acceleration (LVOT VTI 38 cm)  Mild aortic stenosis/LVOT obstruction - mean  gradient of 21 mmHg.      MPI 8/10/2022   No evidence of significant jeopardized viable myocardium or prior myocardial    infarction.   Significantly limited gated portion of the examination. Correlation with    echocardiogram is recommended for EF evaluation.    ECG INTERPRETATION   Negative stress ECG for ischemia.    Assessment & Plan     1. PAF (paroxysmal atrial fibrillation) (HCC)        2. Chronic anticoagulation        3. Essential hypertension        4. Mixed hyperlipidemia        5. Mild aortic stenosis        6. Mitral valve stenosis, unspecified etiology            Medical Decision Making: Today's Assessment/Status/Plan:   Assessment  1.  PAF.  2.  Anticoagulation, rivaroxaban  3.  Aortic stenosis, mild  4.  Mitral stenosis, moderate.  5.  Coronary calcification by CT scan.  6.  Hypertension.  7.  Dyslipidemia.  8.  History of excessive alcohol use.     Recommendation Discussion  1.  PAF, clinically stable, maintaining sinus rhythm, continue rivaroxaban (creatinine clearance 73), metoprolol.  2.  Coronary calcification, asymptomatic, normal recent MPI, continue simvastatin, rivaroxaban, metoprolol.  3.  Aortic stenosis, mitral stenosis, asymptomatic, reviewed recent echocardiogram images and results with the patient, continue to monitor.  4.  Hypertension, BP normal, at goal, continue to monitor, continue metoprolol  5.  Dyslipidemia, LDL 77, continue simvastatin  6.  RTC 6 months.

## 2022-09-16 ENCOUNTER — PHARMACY VISIT (OUTPATIENT)
Dept: PHARMACY | Facility: MEDICAL CENTER | Age: 87
End: 2022-09-16
Payer: COMMERCIAL

## 2022-09-16 PROCEDURE — RXMED WILLOW AMBULATORY MEDICATION CHARGE: Performed by: INTERNAL MEDICINE

## 2022-10-12 ENCOUNTER — NON-PROVIDER VISIT (OUTPATIENT)
Dept: MEDICAL GROUP | Facility: PHYSICIAN GROUP | Age: 87
End: 2022-10-12
Payer: MEDICARE

## 2022-10-12 DIAGNOSIS — Z23 NEED FOR VACCINATION: ICD-10-CM

## 2022-10-12 PROCEDURE — G0008 ADMIN INFLUENZA VIRUS VAC: HCPCS | Performed by: INTERNAL MEDICINE

## 2022-10-12 PROCEDURE — 99999 PR NO CHARGE: CPT | Performed by: INTERNAL MEDICINE

## 2022-10-12 PROCEDURE — 90662 IIV NO PRSV INCREASED AG IM: CPT | Performed by: INTERNAL MEDICINE

## 2022-10-12 NOTE — PROGRESS NOTES
"Lisa Garza is a 86 y.o. female here for a non-provider visit for:   FLU    Reason for immunization: Annual Flu Vaccine  Immunization records indicate need for vaccine: Yes, confirmed with Epic  Minimum interval has been met for this vaccine: Yes  ABN completed: Yes    All IAC Questionnaire questions were answered \"No.\"    Patient tolerated injection and no adverse effects were observed or reported: Yes    Pt scheduled for next dose in series: Not Indicated   "

## 2022-11-21 ENCOUNTER — DOCUMENTATION (OUTPATIENT)
Dept: HEALTH INFORMATION MANAGEMENT | Facility: OTHER | Age: 87
End: 2022-11-21
Payer: MEDICARE

## 2022-11-28 ENCOUNTER — APPOINTMENT (OUTPATIENT)
Dept: MEDICAL GROUP | Facility: PHYSICIAN GROUP | Age: 87
End: 2022-11-28
Payer: MEDICARE

## 2022-12-05 PROBLEM — I73.9 PERIPHERAL VASCULAR DISEASE, UNSPECIFIED (HCC): Status: ACTIVE | Noted: 2022-12-05

## 2022-12-12 ENCOUNTER — OFFICE VISIT (OUTPATIENT)
Dept: MEDICAL GROUP | Facility: PHYSICIAN GROUP | Age: 87
End: 2022-12-12
Payer: MEDICARE

## 2022-12-12 VITALS
WEIGHT: 133 LBS | SYSTOLIC BLOOD PRESSURE: 128 MMHG | BODY MASS INDEX: 23.57 KG/M2 | OXYGEN SATURATION: 90 % | HEIGHT: 63 IN | TEMPERATURE: 98.1 F | RESPIRATION RATE: 16 BRPM | HEART RATE: 72 BPM | DIASTOLIC BLOOD PRESSURE: 66 MMHG

## 2022-12-12 DIAGNOSIS — I48.91 HYPERCOAGULABILITY DUE TO ATRIAL FIBRILLATION (HCC): ICD-10-CM

## 2022-12-12 DIAGNOSIS — D68.69 HYPERCOAGULABILITY DUE TO ATRIAL FIBRILLATION (HCC): ICD-10-CM

## 2022-12-12 DIAGNOSIS — M54.9 UPPER BACK PAIN ON LEFT SIDE: ICD-10-CM

## 2022-12-12 DIAGNOSIS — K59.1 FUNCTIONAL DIARRHEA: ICD-10-CM

## 2022-12-12 PROCEDURE — 99214 OFFICE O/P EST MOD 30 MIN: CPT | Performed by: INTERNAL MEDICINE

## 2022-12-12 ASSESSMENT — FIBROSIS 4 INDEX: FIB4 SCORE: 3.15
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
## 2023-01-31 ENCOUNTER — PHYSICAL THERAPY (OUTPATIENT)
Dept: PHYSICAL THERAPY | Facility: REHABILITATION | Age: 88
End: 2023-01-31
Attending: INTERNAL MEDICINE
Payer: MEDICARE

## 2023-01-31 DIAGNOSIS — M54.9 UPPER BACK PAIN ON LEFT SIDE: ICD-10-CM

## 2023-01-31 PROCEDURE — 97162 PT EVAL MOD COMPLEX 30 MIN: CPT

## 2023-01-31 PROCEDURE — 97110 THERAPEUTIC EXERCISES: CPT

## 2023-01-31 PROCEDURE — 97140 MANUAL THERAPY 1/> REGIONS: CPT

## 2023-02-07 ENCOUNTER — APPOINTMENT (OUTPATIENT)
Dept: PHYSICAL THERAPY | Facility: REHABILITATION | Age: 88
End: 2023-02-07
Attending: INTERNAL MEDICINE
Payer: MEDICARE

## 2023-02-13 NOTE — OP THERAPY DAILY TREATMENT
"  Outpatient Physical Therapy  DAILY TREATMENT     [facility] [place] [id number], [address]  [place] [address]  Phone:  [phone]    Date: 02/14/2023    Patient: Lisa Garza  YOB: 1935  MRN: [id number]     Time Calculation    Start time: 1030  Stop time: 1115 Time Calculation (min): 45 minutes   Chief Complaint: No chief complaint on file.  Visit #: 2  SUBJECTIVE:  Pt indicates that she is getting tight/achy less often and her neck was okay with shoveling and her workout videos without weight were painfree this time. Likes the stretch     Sxs: intermittent pain and tightness of moderate intensity at superior portion of L shoulder blade and mid/lower neck. Used to have pain in the L cehst r/o cardiac issues which she reports was cleared by cardiologist. Denies other sxs into her L arm occasionally at the L wrist with prolonged pushing on a cart.     Aggs:   -OH press or ABD she thinks she had more pain on this L side;   -turning to check her L blindspot or rotation to the L can bug her  -chores, shoveling driveway for snow    OBJECTIVE:  Current objective measures:   C/S AROM:  Flexion 80% WFL with OP  Extension; 75% WFL with pain central neck region (none shoulder blade today)  R Rot; 75% pain L no sxs with OP  L Rot;  75% pain L sided neck with OP**      P-As; tight mid C/S; painful lower L C/S and T/S  TTP L thoracic paraspinals         Therapeutic Exercises (CPT 28128):     1. 1. UPOC 3/30/23      Therapeutic Exercise Summary:     Therapeutic Exercise Summary: Home Exercise Program Created and Reviewed with patient     Prone scapular squeeze with chest lift and I position [id number]\"H  Scapular w PTB 2x20  Rows big OTB 2x20      Chin nods x30  Seated T/S extensions x20    Reveiwed HEP these to be done stretching 2x+/day, strength 1x/day goal to get to 10    Therapeutic Treatments and Modalities:     1. Manual Therapy (CPT 85736), mid C/S III P-As; OP into Ext with MDT " over chair, dec sxs with extension and L rot; NC with T/S mobility  Time-based treatments/modalities:  Physical Therapy Timed Treatment Charges  Manual therapy minutes (CPT 08037): 10 minutes  Therapeutic exercise minutes (CPT 33375): 35 minutes  ASSESSMENT:   Response to treatment: PT finds s/sx linear to eval; responding well to continued chin nods and generic mobility along with progressing her scapular strength. No change in L rotation pain with cervical spine with thoracic extension addition today.    PLAN/RECOMMENDATIONS:   Plan for treatment: therapy treatment to continue next visit.  Planned interventions for next visit: continue with current treatment.

## 2023-02-14 ENCOUNTER — PHYSICAL THERAPY (OUTPATIENT)
Dept: PHYSICAL THERAPY | Facility: REHABILITATION | Age: 88
End: 2023-02-14
Attending: INTERNAL MEDICINE
Payer: MEDICARE

## 2023-02-14 DIAGNOSIS — M54.9 UPPER BACK PAIN ON LEFT SIDE: ICD-10-CM

## 2023-02-14 PROCEDURE — 97140 MANUAL THERAPY 1/> REGIONS: CPT

## 2023-02-14 PROCEDURE — 97110 THERAPEUTIC EXERCISES: CPT

## 2023-02-21 ENCOUNTER — APPOINTMENT (OUTPATIENT)
Dept: PHYSICAL THERAPY | Facility: REHABILITATION | Age: 88
End: 2023-02-21
Attending: INTERNAL MEDICINE
Payer: MEDICARE

## 2023-02-23 NOTE — PROGRESS NOTES
"Subjective:      Lisa Garza is a 82 y.o. female who presents with Abdominal Pain (x 3 days, mid lower abdominal pain, pain worse with movement)            Abdominal Pain   This is a new problem. The current episode started yesterday (right lower abd pn; some pn w/mx; no naus/vom/diarrh/fever sx; ). The onset quality is undetermined. The problem occurs constantly. The problem has been unchanged. The pain is located in the suprapubic region and RLQ. The pain is moderate. The quality of the pain is aching. The abdominal pain does not radiate. Pertinent negatives include no constipation, diarrhea, dysuria, fever, melena, nausea or vomiting. The pain is aggravated by palpation. The pain is relieved by nothing. She has tried nothing for the symptoms. The treatment provided no relief. There is no history of abdominal surgery, colon cancer, Crohn's disease, gallstones, GERD, irritable bowel syndrome, pancreatitis, PUD or ulcerative colitis.       Review of Systems   Constitutional: Negative.  Negative for fever.   HENT: Negative.    Gastrointestinal: Positive for abdominal pain. Negative for blood in stool, constipation, diarrhea, melena, nausea and vomiting.   Genitourinary: Negative.  Negative for dysuria.   Musculoskeletal: Negative.    Skin: Negative.    Neurological: Negative.           Objective:     /80 (BP Location: Left arm, Patient Position: Sitting, BP Cuff Size: Adult)   Pulse 64   Temp 36.9 °C (98.4 °F) (Temporal)   Resp 16   Ht 1.626 m (5' 4\")   Wt 61 kg (134 lb 6.4 oz)   SpO2 98%   BMI 23.07 kg/m²      Physical Exam   Constitutional: She is oriented to person, place, and time. She appears well-developed and well-nourished. No distress.   HENT:   Head: Normocephalic and atraumatic.   Mouth/Throat: Oropharynx is clear and moist.   Abdominal: Soft. Bowel sounds are normal. She exhibits no distension. There is tenderness (RLQ tend).   Neurological: She is alert and oriented to person, place, " Problem: Skin/Tissue Integrity  Goal: Absence of new skin breakdown  Description: 1. Monitor for areas of redness and/or skin breakdown  2. Assess vascular access sites hourly  3. Every 4-6 hours minimum:  Change oxygen saturation probe site  4. Every 4-6 hours:  If on nasal continuous positive airway pressure, respiratory therapy assess nares and determine need for appliance change or resting period. 2/22/2023 1957 by Yancy Lu RN  Outcome: Progressing  2/22/2023 1948 by Jael Dickinson RN  Outcome: Progressing  Note: Left and right foot wounds. Left fem pop today. Leg wrapped and pulses good. Podiatry following. Left great toe amputation planned for Friday. Problem: Respiratory - Adult  Goal: Achieves optimal ventilation and oxygenation  2/22/2023 1957 by Yancy Lu RN  Outcome: Progressing  2/22/2023 1948 by Jael Dickinson RN  Outcome: Progressing  Flowsheets (Taken 2/20/2023 1912 by Felipa Wilson RN)  Achieves optimal ventilation and oxygenation: Assess for changes in respiratory status  Note: Sitting in mid to high 90s on RA. Problem: Discharge Planning  Goal: Discharge to home or other facility with appropriate resources  Outcome: Progressing     Problem: Pain  Goal: Verbalizes/displays adequate comfort level or baseline comfort level  Outcome: Progressing     Problem: Chronic Conditions and Co-morbidities  Goal: Patient's chronic conditions and co-morbidity symptoms are monitored and maintained or improved  2/22/2023 1957 by Yancy Lu RN  Outcome: Progressing  2/22/2023 1948 by Jael Dickinson RN  Outcome: Progressing  Flowsheets (Taken 2/20/2023 1912 by Felipa Wilson RN)  Care Plan - Patient's Chronic Conditions and Co-Morbidity Symptoms are Monitored and Maintained or Improved: Monitor and assess patient's chronic conditions and comorbid symptoms for stability, deterioration, or improvement  Note: Left leg fem pop today.  Right fem site is soft and non and time.   Skin: Skin is warm and dry.   Psychiatric: She has a normal mood and affect. Her behavior is normal.   Nursing note and vitals reviewed.    Active Ambulatory Problems     Diagnosis Date Noted   • History of total left hip arthroplasty 05/21/2015   • Osteoarthritis 05/21/2015   • Osteoarthritis, multiple sites 05/21/2015   • Osteoarthritis of right hip 07/06/2015   • Systolic murmur 11/30/2015   • Mixed hyperlipidemia 11/16/2017   • Chronic seasonal allergic rhinitis due to pollen 11/16/2017   • Abnormal LFTs 03/27/2018   • Atrial fibrillation with RVR ([facility]) 03/27/2018   • Sepsis due to pneumonia ([facility]) 03/27/2018   • Anemia 03/27/2018   • Hyponatremia 03/27/2018   • Hypoalbuminemia 03/27/2018   • Alcohol abuse 03/27/2018   • Acute respiratory failure with hypoxia ([facility]) 03/28/2018   • Pneumonia due to infectious organism 03/29/2018   • PAF (paroxysmal atrial fibrillation) ([facility]) 08/14/2018   • Aortic stenosis 08/14/2018   • Chronic anticoagulation 08/14/2018     Resolved Ambulatory Problems     Diagnosis Date Noted   • No Resolved Ambulatory Problems     Past Medical History:   Diagnosis Date   • Arthritis    • Cancer ([facility])    • Heart murmur    • High cholesterol    • Unspecified cataract    • Unspecified urinary incontinence      Current Outpatient Prescriptions on File Prior to Visit   Medication Sig Dispense Refill   • rivaroxaban (XARELTO) 20 MG Tab tablet Take 1 Tab by mouth with dinner. 90 Tab 3   • simvastatin (ZOCOR) 10 MG Tab Take 1 Tab by mouth every evening. 90 Tab 3   • metoprolol (LOPRESSOR) 25 MG Tab Take 2 Tabs by mouth 2 times a day. 360 Tab 1   • hydrocortisone 2.5 % Cream topical cream Apply 1 Application to affected area(s) 2 times a day. 2 Tube 1   • Multiple Vitamins-Minerals (VITRUM 50+ SENIOR MULTI PO) Take 1 Tab by mouth every day at 6 PM.     • Cholecalciferol (VITAMIN D-3) 1000 UNITS CAPS Take 1 Tab by mouth every morning.     • Omega 3 1200 MG CAPS Take 1 Cap by mouth every  bleeding. No signs of hematoma or injury. Left great toe amputation planned for Friday. Problem: Safety - Adult  Goal: Free from fall injury  Outcome: Progressing     Problem: Safety - Medical Restraint  Goal: Remains free of injury from restraints (Restraint for Interference with Medical Device)  Description: INTERVENTIONS:  1. Determine that other, less restrictive measures have been tried or would not be effective before applying the restraint  2. Evaluate the patient's condition at the time of restraint application  3. Inform patient/family regarding the reason for restraint  4. Q2H: Monitor safety, psychosocial status, comfort, nutrition and hydration  2/22/2023 1957 by Fatimah Engel, RN  Outcome: Progressing  2/22/2023 1948 by Abner Bajwa, RN  Outcome: Progressing  Flowsheets (Taken 2/22/2023 1447)  Remains free of injury from restraints (restraint for interference with medical device): Inform patient/family regarding the reason for restraint   Evaluate the patient's condition at the time of restraint application   Determine that other, less restrictive measures have been tried or would not be effective before applying the restraint   Every 2 hours: Monitor safety, psychosocial status, comfort, nutrition and hydration  Note: Patients right leg was restrained during bed rest per doctors. Has baseline dementia and needed to keep leg straight for 3 hours for safety of his right femoral site for his fem pop. Removed when bed rest was complete. Checks were done and no injuries. Family at bedside educated on understood need for restraint. morning.     • Calcium 500 MG CHEW Take 1 Tab by mouth every morning.     • Psyllium (METAMUCIL FIBER PO) Take 1 Capsule by mouth as needed.     • Non Formulary Request Inhale 1 L/min by mouth Continuous. oxygen x nasal cannula       No current facility-administered medications on file prior to visit.      Social History     Social History   • Marital status:      Spouse name: N/A   • Number of children: N/A   • Years of education: N/A     Occupational History   • Not on file.     Social History Main Topics   • Smoking status: Former Smoker     Packs/day: 1.00     Years: 33.00     Types: Cigarettes     Quit date: 1/1/1991   • Smokeless tobacco: Never Used      Comment: Started smoking at age 23   • Alcohol use 7.0 oz/week     14 Glasses of wine per week      Comment: 1 glass wine/day   • Drug use: No   • Sexual activity: No     Other Topics Concern   • Not on file     Social History Narrative   • No narrative on file     Family History   Problem Relation Age of Onset   • Cancer Father         GI   • Heart Disease Father    • Lung Disease Father    • Stroke Sister    • No Known Problems Son    • No Known Problems Son    • No Known Problems Maternal Grandmother    • No Known Problems Maternal Grandfather    • No Known Problems Paternal Grandmother    • No Known Problems Paternal Grandfather    • Hypertension Sister    • Hyperlipidemia Sister      Clindamycin hcl-fd&c blue #1 and Food       ua [unable to give sample]     Assessment/Plan:     · RLQ abd pn [r/o appy vs other]      · Sent to ER for eval/tx  · Charge notified

## 2023-02-28 ENCOUNTER — APPOINTMENT (OUTPATIENT)
Dept: PHYSICAL THERAPY | Facility: REHABILITATION | Age: 88
End: 2023-02-28
Attending: INTERNAL MEDICINE
Payer: MEDICARE

## 2023-03-07 ENCOUNTER — APPOINTMENT (OUTPATIENT)
Dept: PHYSICAL THERAPY | Facility: REHABILITATION | Age: 88
End: 2023-03-07
Attending: INTERNAL MEDICINE
Payer: MEDICARE

## 2023-03-13 NOTE — OP THERAPY DAILY TREATMENT
"  Outpatient Physical Therapy  DAILY TREATMENT     [facility] [place] [id number] [facility], [address]  [place] [address]  Phone:  [phone]    Date: 03/14/2023    Patient: Lisa Garza  YOB: 1935  MRN: [id number]     Time Calculation    Start time: 1030  Stop time: 1100 Time Calculation (min): 30 minutes   Chief Complaint: No chief complaint on file.  Visit #: 3  SUBJECTIVE: Pt indicates that she is doing really well and back to chores, working out again with lighter weights without pain.     Sxs: intermittent pain and tightness of moderate intensity at superior portion of L shoulder blade and mid/lower neck. Used to have pain in the L cehst r/o cardiac issues which she reports was cleared by cardiologist. Denies other sxs into her L arm occasionally at the L wrist with prolonged pushing on a cart.     Aggs:   -OH press or ABD she thinks she had more pain on this L side;   -turning to check her L blindspot or rotation to the L can bug her  -chores, shoveling driveway for snow    OBJECTIVE:   Current objective measures:   C/S AROM:  Flexion 80% WFL with OP  Extension; 75% WFL with OP  R Rot; 75% pain L no sxs with OP  L Rot;  75% WFL slight tightness          Therapeutic Exercises (CPT 38508):     1. 1. UPOC 3/30/23      Therapeutic Exercise Summary:     Therapeutic Exercise Summary: Home Exercise Program Created and Reviewed with patient     Prone scapular squeeze with chest lift and I position [id number]\"[id number]  Scapular w PTB 2x20  Rows big OTB 2x20      Chin nods x30  Seated T/S extensions x20    Reveiwed HEP these to be done stretching 2x+/day, strength 1x/day goal to get to 10    Therapeutic Treatments and Modalities:     1. Manual Therapy (CPT 64516), mid C/S III P-As; OP into Ext with MDT over chair, dec sxs with extension and L rot; NC with T/S mobility  Time-based treatments/modalities:  Physical Therapy Timed Treatment Charges  Therapeutic exercise minutes (CPT 37000): 25 " minutes  ASSESSMENT: PT finds pt no longer has pain with obj measures or function. We decided to discharge her case due to these findings with encouraged HEP of scapular exercises with her workout video as well as general neck mobility. Will need new referral should she continue PT.     PLAN/RECOMMENDATIONS:   Plan for treatment: therapy treatment to continue next visit.  Planned interventions for next visit: continue with current treatment.

## 2023-03-14 ENCOUNTER — PHYSICAL THERAPY (OUTPATIENT)
Dept: PHYSICAL THERAPY | Facility: REHABILITATION | Age: 88
End: 2023-03-14
Attending: INTERNAL MEDICINE
Payer: MEDICARE

## 2023-03-14 DIAGNOSIS — M54.9 UPPER BACK PAIN ON LEFT SIDE: ICD-10-CM

## 2023-03-14 PROCEDURE — 97110 THERAPEUTIC EXERCISES: CPT

## 2023-03-14 NOTE — OP THERAPY DISCHARGE SUMMARY
Outpatient Physical Therapy  DISCHARGE SUMMARY NOTE      [facility] [address], [address]  [place] [address]  Phone:  [phone]    Date of Visit: 03/14/2023    Patient: Lisa Garza  YOB: 1935  MRN: [id number]     Referring Provider: Kathy Garcia M.D.  [address] [facility] [address]  [place] [address]   Referring Diagnosis Upper back pain on left side [M54.9]         Functional Assessment Used        Your patient is being discharged from Physical Therapy with the following comments:   Goals met    Comments:  PT finds pt no longer has pain with obj measures or function. We decided to discharge her case due to these findings with encouraged HEP of scapular exercises with her workout video as well as general neck mobility. Will need new referral should she continue PT.     Deon Rees, PT    Date: 3/14/2023

## 2023-03-20 NOTE — OP THERAPY DAILY TREATMENT
"  Outpatient Physical Therapy  DAILY TREATMENT     [facility] Outpatient Physical Therapy [place] [address] [facility], [address]  [place] [address]  Phone:  [phone]    Date: 03/21/2023    Patient: Lisa Garza  YOB: 1935  MRN: [id number]     Time Calculation             Chief Complaint: No chief complaint on file.  Visit #: 4  SUBJECTIVE: *** Pt indicates that she is doing really well and back to chores, working out again with lighter weights without pain.     Sxs: intermittent pain and tightness of moderate intensity at superior portion of L shoulder blade and mid/lower neck. Used to have pain in the L cehst r/o cardiac issues which she reports was cleared by cardiologist. Denies other sxs into her L arm occasionally at the L wrist with prolonged pushing on a cart.     Aggs:   -OH press or ABD she thinks she had more pain on this L side;   -turning to check her L blindspot or rotation to the L can bug her  -chores, shoveling driveway for snow    OBJECTIVE: ***  Current objective measures:   C/S AROM:  Flexion 80% WFL with OP  Extension; 75% WFL with OP  R Rot; 75% pain L no sxs with OP  L Rot;  75% WFL slight tightness        Therapeutic Exercises (CPT 47703):     1. 1. UPOC 3/30/23      Therapeutic Exercise Summary:     Therapeutic Exercise Summary: Home Exercise Program Created and Reviewed with patient     Prone scapular squeeze with chest lift and I position [id number]\"H  Scapular w PTB 2x20  Rows big OTB 2x20    Chin nods x30  Seated T/S extensions x20    Reveiwed HEP these to be done stretching 2x+/day, strength 1x/day goal to get to 10    Therapeutic Treatments and Modalities:     1. Manual Therapy (CPT 40038), mid C/S III P-As; OP into Ext with MDT over chair, dec sxs with extension and L rot; NC with T/S mobility  Time-based treatments/modalities:     ASSESSMENT: *** PT finds pt no longer has pain with obj measures or function. We decided to discharge her case due to these findings with encouraged " HEP of scapular exercises with her workout video as well as general neck mobility. Will need new referral should she continue PT.     PLAN/RECOMMENDATIONS:   Plan for treatment: therapy treatment to continue next visit.  Planned interventions for next visit: continue with current treatment.

## 2023-03-21 ENCOUNTER — APPOINTMENT (OUTPATIENT)
Dept: PHYSICAL THERAPY | Facility: REHABILITATION | Age: 88
End: 2023-03-21
Attending: INTERNAL MEDICINE
Payer: MEDICARE

## 2023-03-23 ENCOUNTER — APPOINTMENT (OUTPATIENT)
Dept: MEDICAL GROUP | Facility: PHYSICIAN GROUP | Age: 88
End: 2023-03-23
Payer: MEDICARE

## 2023-03-28 ENCOUNTER — APPOINTMENT (OUTPATIENT)
Dept: PHYSICAL THERAPY | Facility: REHABILITATION | Age: 88
End: 2023-03-28
Attending: INTERNAL MEDICINE
Payer: MEDICARE

## 2023-03-30 DIAGNOSIS — I48.91 ATRIAL FIBRILLATION WITH RVR (HCC): ICD-10-CM

## 2023-06-15 ENCOUNTER — OFFICE VISIT (OUTPATIENT)
Dept: MEDICAL GROUP | Facility: PHYSICIAN GROUP | Age: 88
End: 2023-06-15
Payer: MEDICARE

## 2023-06-15 VITALS
HEIGHT: 63 IN | RESPIRATION RATE: 12 BRPM | OXYGEN SATURATION: 91 % | BODY MASS INDEX: 23.74 KG/M2 | DIASTOLIC BLOOD PRESSURE: 72 MMHG | TEMPERATURE: 97.7 F | SYSTOLIC BLOOD PRESSURE: 114 MMHG | WEIGHT: 134 LBS | HEART RATE: 88 BPM

## 2023-06-15 DIAGNOSIS — I70.0 ABDOMINAL AORTIC ATHEROSCLEROSIS (HCC): ICD-10-CM

## 2023-06-15 DIAGNOSIS — M54.9 UPPER BACK PAIN ON LEFT SIDE: ICD-10-CM

## 2023-06-15 DIAGNOSIS — E78.2 MIXED HYPERLIPIDEMIA: ICD-10-CM

## 2023-06-15 DIAGNOSIS — I48.0 PAF (PAROXYSMAL ATRIAL FIBRILLATION) (HCC): ICD-10-CM

## 2023-06-15 DIAGNOSIS — E55.9 VITAMIN D DEFICIENCY: ICD-10-CM

## 2023-06-15 DIAGNOSIS — I48.0 HYPERCOAGULABLE STATE DUE TO PAROXYSMAL ATRIAL FIBRILLATION (HCC): ICD-10-CM

## 2023-06-15 DIAGNOSIS — D68.69 HYPERCOAGULABLE STATE DUE TO PAROXYSMAL ATRIAL FIBRILLATION (HCC): ICD-10-CM

## 2023-06-15 DIAGNOSIS — D64.9 LOW HEMOGLOBIN: ICD-10-CM

## 2023-06-15 DIAGNOSIS — I73.9 PERIPHERAL VASCULAR DISEASE, UNSPECIFIED (HCC): ICD-10-CM

## 2023-06-15 PROCEDURE — 3078F DIAST BP <80 MM HG: CPT | Performed by: INTERNAL MEDICINE

## 2023-06-15 PROCEDURE — 99214 OFFICE O/P EST MOD 30 MIN: CPT | Performed by: INTERNAL MEDICINE

## 2023-06-15 PROCEDURE — 3074F SYST BP LT 130 MM HG: CPT | Performed by: INTERNAL MEDICINE

## 2023-06-15 ASSESSMENT — PATIENT HEALTH QUESTIONNAIRE - PHQ9: CLINICAL INTERPRETATION OF PHQ2 SCORE: 0

## 2023-06-15 ASSESSMENT — FIBROSIS 4 INDEX: FIB4 SCORE: 3.15
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
## 2023-06-20 ENCOUNTER — TELEPHONE (OUTPATIENT)
Dept: HEALTH INFORMATION MANAGEMENT | Facility: OTHER | Age: 88
End: 2023-06-20
Payer: MEDICARE

## 2023-06-30 ENCOUNTER — TELEPHONE (OUTPATIENT)
Dept: CARDIOLOGY | Facility: MEDICAL CENTER | Age: 88
End: 2023-06-30
Payer: MEDICARE

## 2023-06-30 DIAGNOSIS — I48.91 ATRIAL FIBRILLATION WITH RVR (HCC): ICD-10-CM

## 2023-06-30 RX ORDER — RIVAROXABAN 20 MG/1
TABLET, FILM COATED ORAL
Qty: 100 TABLET | Refills: 0 | Status: SHIPPED | OUTPATIENT
Start: 2023-06-30 | End: 2023-10-09

## 2023-06-30 NOTE — TELEPHONE ENCOUNTER
BOBY          Caller: Lisa Garza    Medication Name and Dosage: XARELTO 20 MG Tab tablet      Please call your pharmacy and have them send us a refill request or speak to a live representative, RX number may have changed.    Medication amount left: 4 days    Preferred Pharmacy: [id number] - [place], [place] - [address] [facility]    Other questions (Topic): n/a    Callback Number (Will only call for issues): [phone]      Thank you    -Praful FRANCES

## 2023-07-10 ENCOUNTER — HOSPITAL ENCOUNTER (OUTPATIENT)
Dept: LAB | Facility: MEDICAL CENTER | Age: 88
End: 2023-07-10
Attending: INTERNAL MEDICINE
Payer: MEDICARE

## 2023-07-10 DIAGNOSIS — D64.9 LOW HEMOGLOBIN: ICD-10-CM

## 2023-07-10 DIAGNOSIS — I48.0 PAF (PAROXYSMAL ATRIAL FIBRILLATION) (HCC): ICD-10-CM

## 2023-07-10 DIAGNOSIS — E78.2 MIXED HYPERLIPIDEMIA: ICD-10-CM

## 2023-07-10 LAB
25(OH)D3 SERPL-MCNC: 52 NG/ML (ref 30–100)
ALBUMIN SERPL BCP-MCNC: 4 G/DL (ref 3.2–4.9)
ALBUMIN/GLOB SERPL: 1.3 G/DL
ALP SERPL-CCNC: 56 U/L (ref 30–99)
ALT SERPL-CCNC: 14 U/L (ref 2–50)
ANION GAP SERPL CALC-SCNC: 11 MMOL/L (ref 7–16)
APPEARANCE UR: CLEAR
AST SERPL-CCNC: 21 U/L (ref 12–45)
BACTERIA #/AREA URNS HPF: NEGATIVE /HPF
BASOPHILS # BLD AUTO: 0.8 % (ref 0–1.8)
BASOPHILS # BLD: 0.06 K/UL (ref 0–0.12)
BILIRUB SERPL-MCNC: 0.5 MG/DL (ref 0.1–1.5)
BILIRUB UR QL STRIP.AUTO: NEGATIVE
BUN SERPL-MCNC: 15 MG/DL (ref 8–22)
CALCIUM ALBUM COR SERPL-MCNC: 10.3 MG/DL (ref 8.5–10.5)
CALCIUM SERPL-MCNC: 10.3 MG/DL (ref 8.5–10.5)
CHLORIDE SERPL-SCNC: 103 MMOL/L (ref 96–112)
CHOLEST SERPL-MCNC: 150 MG/DL (ref 100–199)
CO2 SERPL-SCNC: 29 MMOL/L (ref 20–33)
COLOR UR: YELLOW
CREAT SERPL-MCNC: 0.59 MG/DL (ref 0.5–1.4)
EOSINOPHIL # BLD AUTO: 0.27 K/UL (ref 0–0.51)
EOSINOPHIL NFR BLD: 3.6 % (ref 0–6.9)
EPI CELLS #/AREA URNS HPF: NEGATIVE /HPF
ERYTHROCYTE [DISTWIDTH] IN BLOOD BY AUTOMATED COUNT: 49.6 FL (ref 35.9–50)
FASTING STATUS PATIENT QL REPORTED: NORMAL
GFR SERPLBLD CREATININE-BSD FMLA CKD-EPI: 87 ML/MIN/1.73 M 2
GLOBULIN SER CALC-MCNC: 3.1 G/DL (ref 1.9–3.5)
GLUCOSE SERPL-MCNC: 101 MG/DL (ref 65–99)
GLUCOSE UR STRIP.AUTO-MCNC: NEGATIVE MG/DL
HCT VFR BLD AUTO: 43.4 % (ref 37–47)
HDLC SERPL-MCNC: 60 MG/DL
HGB BLD-MCNC: 13.9 G/DL (ref 12–16)
HYALINE CASTS #/AREA URNS LPF: NORMAL /LPF
IMM GRANULOCYTES # BLD AUTO: 0.02 K/UL (ref 0–0.11)
IMM GRANULOCYTES NFR BLD AUTO: 0.3 % (ref 0–0.9)
KETONES UR STRIP.AUTO-MCNC: NEGATIVE MG/DL
LDLC SERPL CALC-MCNC: 72 MG/DL
LEUKOCYTE ESTERASE UR QL STRIP.AUTO: NEGATIVE
LYMPHOCYTES # BLD AUTO: 1.18 K/UL (ref 1–4.8)
LYMPHOCYTES NFR BLD: 15.6 % (ref 22–41)
MCH RBC QN AUTO: 33.5 PG (ref 27–33)
MCHC RBC AUTO-ENTMCNC: 32 G/DL (ref 32.2–35.5)
MCV RBC AUTO: 104.6 FL (ref 81.4–97.8)
MICRO URNS: ABNORMAL
MONOCYTES # BLD AUTO: 0.72 K/UL (ref 0–0.85)
MONOCYTES NFR BLD AUTO: 9.5 % (ref 0–13.4)
NEUTROPHILS # BLD AUTO: 5.31 K/UL (ref 1.82–7.42)
NEUTROPHILS NFR BLD: 70.2 % (ref 44–72)
NITRITE UR QL STRIP.AUTO: NEGATIVE
NRBC # BLD AUTO: 0 K/UL
NRBC BLD-RTO: 0 /100 WBC (ref 0–0.2)
PH UR STRIP.AUTO: 6 [PH] (ref 5–8)
PLATELET # BLD AUTO: 250 K/UL (ref 164–446)
PMV BLD AUTO: 11.2 FL (ref 9–12.9)
POTASSIUM SERPL-SCNC: 4.6 MMOL/L (ref 3.6–5.5)
PROT SERPL-MCNC: 7.1 G/DL (ref 6–8.2)
PROT UR QL STRIP: 30 MG/DL
RBC # BLD AUTO: 4.15 M/UL (ref 4.2–5.4)
RBC # URNS HPF: NORMAL /HPF
RBC UR QL AUTO: NEGATIVE
SODIUM SERPL-SCNC: 143 MMOL/L (ref 135–145)
SP GR UR STRIP.AUTO: 1.02
TRIGL SERPL-MCNC: 91 MG/DL (ref 0–149)
TSH SERPL DL<=0.005 MIU/L-ACNC: 2.67 UIU/ML (ref 0.38–5.33)
UROBILINOGEN UR STRIP.AUTO-MCNC: 0.2 MG/DL
WBC # BLD AUTO: 7.6 K/UL (ref 4.8–10.8)
WBC #/AREA URNS HPF: NORMAL /HPF

## 2023-07-10 PROCEDURE — 82306 VITAMIN D 25 HYDROXY: CPT

## 2023-07-10 PROCEDURE — 85025 COMPLETE CBC W/AUTO DIFF WBC: CPT

## 2023-07-10 PROCEDURE — 36415 COLL VENOUS BLD VENIPUNCTURE: CPT

## 2023-07-10 PROCEDURE — 81001 URINALYSIS AUTO W/SCOPE: CPT

## 2023-07-10 PROCEDURE — 80053 COMPREHEN METABOLIC PANEL: CPT

## 2023-07-10 PROCEDURE — 80061 LIPID PANEL: CPT

## 2023-07-10 PROCEDURE — 84443 ASSAY THYROID STIM HORMONE: CPT

## 2023-07-11 ENCOUNTER — TELEPHONE (OUTPATIENT)
Dept: MEDICAL GROUP | Facility: PHYSICIAN GROUP | Age: 88
End: 2023-07-11

## 2023-07-11 ENCOUNTER — HOSPITAL ENCOUNTER (OUTPATIENT)
Facility: MEDICAL CENTER | Age: 88
End: 2023-07-12
Attending: EMERGENCY MEDICINE | Admitting: HOSPITALIST
Payer: MEDICARE

## 2023-07-11 ENCOUNTER — APPOINTMENT (OUTPATIENT)
Dept: RADIOLOGY | Facility: MEDICAL CENTER | Age: 88
End: 2023-07-11
Attending: HOSPITALIST
Payer: MEDICARE

## 2023-07-11 ENCOUNTER — APPOINTMENT (OUTPATIENT)
Dept: RADIOLOGY | Facility: MEDICAL CENTER | Age: 88
End: 2023-07-11
Attending: EMERGENCY MEDICINE
Payer: MEDICARE

## 2023-07-11 DIAGNOSIS — H53.2 DIPLOPIA: ICD-10-CM

## 2023-07-11 LAB
ALBUMIN SERPL BCP-MCNC: 4.1 G/DL (ref 3.2–4.9)
ALBUMIN/GLOB SERPL: 1.4 G/DL
ALP SERPL-CCNC: 56 U/L (ref 30–99)
ALT SERPL-CCNC: 15 U/L (ref 2–50)
ANION GAP SERPL CALC-SCNC: 11 MMOL/L (ref 7–16)
AST SERPL-CCNC: 24 U/L (ref 12–45)
BASOPHILS # BLD AUTO: 0.5 % (ref 0–1.8)
BASOPHILS # BLD: 0.04 K/UL (ref 0–0.12)
BILIRUB SERPL-MCNC: 0.4 MG/DL (ref 0.1–1.5)
BUN SERPL-MCNC: 21 MG/DL (ref 8–22)
CALCIUM ALBUM COR SERPL-MCNC: 9.9 MG/DL (ref 8.5–10.5)
CALCIUM SERPL-MCNC: 10 MG/DL (ref 8.5–10.5)
CHLORIDE SERPL-SCNC: 101 MMOL/L (ref 96–112)
CO2 SERPL-SCNC: 28 MMOL/L (ref 20–33)
CREAT SERPL-MCNC: 0.6 MG/DL (ref 0.5–1.4)
EOSINOPHIL # BLD AUTO: 0.25 K/UL (ref 0–0.51)
EOSINOPHIL NFR BLD: 3.1 % (ref 0–6.9)
ERYTHROCYTE [DISTWIDTH] IN BLOOD BY AUTOMATED COUNT: 48.3 FL (ref 35.9–50)
ERYTHROCYTE [SEDIMENTATION RATE] IN BLOOD BY WESTERGREN METHOD: 21 MM/HOUR (ref 0–25)
GFR SERPLBLD CREATININE-BSD FMLA CKD-EPI: 86 ML/MIN/1.73 M 2
GLOBULIN SER CALC-MCNC: 2.9 G/DL (ref 1.9–3.5)
GLUCOSE SERPL-MCNC: 92 MG/DL (ref 65–99)
HCT VFR BLD AUTO: 42 % (ref 37–47)
HGB BLD-MCNC: 13.9 G/DL (ref 12–16)
IMM GRANULOCYTES # BLD AUTO: 0.03 K/UL (ref 0–0.11)
IMM GRANULOCYTES NFR BLD AUTO: 0.4 % (ref 0–0.9)
LYMPHOCYTES # BLD AUTO: 1.16 K/UL (ref 1–4.8)
LYMPHOCYTES NFR BLD: 14.5 % (ref 22–41)
MCH RBC QN AUTO: 33.7 PG (ref 27–33)
MCHC RBC AUTO-ENTMCNC: 33.1 G/DL (ref 32.2–35.5)
MCV RBC AUTO: 101.9 FL (ref 81.4–97.8)
MONOCYTES # BLD AUTO: 0.77 K/UL (ref 0–0.85)
MONOCYTES NFR BLD AUTO: 9.6 % (ref 0–13.4)
NEUTROPHILS # BLD AUTO: 5.75 K/UL (ref 1.82–7.42)
NEUTROPHILS NFR BLD: 71.9 % (ref 44–72)
NRBC # BLD AUTO: 0 K/UL
NRBC BLD-RTO: 0 /100 WBC (ref 0–0.2)
PLATELET # BLD AUTO: 232 K/UL (ref 164–446)
PMV BLD AUTO: 10.6 FL (ref 9–12.9)
POTASSIUM SERPL-SCNC: 4.4 MMOL/L (ref 3.6–5.5)
PROT SERPL-MCNC: 7 G/DL (ref 6–8.2)
RBC # BLD AUTO: 4.12 M/UL (ref 4.2–5.4)
SODIUM SERPL-SCNC: 140 MMOL/L (ref 135–145)
WBC # BLD AUTO: 8 K/UL (ref 4.8–10.8)

## 2023-07-11 PROCEDURE — A9270 NON-COVERED ITEM OR SERVICE: HCPCS | Performed by: HOSPITALIST

## 2023-07-11 PROCEDURE — 70551 MRI BRAIN STEM W/O DYE: CPT

## 2023-07-11 PROCEDURE — 36415 COLL VENOUS BLD VENIPUNCTURE: CPT

## 2023-07-11 PROCEDURE — 80053 COMPREHEN METABOLIC PANEL: CPT

## 2023-07-11 PROCEDURE — 93005 ELECTROCARDIOGRAM TRACING: CPT | Performed by: HOSPITALIST

## 2023-07-11 PROCEDURE — 85652 RBC SED RATE AUTOMATED: CPT

## 2023-07-11 PROCEDURE — 700102 HCHG RX REV CODE 250 W/ 637 OVERRIDE(OP): Performed by: HOSPITALIST

## 2023-07-11 PROCEDURE — 85025 COMPLETE CBC W/AUTO DIFF WBC: CPT

## 2023-07-11 PROCEDURE — 99285 EMERGENCY DEPT VISIT HI MDM: CPT

## 2023-07-11 PROCEDURE — 99223 1ST HOSP IP/OBS HIGH 75: CPT | Mod: AI | Performed by: HOSPITALIST

## 2023-07-11 PROCEDURE — G0378 HOSPITAL OBSERVATION PER HR: HCPCS

## 2023-07-11 PROCEDURE — 70450 CT HEAD/BRAIN W/O DYE: CPT

## 2023-07-11 RX ORDER — ASPIRIN 81 MG/1
81 TABLET ORAL DAILY
Status: DISCONTINUED | OUTPATIENT
Start: 2023-07-11 | End: 2023-07-11

## 2023-07-11 RX ORDER — ATORVASTATIN CALCIUM 40 MG/1
40 TABLET, FILM COATED ORAL EVERY EVENING
Status: DISCONTINUED | OUTPATIENT
Start: 2023-07-11 | End: 2023-07-12 | Stop reason: HOSPADM

## 2023-07-11 RX ORDER — CHLORAL HYDRATE 500 MG
1000 CAPSULE ORAL DAILY
COMMUNITY

## 2023-07-11 RX ORDER — ENOXAPARIN SODIUM 100 MG/ML
40 INJECTION SUBCUTANEOUS DAILY
Status: DISCONTINUED | OUTPATIENT
Start: 2023-07-12 | End: 2023-07-12 | Stop reason: HOSPADM

## 2023-07-11 RX ORDER — LISINOPRIL 10 MG/1
10 TABLET ORAL
Status: DISCONTINUED | OUTPATIENT
Start: 2023-07-11 | End: 2023-07-11

## 2023-07-11 RX ORDER — ASPIRIN 300 MG/1
300 SUPPOSITORY RECTAL DAILY
Status: DISCONTINUED | OUTPATIENT
Start: 2023-07-11 | End: 2023-07-12 | Stop reason: HOSPADM

## 2023-07-11 RX ORDER — ASPIRIN 81 MG/1
81 TABLET, CHEWABLE ORAL DAILY
Status: DISCONTINUED | OUTPATIENT
Start: 2023-07-11 | End: 2023-07-12 | Stop reason: HOSPADM

## 2023-07-11 RX ADMIN — ASPIRIN 81 MG 81 MG: 81 TABLET ORAL at 18:39

## 2023-07-11 RX ADMIN — ATORVASTATIN CALCIUM 40 MG: 40 TABLET, FILM COATED ORAL at 18:25

## 2023-07-11 RX ADMIN — METOPROLOL TARTRATE 50 MG: 25 TABLET, FILM COATED ORAL at 18:24

## 2023-07-11 ASSESSMENT — PAIN DESCRIPTION - PAIN TYPE
TYPE: ACUTE PAIN
TYPE: ACUTE PAIN

## 2023-07-11 ASSESSMENT — LIFESTYLE VARIABLES
TOTAL SCORE: 0
TOTAL SCORE: 0
CONSUMPTION TOTAL: POSITIVE
HAVE YOU EVER FELT YOU SHOULD CUT DOWN ON YOUR DRINKING: NO
HAVE PEOPLE ANNOYED YOU BY CRITICIZING YOUR DRINKING: NO
AVERAGE NUMBER OF DAYS PER WEEK YOU HAVE A DRINK CONTAINING ALCOHOL: 7
HOW MANY TIMES IN THE PAST YEAR HAVE YOU HAD 5 OR MORE DRINKS IN A DAY: 5
EVER FELT BAD OR GUILTY ABOUT YOUR DRINKING: NO
EVER HAD A DRINK FIRST THING IN THE MORNING TO STEADY YOUR NERVES TO GET RID OF A HANGOVER: NO
ON A TYPICAL DAY WHEN YOU DRINK ALCOHOL HOW MANY DRINKS DO YOU HAVE: 1
ALCOHOL_USE: YES
DOES PATIENT WANT TO STOP DRINKING: NO
TOTAL SCORE: 0

## 2023-07-11 ASSESSMENT — FIBROSIS 4 INDEX
FIB4 SCORE: 1.95
FIB4 SCORE: 2.323790007724450131

## 2023-07-11 ASSESSMENT — ENCOUNTER SYMPTOMS
HEADACHES: 1
CARDIOVASCULAR NEGATIVE: 1
GASTROINTESTINAL NEGATIVE: 1
PSYCHIATRIC NEGATIVE: 1
CONSTITUTIONAL NEGATIVE: 1
RESPIRATORY NEGATIVE: 1
DOUBLE VISION: 1
MUSCULOSKELETAL NEGATIVE: 1

## 2023-07-11 ASSESSMENT — PATIENT HEALTH QUESTIONNAIRE - PHQ9
1. LITTLE INTEREST OR PLEASURE IN DOING THINGS: NOT AT ALL
SUM OF ALL RESPONSES TO PHQ9 QUESTIONS 1 AND 2: 0
2. FEELING DOWN, DEPRESSED, IRRITABLE, OR HOPELESS: NOT AT ALL
1. LITTLE INTEREST OR PLEASURE IN DOING THINGS: NOT AT ALL
2. FEELING DOWN, DEPRESSED, IRRITABLE, OR HOPELESS: NOT AT ALL
SUM OF ALL RESPONSES TO PHQ9 QUESTIONS 1 AND 2: 0

## 2023-07-11 NOTE — ED TRIAGE NOTES
Chief Complaint   Patient presents with    Headache     X 6 days    Blurred Vision     Double vision x 5days      Pt ambulate to triage with above complaint. Pt denies dizziness/numbness/tingling.    Pt educated on triage process and returned to lobby.

## 2023-07-11 NOTE — DISCHARGE PLANNING
[facility] Transitional [facility] Coordination    Referral from: Dr Treadwell  Insurance Provider on Facesheet: SCP  Potential Rehab Diagnosis: TBD    Chart review indicates patient may have on going medical management and may have therapy needs to possibly meet inpatient rehab facility criteria with the goal of returning to community.    D/C support: TBD     Physiatry consultation pended per protocol.     Headache/double vision - physiatry consult pended, awaiting work-up and therapy evals as appropriate. TCC will follow.     Thank you for the referral.

## 2023-07-11 NOTE — PROGRESS NOTES
2 RN Skin Assessment Completed by SANDIP Lieberman and GIL Peterson   Generalized, small bruises of different healing stages.   Head: WDL  Ears: WDL  Nose: WDL  Mouth: WDL  Neck: WDL  Breasts/Chest: WDL  Shoulder Blades: WDL  Spine: WDL  (R) Arm/Elbow/hand: WDL  (L) Arm/Elbow/hand: WDL  Abdomen:WDL  Groin: WDL  Sacrum/Coccyx/Buttocks: blanching  (R) Leg: WDL  (L) Leg: WDL  (R) Heel/Foot/Toe: blanching  (L) Heel/Foot/Toe: blanching              Devices in place: BP Cuff and Pulse Ox     Interventions in place: Pillows     Possible skin injury found: No     Pictures uploaded into Epic: N/A  Wound Consult Placed: N/A

## 2023-07-11 NOTE — TELEPHONE ENCOUNTER
Retuning patient call from today at 9:18 am voicemail left .  States has a sudden on set headache x 6 days and experiencing double vision . This is all new to her , has no Hx of headaches like this in the past . No recent falls  per patient , no nausea and  states just feels weak . Per Dr. Bravo  advised patient to go to the ER for further evaluation. Patient has been informed and will go to ER for eval and treatment .

## 2023-07-11 NOTE — PROGRESS NOTES
Patient arrived to floor. Assumed care at 1655. Assessment complete, A&Ox4 Admission record complete, Patient on monitor, Monitor room notified. Pt oriented to room, educated on call light/extension/phone system, white board updated. Fall assessment complete, patient educated to call for assistance, pt verbalizes understanding. Pt denies pain or any additional needs at this time. Questions and concerns addressed. Call light, phone, and personal belongings within reach.   Vision assessment reports double vision when both eye are open, which corrects when either eye is covered.

## 2023-07-11 NOTE — ED NOTES
Pt's friend Coni called for an update, per pt update can be given. Coni will be picking her up when she's discharged. Her number is [phone].

## 2023-07-12 VITALS
BODY MASS INDEX: 24.38 KG/M2 | TEMPERATURE: 97.7 F | HEIGHT: 63 IN | OXYGEN SATURATION: 91 % | DIASTOLIC BLOOD PRESSURE: 59 MMHG | SYSTOLIC BLOOD PRESSURE: 121 MMHG | RESPIRATION RATE: 17 BRPM | HEART RATE: 62 BPM | WEIGHT: 137.57 LBS

## 2023-07-12 PROBLEM — H53.2 DIPLOPIA: Status: RESOLVED | Noted: 2023-07-11 | Resolved: 2023-07-12

## 2023-07-12 LAB
CHOLEST SERPL-MCNC: 140 MG/DL (ref 100–199)
EKG IMPRESSION: NORMAL
EST. AVERAGE GLUCOSE BLD GHB EST-MCNC: 128 MG/DL
HBA1C MFR BLD: 6.1 % (ref 4–5.6)
HDLC SERPL-MCNC: 59 MG/DL
LDLC SERPL CALC-MCNC: 63 MG/DL
TRIGL SERPL-MCNC: 89 MG/DL (ref 0–149)

## 2023-07-12 PROCEDURE — A9270 NON-COVERED ITEM OR SERVICE: HCPCS | Performed by: HOSPITALIST

## 2023-07-12 PROCEDURE — 83036 HEMOGLOBIN GLYCOSYLATED A1C: CPT

## 2023-07-12 PROCEDURE — 700102 HCHG RX REV CODE 250 W/ 637 OVERRIDE(OP): Performed by: HOSPITALIST

## 2023-07-12 PROCEDURE — 36415 COLL VENOUS BLD VENIPUNCTURE: CPT

## 2023-07-12 PROCEDURE — G0378 HOSPITAL OBSERVATION PER HR: HCPCS

## 2023-07-12 PROCEDURE — 99239 HOSP IP/OBS DSCHRG MGMT >30: CPT | Performed by: HOSPITALIST

## 2023-07-12 PROCEDURE — 93010 ELECTROCARDIOGRAM REPORT: CPT | Performed by: INTERNAL MEDICINE

## 2023-07-12 PROCEDURE — 97161 PT EVAL LOW COMPLEX 20 MIN: CPT

## 2023-07-12 PROCEDURE — 80061 LIPID PANEL: CPT

## 2023-07-12 PROCEDURE — 97165 OT EVAL LOW COMPLEX 30 MIN: CPT

## 2023-07-12 PROCEDURE — 92610 EVALUATE SWALLOWING FUNCTION: CPT

## 2023-07-12 RX ADMIN — METOPROLOL TARTRATE 50 MG: 25 TABLET, FILM COATED ORAL at 05:28

## 2023-07-12 RX ADMIN — ASPIRIN 81 MG 81 MG: 81 TABLET ORAL at 05:27

## 2023-07-12 ASSESSMENT — COGNITIVE AND FUNCTIONAL STATUS - GENERAL
MOBILITY SCORE: 24
SUGGESTED CMS G CODE MODIFIER DAILY ACTIVITY: CH
SUGGESTED CMS G CODE MODIFIER MOBILITY: CH
DAILY ACTIVITIY SCORE: 24

## 2023-07-12 ASSESSMENT — ACTIVITIES OF DAILY LIVING (ADL): TOILETING: INDEPENDENT

## 2023-07-12 ASSESSMENT — PATIENT HEALTH QUESTIONNAIRE - PHQ9
1. LITTLE INTEREST OR PLEASURE IN DOING THINGS: NOT AT ALL
SUM OF ALL RESPONSES TO PHQ9 QUESTIONS 1 AND 2: 0
2. FEELING DOWN, DEPRESSED, IRRITABLE, OR HOPELESS: NOT AT ALL

## 2023-07-12 ASSESSMENT — GAIT ASSESSMENTS
DISTANCE (FEET): 200
GAIT LEVEL OF ASSIST: MODIFIED INDEPENDENT
DEVIATION: NO DEVIATION

## 2023-07-12 NOTE — THERAPY
Speech Language Pathology   Clinical Swallow Evaluation     Patient Name: Lisa Garza  AGE:  87 y.o., SEX:  female  Medical Record #: [id number]  Date of Service: 7/12/2023      History of Present Illness  Patient is a 87 y.o. female who presented 7/11/2023 with past medical history of paroxysmal atrial fibrillation, hyperlipidemia, osteoarthritis who presents to the ED c/o headache and diplopia.    No previous hx of SLP services at [facility].      General Information:  Vitals  O2 Delivery Device: None - Room Air  Level of Consciousness: Alert, Awake  Orientation: Oriented x 4  Follows Directives: Yes      Prior Living Situation & Level of Function:  Housing / Facility: [address]  Lives with - Patient's Self Care Capacity: Alone and Able to Care For Self  Communication: WFL  Swallowing: WFL       Oral Mechanism Evaluation:  Dentition: Good   Facial Symmetry: Equal  Facial Sensation: Equal     Labial Observations: WFL   Lingual Observations: Midline  Motor Speech: WFL          Laryngeal Function:  Secretion Management: Adequate  Voice Quality: WFL ([place])  Cough: Perceptually WNL       Subjective  Patient cleared by RN for evaluation. Pt received awake, sitting UIB, eating breakfast. Pt fully cooperated with SLP tasks.      Assessment  Current Method of Nutrition: Oral diet (RG7/TN0)  Positioning: Hanley's (60-90 degrees)  Bolus Administration: Patient    O2 Delivery Device: None - Room Air  Factor(s) Affecting Performance: None  Tracheostomy : No          Swallowing Trials:  Swallowing Trials  Thin Liquid (TN0): WFL  Liquidised (LQ3): WFL  Soft & Bite Sized (SB6): WFL  Regular (RG7): WFL      Comments: Patient with appropriate self-feeding. Appropriate oral bolus acceptance/containment. Adequate oral bolus stripping from utensil. Presumed complete AP transfer without oral residue noted post swallow. Adequate bite with functional mastication of solids. No cough/throat clear appreciated with PO. Vocal quality  remained clear. Single swallow completed per bolus. No signs of esophageal dysfunction. No overt s/sx or aspiration appreciated.      Clinical Impressions  Patient presents with a functional swallow. Diet modification is not indicated. SLP will not actively follow for dysphagia management. Re-consult with any change in status.       Recommendations  Diet Consistency: Regular solids and thin liquids  Instrumentation: None indicated at this time  Medication: As tolerated  Supervision: Independent  Positioning: Fully upright and midline during oral intake  Risk Management : Small bites/sips, Slow rate of intake  Oral Care: BID         SLP Treatment Plan  Treatment Plan: None Indicated  SLP Frequency: N/A - Evaluation Only  Estimated Duration: N/A - Evaluation Only      Anticipated Discharge Needs  Discharge Recommendations: Anticipate that the patient will have no further speech therapy needs after discharge from the hospital   Therapy Recommendations Upon DC: Not Indicated                  Beata Edmondson MS,CCC-SLP

## 2023-07-12 NOTE — ASSESSMENT & PLAN NOTE
Currently in normal sinus rhythm    Continue Lopressor 50 mg p.o. twice daily for heart rate control    Hold Xarelto until results of MRI

## 2023-07-12 NOTE — THERAPY
Occupational Therapy   Initial Evaluation     Patient Name: Lisa Garza  Age:  87 y.o., Sex:  female  Medical Record #: [id number]  Today's Date: 7/12/2023     Precautions: Fall Risk    Assessment    Patient is 87 y.o. female admitted with blurred vision and headache. Pt presents to OT eval able to demonstrate all self-care ADLs and functional mobility without assistance. Pt has no additional acute OT needs at this time.       Plan    Occupational Therapy Initial Treatment Plan   Duration: Discharge Needs Only    DC Equipment Recommendations: None  Discharge Recommendations: Anticipate that the patient will have no further occupational therapy needs after discharge from the hospital      Objective       07/12/23 0903   Prior Living Situation   Prior Services None   Housing / Facility 1 Story House   Bathroom Set up Walk In Shower;Grab Bars   Equipment Owned Grab Bar(s) In Tub / Shower;Hand Held Shower   Lives with - Patient's Self Care Capacity Alone and Able to Care For Self   Comments local son works from home, available if needed   Prior Level of ADL Function   Self Feeding Independent   Grooming / Hygiene Independent   Bathing Independent   Dressing Independent   Toileting Independent   Prior Level of IADL Function   Medication Management Independent   Laundry Independent   Kitchen Mobility Independent   Finances Independent   Home Management Independent   Shopping Independent   Prior Level Of Mobility Independent Without Device in Community   Driving / Transportation Driving Independent   Occupation (Pre-Hospital Vocational) Retired Due To Age   History of Falls   History of Falls No   Vitals   O2 Delivery Device None - Room Air   Pain 0 - 10 Group   Therapist Pain Assessment Post Activity Pain Same as Prior to Activity;Nurse Notified;0   Cognition    Level of Consciousness Alert   Comments pleasant, motivated   Strength Upper Body   Upper Body Strength  WDL   Upper Body Muscle Tone   Upper Body Muscle Tone   WDL   Coordination Upper Body   Coordination WDL   Balance Assessment   Sitting Balance (Static) Good   Sitting Balance (Dynamic) Good   Standing Balance (Static) Good   Standing Balance (Dynamic) Good   Weight Shift Sitting Good   Weight Shift Standing Good   Bed Mobility    Supine to Sit Modified Independent   Sit to Supine Modified Independent   Scooting Modified Independent   ADL Assessment   Eating Independent   Grooming Modified Independent;Standing   Upper Body Dressing Supervision   Lower Body Dressing Supervision   Toileting Supervision   How much help from another person does the patient currently need...   Putting on and taking off regular lower body clothing? 4   Bathing (including washing, rinsing, and drying)? 4   Toileting, which includes using a toilet, bedpan, or urinal? 4   Putting on and taking off regular upper body clothing? 4   Taking care of personal grooming such as brushing teeth? 4   Eating meals? 4   6 Clicks Daily Activity Score 24   Functional Mobility   Sit to Stand Modified Independent   Bed, Chair, Wheelchair Transfer Modified Independent   Toilet Transfers Modified Independent   Transfer Method Stand Step   Mobility no AD   Activity Tolerance   Comments functional for self-care ADLs   Education Group   Education Provided Role of Occupational Therapist;Activities of Daily Living;Transfers;Home Safety   Role of Occupational Therapist Patient Response Patient;Acceptance;Explanation;Verbal Demonstration   Home Safety Patient Response Patient;Acceptance;Explanation;Verbal Demonstration   Transfers Patient Response Patient;Acceptance;Explanation;Verbal Demonstration   ADL Patient Response Patient;Acceptance;Explanation;Verbal Demonstration   Occupational Therapy Initial Treatment Plan    Duration Discharge Needs Only   Problem List   Problem List None   Anticipated Discharge Equipment and Recommendations   DC Equipment Recommendations None   Discharge Recommendations Anticipate that the  patient will have no further occupational therapy needs after discharge from the hospital

## 2023-07-12 NOTE — CARE PLAN
The patient is Watcher - Medium risk of patient condition declining or worsening    Shift Goals  Clinical Goals: MRI, d/c, observation  Patient Goals: MRI, d/c    Progress made toward(s) clinical / shift goals:  Pain management progressing through medication (see MAR), rest, positioning, and distraction.  Patient educated on plan of care, medications, side effects, non-pharmalogical pain control, and safety/fall precautions.        Problem: Optimal Care of the Stroke Patient  Goal: Optimal acute care for the stroke patient  Description: Target End Date:  1 to 3 days    - Vital signs and neuro checks performed and documented per order  - NIHSS completed and documented per order  - Continuous telemetry monitoring for 72 hours or until discontinued by provider  - Head CT without contrast obtained  - Consideration of MRI/MRA  - MRI screening form completed in worklist if MRI ordered  - Echocardiogram with Bubble Study ordered/completed with consideration of STEVIE  - Carotid Doppler ordered/completed (Not required if CTA of neck completed in ED)  - Lipid Panel obtained within 48 hours of admission  - PT, PTT, INR obtained per Anticoagulation orders (if applicable)  - Antithrombotic therapy by end of hospital day 2 for ischemic stroke. Provider must document reason if contraindicated.  - Venous Thromboembolism (VTE) Prophylaxis by end of hospital day 2 for ischemic and hemorrhagic stroke. Provider must document reason if contraindicated  - Dysphagia screen completed and documented prior to any PO intake. Patient to remain NPO until Speech Therapy evaluation if thrombolytic or thrombectomy performed  - Rehabilitation assessment including PT/OT/SLP evaluations for referral to Physical Medicine and Rehabilitation services. If none needed, provider needs to document reason  - Neurology consult placed  - Consideration of cardiology consult for cryptogenic strokes  Outcome: Progressing     Problem: Knowledge Deficit -  Standard  Goal: Patient and family/care givers will demonstrate understanding of plan of care, disease process/condition, diagnostic tests and medications  Description: Target End Date:  1-3 days or as soon as patient condition allows    Document in Patient Education    1.  Patient and family/caregiver oriented to unit, equipment, visitation policy and means for communicating concern  2.  Complete/review Learning Assessment  3.  Assess knowledge level of disease process/condition, treatment plan, diagnostic tests and medications  4.  Explain disease process/condition, treatment plan, diagnostic tests and medications  Outcome: Progressing     Problem: Pain - Standard  Goal: Alleviation of pain or a reduction in pain to the patient’s comfort goal  Description: Target End Date:  Prior to discharge or change in level of care    Document on Vitals flowsheet    1.  Document pain using the appropriate pain scale per order or unit policy  2.  Educate and implement non-pharmacologic comfort measures (i.e. relaxation, distraction, massage, cold/heat therapy, etc.)  3.  Pain management medications as ordered  4.  Reassess pain after pain med administration per policy  5.  If opiods administered assess patient's response to pain medication is appropriate per POSS sedation scale  6.  Follow pain management plan developed in collaboration with patient and interdisciplinary team (including palliative care or pain specialists if applicable)  Outcome: Progressing

## 2023-07-12 NOTE — THERAPY
Physical Therapy   Initial Evaluation     Patient Name: Lisa Garza  Age:  87 y.o., Sex:  female  Medical Record #: [id number]  Today's Date: 7/12/2023     Precautions  Precautions: Fall Risk    Assessment  Patient is 87 y.o. female that presents with headache and intermittent double vision. Pt states that she also has minor distance vision issues. CT head negative for acute bleed. Pt appears to be functioning at her prior level, no unilateral weakness or sensory deficits. No further acute PT needs.   Plan    Physical Therapy Initial Treatment Plan   Duration: Evaluation only    DC Equipment Recommendations: None  Discharge Recommendations: Anticipate that the patient will have no further physical therapy needs after discharge from the hospital     Objective       07/12/23 0858   Charge Group   PT Evaluation PT Evaluation Low   Total Time Spent   PT Total Time Yes    Services   Is patient using  services for this encounter? No   Initial Contact Note    Initial Contact Note Order Received and Verified, Evaluation Only - Patient Does Not Require Further Acute Physical Therapy at this Time.  However, May Benefit from Post Acute Therapy for Higher Level Functional Deficits.   Pain 0 - 10 Group   Location Head   Location Orientation Anterior   Description Aching   Therapist Pain Assessment Prior to Activity   Prior Living Situation   Prior Services None   Housing / Facility 1 Story House   Steps Into Home 2   Steps In Home 0   Rail None   Elevator No   Equipment Owned Front-Wheel Walker;Wheelchair   Lives with - Patient's Self Care Capacity Alone and Able to Care For Self   Prior Level of Functional Mobility   Bed Mobility Independent   Transfer Status Independent   Ambulation Independent   Assistive Devices Used None   Wheelchair Other (Comments)   Comments Pt has wheelchair but does not use.   History of Falls   History of Falls No   Cognition    Cognition / Consciousness WDL   Level of  Consciousness Alert   Active ROM Lower Body    Active ROM Lower Body  WDL   Strength Lower Body   Lower Body Strength  WDL   Sensation Lower Body   Lower Extremity Sensation   WDL   Vision   Double Vision Education   Vision Comments Pt states intermittent double vision that feels better when she covers R eye. Pt also stated minor difficulty with vision distance.   Balance Assessment   Sitting Balance (Static) Normal   Sitting Balance (Dynamic) Normal   Standing Balance (Static) Normal   Standing Balance (Dynamic) Normal   Weight Shift Sitting Normal   Weight Shift Standing Normal   Bed Mobility    Supine to Sit Independent   Sit to Supine Independent   Scooting Independent   Gait Analysis   Gait Level Of Assist Modified Independent   Assistive Device None   Distance (Feet) 200   # of Times Distance was Traveled 2   Deviation No deviation   # of Stairs Climbed 0   Functional Mobility   Sit to Stand Independent   Bed, Chair, Wheelchair Transfer Independent   How much difficulty does the patient currently have...   Turning over in bed (including adjusting bedclothes, sheets and blankets)? 4   Sitting down on and standing up from a chair with arms (e.g., wheelchair, bedside commode, etc.) 4   Moving from lying on back to sitting on the side of the bed? 4   How much help from another person does the patient currently need...   Moving to and from a bed to a chair (including a wheelchair)? 4   Need to walk in a hospital room? 4   Climbing 3-5 steps with a railing? 4   6 clicks Mobility Score 24   Education Group   Education Provided Role of Physical Therapist   Role of Physical Therapist Patient Response Patient;Eager;Explanation;Demonstration;Verbal Demonstration;Action Demonstration   Physical Therapy Initial Treatment Plan    Duration Evaluation only   Anticipated Discharge Equipment and Recommendations   DC Equipment Recommendations None   Discharge Recommendations Anticipate that the patient will have no further  physical therapy needs after discharge from the hospital   Interdisciplinary Plan of Care Collaboration   IDT Collaboration with  Occupational Therapist;Nursing   Patient Position at End of Therapy In Bed;Call Light within Reach;Tray Table within Reach;Phone within Reach   Collaboration Comments RN updated   Session Information   Date / Session Number  7/12 - 1x

## 2023-07-12 NOTE — DISCHARGE SUMMARY
Discharge Summary    CHIEF COMPLAINT ON ADMISSION  Chief Complaint   Patient presents with    Headache     X 6 days    Blurred Vision     Double vision x 5days       Reason for Admission  Headache, Double Vision, Sent by MD     Admission Date  7/11/2023    CODE STATUS  Prior    HPI & HOSPITAL COURSE  Lisa Garza is a 87 y.o. female who presented 7/11/2023 with past medical history of paroxysmal atrial fibrillation, hyperlipidemia, osteoarthritis who presents to the emergency department at [facility] stating that she has a headache that is been going on for last 6 days.  She describes an achiness in the bifrontal area and it radiates towards her back of the head.  She is also noted intermittent double visions during that time.  She got concerned and presented self to the emergency department.  In the ED patient blood pressure was persistently elevated.  She was nonfocal.  CT of the head did not show any evidence of acute bleed.  Patient was referred to me for further care and management     No fever no chills     No chest pain or shortness of breath     No other focal deficits     No history of migraine    =================================    During hospital stay patient had no evidence of arrhythmia.    No recurrence of any neurological symptoms    bP improved    MRI of the brain negative for acute CVA.  Patient was cleared for discharge home to follow-up with PCP for referral for an outpatient ophthalomogist      Therefore, she is discharged in good and stable condition to home with close outpatient follow-up.    The patient met 2-midnight criteria for an inpatient stay at the time of discharge.    Discharge Date  7/12/2023    FOLLOW UP ITEMS POST DISCHARGE      DISCHARGE DIAGNOSES  Principal Problem (Resolved):    Diplopia (POA: Yes)  Active Problems:    Mixed hyperlipidemia (POA: Yes)      Overview: LDL < 100 on zoco 50mg/d    PAF (paroxysmal atrial fibrillation) (HCC) (POA: Yes)      Overview: Saw her  cardiologist 12/21, continues on xarelto 20mg/d and lopressor 50mg       bid. Has yearly f/u. Stable, continue current plan of care          Chronic anticoagulation (POA: Yes)      FOLLOW UP  Future Appointments   Date Time Provider Department Center   8/3/2023 11:20 AM Jaydon Sutherland M.D. [facility] None   10/17/2023 10:50 AM Kathy Garcia M.D. [facility]     Kathy Garcia M.D.  [address]  [address]  [facility] [id number] [phone]    Schedule an appointment as soon as possible for a visit in 1 week(s)      Brandon Pearl M.D.  [address]  [address]  [place] [address] [phone]            MEDICATIONS ON DISCHARGE     Medication List        CHANGE how you take these medications        Instructions   metoprolol tartrate 25 MG Tabs  What changed:   how much to take  how to take this  when to take this  Commonly known as: Lopressor   TAKE TWO TABLETS BY MOUTH TWICE DAILY     simvastatin 40 MG Tabs  What changed: when to take this  Commonly known as: Zocor   TAKE ONE TABLET BY MOUTH IN THE EVENING     Xarelto 20 MG Tabs tablet  What changed:   how much to take  additional instructions  Generic drug: rivaroxaban   Doctor's comments: NEW MD  TAKE 1 TABLET BY MOUTH ONCE DAILY WITH DINNER            CONTINUE taking these medications        Instructions   CALCIUM PO   Take 1 Tablet by mouth every day.  Dose: 1 Tablet     fish oil 1000 MG Caps capsule   Take 1,000 mg by mouth every day.  Dose: 1,000 mg     Vitamin D-3 25 MCG (1000 UT) Caps   Take 1,000 Units by mouth every morning.  Dose: 1,000 Units     VITRUM 50+ SENIOR MULTI PO   Take 1 Tablet by mouth every morning. Indications: Vitamin Deficiency  Dose: 1 Tablet              Allergies  Allergies   Allergen Reactions    Haines Unspecified     Itchy welts      Clindamycin Unspecified     Unknown reaction      Oatmeal Diarrhea     Gas      Scallops Vomiting       DIET  Orders Placed This Encounter   Procedures    Diet Order Diet: Cardiac (Decaf)      Standing Status:   Standing     Number of Occurrences:   1     Order Specific Question:   Diet:     Answer:   Cardiac [6]     Comments:   Decaf       ACTIVITY  As tolerated.  Weight bearing as tolerated    CONSULTATIONS  \    PROCEDURES      LABORATORY  Lab Results   Component Value Date    SODIUM 140 07/11/2023    POTASSIUM 4.4 07/11/2023    CHLORIDE 101 07/11/2023    CO2 28 07/11/2023    GLUCOSE 92 07/11/2023    BUN 21 07/11/2023    CREATININE 0.60 07/11/2023        Lab Results   Component Value Date    WBC 8.0 07/11/2023    HEMOGLOBIN 13.9 07/11/2023    HEMATOCRIT 42.0 07/11/2023    PLATELETCT 232 07/11/2023        Total time of the discharge process exceeds 39  minutes.

## 2023-07-12 NOTE — DISCHARGE INSTRUCTIONS
Diplopia  Diplopia is a condition in which a person sees two of a single object. It is also called double vision. There are two types of diplopia.  Monocular diplopia. This is double vision that is present when only one eye is open. It can occur in one or both eyes. Monocular diplopia is often caused by irregularity in the surface layer of your eye (cornea), a clouding of the lens in your eye (cataract), or a problem in the way your eye focuses light.  Binocular diplopia. This is double vision that is present only when both eyes are open. When you close one eye, the double vision will go away. Binocular diplopia may be more serious. It can be caused by:  Problems with the nerves or muscles that are responsible for eye movement.  Disease of the nerves (neurologic disease).  Immune system conditions, such as Graves' disease.  Migraine headaches.  Tumors.  An infection.  A stroke.  An injury.  There are many causes of diplopia. Some are not dangerous and can be easily corrected. Diplopia may also be a symptom of a serious medical problem. You may need to see a health care provider who specializes in eye conditions (ophthalmologist) or a nerve specialist (neurologist) to find the cause.  Follow these instructions at home:    Pay attention to any changes in your vision. Tell your health care provider about them.  Do not drive or operate machinery if diplopia interferes with your vision.  Keep all follow-up visits. This is important.  Contact a health care provider if:  Your diplopia gets worse.  You develop any other symptoms along with your diplopia, such as:  Weakness.  Numbness.  Headache.  Eye pain.  Clumsiness.  Nausea.  Drooping eyelids.  Abnormal movement of one eye.  Get help right away if:  You have sudden vision loss.  You suddenly get a very bad headache.  You have sudden weakness or numbness.  You develop droopiness on one side of your face.  You suddenly lose the ability to speak, understand speech, or  both.  You develop difficulty breathing.  These symptoms may represent a serious problem that is an emergency. Do not wait to see if the symptoms will go away. Get medical help right away. Call your local emergency services (911 in the [place] Do not drive yourself to the hospital.  Summary  Diplopia is a condition in which a person sees two of a single object. It is also called double vision.  Monocular diplopia is double vision that affects only one eye at a time. It is often caused by irregularity in the surface layer of your eye (cornea), a clouding of the lens in your eye (cataract), or a problem in the way your eye focuses light.  Binocular diplopia is double vision that affects both eyes at the same time. However, when you shut one eye, the double vision will go away. Binocular diplopia may be more serious.  If you have diplopia, you may need to see a health care provider who specializes in eye conditions (ophthalmologist) or a nerve specialist (neurologist) to find the cause.  This information is not intended to replace advice given to you by your health care provider. Make sure you discuss any questions you have with your health care provider.  Document Revised: 04/21/2022 Document Reviewed: 04/21/2022  Elsevier Patient Education © 2023 [employer]

## 2023-07-12 NOTE — PROGRESS NOTES
Assumed care of patient and heard report from Luisana OROPEZA.  Admit profile and assessment completed, patient is A&Ox 4, continuous telemetry monitoring in place, reports  no pain and is on room air  Patient updated on plan of care and all needs addressed at this time.  Upper bed rails in place, bed in lowest locked setting, non slip socks on, belongings and call light in reach, falls precaution and safety education reinforced, patient verbalized understanding.

## 2023-07-12 NOTE — H&P
Hospital Medicine History & Physical Note    Date of Service  7/11/2023    Primary Care Physician  Kathy Garcia M.D.    Consultants      Code Status  Prior    Chief Complaint  Chief Complaint   Patient presents with    Headache     X 6 days    Blurred Vision     Double vision x 5days       History of Presenting Illness  Lisa Garza is a 87 y.o. female who presented 7/11/2023 with past medical history of paroxysmal atrial fibrillation, hyperlipidemia, osteoarthritis who presents to the emergency department at [facility] stating that she has a headache that is been going on for last 6 days.  She describes an achiness in the bifrontal area and it radiates towards her back of the head.  She is also noted intermittent double visions during that time.  She got concerned and presented self to the emergency department.  In the ED patient blood pressure was persistently elevated.  She was nonfocal.  CT of the head did not show any evidence of acute bleed.  Patient was referred to me for further care and management    No fever no chills    No chest pain or shortness of breath    No other focal deficits    No history of migraine    I discussed the plan of care with patient.    Review of Systems  Review of Systems   Constitutional: Negative.    HENT: Negative.     Eyes:  Positive for double vision.   Respiratory: Negative.     Cardiovascular: Negative.    Gastrointestinal: Negative.    Genitourinary: Negative.    Musculoskeletal: Negative.    Skin: Negative.    Neurological:  Positive for headaches.   Psychiatric/Behavioral: Negative.         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    Surgical History   has a past surgical history that includes eye surgery; other (Right, 2006); hip  arthroplasty total (Left, 7/6/2015); and appendectomy laparoscopic (10/10/2018).     Family History  family history includes Cancer in her father; Heart Disease in her father; Hyperlipidemia in her sister; Hypertension in her sister; Lung Disease in her father; No Known Problems in her maternal grandfather, maternal grandmother, paternal grandfather, paternal grandmother, son, and son; Stroke in her sister.   Family history reviewed with patient. There is no family history that is pertinent to the chief complaint.     Social History   reports that she quit smoking about 32 years ago. Her smoking use included cigarettes. She has a 33.00 pack-year smoking history. She has never used smokeless tobacco. She reports that she does not currently use alcohol after a past usage of about 8.4 oz of alcohol per week. She reports that she does not use drugs.    Allergies  Allergies   Allergen Reactions    Person Unspecified     Itchy welts      Clindamycin Unspecified     Unknown reaction      Oatmeal Diarrhea     Gas      Scallops Vomiting       Medications  Prior to Admission Medications   Prescriptions Last Dose Informant Patient Reported? Taking?   CALCIUM PO 7/11/2023 Patient Yes Yes   Sig: Take 1 Tablet by mouth every day.   Cholecalciferol (VITAMIN D-3) 1000 UNITS CAPS 7/11/2023 at AM Patient Yes No   Sig: Take 1,000 Units by mouth every morning.   Multiple Vitamins-Minerals (VITRUM 50+ SENIOR MULTI PO) 7/11/2023 at AM Patient Yes No   Sig: Take 1 Tablet by mouth every morning. Indications: Vitamin Deficiency   Omega-3 Fatty Acids (FISH OIL) 1000 MG Cap capsule 7/11/2023 at AM Patient Yes Yes   Sig: Take 1,000 mg by mouth every day.   XARELTO 20 MG Tab tablet 7/10/2023 at PM Patient No No   Sig: TAKE 1 TABLET BY MOUTH ONCE DAILY WITH DINNER   Patient taking differently: Take 20 mg by mouth with dinner.   metoprolol tartrate (LOPRESSOR) 25 MG Tab 7/11/2023 at AM Patient No No   Sig: TAKE TWO TABLETS BY MOUTH TWICE DAILY    Patient taking differently: Take 50 mg by mouth 2 times a day. TAKE TWO TABLETS BY MOUTH TWICE DAILY   simvastatin (ZOCOR) 40 MG Tab 7/10/2023 at PM Patient No No   Sig: TAKE ONE TABLET BY MOUTH IN THE EVENING   Patient taking differently: Take 40 mg by mouth every evening.      Facility-Administered Medications: None       Physical Exam  Temp:  [36.6 °C (97.8 °F)] 36.6 °C (97.8 °F)  Pulse:  [] 84  Resp:  [12-20] 20  BP: (141-198)/(65-94) 166/72  SpO2:  [90 %-96 %] 90 %  Blood Pressure : (!) 166/72   Temperature: 36.6 °C (97.8 °F)   Pulse: 84   Respiration: 20   Pulse Oximetry: 90 %       Physical Exam  Constitutional:       General: She is not in acute distress.     Appearance: She is not ill-appearing or toxic-appearing.   HENT:      Head: Atraumatic.   Eyes:      General: No scleral icterus.     Extraocular Movements: Extraocular movements intact.      Pupils: Pupils are equal, round, and reactive to light.   Cardiovascular:      Rate and Rhythm: Normal rate and regular rhythm.      Heart sounds: No murmur heard.     No gallop.   Pulmonary:      Effort: Pulmonary effort is normal. No respiratory distress.      Breath sounds: No stridor. No wheezing or rhonchi.   Abdominal:      General: There is no distension.      Palpations: There is no mass.      Tenderness: There is no abdominal tenderness. There is no guarding or rebound.   Musculoskeletal:         General: No swelling, tenderness, deformity or signs of injury. Normal range of motion.      Cervical back: Normal range of motion.      Right lower leg: No edema.   Skin:     General: Skin is warm.      Capillary Refill: Capillary refill takes 2 to 3 seconds.      Coloration: Skin is not jaundiced or pale.      Findings: No bruising or erythema.   Neurological:      General: No focal deficit present.      Mental Status: She is oriented to person, place, and time.      Cranial Nerves: No cranial nerve deficit.      Sensory: No sensory deficit.      Motor:  No weakness.      Coordination: Coordination normal.      Gait: Gait normal.      Deep Tendon Reflexes: Reflexes normal.   Psychiatric:         Mood and Affect: Mood normal.         Behavior: Behavior normal.         Laboratory:  Recent Labs     07/10/23  0930 07/11/23  1110   WBC 7.6 8.0   RBC 4.15* 4.12*   HEMOGLOBIN 13.9 13.9   HEMATOCRIT 43.4 42.0   .6* 101.9*   MCH 33.5* 33.7*   MCHC 32.0* 33.1   RDW 49.6 48.3   PLATELETCT 250 232   MPV 11.2 10.6     Recent Labs     07/10/23  0930 07/11/23  1110   SODIUM 143 140   POTASSIUM 4.6 4.4   CHLORIDE 103 101   CO2 29 28   GLUCOSE 101* 92   BUN 15 21   CREATININE 0.59 0.60   CALCIUM 10.3 10.0     Recent Labs     07/10/23  0930 07/11/23  1110   ALTSGPT 14 15   ASTSGOT 21 24   ALKPHOSPHAT 56 56   TBILIRUBIN 0.5 0.4   GLUCOSE 101* 92         No results for input(s): NTPROBNP in the last 72 hours.  Recent Labs     07/10/23  0930   TRIGLYCERIDE 91   HDL 60   LDL 72     No results for input(s): TROPONINT in the last 72 hours.    Imaging:  CT-HEAD W/O   Final Result      1. Cerebral atrophy.   2. White matter lucencies most consistent with small vessel ischemic change versus demyelination or gliosis.   3. Otherwise, Head CT without contrast with no acute findings. No evidence of acute cerebral infarction, hemorrhage or mass lesion.         MR-BRAIN-W/O    (Results Pending)       EKG:  I have personally reviewed the images and compared with prior images.    EKG shows sinus rhythm with poor R wave progression anterior leads.  Q waves in inferior leads      Assessment/Plan:  Justification for Admission Status  I anticipate this patient   is appropriate for observation status at this time because I expect patient will require less than 48 hours for further evaluation diplopia      * Diplopia- (present on admission)  Assessment & Plan  Concern for TIA versus CVA    BP is elevated and uncontrolled-blood pressure reinitiated at 50 mg p.o. twice daily we will monitor for the  need for second antihypertensive medication    Neurochecks every 4 hours    Monitor on telemetry for any recurrence of atrial fibrillation    MRI of the brain to rule out for CVA    Chronic anticoagulation- (present on admission)  Assessment & Plan  Hold Xarelto until results of MRI of the brain    PAF (paroxysmal atrial fibrillation) (HCC)- (present on admission)  Assessment & Plan  Currently in normal sinus rhythm    Continue Lopressor 50 mg p.o. twice daily for heart rate control    Hold Xarelto until results of MRI        Mixed hyperlipidemia- (present on admission)  Assessment & Plan  Changing Zocor to Lipitor 40 mg nightly    Follow-up on lipid profile    Check hemoglobin A1c        VTE prophylaxis: SCDs/TEDs

## 2023-07-12 NOTE — PROGRESS NOTES
Pt in bed awake,a&ox4,[facility],dc plan explained,pt still have mild diplopia,she states she feels much better,discharge process explained.

## 2023-07-12 NOTE — ASSESSMENT & PLAN NOTE
Concern for TIA versus CVA    BP is elevated and uncontrolled-blood pressure reinitiated at 50 mg p.o. twice daily we will monitor for the need for second antihypertensive medication    Neurochecks every 4 hours    Monitor on telemetry for any recurrence of atrial fibrillation    MRI of the brain to rule out for CVA

## 2023-07-12 NOTE — CARE PLAN
The patient is Watcher - Medium risk of patient condition declining or worsening    Shift Goals  Clinical Goals: Neuro DX,  Patient Goals: Neuro studies    Progress made toward(s) clinical / shift goals:    Problem: Knowledge Deficit - Stroke Education  Goal: Patient's knowledge of stroke and risk factors will improve  Outcome: Progressing     Problem: Neuro Status  Goal: Neuro status will remain stable or improve  Outcome: Progressing       Patient is not progressing towards the following goals:

## 2023-07-13 ENCOUNTER — PATIENT OUTREACH (OUTPATIENT)
Dept: MEDICAL GROUP | Facility: PHYSICIAN GROUP | Age: 88
End: 2023-07-13
Payer: MEDICARE

## 2023-07-13 NOTE — PROGRESS NOTES
Transitional Care Management  TCM Outreach Date and Time: Filed (7/13/2023 11:59 AM)    Discharge Questions  Actual Discharge Date: 07/12/23  Now that you are home, how are you feeling?: Fair  Did you receive any new prescriptions?: No  Do you have any questions about your current medications or new medications (Review Med Rec)?: No  Do you have a follow up appointment scheduled with your PCP?: Yes (Patient was scheduled w/ Dr. Gale and she wants to see Dr. Garcia. Appointment was changed.)  Appointment Date: 07/20/23  Appointment Time: 1650  Any issues or paperwork you wish to discuss with your PCP?: Yes (Please specify) (referral to ophthalmologist)  Does this patient qualify for the CCM program?: Yes    Transitional Care  Number of attempts made to contact patient: 1  Current or previous attempts competed within two business days of discharge? : Yes  Provided education regarding treatment plan, medications, self-management, ADLs?: Yes (Neighbor advised patient to take Ibuprofen; Due to hx. of taking Xarelto and increased risk for bleeding and hx. of brain bleeds in sister, advised patient to take Tylenol for HA instead, if she has  never been told NOT to take it for any reason.)  Has patient completed an Advanced Directive?: Yes  Is the patient's advanced directive on file?: Yes  Has the Care Manager's phone number provided?: No ([phone] for any needs, ask for . She has had trouble using the automated system)  Is there anything else I can help you with?: Yes (Please specify) (expedite referral to Dr. Garcia)    Discharge Summary  Chief Complaint: Blurred vision, headache  Admitting Diagnosis: Diplopia  Discharge Diagnosis: Diplopia      Staff message sent to Dr. Garcia regarding change of appointment and referral to ophthalmology

## 2023-07-18 ENCOUNTER — TELEPHONE (OUTPATIENT)
Dept: HEALTH INFORMATION MANAGEMENT | Facility: OTHER | Age: 88
End: 2023-07-18
Payer: MEDICARE

## 2023-07-19 ENCOUNTER — TELEPHONE (OUTPATIENT)
Dept: MEDICAL GROUP | Facility: PHYSICIAN GROUP | Age: 88
End: 2023-07-19
Payer: MEDICARE

## 2023-07-19 NOTE — TELEPHONE ENCOUNTER
Patient left voicemail regarding referral to opthalmology due  to headache and intermittent double vision . States has not heard yet from anyone . Can you place referral , thank you

## 2023-07-20 ENCOUNTER — APPOINTMENT (OUTPATIENT)
Dept: MEDICAL GROUP | Facility: PHYSICIAN GROUP | Age: 88
End: 2023-07-20
Payer: MEDICARE

## 2023-07-20 DIAGNOSIS — H53.2 DIPLOPIA: ICD-10-CM

## 2023-08-03 ENCOUNTER — OFFICE VISIT (OUTPATIENT)
Dept: CARDIOLOGY | Facility: MEDICAL CENTER | Age: 88
End: 2023-08-03
Attending: INTERNAL MEDICINE
Payer: MEDICARE

## 2023-08-03 VITALS
HEART RATE: 74 BPM | WEIGHT: 135 LBS | OXYGEN SATURATION: 91 % | HEIGHT: 63 IN | BODY MASS INDEX: 23.92 KG/M2 | DIASTOLIC BLOOD PRESSURE: 62 MMHG | SYSTOLIC BLOOD PRESSURE: 110 MMHG | RESPIRATION RATE: 10 BRPM

## 2023-08-03 DIAGNOSIS — I25.10 CORONARY ARTERY CALCIFICATION SEEN ON CAT SCAN: ICD-10-CM

## 2023-08-03 DIAGNOSIS — E78.5 DYSLIPIDEMIA: ICD-10-CM

## 2023-08-03 DIAGNOSIS — I05.0 MITRAL VALVE STENOSIS, UNSPECIFIED ETIOLOGY: ICD-10-CM

## 2023-08-03 DIAGNOSIS — I10 ESSENTIAL HYPERTENSION: ICD-10-CM

## 2023-08-03 DIAGNOSIS — I35.0 MILD AORTIC STENOSIS: ICD-10-CM

## 2023-08-03 PROCEDURE — 99212 OFFICE O/P EST SF 10 MIN: CPT | Performed by: INTERNAL MEDICINE

## 2023-08-03 PROCEDURE — 3074F SYST BP LT 130 MM HG: CPT | Performed by: INTERNAL MEDICINE

## 2023-08-03 PROCEDURE — 99214 OFFICE O/P EST MOD 30 MIN: CPT | Performed by: INTERNAL MEDICINE

## 2023-08-03 PROCEDURE — 3078F DIAST BP <80 MM HG: CPT | Performed by: INTERNAL MEDICINE

## 2023-08-03 ASSESSMENT — ENCOUNTER SYMPTOMS
LOSS OF CONSCIOUSNESS: 0
SHORTNESS OF BREATH: 0
COUGH: 0
MYALGIAS: 0
PALPITATIONS: 0
BRUISES/BLEEDS EASILY: 0
DIZZINESS: 0

## 2023-08-03 ASSESSMENT — FIBROSIS 4 INDEX: FIB4 SCORE: 2.323790007724450131

## 2023-08-03 NOTE — PROGRESS NOTES
Chief Complaint   Patient presents with    Atrial Fibrillation     F/V Dx: PAF (paroxysmal atrial fibrillation) (HCC)    Hyperlipidemia    Hypertension       Subjective     Lisa Garza is a 87 y.o. female who presents today for follow-up care.    The patient has PAF, NOAC with rivaroxaban, aortic stenosis, mitral stenosis, coronary calcification, dyslipidemia and hypertension.    Since 9/1/2022 appointment the patient was recently admitted to the [facility] 7/11/2023-7/12/2023 for frontal headache, diplopia with elevated blood pressure 166/72.  Head negative brain MRI and head CTA.  After discharge saw ophthalmologist Dr. Real who did not find any ophthalmologic abnormalities.  She has had no recurrent symptoms since.  The month before took a week trip to [place].  She denies any cardiac symptoms of shortness of breath, PND or LE edema.  No angina pectoris.     Hospitalized [facility] 8/10/2022 -for scapular and chest pain, normal troponins, MPI normal, echocardiogram with mild aortic stenosis moderate mitral stenosis, discharged on same medications.       The patient previously smoked but quit 20 years ago.  History of alcohol abuse.     Hospitalized [facility] 3/26/2018-3/31/2018 for pneumonia and atrial fibrillation. She had a new onset of atrial fibrillation she was started on Xarelto for anticoagulation. Her atrial fibrillation reverted back to normal rhythm. Echocardiogram showed diastolic dysfunction grade 2 and ejection fraction 70%.     Past Medical History:   Diagnosis Date    Arthritis     osteo, hip    Cancer (HCC)     skin on left shoulder    Functional diarrhea 12/12/2022    Heart murmur     High cholesterol     Unspecified cataract     tony IOL    Unspecified urinary incontinence     Upper back pain on left side 12/12/2022    Vitamin D deficiency 6/15/2023     Past Surgical History:   Procedure Laterality Date    APPENDECTOMY LAPAROSCOPIC  10/10/2018    Procedure: APPENDECTOMY LAPAROSCOPIC;  Surgeon: Sushil TOBIN  NATE Dalton;  Location: SURGERY [facility];  Service: General    HIP ARTHROPLASTY TOTAL Left 2015    Procedure: HIP ARTHROPLASTY TOTAL;  Surgeon: Nvaid Howard M.D.;  Location: SURGERY [facility];  Service:     OTHER Right 2006    cyst & bone spur on right hand    EYE SURGERY      bilateral IOL     Family History   Problem Relation Age of Onset    Cancer Father         GI    Heart Disease Father     Lung Disease Father     Stroke Sister     No Known Problems Son     No Known Problems Son     No Known Problems Maternal Grandmother     No Known Problems Maternal Grandfather     No Known Problems Paternal Grandmother     No Known Problems Paternal Grandfather     Hypertension Sister     Hyperlipidemia Sister      Social History     Socioeconomic History    Marital status:      Spouse name: Not on file    Number of children: Not on file    Years of education: Not on file    Highest education level: Not on file   Occupational History    Not on file   Tobacco Use    Smoking status: Former     Packs/day: 1.00     Years: 33.00     Pack years: 33.00     Types: Cigarettes     Quit date: 1991     Years since quittin.6    Smokeless tobacco: Never    Tobacco comments:     Started smoking at age 23   Vaping Use    Vaping Use: Never used   Substance and Sexual Activity    Alcohol use: Not Currently     Alcohol/week: 8.4 oz     Types: 14 Glasses of wine per week     Comment: 1 glass wine/day    Drug use: No    Sexual activity: Never     Partners: Male   Other Topics Concern    Not on file   Social History Narrative    She lost her   to Alzheimer's. She lives alone in her own home, 1 story. Has a son who lives nearby. She does all ADLs and IADLs w/o difficulty though recently hired a  to do her outside work. 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 She enjoys reading, learning to play the piano.      Social Determinants of Health     Financial Resource Strain:  "Not on file   Food Insecurity: Not on file   Transportation Needs: Not on file   Physical Activity: Not on file   Stress: Not on file   Social Connections: Not on file   Intimate Partner Violence: Not on file   Housing Stability: Not on file     Allergies   Allergen Reactions    Ward Unspecified     Itchy welts      Clindamycin Unspecified     Unknown reaction      Oatmeal Diarrhea     Gas      Scallops Vomiting     Outpatient Encounter Medications as of 8/3/2023   Medication Sig Dispense Refill    CALCIUM PO Take 1 Tablet by mouth every day.      Omega-3 Fatty Acids (FISH OIL) 1000 MG Cap capsule Take 1,000 mg by mouth every day.      XARELTO 20 MG Tab tablet TAKE 1 TABLET BY MOUTH ONCE DAILY WITH DINNER (Patient taking differently: Take 20 mg by mouth with dinner.) 100 Tablet 0    simvastatin (ZOCOR) 40 MG Tab TAKE ONE TABLET BY MOUTH IN THE EVENING (Patient taking differently: Take 40 mg by mouth every evening.) 100 Tablet 1    metoprolol tartrate (LOPRESSOR) 25 MG Tab TAKE TWO TABLETS BY MOUTH TWICE DAILY (Patient taking differently: Take 50 mg by mouth 2 times a day. TAKE TWO TABLETS BY MOUTH TWICE DAILY) 400 Tablet 3    Multiple Vitamins-Minerals (VITRUM 50+ SENIOR MULTI PO) Take 1 Tablet by mouth every morning. Indications: Vitamin Deficiency      Cholecalciferol (VITAMIN D-3) 1000 UNITS CAPS Take 1,000 Units by mouth every morning.       No facility-administered encounter medications on file as of 8/3/2023.     Review of Systems   HENT:  Negative for nosebleeds.    Respiratory:  Negative for cough and shortness of breath.    Cardiovascular:  Negative for chest pain and palpitations.   Musculoskeletal:  Negative for myalgias.   Neurological:  Negative for dizziness and loss of consciousness.   Endo/Heme/Allergies:  Does not bruise/bleed easily.              Objective     /62 (BP Location: Left arm, Patient Position: Sitting, BP Cuff Size: Adult)   Pulse 74   Resp (!) 10   Ht 1.6 m (5' 3\")   Wt " 61.2 kg (135 lb)   SpO2 91%   BMI 23.91 kg/m²     Physical Exam  Constitutional:       General: She is not in acute distress.     Appearance: She is well-developed.   Eyes:      Conjunctiva/sclera: Conjunctivae normal.      Pupils: Pupils are equal, round, and reactive to light.   Neck:      Vascular: No JVD.   Cardiovascular:      Rate and Rhythm: Normal rate. Rhythm irregularly irregular.      Pulses: Normal pulses.      Heart sounds: Murmur heard.      No friction rub. No gallop.   Pulmonary:      Effort: Pulmonary effort is normal. No accessory muscle usage or respiratory distress.      Breath sounds: Normal breath sounds. No wheezing or rales.   Musculoskeletal:      Right lower leg: No edema.      Left lower leg: No edema.   Skin:     General: Skin is warm and dry.      Findings: No rash.      Nails: There is no clubbing.   Neurological:      Mental Status: She is alert and oriented to person, place, and time.   Psychiatric:         Behavior: Behavior normal.            ECHOCARDIOGRAM 3/28/2018  Left ventricular ejection fraction is visually estimated to be 70%.  Mild concentric left ventricular hypertrophy.  Grade II diastolic dysfunction.  Moderately dilated left atrium.  Mild mitral regurgitation.    Mild aortic stenosis.     ECHOCARDIOGRAM 1/13/2022  The left ventricular ejection fraction is visually estimated to be 75%,   borderline hyperdynamic.  Mild concentric left ventricular hypertrophy.  Biatrial enlargement.  Aortic valve leaflets not well visualized, appear heavily calcified with moderate aortic stenosis:V-max 3.9 m/s, MG 38 mmHg, KONG 1 cm2, DI 0.32.  Calcification of the mitral valve leaflets, consider rheumatic heart disease.  Mild to moderate mitral stenosis: MG 4-5.5 mmHg, HR 58 bpm.  Mild mitral regurgitation, multiple jets present.  Estimated right ventricular systolic pressure is 35 mmHg.  Normal estimated right atrial pressure.    ECHOCARDIOGRAM 8/10/2022  Normal left ventricular size,  thickness, systolic function.  Moderate mitral stenosis with restriction in the anterior mitral valve leaflet.  Mild calcification of the aortic valve leaflets and apparently   elongated LVOT with subvalvular flow acceleration (LVOT VTI 38 cm)  Mild aortic stenosis/LVOT obstruction - mean gradient of 21 mmHg.      MPI 8/10/2022   No evidence of significant jeopardized viable myocardium or prior myocardial    infarction.   Significantly limited gated portion of the examination. Correlation with    echocardiogram is recommended for EF evaluation.    ECG INTERPRETATION   Negative stress ECG for ischemia.    Assessment & Plan     1. Mild aortic stenosis  EC-ECHOCARDIOGRAM COMPLETE W/O CONT      2. Mitral valve stenosis, unspecified etiology  EC-ECHOCARDIOGRAM COMPLETE W/O CONT      3. Coronary artery calcification seen on CAT scan        4. Essential hypertension        5. Dyslipidemia            Medical Decision Making: Today's Assessment/Status/Plan:   Assessment  PAF.  Anticoagulation, rivaroxaban  Aortic stenosis, mild  Mitral stenosis, moderate.  Coronary calcification by CT scan.  Hypertension.  Dyslipidemia.  History of excessive alcohol use.     Recommendation Discussion  PAF, clinically stable, no recurrence, maintaining sinus rhythm, continue rivaroxaban, metoprolol.  Coronary calcification, asymptomatic, prior normal MPI, continue simvastatin, rivaroxaban, metoprolol.  Aortic stenosis, mitral stenosis, asymptomatic, follow-up echocardiogram.  Hypertension, BP currently normal, at goal, continue to monitor, continue metoprolol  Dyslipidemia, LDL 63, continue simvastatin  RTC 6 months.

## 2023-08-16 ENCOUNTER — OFFICE VISIT (OUTPATIENT)
Dept: MEDICAL GROUP | Facility: PHYSICIAN GROUP | Age: 88
End: 2023-08-16
Payer: MEDICARE

## 2023-08-16 VITALS
WEIGHT: 134 LBS | DIASTOLIC BLOOD PRESSURE: 70 MMHG | BODY MASS INDEX: 23.74 KG/M2 | SYSTOLIC BLOOD PRESSURE: 114 MMHG | HEIGHT: 63 IN | TEMPERATURE: 98.1 F | OXYGEN SATURATION: 90 % | RESPIRATION RATE: 14 BRPM | HEART RATE: 64 BPM

## 2023-08-16 DIAGNOSIS — R73.03 PREDIABETES: ICD-10-CM

## 2023-08-16 DIAGNOSIS — H53.2 DIPLOPIA: ICD-10-CM

## 2023-08-16 DIAGNOSIS — I10 ESSENTIAL HYPERTENSION: ICD-10-CM

## 2023-08-16 PROCEDURE — 3074F SYST BP LT 130 MM HG: CPT | Performed by: INTERNAL MEDICINE

## 2023-08-16 PROCEDURE — 3078F DIAST BP <80 MM HG: CPT | Performed by: INTERNAL MEDICINE

## 2023-08-16 PROCEDURE — 99214 OFFICE O/P EST MOD 30 MIN: CPT | Performed by: INTERNAL MEDICINE

## 2023-08-16 ASSESSMENT — FIBROSIS 4 INDEX: FIB4 SCORE: 2.323790007724450131

## 2023-08-16 NOTE — PROGRESS NOTES
CC: ER follow-up visit    HPI:  Lisa presents with the following    1. Diplopia  Patient with a PMH of PAF, dyslipidemia and hypertension presented to the emergency room on July 11 with complaints of a 6-day history of bifrontal headache associated with diplopia.  When she presented to the emergency room, her blood pressure was persistently elevated.  She remained nonfocal.  CT and MRI of the brain was negative for acute abnormalities.  ESR within normal limits.  Patient was discharged in stable condition, no added medications.  Recommendations to follow-up with outpatient ophthalmology.  She was subsequently seen and evaluated by ophthalmology and her exam was benign.  Her diplopia resolved, left with a mild slight frontal headache.    2. Essential hypertension  Patient stable after discharge from ER.  Patient is currently taking metoprolol 25 mg tablets, 2 tablets twice daily to manage her blood pressure.  She has not been monitoring her blood pressure regularly however has been quite stable.    3. Prediabetes  Patient completed lab work which showed a hemoglobin A1c of 6.1, slightly elevated from 5.9.  No PMH of T2DM.      Patient Active Problem List    Diagnosis Date Noted    Prediabetes 08/16/2023    Dyslipidemia 08/03/2023    Diplopia 07/11/2023    Vitamin D deficiency 06/15/2023    Upper back pain on left side 12/12/2022    Functional diarrhea 12/12/2022    BMI 23.0-23.9, adult 12/05/2022    Peripheral vascular disease, unspecified (HCC) 12/05/2022    Chest pain 08/10/2022    Mitral stenosis 01/17/2022    Osteopenia 11/03/2021    Low back pain without sciatica 04/29/2021    Abdominal aortic atherosclerosis (HCC) 04/29/2021    History of melanoma 04/29/2021    Hypercoagulability due to atrial fibrillation (HCC) 04/29/2021    Full code status 04/29/2021    Urge incontinence 04/29/2021    Essential hypertension 11/15/2019    Mild mitral regurgitation 11/15/2019    Coronary artery calcification seen on CAT  scan 09/16/2019    PAF (paroxysmal atrial fibrillation) ([facility]) 08/14/2018    Mild aortic stenosis 08/14/2018    Chronic anticoagulation 08/14/2018    Low hemoglobin 03/27/2018    Mixed hyperlipidemia 11/16/2017    Chronic seasonal allergic rhinitis due to pollen 11/16/2017    Osteoarthritis of right hip 07/06/2015    History of total left hip arthroplasty 05/21/2015    Osteoarthritis 05/21/2015    Osteoarthritis, multiple sites 05/21/2015       Current Outpatient Medications   Medication Sig Dispense Refill    CALCIUM PO Take 1 Tablet by mouth every day.      Omega-3 Fatty Acids (FISH OIL) 1000 MG Cap capsule Take 1,000 mg by mouth every day.      XARELTO 20 MG Tab tablet TAKE 1 TABLET BY MOUTH ONCE DAILY WITH DINNER (Patient taking differently: Take 20 mg by mouth with dinner.) 100 Tablet 0    simvastatin (ZOCOR) 40 MG Tab TAKE ONE TABLET BY MOUTH IN THE EVENING (Patient taking differently: Take 40 mg by mouth every evening.) 100 Tablet 1    metoprolol tartrate (LOPRESSOR) 25 MG Tab TAKE TWO TABLETS BY MOUTH TWICE DAILY (Patient taking differently: Take 50 mg by mouth 2 times a day. TAKE TWO TABLETS BY MOUTH TWICE DAILY) 400 Tablet 3    Multiple Vitamins-Minerals (VITRUM 50+ SENIOR MULTI PO) Take 1 Tablet by mouth every morning. Indications: Vitamin Deficiency      Cholecalciferol (VITAMIN D-3) 1000 UNITS CAPS Take 1,000 Units by mouth every morning.       No current facility-administered medications for this visit.         Allergies as of 08/16/2023 - Reviewed 08/16/2023   Allergen Reaction Noted    Bonner Unspecified 08/10/2022    Clindamycin Unspecified 08/10/2022    Oatmeal Diarrhea 08/10/2022    Scallops Vomiting 08/10/2022        Social History     Socioeconomic History    Marital status:      Spouse name: Not on file    Number of children: Not on file    Years of education: Not on file    Highest education level: Not on file   Occupational History    Not on file   Tobacco Use    Smoking status: Former      Packs/day: 1.00     Years: 33.00     Additional pack years: 0.00     Total pack years: 33.00     Types: Cigarettes     Quit date: 1991     Years since quittin.6    Smokeless tobacco: Never    Tobacco comments:     Started smoking at age 23   Vaping Use    Vaping Use: Never used   Substance and Sexual Activity    Alcohol use: Not Currently     Alcohol/week: 8.4 oz     Types: 14 Glasses of wine per week     Comment: 1 glass wine/day    Drug use: No    Sexual activity: Never     Partners: Male   Other Topics Concern    Not on file   Social History Narrative    She lost her   to Alzheimer's. She lives alone in her own home, 1 story. Has a son who lives nearby. She does all ADLs and IADLs w/o difficulty though recently hired a  to do her outside work. 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 She enjoys reading, learning to play the piano.      Social Determinants of Health     Financial Resource Strain: Not on file   Food Insecurity: Not on file   Transportation Needs: Not on file   Physical Activity: Not on file   Stress: Not on file   Social Connections: Not on file   Intimate Partner Violence: Not on file   Housing Stability: Not on file       Family History   Problem Relation Age of Onset    Cancer Father         GI    Heart Disease Father     Lung Disease Father     Stroke Sister     No Known Problems Son     No Known Problems Son     No Known Problems Maternal Grandmother     No Known Problems Maternal Grandfather     No Known Problems Paternal Grandmother     No Known Problems Paternal Grandfather     Hypertension Sister     Hyperlipidemia Sister        Past Surgical History:   Procedure Laterality Date    APPENDECTOMY LAPAROSCOPIC  10/10/2018    Procedure: APPENDECTOMY LAPAROSCOPIC;  Surgeon: Sushil Dalton M.D.;  Location: SURGERY [facility];  Service: General    HIP ARTHROPLASTY TOTAL Left 2015    Procedure: HIP ARTHROPLASTY TOTAL;  Surgeon: Navid ANGEL  "NATE Howard;  Location: SURGERY [facility];  Service:     OTHER Right 2006    cyst & bone spur on right hand    EYE SURGERY      bilateral IOL       ROS: Positive ROS per HPI.  Denies any Headache,Chest pain,  Shortness of breath,  Abdominal pain, Changes of bowel or bladder, Lower ext edema, Fevers, Nights sweats, Weight Changes, Focal weakness or numbness.  All other systems are negative.    /70   Pulse 64   Temp 36.7 °C (98.1 °F) (Temporal)   Resp 14   Ht 1.6 m (5' 3\")   Wt 60.8 kg (134 lb)   SpO2 90%   BMI 23.74 kg/m²      Constitutional: Alert, no distress, well-groomed.  Skin: Warm, dry, good turgor, no rashes in visible areas.  Eye: Equal, round and reactive, conjunctiva clear, lids normal.  ENMT: Lips without lesions, good dentition, moist mucous membranes.  Neck: Trachea midline, no masses, no thyromegaly.  Respiratory: Unlabored respiratory effort, no cough.  Abdomen: Soft, no gross masses.  MSK: Normal gait, moves all extremities.  Neuro: Grossly non-focal. No cranial nerve deficit. Strength and sensation intact.   Psych: Alert and oriented x3, normal affect and mood.        Assessment and Plan.   87 y.o. female presenting with the following.     1. Diplopia  ER records reviewed with patient.  Diplopia resolved.  Ophthalmology benign.  Symptoms possibly related to environmental allergies and increased pollen in the area.  Patient unable to take antiinflammatories due to Xarelto, Tylenol as needed.  Also suggest antihistamines if symptoms do not resolve.    2. Essential hypertension  Patient will continue current dose of metoprolol.  Patient will keep a blood pressure log and bring it into her follow-up visit in October.  ER precautions provided.    3. Prediabetes  Noted hemoglobin A1c of 6.1 in recent lab work.  For follow-up in more detail with patient on her next visit.    My total time spent caring for the patient on the day of the encounter was 31 minutes.   This does not include " time spent on separately billable procedures/tests.

## 2023-09-26 ENCOUNTER — APPOINTMENT (OUTPATIENT)
Dept: RADIOLOGY | Facility: MEDICAL CENTER | Age: 88
DRG: 177 | End: 2023-09-26
Attending: EMERGENCY MEDICINE
Payer: MEDICARE

## 2023-09-26 ENCOUNTER — HOSPITAL ENCOUNTER (INPATIENT)
Facility: MEDICAL CENTER | Age: 88
LOS: 2 days | DRG: 177 | End: 2023-09-28
Attending: EMERGENCY MEDICINE | Admitting: STUDENT IN AN ORGANIZED HEALTH CARE EDUCATION/TRAINING PROGRAM
Payer: MEDICARE

## 2023-09-26 DIAGNOSIS — R09.02 HYPOXIA: ICD-10-CM

## 2023-09-26 DIAGNOSIS — M15.9 PRIMARY OSTEOARTHRITIS INVOLVING MULTIPLE JOINTS: ICD-10-CM

## 2023-09-26 DIAGNOSIS — J96.01 ACUTE HYPOXEMIC RESPIRATORY FAILURE (HCC): ICD-10-CM

## 2023-09-26 DIAGNOSIS — U07.1 COVID-19: ICD-10-CM

## 2023-09-26 PROBLEM — Z71.89 ACP (ADVANCE CARE PLANNING): Status: ACTIVE | Noted: 2023-09-26

## 2023-09-26 PROBLEM — I48.91 AF (ATRIAL FIBRILLATION) (HCC): Status: ACTIVE | Noted: 2018-08-14

## 2023-09-26 PROBLEM — E78.5 HYPERLIPIDEMIA: Status: ACTIVE | Noted: 2017-11-16

## 2023-09-26 LAB
ALBUMIN SERPL BCP-MCNC: 3.6 G/DL (ref 3.2–4.9)
ALBUMIN/GLOB SERPL: 1.4 G/DL
ALP SERPL-CCNC: 58 U/L (ref 30–99)
ALT SERPL-CCNC: 11 U/L (ref 2–50)
ANION GAP SERPL CALC-SCNC: 11 MMOL/L (ref 7–16)
APPEARANCE UR: CLEAR
AST SERPL-CCNC: 25 U/L (ref 12–45)
BACTERIA #/AREA URNS HPF: NEGATIVE /HPF
BASOPHILS # BLD AUTO: 0.6 % (ref 0–1.8)
BASOPHILS # BLD: 0.04 K/UL (ref 0–0.12)
BILIRUB SERPL-MCNC: 0.5 MG/DL (ref 0.1–1.5)
BILIRUB UR QL STRIP.AUTO: NEGATIVE
BUN SERPL-MCNC: 12 MG/DL (ref 8–22)
CALCIUM ALBUM COR SERPL-MCNC: 8.8 MG/DL (ref 8.5–10.5)
CALCIUM SERPL-MCNC: 8.5 MG/DL (ref 8.5–10.5)
CHLORIDE SERPL-SCNC: 101 MMOL/L (ref 96–112)
CO2 SERPL-SCNC: 23 MMOL/L (ref 20–33)
COLOR UR: ABNORMAL
CREAT SERPL-MCNC: 0.6 MG/DL (ref 0.5–1.4)
EOSINOPHIL # BLD AUTO: 0.02 K/UL (ref 0–0.51)
EOSINOPHIL NFR BLD: 0.3 % (ref 0–6.9)
EPI CELLS #/AREA URNS HPF: NEGATIVE /HPF
ERYTHROCYTE [DISTWIDTH] IN BLOOD BY AUTOMATED COUNT: 47 FL (ref 35.9–50)
FLUAV RNA SPEC QL NAA+PROBE: NEGATIVE
FLUAV RNA SPEC QL NAA+PROBE: NORMAL
FLUBV RNA SPEC QL NAA+PROBE: NEGATIVE
FLUBV RNA SPEC QL NAA+PROBE: NORMAL
GFR SERPLBLD CREATININE-BSD FMLA CKD-EPI: 86 ML/MIN/1.73 M 2
GLOBULIN SER CALC-MCNC: 2.6 G/DL (ref 1.9–3.5)
GLUCOSE SERPL-MCNC: 103 MG/DL (ref 65–99)
GLUCOSE UR STRIP.AUTO-MCNC: NEGATIVE MG/DL
HCT VFR BLD AUTO: 37.3 % (ref 37–47)
HGB BLD-MCNC: 12.2 G/DL (ref 12–16)
HYALINE CASTS #/AREA URNS LPF: ABNORMAL /LPF
IMM GRANULOCYTES # BLD AUTO: 0.03 K/UL (ref 0–0.11)
IMM GRANULOCYTES NFR BLD AUTO: 0.5 % (ref 0–0.9)
KETONES UR STRIP.AUTO-MCNC: 15 MG/DL
LACTATE SERPL-SCNC: 1.6 MMOL/L (ref 0.5–2)
LEUKOCYTE ESTERASE UR QL STRIP.AUTO: ABNORMAL
LYMPHOCYTES # BLD AUTO: 0.36 K/UL (ref 1–4.8)
LYMPHOCYTES NFR BLD: 5.6 % (ref 22–41)
MCH RBC QN AUTO: 32.6 PG (ref 27–33)
MCHC RBC AUTO-ENTMCNC: 32.7 G/DL (ref 32.2–35.5)
MCV RBC AUTO: 99.7 FL (ref 81.4–97.8)
MICRO URNS: ABNORMAL
MONOCYTES # BLD AUTO: 0.84 K/UL (ref 0–0.85)
MONOCYTES NFR BLD AUTO: 13.1 % (ref 0–13.4)
NEUTROPHILS # BLD AUTO: 5.1 K/UL (ref 1.82–7.42)
NEUTROPHILS NFR BLD: 79.9 % (ref 44–72)
NITRITE UR QL STRIP.AUTO: NEGATIVE
NRBC # BLD AUTO: 0 K/UL
NRBC BLD-RTO: 0 /100 WBC (ref 0–0.2)
PH UR STRIP.AUTO: 5.5 [PH] (ref 5–8)
PLATELET # BLD AUTO: 183 K/UL (ref 164–446)
PMV BLD AUTO: 11.1 FL (ref 9–12.9)
POTASSIUM SERPL-SCNC: 4.3 MMOL/L (ref 3.6–5.5)
PROT SERPL-MCNC: 6.2 G/DL (ref 6–8.2)
PROT UR QL STRIP: 30 MG/DL
RBC # BLD AUTO: 3.74 M/UL (ref 4.2–5.4)
RBC # URNS HPF: ABNORMAL /HPF
RBC UR QL AUTO: NEGATIVE
RSV RNA SPEC QL NAA+PROBE: NEGATIVE
RSV RNA SPEC QL NAA+PROBE: NORMAL
SARS-COV-2 RNA RESP QL NAA+PROBE: DETECTED
SARS-COV-2 RNA RESP QL NAA+PROBE: NORMAL
SODIUM SERPL-SCNC: 135 MMOL/L (ref 135–145)
SP GR UR STRIP.AUTO: 1.02
SPECIMEN SOURCE: ABNORMAL
SPECIMEN SOURCE: NORMAL
UROBILINOGEN UR STRIP.AUTO-MCNC: 0.2 MG/DL
WBC # BLD AUTO: 6.4 K/UL (ref 4.8–10.8)
WBC #/AREA URNS HPF: ABNORMAL /HPF

## 2023-09-26 PROCEDURE — 700105 HCHG RX REV CODE 258: Performed by: EMERGENCY MEDICINE

## 2023-09-26 PROCEDURE — 51701 INSERT BLADDER CATHETER: CPT

## 2023-09-26 PROCEDURE — 80053 COMPREHEN METABOLIC PANEL: CPT

## 2023-09-26 PROCEDURE — 85025 COMPLETE CBC W/AUTO DIFF WBC: CPT

## 2023-09-26 PROCEDURE — 8E0ZXY6 ISOLATION: ICD-10-PCS | Performed by: STUDENT IN AN ORGANIZED HEALTH CARE EDUCATION/TRAINING PROGRAM

## 2023-09-26 PROCEDURE — A9270 NON-COVERED ITEM OR SERVICE: HCPCS | Performed by: EMERGENCY MEDICINE

## 2023-09-26 PROCEDURE — 83605 ASSAY OF LACTIC ACID: CPT

## 2023-09-26 PROCEDURE — 770001 HCHG ROOM/CARE - MED/SURG/GYN PRIV*

## 2023-09-26 PROCEDURE — 700111 HCHG RX REV CODE 636 W/ 250 OVERRIDE (IP): Performed by: STUDENT IN AN ORGANIZED HEALTH CARE EDUCATION/TRAINING PROGRAM

## 2023-09-26 PROCEDURE — 700102 HCHG RX REV CODE 250 W/ 637 OVERRIDE(OP): Performed by: EMERGENCY MEDICINE

## 2023-09-26 PROCEDURE — 87086 URINE CULTURE/COLONY COUNT: CPT

## 2023-09-26 PROCEDURE — 99285 EMERGENCY DEPT VISIT HI MDM: CPT

## 2023-09-26 PROCEDURE — 0241U HCHG SARS-COV-2 COVID-19 NFCT DS RESP RNA 4 TRGT MIC: CPT | Mod: 91

## 2023-09-26 PROCEDURE — C9803 HOPD COVID-19 SPEC COLLECT: HCPCS | Performed by: EMERGENCY MEDICINE

## 2023-09-26 PROCEDURE — 99497 ADVNCD CARE PLAN 30 MIN: CPT | Mod: 25 | Performed by: STUDENT IN AN ORGANIZED HEALTH CARE EDUCATION/TRAINING PROGRAM

## 2023-09-26 PROCEDURE — 36415 COLL VENOUS BLD VENIPUNCTURE: CPT

## 2023-09-26 PROCEDURE — 71045 X-RAY EXAM CHEST 1 VIEW: CPT

## 2023-09-26 PROCEDURE — 99223 1ST HOSP IP/OBS HIGH 75: CPT | Mod: 25 | Performed by: STUDENT IN AN ORGANIZED HEALTH CARE EDUCATION/TRAINING PROGRAM

## 2023-09-26 PROCEDURE — 87040 BLOOD CULTURE FOR BACTERIA: CPT

## 2023-09-26 PROCEDURE — 96374 THER/PROPH/DIAG INJ IV PUSH: CPT

## 2023-09-26 PROCEDURE — 81001 URINALYSIS AUTO W/SCOPE: CPT

## 2023-09-26 RX ORDER — SODIUM CHLORIDE, SODIUM LACTATE, POTASSIUM CHLORIDE, CALCIUM CHLORIDE 600; 310; 30; 20 MG/100ML; MG/100ML; MG/100ML; MG/100ML
1000 INJECTION, SOLUTION INTRAVENOUS ONCE
Status: COMPLETED | OUTPATIENT
Start: 2023-09-26 | End: 2023-09-26

## 2023-09-26 RX ORDER — ACETAMINOPHEN 325 MG/1
650 TABLET ORAL ONCE
Status: COMPLETED | OUTPATIENT
Start: 2023-09-26 | End: 2023-09-26

## 2023-09-26 RX ORDER — METOPROLOL TARTRATE 50 MG/1
50 TABLET, FILM COATED ORAL 2 TIMES DAILY
Status: DISCONTINUED | OUTPATIENT
Start: 2023-09-27 | End: 2023-09-28 | Stop reason: HOSPADM

## 2023-09-26 RX ORDER — ENOXAPARIN SODIUM 100 MG/ML
40 INJECTION SUBCUTANEOUS DAILY
Status: DISCONTINUED | OUTPATIENT
Start: 2023-09-26 | End: 2023-09-26

## 2023-09-26 RX ORDER — ACETAMINOPHEN 325 MG/1
650 TABLET ORAL EVERY 6 HOURS PRN
Status: DISCONTINUED | OUTPATIENT
Start: 2023-09-26 | End: 2023-09-28 | Stop reason: HOSPADM

## 2023-09-26 RX ORDER — SIMVASTATIN 40 MG
40 TABLET ORAL EVERY EVENING
Status: DISCONTINUED | OUTPATIENT
Start: 2023-09-27 | End: 2023-09-28 | Stop reason: HOSPADM

## 2023-09-26 RX ORDER — DEXAMETHASONE SODIUM PHOSPHATE 4 MG/ML
6 INJECTION, SOLUTION INTRA-ARTICULAR; INTRALESIONAL; INTRAMUSCULAR; INTRAVENOUS; SOFT TISSUE DAILY
Status: DISCONTINUED | OUTPATIENT
Start: 2023-09-26 | End: 2023-09-28 | Stop reason: HOSPADM

## 2023-09-26 RX ADMIN — SODIUM CHLORIDE, POTASSIUM CHLORIDE, SODIUM LACTATE AND CALCIUM CHLORIDE 1000 ML: 600; 310; 30; 20 INJECTION, SOLUTION INTRAVENOUS at 15:20

## 2023-09-26 RX ADMIN — SODIUM CHLORIDE, POTASSIUM CHLORIDE, SODIUM LACTATE AND CALCIUM CHLORIDE 1000 ML: 600; 310; 30; 20 INJECTION, SOLUTION INTRAVENOUS at 18:40

## 2023-09-26 RX ADMIN — ACETAMINOPHEN 650 MG: 325 TABLET, FILM COATED ORAL at 15:08

## 2023-09-26 RX ADMIN — DEXAMETHASONE SODIUM PHOSPHATE 6 MG: 4 INJECTION INTRA-ARTICULAR; INTRALESIONAL; INTRAMUSCULAR; INTRAVENOUS; SOFT TISSUE at 22:50

## 2023-09-26 ASSESSMENT — CHA2DS2 SCORE
SEX: FEMALE
CHA2DS2 VASC SCORE: 5
PRIOR STROKE OR TIA OR THROMBOEMBOLISM: NO
CHF OR LEFT VENTRICULAR DYSFUNCTION: NO
HYPERTENSION: YES
DIABETES: NO
VASCULAR DISEASE: YES
AGE 75 OR GREATER: YES
AGE 65 TO 74: NO

## 2023-09-26 ASSESSMENT — ENCOUNTER SYMPTOMS
PSYCHIATRIC NEGATIVE: 1
CARDIOVASCULAR NEGATIVE: 1
MUSCULOSKELETAL NEGATIVE: 1
CHILLS: 1
FEVER: 1
EYES NEGATIVE: 1
GASTROINTESTINAL NEGATIVE: 1
WEAKNESS: 1
COUGH: 1

## 2023-09-26 ASSESSMENT — FIBROSIS 4 INDEX: FIB4 SCORE: 2.323790007724450131

## 2023-09-26 NOTE — ED TRIAGE NOTES
"Lisa Garza  87 y.o.  Female  [facility] to [facility] for     Chief Complaint   Patient presents with    Weakness     Pt getting out of bed and was unable to get up.  Pt slid down side of bed to the floor.  Pt did not get injured per EMS    Cough     Strong productive cough with clear phlegm, onset last night    Fever    Chills     Pt febrile on arrival.  PIV placed pta with fsbs 124.  Pt was given 500cc NS pta.  Per EMS, pt spo2 85% ra pta, pt was placed on o2 @  2lpm.  Pt reports she takes xarelto for a-fib.    Sepsis score 3, protocol orders placed.  ED phleb aware of need for BC    /55   Pulse 89   Temp (!) 38.7 °C (101.7 °F) (Temporal)   Resp 20   Ht 1.6 m (5' 3\")   Wt 59 kg (130 lb)   SpO2 93%   BMI 23.03 kg/m²     "

## 2023-09-26 NOTE — ED PROVIDER NOTES
"ER Provider Note    Scribed for Rosa Elena Petersen M.d. by Lorena Castle. 9/26/2023  2:13 PM    Primary Care Provider: Kathy Garcia M.D.    CHIEF COMPLAINT  Chief Complaint   Patient presents with    Weakness     Pt getting out of bed and was unable to get up.  Pt slid down side of bed to the floor.  Pt did not get injured per EMS    Cough     Strong productive cough with clear phlegm, onset last night    Fever    Chills     EXTERNAL RECORDS REVIEWED  Outpatient Notes Seen by cardiology 8/3/23. She has aortic stenosis, afib, hypertension, hyperlipidemia. Patient was admitted to 7/11/23 for headache and diplopia and high blood pressure and had negative brain MRI. She was discharged the following day. She followed up and had no opthalmologic abnormalities. On xarelto for afib. She also followed up 8/16/23 with PCP and was told to follow medications as prescribed. Last echo in August 2022 which was normal with ejection fraction of 70%     HPI/ROS  LIMITATION TO HISTORY   Select: : None  OUTSIDE HISTORIAN(S):  None    Lisa Garza is a 87 y.o. female who presents to the ED complaining of generalized weakness and cough onset today. Earlier this week, son was very sick with respiratory symptoms and visited her. Last night, she started coughing and this morning felt weak. She reports that she reached over to the end table this morning to get a book and as she reached over she slid off of the bed onto the floor.  She did not sustain any injuries.  She says \"I did not fall.\"  EMS reports, however, that she had a difficult time getting up off the floor.  She lives alone.  Associated sputum production with clear phlegm, sore throat, fever, and chills. No blood in sputum. No nausea, vomiting, diarrhea, chest pain, rash, malaise. Patient reports that she felt normal yesterday.  Patient denies home oxygen use.  She says she does not have any underlying lung diseases but when she does get a cold it tends to \"go to her " "lungs.\"  She says she often ends up with pneumonia and if she ends up with a pneumonia she ultimately goes into A-fib.  She is currently not in A-fib.    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    Heart murmur     High cholesterol     Prediabetes 8/16/2023    Unspecified cataract     tony IOL    Unspecified urinary incontinence     Upper back pain on left side 12/12/2022    Vitamin D deficiency 6/15/2023       SURGICAL HISTORY  Past Surgical History:   Procedure Laterality Date    APPENDECTOMY LAPAROSCOPIC  10/10/2018    Procedure: APPENDECTOMY LAPAROSCOPIC;  Surgeon: Sushil Dalton M.D.;  Location: SURGERY [facility];  Service: General    HIP ARTHROPLASTY TOTAL Left 7/6/2015    Procedure: HIP ARTHROPLASTY TOTAL;  Surgeon: Navid Howard M.D.;  Location: SURGERY [facility];  Service:     OTHER Right 2006    cyst & bone spur on right hand    EYE SURGERY      bilateral IOL       FAMILY HISTORY  Family History   Problem Relation Age of Onset    Cancer Father         GI    Heart Disease Father     Lung Disease Father     Stroke Sister     No Known Problems Son     No Known Problems Son     No Known Problems Maternal Grandmother     No Known Problems Maternal Grandfather     No Known Problems Paternal Grandmother     No Known Problems Paternal Grandfather     Hypertension Sister     Hyperlipidemia Sister        SOCIAL HISTORY   reports that she quit smoking about 32 years ago. Her smoking use included cigarettes. She started smoking about 65 years ago. She has a 33.0 pack-year smoking history. She has never used smokeless tobacco. She reports that she does not currently use alcohol after a past usage of about 8.4 oz of alcohol per week. She reports that she does not use drugs.    CURRENT MEDICATIONS  Previous Medications    CALCIUM PO    Take 1 Tablet by mouth every day.    " "CHOLECALCIFEROL (VITAMIN D-3) 1000 UNITS CAPS    Take 1,000 Units by mouth every morning.    METOPROLOL TARTRATE (LOPRESSOR) 25 MG TAB    TAKE TWO TABLETS BY MOUTH TWICE DAILY    MULTIPLE VITAMINS-MINERALS (VITRUM 50+ SENIOR MULTI PO)    Take 1 Tablet by mouth every morning. Indications: Vitamin Deficiency    OMEGA-3 FATTY ACIDS (FISH OIL) 1000 MG CAP CAPSULE    Take 1,000 mg by mouth every day.    SIMVASTATIN (ZOCOR) 40 MG TAB    TAKE ONE TABLET BY MOUTH IN THE EVENING    XARELTO 20 MG TAB TABLET    TAKE 1 TABLET BY MOUTH ONCE DAILY WITH DINNER       ALLERGIES  Citrus, Clindamycin, Oatmeal, and Scallops    PHYSICAL EXAM  /55   Pulse 89   Temp (!) 38.7 °C (101.7 °F) (Temporal)   Resp 20   Ht 1.6 m (5' 3\")   Wt 59 kg (130 lb)   SpO2 97%   BMI 23.03 kg/m²     Constitutional:  Well developed, well nourished; ill-appearing  HENT: Normocephalic, Atraumatic, Bilateral external ears normal, , dry mucus membranes  Eyes: PERRL, EOMI, Conjunctiva normal, No discharge.   Neck: Normal range of motion, supple, nontender  Lymphatic: No lymphadenopathy noted.   Cardiovascular: tachycardic,  Normal rhythm, loud 3/6 holo systolic murmur, No rubs or gallops   Thorax & Lungs: frequent harsh sounding cough, CTA=bilaterally;  No respiratory distress,  No wheezing rales, or rhonchi; No chest tenderness. No crepitus or subQ air   Abdomen: Soft, good bowel sounds no guarding no rebound, no masses, no pulsatile mass, no tenderness, no distention  Skin: skin hot to touch, Warm, Dry, No erythema, No rash.   Back: No tenderness, No CVA tenderness.   Extremities: 2+ dp and pt pulses bilateral LEs;  Nontender; no pretibial edema  Neurologic: Alert & oriented x 4, clear speech  Psychiatric: appropriate, normal affect    DIAGNOSTIC STUDIES  Labs:   Results for orders placed or performed during the hospital encounter of 09/26/23   Lactic acid (lactate)   Result Value Ref Range    Lactic Acid 1.6 0.5 - 2.0 mmol/L   CBC With Differential "   Result Value Ref Range    WBC 6.4 4.8 - 10.8 K/uL    RBC 3.74 (L) 4.20 - 5.40 M/uL    Hemoglobin 12.2 12.0 - 16.0 g/dL    Hematocrit 37.3 37.0 - 47.0 %    MCV 99.7 (H) 81.4 - 97.8 fL    MCH 32.6 27.0 - 33.0 pg    MCHC 32.7 32.2 - 35.5 g/dL    RDW 47.0 35.9 - 50.0 fL    Platelet Count 183 164 - 446 K/uL    MPV 11.1 9.0 - 12.9 fL    Neutrophils-Polys 79.90 (H) 44.00 - 72.00 %    Lymphocytes 5.60 (L) 22.00 - 41.00 %    Monocytes 13.10 0.00 - 13.40 %    Eosinophils 0.30 0.00 - 6.90 %    Basophils 0.60 0.00 - 1.80 %    Immature Granulocytes 0.50 0.00 - 0.90 %    Nucleated RBC 0.00 0.00 - 0.20 /100 WBC    Neutrophils (Absolute) 5.10 1.82 - 7.42 K/uL    Lymphs (Absolute) 0.36 (L) 1.00 - 4.80 K/uL    Monos (Absolute) 0.84 0.00 - 0.85 K/uL    Eos (Absolute) 0.02 0.00 - 0.51 K/uL    Baso (Absolute) 0.04 0.00 - 0.12 K/uL    Immature Granulocytes (abs) 0.03 0.00 - 0.11 K/uL    NRBC (Absolute) 0.00 K/uL   Comp Metabolic Panel   Result Value Ref Range    Sodium 135 135 - 145 mmol/L    Potassium 4.3 3.6 - 5.5 mmol/L    Chloride 101 96 - 112 mmol/L    Co2 23 20 - 33 mmol/L    Anion Gap 11.0 7.0 - 16.0    Glucose 103 (H) 65 - 99 mg/dL    Bun 12 8 - 22 mg/dL    Creatinine 0.60 0.50 - 1.40 mg/dL    Calcium 8.5 8.5 - 10.5 mg/dL    Correct Calcium 8.8 8.5 - 10.5 mg/dL    AST(SGOT) 25 12 - 45 U/L    ALT(SGPT) 11 2 - 50 U/L    Alkaline Phosphatase 58 30 - 99 U/L    Total Bilirubin 0.5 0.1 - 1.5 mg/dL    Albumin 3.6 3.2 - 4.9 g/dL    Total Protein 6.2 6.0 - 8.2 g/dL    Globulin 2.6 1.9 - 3.5 g/dL    A-G Ratio 1.4 g/dL   Urinalysis    Specimen: Urine   Result Value Ref Range    Color DK Yellow     Character Clear     Specific Gravity 1.023 <1.035    Ph 5.5 5.0 - 8.0    Glucose Negative Negative mg/dL    Ketones 15 (A) Negative mg/dL    Protein 30 (A) Negative mg/dL    Bilirubin Negative Negative    Urobilinogen, Urine 0.2 Negative    Nitrite Negative Negative    Leukocyte Esterase Small (A) Negative    Occult Blood Negative Negative     Micro Urine Req Microscopic    CoV-2, FLU A/B, and RSV by PCR (2-4 Hours CEPHEID) : Collect NP swab in VTM    Specimen: Respirate   Result Value Ref Range    Influenza virus A RNA Invalid Negative    Influenza virus B, PCR Invalid Negative    RSV, PCR Invalid Negative    SARS-CoV-2 by PCR Invalid     SARS-CoV-2 Source NP Swab    ESTIMATED GFR   Result Value Ref Range    GFR (CKD-EPI) 86 >60 mL/min/1.73 m 2   URINE MICROSCOPIC (W/UA)   Result Value Ref Range    WBC 10-20 (A) /hpf    RBC 2-5 (A) /hpf    Bacteria Negative None /hpf    Epithelial Cells Negative /hpf    Hyaline Cast 0-2 /lpf   CoV-2, Flu A/B, And RSV by PCR (Cepheid)   Result Value Ref Range    Influenza virus A RNA Negative Negative    Influenza virus B, PCR Negative Negative    RSV, PCR Negative Negative    SARS-CoV-2 by PCR DETECTED (AA)     SARS-CoV-2 Source NP Swab        Radiology:   The attending emergency physician has independently interpreted the diagnostic imaging associated with this visit and am waiting the final reading from the radiologist.   Preliminary interpretation is a follows: ER MD is reviewed the patient's chest x-ray.  No obvious focal infiltrates but she does have increased interstitial markings throughout.    Radiologist interpretation:   DX-CHEST-PORTABLE (1 VIEW)   Final Result      1.  Prominence of blurring of the interstitial pulmonary vasculature consistent with pulmonary vascular congestion.        COURSE & MEDICAL DECISION MAKING   ED Observation Status? Yes; I am placing the patient in to an observation status due to a diagnostic uncertainty as well as therapeutic intensity. Patient placed in observation status at 2:14 PM, 9/26/2023.     Observation plan is as follows: Patient was placed in ED observation status that she will need comprehensive work-up and evaluation for her complaint of cough, fever, generalized weakness and myalgia.    Upon Reevaluation, the patient's condition has: not improved; and will be escalated  "to hospitalization.    Patient discharged from ED Observation status at 9/26/2023 8:00 PM    INITIAL ASSESSMENT, COURSE AND PLAN  Care Narrative: Patient presents to the ER with complaint of generalized weakness and a cough.  Cough started in the middle the night last night.  Generalized weakness began this morning shortly after breakfast.  Patient says her son was ill with an upper respiratory infection 4 days ago and went over to her house to visit her.  Patient has a very frequent harsh sounding cough.  She says she does not have any underlying lung disease but when she does get sick infection \"tends to go to her lungs.  She says she often gets pneumonia and when she gets pneumonia she ends up with A-fib.  She is currently in a normal sinus rhythm.  She denies chest pain.  She does feel little short of breath.  Patient is COVID-positive.  She is hypoxic to 86% on room air with good waveform.  She is currently oxygen requiring at 2 L.  Chest x-ray shows some diffuse interstitial changes.  No focal infiltrate.  Patient is awake and alert.  She is not in any respiratory distress.  At this time I think she will need to be hospitalized for COVID with hypoxia.  I spoke with the hospitalist on-call and he will kindly evaluate the patient hospitalization.    HTN/IDDM FOLLOW UP:  The patient is referred to a primary physician for blood pressure management, diabetic screening, and for all other preventive health concerns    2:14 PM Patient seen and examined at bedside. Ordered for labs and radiology to evaluate.     5:18 PM Called lab to ask where her results were. They said that her swab was invalid.    6:32 PM Patient will be getting another liter of fluids.    7:36 PM Patient reevaluated at bedside. Discussed resulted studies.     7:57 PM Patient reevaluated at bedside. Patient resting. Discussed that she has COVID. Also explained that her blood pressure is a little soft. Explained that she will need to be admitted. She " "is agreeable. She is not on oxygen at home, but was while here.     8:19 PM Patient's oxygen drops to 88% when coughing. Currently in bathroom. Will reevaluate when she gets back.    8:59 PM Consulted with Dr. Walker, hospitalist, about the patient's case.    2105: Patient desaturated to 86% on room air with a good waveform.    ADDITIONAL PROBLEM LIST  Generalized weakness  Cough    DISPOSITION AND DISCUSSIONS  I have discussed management of the patient with the following physicians and TOMAS's:  Dr Walker, hospitalist    Discussion of management with other [facility] or appropriate source(s):  laboratory to ask when patient's COVID test will be back.  I was told the results were \"invalid\" we needed to recollect.      Escalation of care considered, and ultimately not performed: diagnostic imaging.  Patient has fevers of nearly 102, cough, and is positive for COVID.  Symptoms started this morning.  I do not think she has PE.  I do not think she needs a CT scan of the chest at this time.    Barriers to care at this time, including but not limited to:  None known .     Decision tools and prescription drugs considered including, but not limited to: Antibiotics patient is positive for COVID.  No focal infiltrates on x-ray.  I do not think she needs antibiotics. .    CRITICAL CARE  The very real possibilty of a deterioration of this patient's condition required the highest level of my preparedness for sudden, emergent intervention.  I provided critical care services, which included medication orders, frequent reevaluations of the patient's condition and response to treatment, ordering and reviewing test results, and discussing the case with various consultants.  The critical care time associated with the care of the patient was 35 minutes. Review chart for interventions. This time is exclusive of any other billable procedures.      DISPOSITION:  Patient will be hospitalized by Dr. Walker in guarded condition.    FINAL " DIANGOSIS  1. COVID-19 Acute   2. Hypoxia Acute    The critical care time associated with the care of the patient was 35 minutes.    This dictation has been created using voice recognition software. The accuracy of the dictation is limited by the abilities of the software. I expect there may be some errors of grammar and possibly content. I made every attempt to manually correct the errors within my dictation. However, errors related to voice recognition software may still exist and should be interpreted within the appropriate context.     Lorena STONE (Trena), am scribing for, and in the presence of, Rosa Elena Petersen M.D.    Electronically signed by: Lorena Castle (Trena), 9/26/2023    Rosa Elena STONE M.D. personally performed the services described in this documentation, as scribed by Lorena Castle in my presence, and it is both accurate and complete.    The note accurately reflects work and decisions made by me.  Rosa Elena Petersen M.D.  9/26/2023  9:34 PM

## 2023-09-27 PROCEDURE — 97161 PT EVAL LOW COMPLEX 20 MIN: CPT

## 2023-09-27 PROCEDURE — A9270 NON-COVERED ITEM OR SERVICE: HCPCS | Performed by: STUDENT IN AN ORGANIZED HEALTH CARE EDUCATION/TRAINING PROGRAM

## 2023-09-27 PROCEDURE — 97535 SELF CARE MNGMENT TRAINING: CPT

## 2023-09-27 PROCEDURE — 99233 SBSQ HOSP IP/OBS HIGH 50: CPT | Performed by: STUDENT IN AN ORGANIZED HEALTH CARE EDUCATION/TRAINING PROGRAM

## 2023-09-27 PROCEDURE — 770001 HCHG ROOM/CARE - MED/SURG/GYN PRIV*

## 2023-09-27 PROCEDURE — 700102 HCHG RX REV CODE 250 W/ 637 OVERRIDE(OP): Performed by: STUDENT IN AN ORGANIZED HEALTH CARE EDUCATION/TRAINING PROGRAM

## 2023-09-27 RX ADMIN — METOPROLOL TARTRATE 50 MG: 50 TABLET, FILM COATED ORAL at 00:41

## 2023-09-27 RX ADMIN — RIVAROXABAN 20 MG: 20 TABLET, FILM COATED ORAL at 17:07

## 2023-09-27 RX ADMIN — METOPROLOL TARTRATE 50 MG: 50 TABLET, FILM COATED ORAL at 17:07

## 2023-09-27 RX ADMIN — SIMVASTATIN 40 MG: 40 TABLET, FILM COATED ORAL at 00:41

## 2023-09-27 RX ADMIN — RIVAROXABAN 20 MG: 20 TABLET, FILM COATED ORAL at 00:41

## 2023-09-27 RX ADMIN — SIMVASTATIN 40 MG: 40 TABLET, FILM COATED ORAL at 17:07

## 2023-09-27 ASSESSMENT — LIFESTYLE VARIABLES
AVERAGE NUMBER OF DAYS PER WEEK YOU HAVE A DRINK CONTAINING ALCOHOL: 7
HOW MANY TIMES IN THE PAST YEAR HAVE YOU HAD 5 OR MORE DRINKS IN A DAY: 0
ALCOHOL_USE: YES
HAVE YOU EVER FELT YOU SHOULD CUT DOWN ON YOUR DRINKING: NO
EVER HAD A DRINK FIRST THING IN THE MORNING TO STEADY YOUR NERVES TO GET RID OF A HANGOVER: NO
TOTAL SCORE: 0
CONSUMPTION TOTAL: NEGATIVE
TOTAL SCORE: 0
SUBSTANCE_ABUSE: 0
TOTAL SCORE: 0
ON A TYPICAL DAY WHEN YOU DRINK ALCOHOL HOW MANY DRINKS DO YOU HAVE: 1
DOES PATIENT WANT TO STOP DRINKING: NO
HAVE PEOPLE ANNOYED YOU BY CRITICIZING YOUR DRINKING: NO
EVER FELT BAD OR GUILTY ABOUT YOUR DRINKING: NO

## 2023-09-27 ASSESSMENT — COGNITIVE AND FUNCTIONAL STATUS - GENERAL
WALKING IN HOSPITAL ROOM: A LITTLE
WALKING IN HOSPITAL ROOM: A LITTLE
CLIMB 3 TO 5 STEPS WITH RAILING: A LITTLE
MOVING TO AND FROM BED TO CHAIR: A LITTLE
MOBILITY SCORE: 19
SUGGESTED CMS G CODE MODIFIER MOBILITY: CJ
MOVING FROM LYING ON BACK TO SITTING ON SIDE OF FLAT BED: A LITTLE
DRESSING REGULAR LOWER BODY CLOTHING: A LITTLE
CLIMB 3 TO 5 STEPS WITH RAILING: A LITTLE
TURNING FROM BACK TO SIDE WHILE IN FLAT BAD: A LITTLE
SUGGESTED CMS G CODE MODIFIER DAILY ACTIVITY: CI
SUGGESTED CMS G CODE MODIFIER MOBILITY: CK
MOBILITY SCORE: 22
DAILY ACTIVITIY SCORE: 23

## 2023-09-27 ASSESSMENT — PATIENT HEALTH QUESTIONNAIRE - PHQ9
SUM OF ALL RESPONSES TO PHQ9 QUESTIONS 1 AND 2: 0
SUM OF ALL RESPONSES TO PHQ9 QUESTIONS 1 AND 2: 0
1. LITTLE INTEREST OR PLEASURE IN DOING THINGS: NOT AT ALL
2. FEELING DOWN, DEPRESSED, IRRITABLE, OR HOPELESS: NOT AT ALL
1. LITTLE INTEREST OR PLEASURE IN DOING THINGS: NOT AT ALL
2. FEELING DOWN, DEPRESSED, IRRITABLE, OR HOPELESS: NOT AT ALL

## 2023-09-27 ASSESSMENT — ENCOUNTER SYMPTOMS
SORE THROAT: 0
NAUSEA: 0
CHILLS: 1
ABDOMINAL PAIN: 0
VOMITING: 0
BLURRED VISION: 0
FEVER: 1
SHORTNESS OF BREATH: 1
COUGH: 1
MYALGIAS: 1

## 2023-09-27 ASSESSMENT — GAIT ASSESSMENTS
GAIT LEVEL OF ASSIST: STANDBY ASSIST
DEVIATION: OTHER (COMMENT)
DISTANCE (FEET): 20

## 2023-09-27 ASSESSMENT — PAIN DESCRIPTION - PAIN TYPE
TYPE: ACUTE PAIN
TYPE: ACUTE PAIN

## 2023-09-27 ASSESSMENT — FIBROSIS 4 INDEX: FIB4 SCORE: 3.58

## 2023-09-27 NOTE — DOCUMENTATION QUERY
"                                                                         [facility]                                                                       Query Response Note      PATIENT:               ADALID REDDY  ACCT #:                  [id number]  MRN:                     [id number]  :                      1935  ADMIT DATE:       2023 1:27 PM  DISCH DATE:          RESPONDING  PROVIDER #:        [id number]           QUERY TEXT:    Atrial fibrillation is documented in the Medical Record.  Please specify the type.    NOTE:  If an appropriate response is not listed below, please respond with a new note.    Note: If you agree with a diagnosis listed above, please remember to include it in your concurrent daily documentation and onto the Discharge Summary.      The patient's Clinical Indicators include:  87 year old female admitted for COVID.     Walker \"Known hx of A fib. Continue Xarelto\"     ED note Dapra \"She is currently not in A-fib.\"    Risk factors: Afib  Treatment: labs, Xarelto, CXR, O2  Options provided:   -- Permanent atrial fibrillation (persistent or longstanding persistent AF where cardioversion cannot or will not be performed   -- Other persistent atrial fibrillation (AF that does not terminate within 7 days or that requires repeat pharmacological or electrical cardioversion.   -- Longstanding persistent atrial fibrillation (AF that is persistent and continuous, lasting longer than 1 year)   -- Chronic atrial fibrillation, unspecified (may refer to persistent, longstanding persistent or permanent AF.  A more specific descriptive term is preferred over the nonspecific AF   -- Atrial fibrillation ruled out   -- Other explanation, Please specify   -- Unable to determine      Query created by: Gianna Pack on 2023 8:37 AM    RESPONSE TEXT:    Chronic atrial fibrillation, unspecified (may refer to persistent, longstanding persistent or permanent AF. A more specific descriptive " term is preferred over the nonspecific AF          Electronically signed by:  KARAN DESOUZA MD 9/27/2023 4:36 PM

## 2023-09-27 NOTE — CARE PLAN
The patient is Stable - Low risk of patient condition declining or worsening    Shift Goals  Clinical Goals: O2 sat >90%, wean O2, reduce pulm congestion  Patient Goals: wean O2, rest    Progress made toward(s) clinical / shift goals:  Pt is A&Ox4, denies any pain. VSS on O2 @ 1 L via NC. No SOB noted or reported. Reinforced the use of call light when assistance needed. Wean O2 off at discharge. Pt will maintain O2 sat above 90% upont discharge.    Patient is not progressing towards the following goals:

## 2023-09-27 NOTE — PROGRESS NOTES
4 Eyes Skin Assessment Completed by Riaz RN and SANDIP Rivers.    Head WDL  Ears WDL  Nose WDL  Mouth WDL  Neck WDL  Breast/Chest WDL  Shoulder Blades WDL  Spine WDL  (R) Arm/Elbow/Hand WDL  (L) Arm/Elbow/Hand WDL  Abdomen WDL  Groin WDL  Scrotum/Coccyx/Buttocks WDL  (R) Leg WDL  (L) Leg WDL  (R) Heel/Foot/Toe WDL  (L) Heel/Foot/Toe WDL          Devices In Places Pulse Ox and Nasal Cannula      Interventions In Place NC W/Ear Foams and Low Air Loss Mattress    Possible Skin Injury No    Pictures Uploaded Into Epic N/A  Wound Consult Placed N/A  RN Wound Prevention Protocol Ordered No

## 2023-09-27 NOTE — DISCHARGE PLANNING
[facility] TCN chart review completed. Collaborated with LA NENA Olmedo prior to meeting with the pt. The most current review of medical record, knowledge of pt's PLOF and social support, LACE+ score of 64 and 6 clicks scores of 23 for ADLs and 22 for mobility were considered. Per chart review, patient tested positive for COVID and is currently on 1L O2.    PT/OT evals pending.    Pt seen at bedside. Introduced TCN program. Provided education regarding post acute levels of care. Discussed [facility] plan benefits (Meds to Beds, medical uber and [facility] transitional care). Pt verbalizes understanding. Patient states she lives alone in [facility] in [place], [place]. She owns a FWW but doesn't use it. She is independent with mobility, ADLs, IADLs, and driving and feels she is at her baseline. She is hoping to go home with outpatient f/u and no home O2, but is amenable to home health and home O2 if recommended.    Choice proactively obtained for DME(O2) and HH and faxed to [facility]. TCN will continue to follow and collaborate with discharge planning team as additional post acute needs arise. Thank you.     Completed today:  PT/OT evals pending  Choice obtained: DME(O2; [employer]); HH ([facility])  [facility] with [facility] 10/17/23    *Addendum 1430 - PT eval completed with recommendation for home health. OT eval pending.

## 2023-09-27 NOTE — THERAPY
Physical Therapy   Initial Evaluation     Patient Name: Lisa Garza  Age:  87 y.o., Sex:  female  Medical Record #: [id number]  Today's Date: 9/27/2023     Precautions  Precautions: Fall Risk;Other (See Comments)  Comments: Enhanced droplet & contact precautions for COVID-19    Assessment  Patient is 87 y.o. female who presented 9/26/2023 with generalized weakness.   Patient was found to have acute hypoxemic respiratory failure due to COVID-19, A fib.   Past medical history of Arthritis, Cancer, Heart murmur, High cholesterol, Prediabetes, Unspecified cataract, Unspecified urinary incontinence, Upper back pain on left side and Vitamin D deficiency.     Patient seen for PT evaluation. Was in bed, agreeable to participate. Patient able to demonstrate functional mobility tasks as mentioned below. She was able to ambulate in the room & to the bathroom with SBA. Will benefit from continued PT services to address and encourage ambulation and improve activity tolerance. Recommend HH PT upon returning home for home safety evaluation and facilitate return back to community.     Plan    Physical Therapy Initial Treatment Plan   Treatment Plan : Gait Training, Therapeutic Activities  Treatment Frequency: 2 Times per Week  Duration: Until Therapy Goals Met    DC Equipment Recommendations: None  Discharge Recommendations: Recommend home health for continued physical therapy services      Objective       09/27/23 1112   Charge Group   PT Evaluation PT Evaluation Low   PT Self Care / Home Evaluation (Units) 1   Total Time Spent   PT Evaluation Time Spent (Mins) 15   PT Self Care/Home Evaluation Time Spent (Mins) 12   PT Total Time Spent (Calculated) 27   Initial Contact Note    Initial Contact Note Order Received and Verified, Physical Therapy Evaluation in Progress with Full Report to Follow.   Precautions   Precautions Fall Risk;Other (See Comments)   Comments Enhanced droplet & contact precautions for COVID-19   Vitals    Pulse 69   Pulse Oximetry 95 %   O2 (LPM) 1   O2 Delivery Device Nasal Cannula   Vitals Comments Seated EOB prior to ambulation; Post activity, seated in the chair: HR 70, SpO2 96   Pain   Pain Scales 0 to 10 Scale    Intervention Declines   Pain 0 - 10 Group   Therapist Pain Assessment Prior to Activity;During Activity;Post Activity;0   Prior Living Situation   Prior Services Home-Independent   Housing / Facility 1 Story House   Steps Into Home 3   Steps In Home 3   Rail Left Rail (Steps in Home);Left Rail  (Steps into Home)   Equipment Owned Front-Wheel Walker   Lives with - Patient's Self Care Capacity Alone and Able to Care For Self   Comments Patient's son would be able to provide support if needed; have other friends/family member that could provide support as well   Prior Level of Functional Mobility   Bed Mobility Independent   Transfer Status Independent   Ambulation Independent   Ambulation Distance Community   Stairs Independent   Comments Patient works out 5 days/week   History of Falls   History of Falls Yes   Date of Last Fall   (Slid off the bed prior to arrival)   Cognition    Cognition / Consciousness WDL   Passive ROM Upper Body   Comments Please refer to OT notes for upper body assessment   Passive ROM Lower Body   Passive ROM Lower Body WDL   Active ROM Lower Body    Active ROM Lower Body  WDL   Strength Lower Body   Lower Body Strength  WDL   Sensation Lower Body   Lower Extremity Sensation   WDL   Other Treatments   Other Treatments Provided Patient educated on the following: pursed lip breathing, importance of OOB to chair for meals, upright sitting for meals, ambulating several times during the day, checking HR & SpO2 at home especially when feeling SOB, importance of oral care. Above was written down for the patient. Patient mentioned that she could use her FWW at home if she felt slightly off with her balance.   Balance Assessment   Sitting Balance (Static) Good   Sitting Balance  (Dynamic) Good   Standing Balance (Static) Fair +   Standing Balance (Dynamic) Fair   Comments Without AD in standing   Bed Mobility    Supine to Sit Supervised   Scooting Supervised   Comments HOB raised   Gait Analysis   Gait Level Of Assist Standby Assist   Assistive Device None   Distance (Feet) 20   # of Times Distance was Traveled 3   Deviation Other (Comment)  (Reduced step/stride length, slight forward trunk lean)   # of Stairs Climbed   (Patient was able to perform high stepping in place with use of bed rail on the R, x 5 times, with SBA)   Comments Patient ambulated in the room without loss of balance, assistance mainly for managing of O2 cord   Functional Mobility   Sit to Stand Supervised   Bed, Chair, Wheelchair Transfer Supervised   Toilet Transfers Supervised   Transfer Method Stand Step   Mobility Bed-chair, chair-toilet, toilet-chair   Comments Cues for hand placement, LE placement; Patient ambulated into the bathroom, was able to perform hand hygiene by the sink without loss of balance   How much difficulty does the patient currently have...   Turning over in bed (including adjusting bedclothes, sheets and blankets)? 4   Sitting down on and standing up from a chair with arms (e.g., wheelchair, bedside commode, etc.) 4   Moving from lying on back to sitting on the side of the bed? 4   How much help from another person does the patient currently need...   Moving to and from a bed to a chair (including a wheelchair)? 4   Need to walk in a hospital room? 3   Climbing 3-5 steps with a railing? 3   6 clicks Mobility Score 22   Activity Tolerance   Sitting in Chair Post session   Patient / Family Goals    Patient / Family Goal #1 To return home   Short Term Goals    Short Term Goal # 1 Patient will ambulate in the room >100 feet with supervision in 6 visits   Short Term Goal # 2 Patient will ascend/descend 3 steps with 1 side rail with supervision in 6 visits   Education Group   Education Provided Role of  Physical Therapist   Role of Physical Therapist Patient Response Patient;Acceptance;Explanation;Demonstration;Verbal Demonstration;Action Demonstration   Physical Therapy Initial Treatment Plan    Treatment Plan  Gait Training;Therapeutic Activities   Treatment Frequency 2 Times per Week   Duration Until Therapy Goals Met   Problem List    Problems Impaired Ambulation;Decreased Activity Tolerance   Anticipated Discharge Equipment and Recommendations   DC Equipment Recommendations None   Discharge Recommendations Recommend home health for continued physical therapy services   Interdisciplinary Plan of Care Collaboration   IDT Collaboration with  Nursing;Occupational Therapist   Patient Position at End of Therapy Seated;Call Light within Reach;Tray Table within Reach;Phone within Reach   Session Information   Date / Session Number  9/27-1(1/2, 10/3)

## 2023-09-27 NOTE — HOSPITAL COURSE
Lisa Garza is a 87 y.o. female who presented 9/26/2023 with generalized weakness.  Patient went to a theater 2 days ago and was at her normal baseline state of health.  on day of admit, at the breakfast, she reports a progressively worsening generalized weakness, cough, fatigue.  She states that she was sitting on the ground and it was difficult for her to get up.  She then decided to come to ER for evaluation.  She was found to be hypoxic with O2 sats 85%. Her covid swab was positive and she was started on decadron and admitted for ARF due to covid pneumonia.

## 2023-09-27 NOTE — ASSESSMENT & PLAN NOTE
16 minutes spent discussing goals of care with patient.  She states that she would like to be full code and to have everything done.

## 2023-09-27 NOTE — CARE PLAN
Problem: Knowledge Deficit - Standard  Goal: Patient and family/care givers will demonstrate understanding of plan of care, disease process/condition, diagnostic tests and medications  Outcome: Progressing   The patient is Stable - Low risk of patient condition declining or worsening    Shift Goals  Clinical Goals: 02 sat > 90%, wean 02, reduce pulm congestion    Progress made toward(s) clinical / shift goals:      Patient is not progressing towards the following goals:

## 2023-09-27 NOTE — H&P
Hospital Medicine History & Physical Note    Date of Service  9/26/2023    Primary Care Physician  Kathy Garcia M.D.    Consultants  None    Code Status  Full Code    Chief Complaint  Chief Complaint   Patient presents with    Weakness     Pt getting out of bed and was unable to get up.  Pt slid down side of bed to the floor.  Pt did not get injured per EMS    Cough     Strong productive cough with clear phlegm, onset last night    Fever    Chills       History of Presenting Illness  Lisa Garza is a 87 y.o. female who presented 9/26/2023 with generalized weakness.  Patient went to a theater 2 days ago and was at her normal baseline state of health.  Today at the breakfast, she reports a progressively worsening generalized weakness, cough, fatigue.  She states that she was sitting on the ground and it was difficult for her to get up.  She then decided to come to ER for evaluation.    In the ED, patient febrile and hypoxic. Patient positive for COVID. CXR showing pulmonary vascular congestion.     I discussed the plan of care with patient.    Review of Systems  Review of Systems   Constitutional:  Positive for chills, fever and malaise/fatigue.   HENT: Negative.     Eyes: Negative.    Respiratory:  Positive for cough.    Cardiovascular: Negative.    Gastrointestinal: Negative.    Genitourinary: Negative.    Musculoskeletal: Negative.    Skin: Negative.    Neurological:  Positive for weakness.   Endo/Heme/Allergies: Negative.    Psychiatric/Behavioral: Negative.         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    Surgical History   has a past surgical history that includes eye surgery; other (Right, 2006); hip arthroplasty total (Left, 7/6/2015); and  appendectomy laparoscopic (10/10/2018).     Family History  family history includes Cancer in her father; Heart Disease in her father; Hyperlipidemia in her sister; Hypertension in her sister; Lung Disease in her father; No Known Problems in her maternal grandfather, maternal grandmother, paternal grandfather, paternal grandmother, son, and son; Stroke in her sister.   Family history reviewed with patient. There is no family history that is pertinent to the chief complaint.     Social History   reports that she quit smoking about 32 years ago. Her smoking use included cigarettes. She started smoking about 65 years ago. She has a 33.0 pack-year smoking history. She has never used smokeless tobacco. She reports that she does not currently use alcohol after a past usage of about 8.4 oz of alcohol per week. She reports that she does not use drugs.    Allergies  Allergies   Allergen Reactions    [place] Unspecified     Itchy welts      Clindamycin Unspecified     Unknown reaction      Oatmeal Diarrhea     Gas      Scallops Vomiting       Medications  Prior to Admission Medications   Prescriptions Last Dose Informant Patient Reported? Taking?   CALCIUM PO  Patient Yes No   Sig: Take 1 Tablet by mouth every day.   Cholecalciferol (VITAMIN D-3) 1000 UNITS CAPS  Patient Yes No   Sig: Take 1,000 Units by mouth every morning.   Multiple Vitamins-Minerals (VITRUM 50+ SENIOR MULTI PO)  Patient Yes No   Sig: Take 1 Tablet by mouth every morning. Indications: Vitamin Deficiency   Omega-3 Fatty Acids (FISH OIL) 1000 MG Cap capsule  Patient Yes No   Sig: Take 1,000 mg by mouth every day.   XARELTO 20 MG Tab tablet  Patient No No   Sig: TAKE 1 TABLET BY MOUTH ONCE DAILY WITH DINNER   Patient taking differently: Take 20 mg by mouth with dinner.   metoprolol tartrate (LOPRESSOR) 25 MG Tab  Patient No No   Sig: TAKE TWO TABLETS BY MOUTH TWICE DAILY   Patient taking differently: Take 50 mg by mouth 2 times a day. TAKE TWO TABLETS BY MOUTH  TWICE DAILY   simvastatin (ZOCOR) 40 MG Tab  Patient No No   Sig: TAKE ONE TABLET BY MOUTH IN THE EVENING   Patient taking differently: Take 40 mg by mouth every evening.      Facility-Administered Medications: None       Physical Exam  Temp:  [36.7 °C (98.1 °F)-38.7 °C (101.7 °F)] 36.7 °C (98.1 °F)  Pulse:  [] 69  Resp:  [13-20] 19  BP: ()/(46-72) 133/64  SpO2:  [87 %-97 %] 94 %  Blood Pressure : 133/64   Temperature: 36.7 °C (98.1 °F)   Pulse: 69   Respiration: 19   Pulse Oximetry: 94 %       Physical Exam  Constitutional:       Appearance: Normal appearance. She is normal weight.      Comments: [place]   HENT:      Head: Normocephalic.      Nose: Nose normal.      Mouth/Throat:      Mouth: Mucous membranes are moist.   Eyes:      Pupils: Pupils are equal, round, and reactive to light.   Cardiovascular:      Rate and Rhythm: Normal rate and regular rhythm.      Comments: Systolic murmur heard  Pulmonary:      Effort: Pulmonary effort is normal.      Comments: Mild Rhonchi heard diffusely   Abdominal:      General: Abdomen is flat. Bowel sounds are normal.      Palpations: Abdomen is soft.   Musculoskeletal:         General: Normal range of motion.      Cervical back: Neck supple.   Skin:     General: Skin is warm.   Neurological:      General: No focal deficit present.      Mental Status: She is alert and oriented to person, place, and time. Mental status is at baseline.   Psychiatric:         Mood and Affect: Mood normal.         Behavior: Behavior normal.         Thought Content: Thought content normal.         Judgment: Judgment normal.         Laboratory:  Recent Labs     09/26/23  1356   WBC 6.4   RBC 3.74*   HEMOGLOBIN 12.2   HEMATOCRIT 37.3   MCV 99.7*   MCH 32.6   MCHC 32.7   RDW 47.0   PLATELETCT 183   MPV 11.1     Recent Labs     09/26/23  1356   SODIUM 135   POTASSIUM 4.3   CHLORIDE 101   CO2 23   GLUCOSE 103*   BUN 12   CREATININE 0.60   CALCIUM 8.5     Recent Labs     09/26/23  1356   ALTSGPT  "11   ASTSGOT 25   ALKPHOSPHAT 58   TBILIRUBIN 0.5   GLUCOSE 103*         No results for input(s): \"NTPROBNP\" in the last 72 hours.      No results for input(s): \"TROPONINT\" in the last 72 hours.    Imaging:  DX-CHEST-PORTABLE (1 VIEW)   Final Result      1.  Prominence of blurring of the interstitial pulmonary vasculature consistent with pulmonary vascular congestion.          X-Ray:  I have personally reviewed the images and compared with prior images.    Assessment/Plan:  Justification for Admission Status  I anticipate this patient will require at least two midnights for appropriate medical management, necessitating inpatient admission because patient has acute hypoxemic respiratory failure secdoanry to covid    Patient will need a Med/Surg bed on EMERGENCY service .  The need is secondary to covid .    * Acute hypoxemic respiratory failure due to COVID-19 ([facility])  Assessment & Plan  Spoke with ERP.  Patient found to have shortness of breath and is requiring oxygen.  Normally does not use oxygen.  Found to be positive for COVID.  Patient will be started on Decadron, and will be monitored overnight for worsening respiratory failure.  Monitor for hyperglycemia from Decadron administration.    ACP (advance care planning)  Assessment & Plan  16 minutes spent discussing goals of care with patient.  She states that she would like to be full code and to have everything done.     AF (atrial fibrillation) ([facility])  Assessment & Plan  Known hx of A fib  Continue xarelto     Hyperlipidemia  Assessment & Plan  Continue Zocor        VTE prophylaxis: xarelto  "

## 2023-09-27 NOTE — DISCHARGE PLANNING
SAMANTHA scanned the home health choice into the pt's media manger.  Currently no home health order.

## 2023-09-27 NOTE — ASSESSMENT & PLAN NOTE
presents with respiratory symptoms, SOB and fever.   New oxygen needs, RA saturation of 85% requiring 1-2 L.   Found to be positive for COVID.  Patient  started on Decadron, and will be monitored overnight for worsening respiratory failure.  Monitor for hyperglycemia from Decadron administration.  Wean O2   Supportive care   Isolation precautions

## 2023-09-27 NOTE — PROGRESS NOTES
Hospital Medicine Daily Progress Note    Date of Service  9/27/2023    Chief Complaint  Lisa Garza is a 87 y.o. female admitted 9/26/2023 with weakness and cough    Hospital Course  Lisa Garza is a 87 y.o. female who presented 9/26/2023 with generalized weakness.  Patient went to a theater 2 days ago and was at her normal baseline state of health.  on day of admit, at the breakfast, she reports a progressively worsening generalized weakness, cough, fatigue.  She states that she was sitting on the ground and it was difficult for her to get up.  She then decided to come to ER for evaluation.  She was found to be hypoxic with O2 sats 85%. Her covid swab was positive and she was started on decadron and admitted for ARF due to covid pneumonia.     Interval Problem Update  Pt seen and examined, feeling better but still a bit weak. Cough improving   - continue decadron, wean O2   - home oxygen study done, plan for dc tomorrow if she continues to improve   - HH ordered     I have discussed this patient's plan of care and discharge plan at IDT rounds today with Case Management, Nursing, Nursing leadership, and other members of the IDT team.    Consultants/Specialty  NA    Code Status  Full Code    Disposition  The patient is not medically cleared for discharge to home or a post-acute facility.  Anticipate discharge to: home with organized home healthcare and close outpatient follow-up    I have placed the appropriate orders for post-discharge needs.    Review of Systems  Review of Systems   Constitutional:  Positive for chills, fever and malaise/fatigue.   HENT:  Positive for congestion. Negative for sore throat.    Eyes:  Negative for blurred vision.   Respiratory:  Positive for cough and shortness of breath.    Cardiovascular:  Negative for chest pain.   Gastrointestinal:  Negative for abdominal pain, nausea and vomiting.   Genitourinary:  Negative for dysuria.   Musculoskeletal:  Positive for myalgias.    Psychiatric/Behavioral:  Negative for substance abuse.         Physical Exam  Temp:  [36.4 °C (97.5 °F)-36.7 °C (98.1 °F)] 36.5 °C (97.7 °F)  Pulse:  [60-82] 82  Resp:  [13-25] 18  BP: ()/(46-72) 131/52  SpO2:  [86 %-97 %] 95 %    Physical Exam  Vitals and nursing note reviewed.   Constitutional:       Appearance: Normal appearance. She is normal weight.   HENT:      Head: Normocephalic and atraumatic.      Mouth/Throat:      Mouth: Mucous membranes are moist.   Eyes:      Conjunctiva/sclera: Conjunctivae normal.   Cardiovascular:      Rate and Rhythm: Normal rate and regular rhythm.      Heart sounds: Normal heart sounds.   Pulmonary:      Breath sounds: Rales present.   Abdominal:      Palpations: Abdomen is soft.   Musculoskeletal:         General: Normal range of motion.      Cervical back: Neck supple.   Skin:     General: Skin is warm.   Neurological:      General: No focal deficit present.      Mental Status: She is alert and oriented to person, place, and time. Mental status is at baseline.   Psychiatric:         Mood and Affect: Mood normal.         Behavior: Behavior normal.         Thought Content: Thought content normal.         Judgment: Judgment normal.         Fluids    Intake/Output Summary (Last 24 hours) at 9/27/2023 1701  Last data filed at 9/27/2023 1100  Gross per 24 hour   Intake 1120 ml   Output 250 ml   Net 870 ml       Laboratory  Recent Labs     09/26/23  1356   WBC 6.4   RBC 3.74*   HEMOGLOBIN 12.2   HEMATOCRIT 37.3   MCV 99.7*   MCH 32.6   MCHC 32.7   RDW 47.0   PLATELETCT 183   MPV 11.1     Recent Labs     09/26/23  1356   SODIUM 135   POTASSIUM 4.3   CHLORIDE 101   CO2 23   GLUCOSE 103*   BUN 12   CREATININE 0.60   CALCIUM 8.5                   Imaging  DX-CHEST-PORTABLE (1 VIEW)   Final Result      1.  Prominence of blurring of the interstitial pulmonary vasculature consistent with pulmonary vascular congestion.           Assessment/Plan  * Acute hypoxemic respiratory failure  due to COVID-19 (HCC)  Assessment & Plan  presents with respiratory symptoms, SOB and fever.   New oxygen needs, RA saturation of 85% requiring 1-2 L.   Found to be positive for COVID.  Patient  started on Decadron, and will be monitored overnight for worsening respiratory failure.  Monitor for hyperglycemia from Decadron administration.  Wean O2   Supportive care   Isolation precautions     Acute hypoxemic respiratory failure (HCC)- (present on admission)  Assessment & Plan  New o2 needs due to COVID PNA  Wean as able   Decadron   Supportive care with antitussives   IS and mobilize     ACP (advance care planning)  Assessment & Plan  16 minutes spent discussing goals of care with patient.  She states that she would like to be full code and to have everything done.     AF (atrial fibrillation) (HCC)  Assessment & Plan  Known hx of A fib  Continue xarelto     Hyperlipidemia  Assessment & Plan  Continue Zocor         VTE prophylaxis:    enoxaparin ppx      I have performed a physical exam and reviewed and updated ROS and Plan today (9/27/2023). In review of yesterday's note (9/26/2023), there are no changes except as documented above.      Greater than 51 minutes spent prepping to see patient (e.g. review of tests) obtaining and/or reviewing separately obtained history. Performing a medically appropriate examination and/ evaluation.  Counseling and educating the patient/family/caregiver.  Ordering medications, tests, or procedures.  Referring and communicating with other health care professionals.  Documenting clinical information in EPIC.  Independently interpreting results and communicating results to patient/family/caregiver.  Care coordination.

## 2023-09-28 ENCOUNTER — HOME HEALTH ADMISSION (OUTPATIENT)
Dept: HOME HEALTH SERVICES | Facility: HOME HEALTHCARE | Age: 88
End: 2023-09-28
Payer: MEDICARE

## 2023-09-28 VITALS
OXYGEN SATURATION: 98 % | HEART RATE: 52 BPM | BODY MASS INDEX: 25.31 KG/M2 | WEIGHT: 142.86 LBS | TEMPERATURE: 97.2 F | RESPIRATION RATE: 19 BRPM | HEIGHT: 63 IN | DIASTOLIC BLOOD PRESSURE: 75 MMHG | SYSTOLIC BLOOD PRESSURE: 126 MMHG

## 2023-09-28 LAB
BACTERIA UR CULT: NORMAL
SIGNIFICANT IND 70042: NORMAL
SITE SITE: NORMAL
SOURCE SOURCE: NORMAL

## 2023-09-28 PROCEDURE — A9270 NON-COVERED ITEM OR SERVICE: HCPCS | Performed by: STUDENT IN AN ORGANIZED HEALTH CARE EDUCATION/TRAINING PROGRAM

## 2023-09-28 PROCEDURE — 700102 HCHG RX REV CODE 250 W/ 637 OVERRIDE(OP): Performed by: STUDENT IN AN ORGANIZED HEALTH CARE EDUCATION/TRAINING PROGRAM

## 2023-09-28 PROCEDURE — 99239 HOSP IP/OBS DSCHRG MGMT >30: CPT | Performed by: STUDENT IN AN ORGANIZED HEALTH CARE EDUCATION/TRAINING PROGRAM

## 2023-09-28 PROCEDURE — 700111 HCHG RX REV CODE 636 W/ 250 OVERRIDE (IP): Performed by: STUDENT IN AN ORGANIZED HEALTH CARE EDUCATION/TRAINING PROGRAM

## 2023-09-28 RX ADMIN — METOPROLOL TARTRATE 50 MG: 50 TABLET, FILM COATED ORAL at 04:32

## 2023-09-28 RX ADMIN — DEXAMETHASONE SODIUM PHOSPHATE 6 MG: 4 INJECTION INTRA-ARTICULAR; INTRALESIONAL; INTRAMUSCULAR; INTRAVENOUS; SOFT TISSUE at 04:32

## 2023-09-28 ASSESSMENT — PAIN DESCRIPTION - PAIN TYPE: TYPE: ACUTE PAIN

## 2023-09-28 ASSESSMENT — PATIENT HEALTH QUESTIONNAIRE - PHQ9
SUM OF ALL RESPONSES TO PHQ9 QUESTIONS 1 AND 2: 0
1. LITTLE INTEREST OR PLEASURE IN DOING THINGS: NOT AT ALL
2. FEELING DOWN, DEPRESSED, IRRITABLE, OR HOPELESS: NOT AT ALL

## 2023-09-28 NOTE — ASSESSMENT & PLAN NOTE
New o2 needs due to COVID PNA  Wean as able   Decadron   Supportive care with antitussives   IS and mobilize

## 2023-09-28 NOTE — CARE PLAN
The patient is Stable - Low risk of patient condition declining or worsening    Shift Goals  Clinical Goals: Wean 02 to RA  Patient Goals: wean O2, rest    Progress made toward(s) clinical / shift goals:      Patient is not progressing towards the following goals:    Pt was unable to wean off of 1 L NC. Pt's 02 dropped to between 86-89% while supine.

## 2023-09-28 NOTE — DISCHARGE INSTRUCTIONS
Commend you quarantine for at least 5 days or until your symptoms are resolved   Okay to use over the counter tylenol and cough syrup if needed   If you develop any worsening symptoms, difficulty breathing or other concerning symptoms, seek medical attention  Recommend follow up with your PCP in 1-2 weeks for post hospitalization follow up care

## 2023-09-28 NOTE — DISCHARGE PLANNING
SCP TCN chart review completed. Collaborated with [facility] NENA Olmedo. Current discharge considerations are home with HH. MD placed HH order. DPA to send referral to [facility]. Per chart review, patient currently on room air. Per MD, patient medically cleared for discharge.    PT eval completed 9/27 with recs for HH.    OT eval pending.    TCN will continue to follow and collaborate with discharge planning team as additional post acute needs arise. Thank you.    Completed today:  PT eval completed 9/27 with recs for HH  OT eval pending  Choice obtained: DME([facility]; [facility]); HH ([facility])  SCP with [facility] PCP 10/17/23

## 2023-09-28 NOTE — DISCHARGE PLANNING
ATTN: Case Management  RE: Referral for Home Health    As of 09/28/2023, we have accepted the Home Health referral for the patient listed above.    A [facility] clinician will be out to see the patient within 48 hours. If you have any questions or concerns regarding the patient's transition to Home Health, please do not hesitate to contact us at [phone].      We look forward to collaborating with you,  [facility] Health Team

## 2023-09-28 NOTE — DISCHARGE SUMMARY
Discharge Summary    CHIEF COMPLAINT ON ADMISSION  Chief Complaint   Patient presents with    Weakness     Pt getting out of bed and was unable to get up.  Pt slid down side of bed to the floor.  Pt did not get injured per EMS    Cough     Strong productive cough with clear phlegm, onset last night    Fever    Chills       Reason for Admission  EMS     Admission Date  9/26/2023    CODE STATUS  Full Code    HPI & HOSPITAL COURSE  Lisa Garza is a 87 y.o. female who presented 9/26/2023 with generalized weakness.  Patient went to a theater 2 days ago and was at her normal baseline state of health.  on day of admit, at the breakfast, she reports a progressively worsening generalized weakness, cough, fatigue.  She states that she was sitting on the ground and it was difficult for her to get up.  She then decided to come to ER for evaluation.  She was found to be hypoxic with O2 saturation of 85%. Her covid swab was positive and she was started on decadron and admitted for Acute respiratory failure due to covid pneumonia. Patient quickly improved with decadron and her oxygen was weaned off. She was saturating >90% with ambulation on room air. Thus she was discharged home with close outpatient follow up    Therefore, she is discharged in good and stable condition to home with organized home healthcare and close outpatient follow-up.    The patient met 2-midnight criteria for an inpatient stay at the time of discharge.    Discharge Date  9/28/2023    FOLLOW UP ITEMS POST DISCHARGE  COVID resolution   Chronic medical conditions     DISCHARGE DIAGNOSES  Principal Problem:    Acute hypoxemic respiratory failure due to COVID-19 (HCC) (POA: Unknown)  Active Problems:    Hyperlipidemia (POA: Unknown)      Overview: LDL < 100 on zoco 50mg/d    AF (atrial fibrillation) (HCC) (POA: Unknown)      Overview: Saw her cardiologist 12/21, continues on xarelto 20mg/d and lopressor 50mg       bid. Has yearly f/u. Stable, continue  current plan of care          ACP (advance care planning) (POA: Unknown)    Acute hypoxemic respiratory failure (HCC) (POA: Yes)  Resolved Problems:    * No resolved hospital problems. *      FOLLOW UP  Future Appointments   Date Time Provider Department Center   10/17/2023 10:50 AM Kathy Garcia M.D. [facility] [place]   11/16/2023 10:15 AM V EXAM 9 ECHO [facility]     Kathy Garcia M.D.  [address]  [address]  [place] [id number] [phone]    Schedule an appointment as soon as possible for a visit in 1 week(s)  hospital follow up    Brandon Pearl M.D.  [address]  [address]  [place] [id number] [phone]            MEDICATIONS ON DISCHARGE     Medication List        CHANGE how you take these medications        Instructions   metoprolol tartrate 25 MG Tabs  What changed:   how much to take  how to take this  when to take this  Commonly known as: Lopressor   TAKE TWO TABLETS BY MOUTH TWICE DAILY     simvastatin 40 MG Tabs  What changed: when to take this  Commonly known as: Zocor   TAKE ONE TABLET BY MOUTH IN THE EVENING     Xarelto 20 MG Tabs tablet  What changed:   how much to take  additional instructions  Generic drug: rivaroxaban   Doctor's comments: NEW MD  TAKE 1 TABLET BY MOUTH ONCE DAILY WITH DINNER            CONTINUE taking these medications        Instructions   CALCIUM PO   Take 1 Tablet by mouth every day.  Dose: 1 Tablet     fish oil 1000 MG Caps capsule   Take 1,000 mg by mouth every day.  Dose: 1,000 mg     Vitamin D-3 25 MCG (1000 UT) Caps   Take 1,000 Units by mouth every morning.  Dose: 1,000 Units     VITRUM 50+ SENIOR MULTI PO   Take 1 Tablet by mouth every morning. Indications: Vitamin Deficiency  Dose: 1 Tablet              Allergies  Allergies   Allergen Reactions    Sturgis Unspecified     Itchy welts      Clindamycin Unspecified     Pt unsure of reaction    Oatmeal Diarrhea     Gas      Scallops Vomiting       DIET  Orders Placed This Encounter   Procedures     Diet Order Diet: Cardiac (almond milk not cows milk)     Standing Status:   Standing     Number of Occurrences:   1     Order Specific Question:   Diet:     Answer:   Cardiac [6]     Comments:   almond milk not cows milk       ACTIVITY  As tolerated.      CONSULTATIONS  NA    PROCEDURES  NA    LABORATORY  Lab Results   Component Value Date    SODIUM 135 09/26/2023    POTASSIUM 4.3 09/26/2023    CHLORIDE 101 09/26/2023    CO2 23 09/26/2023    GLUCOSE 103 (H) 09/26/2023    BUN 12 09/26/2023    CREATININE 0.60 09/26/2023        Lab Results   Component Value Date    WBC 6.4 09/26/2023    HEMOGLOBIN 12.2 09/26/2023    HEMATOCRIT 37.3 09/26/2023    PLATELETCT 183 09/26/2023        Total time of the discharge process exceeds 32 minutes.

## 2023-09-29 ENCOUNTER — PATIENT OUTREACH (OUTPATIENT)
Dept: MEDICAL GROUP | Facility: PHYSICIAN GROUP | Age: 88
End: 2023-09-29
Payer: MEDICARE

## 2023-09-29 NOTE — PROGRESS NOTES
9/29: 1st Patient outreach for TCM.  MAR with contact information.    10/2: Transitional Care Management  TCM Outreach Date and Time: Filed (9/29/2023 12:08 PM)    Discharge Questions  Actual Discharge Date: 09/28/23  Now that you are home, how are you feeling?: Fair  Did you receive any new prescriptions?: No  Do you have any questions about your current medications or new medications (Review Med Rec)?: No  Do you have a follow up appointment scheduled with your PCP?: No  Was an appointment scheduled for the patient?: No  Reason not scheduled?: Declines  Any issues or paperwork you wish to discuss with your PCP?: No  Does this patient qualify for the CCM program?: No (has referral for home health, once dc'd from  may be eligible)    Transitional Care  Number of attempts made to contact patient: 2  Current or previous attempts competed within two business days of discharge? : Yes  Provided education regarding treatment plan, medications, self-management, ADLs?: Yes  Has patient completed an Advanced Directive?: Yes  Is the patient's advanced directive on file?: Yes  Has the Care Manager's phone number provided?: No  Is there anything else I can help you with?: No    Discharge Summary  Chief Complaint: Weakness, cough, fever, chillls - Pt getting out of bed and was unable to get up. Pt slid down side of bed to the floor. Pt did not get injured per EMS. Strong productive cough with clear phlegm, onset last night  Admitting Diagnosis: EMS  Discharge Diagnosis: acute hypoxemic respiratory failure d/t covid-19

## 2023-10-01 ENCOUNTER — HOME CARE VISIT (OUTPATIENT)
Dept: HOME HEALTH SERVICES | Facility: HOME HEALTHCARE | Age: 88
End: 2023-10-01
Payer: MEDICARE

## 2023-10-01 VITALS
OXYGEN SATURATION: 93 % | BODY MASS INDEX: 25.2 KG/M2 | SYSTOLIC BLOOD PRESSURE: 112 MMHG | RESPIRATION RATE: 18 BRPM | HEIGHT: 63 IN | TEMPERATURE: 99.5 F | WEIGHT: 142.2 LBS | DIASTOLIC BLOOD PRESSURE: 62 MMHG | HEART RATE: 78 BPM

## 2023-10-01 LAB
BACTERIA BLD CULT: NORMAL
BACTERIA BLD CULT: NORMAL
SIGNIFICANT IND 70042: NORMAL
SIGNIFICANT IND 70042: NORMAL
SITE SITE: NORMAL
SITE SITE: NORMAL
SOURCE SOURCE: NORMAL
SOURCE SOURCE: NORMAL

## 2023-10-01 PROCEDURE — G0493 RN CARE EA 15 MIN HH/HOSPICE: HCPCS

## 2023-10-01 PROCEDURE — 665001 SOC-HOME HEALTH

## 2023-10-01 SDOH — ECONOMIC STABILITY: HOUSING INSECURITY: HOME SAFETY: SN EDUCATED PT IN REGARDS TO FALL PREVENTION USING HANDOUT IN WHITE BINDER

## 2023-10-01 ASSESSMENT — ENCOUNTER SYMPTOMS
PAIN: 1
PAIN LOCATION - PAIN FREQUENCY: FREQUENT
DYSPNEA ON EXERTION: 1
LAST BOWEL MOVEMENT: 66748
SHORTNESS OF BREATH: 1
HIGHEST PAIN SEVERITY IN PAST 24 HOURS: 2/10
STOOL FREQUENCY: DAILY
PAIN LOCATION - PAIN SEVERITY: 2/10
LOWEST PAIN SEVERITY IN PAST 24 HOURS: 0/10
DIARRHEA: 1
PAIN LOCATION: GENERALIZED
PAIN LOCATION - RELIEVING FACTORS: REST
COUGH CHARACTERISTICS: PRODUCTIVE
PERSON REPORTING PAIN: PATIENT
PAIN LOCATION - PAIN QUALITY: ACHY
FATIGUE: 1
PAIN SEVERITY GOAL: 0/10
SUBJECTIVE PAIN PROGRESSION: GRADUALLY IMPROVING
COUGH CHARACTERISTICS: MOIST
VOMITING: PT DENIES
COUGH: 1
NAUSEA: PT DENIES
PAIN LOCATION - PAIN DURATION: ACUTE
MYALGIAS: 1
DYSPNEA ACTIVITY LEVEL: AFTER AMBULATING MORE THAN 20 FT

## 2023-10-01 ASSESSMENT — ACTIVITIES OF DAILY LIVING (ADL)
AMBULATION ASSISTANCE: 1
BATHING_CURRENT_FUNCTION: SUPERVISION
USING THE TELPHONE: INDEPENDENT
DRESSING_UB_CURRENT_FUNCTION: SUPERVISION
TOILETING: SUPERVISION
CURRENT_FUNCTION: SUPERVISION
TOILETING: 1
PREPARING MEALS: NEEDS ASSISTANCE
BATHING ASSESSED: 1
TELEPHONE USE ASSESSED: 1
DRESSING_LB_CURRENT_FUNCTION: SUPERVISION
FEEDING: INDEPENDENT
GROOMING_CURRENT_FUNCTION: SUPERVISION
TRANSPORTATION COMMENTS: PT NEEDS ASSISTANCE TO LEAVE HOME
FEEDING ASSESSED: 1
GROOMING ASSESSED: 1
PHYSICAL TRANSFERS ASSESSED: 1
AMBULATION ASSISTANCE: SUPERVISION

## 2023-10-01 ASSESSMENT — FIBROSIS 4 INDEX: FIB4 SCORE: 3.58

## 2023-10-02 ENCOUNTER — APPOINTMENT (OUTPATIENT)
Dept: MEDICAL GROUP | Facility: PHYSICIAN GROUP | Age: 88
End: 2023-10-02
Payer: MEDICARE

## 2023-10-03 ENCOUNTER — HOME CARE VISIT (OUTPATIENT)
Dept: HOME HEALTH SERVICES | Facility: HOME HEALTHCARE | Age: 88
End: 2023-10-03
Payer: MEDICARE

## 2023-10-03 VITALS
RESPIRATION RATE: 18 BRPM | TEMPERATURE: 98.4 F | OXYGEN SATURATION: 94 % | SYSTOLIC BLOOD PRESSURE: 124 MMHG | HEART RATE: 70 BPM | DIASTOLIC BLOOD PRESSURE: 78 MMHG

## 2023-10-03 PROCEDURE — G0151 HHCP-SERV OF PT,EA 15 MIN: HCPCS

## 2023-10-03 PROCEDURE — G0180 MD CERTIFICATION HHA PATIENT: HCPCS | Performed by: INTERNAL MEDICINE

## 2023-10-04 ENCOUNTER — HOME CARE VISIT (OUTPATIENT)
Dept: HOME HEALTH SERVICES | Facility: HOME HEALTHCARE | Age: 88
End: 2023-10-04

## 2023-10-04 ENCOUNTER — HOME CARE VISIT (OUTPATIENT)
Dept: HOME HEALTH SERVICES | Facility: HOME HEALTHCARE | Age: 88
End: 2023-10-04
Payer: MEDICARE

## 2023-10-04 VITALS
RESPIRATION RATE: 18 BRPM | OXYGEN SATURATION: 93 % | HEART RATE: 67 BPM | TEMPERATURE: 98.8 F | DIASTOLIC BLOOD PRESSURE: 60 MMHG | SYSTOLIC BLOOD PRESSURE: 110 MMHG

## 2023-10-04 PROCEDURE — G0299 HHS/HOSPICE OF RN EA 15 MIN: HCPCS

## 2023-10-04 PROCEDURE — G0152 HHCP-SERV OF OT,EA 15 MIN: HCPCS

## 2023-10-04 SDOH — ECONOMIC STABILITY: HOUSING INSECURITY: HOME SAFETY: PT LIVES ALONE AND HOME IS WELL KEPT AND HAS FRIENDS/FAMILY THAT HELP HER DAILY

## 2023-10-04 ASSESSMENT — GAIT ASSESSMENTS
GAIT SCORE: 9
STEP SYMMETRY: 1 - RIGHT AND LEFT STEP LENGTH APPEAR EQUAL
WALKING STANCE: 1 - HEELS ALMOST TOUCHING WHILE WALKING
INITIATION OF GAIT IMMEDIATELY AFTER GO: 1 - NO HESITANCY
TRUNK: 1 - NO SWAY BUT FLEXION OF KNEES OR BACK OR SPREADS ARMS WHILE WALKING
TRUNK SCORE: 1
PATH SCORE: 1
STEP CONTINUITY: 0 - STOPPING OR DISCONTINUITY BETWEEN STEPS
PATH: 1 - MILD/MODERATE DEVIATION OR USES WALKING AID
BALANCE AND GAIT SCORE: 23

## 2023-10-04 ASSESSMENT — BALANCE ASSESSMENTS
ARISES: 1 - ABLE, USES ARMS TO HELP
EYES CLOSED AT MAXIMUM POSITION NUDGED: 1 - STEADY
NUDGED SCORE: 2
IMMEDIATE STANDING BALANCE FIRST 5 SECONDS: 2 - STEADY WITHOUT WALKER OR OTHER SUPPORT
SITTING BALANCE: 1 - STEADY, SAFE
NUDGED: 2 - STEADY
TURNING 360 DEGREES STEPS: 1 - CONTINUOUS STEPS
ARISING SCORE: 1
SITTING DOWN: 1 - USES ARMS OR NOT SMOOTH MOTION
STANDING BALANCE: 2 - NARROW STANCE WITHOUT SUPPORT
ATTEMPTS TO ARISE: 2 - ABLE TO RISE, ONE ATTEMPT
BALANCE SCORE: 14

## 2023-10-04 ASSESSMENT — ENCOUNTER SYMPTOMS
NAUSEA: DENIES
PERSON REPORTING PAIN: PATIENT
MUSCLE WEAKNESS: 1
DENIES PAIN: 1
FATIGUE: 1
COUGH CHARACTERISTICS: STRONG
COUGH CHARACTERISTICS: PRODUCTIVE
COUGH: 1
VOMITING: DENIES
LAST BOWEL MOVEMENT: 66751
MUSCLE WEAKNESS: 1

## 2023-10-04 ASSESSMENT — ACTIVITIES OF DAILY LIVING (ADL)
AMBULATION ASSISTANCE: STAND BY ASSIST
TOILETING: 1
TOILETING: INDEPENDENT
ADLS_COMMENTS: SEE OT EVAL FOR MORE DETAILS
AMBULATION ASSISTANCE: SUPERVISION
DRESSING_UB_CURRENT_FUNCTION: INDEPENDENT
AMBULATION ASSISTANCE: 1
PHYSICAL TRANSFERS ASSESSED: 1
BATHING ASSESSED: 1
GROOMING_COMMENTS: SEE OT EVAL FOR MORE DETAILS
DRESSING_LB_CURRENT_FUNCTION: INDEPENDENT
CURRENT_FUNCTION: STAND BY ASSIST
CURRENT_FUNCTION: SUPERVISION
FEEDING_COMMENTS: SEE OT EVAL FOR MORE DETAILS
AMBULATION ASSISTANCE ON FLAT SURFACES: 1
BATHING_CURRENT_FUNCTION: INDEPENDENT
BATHING_COMMENTS: SEE OT EVAL FOR MORE DETAILS

## 2023-10-08 DIAGNOSIS — I48.91 ATRIAL FIBRILLATION WITH RVR (HCC): ICD-10-CM

## 2023-10-09 ENCOUNTER — HOME CARE VISIT (OUTPATIENT)
Dept: HOME HEALTH SERVICES | Facility: HOME HEALTHCARE | Age: 88
End: 2023-10-09
Payer: MEDICARE

## 2023-10-09 ENCOUNTER — DOCUMENTATION (OUTPATIENT)
Dept: MEDICAL GROUP | Facility: PHYSICIAN GROUP | Age: 88
End: 2023-10-09
Payer: MEDICARE

## 2023-10-09 VITALS
SYSTOLIC BLOOD PRESSURE: 122 MMHG | HEART RATE: 68 BPM | TEMPERATURE: 98.2 F | OXYGEN SATURATION: 95 % | RESPIRATION RATE: 18 BRPM | DIASTOLIC BLOOD PRESSURE: 64 MMHG

## 2023-10-09 PROCEDURE — G0299 HHS/HOSPICE OF RN EA 15 MIN: HCPCS

## 2023-10-09 RX ORDER — RIVAROXABAN 20 MG/1
TABLET, FILM COATED ORAL
Qty: 100 TABLET | Refills: 2 | Status: SHIPPED | OUTPATIENT
Start: 2023-10-09

## 2023-10-09 ASSESSMENT — ENCOUNTER SYMPTOMS
STOOL FREQUENCY: DAILY
PAIN LOCATION: JAWS
HYPERTENSION: 1
PAIN: 1
PERSON REPORTING PAIN: PATIENT
NAUSEA: DENIES
PAIN LOCATION - PAIN FREQUENCY: INTERMITTENT
VOMITING: DENIES
BOWEL PATTERN NORMAL: 1
LAST BOWEL MOVEMENT: 66756
COUGH: 1
COUGH CHARACTERISTICS: PRODUCTIVE
PAIN LOCATION - PAIN QUALITY: SORE
PAIN LOCATION - PAIN SEVERITY: 5/10

## 2023-10-09 NOTE — PROGRESS NOTES
Medication chart review for [facility] services    Received referral from [facility].   Medications reviewed  compared with discharge summary if available.  Discharge summary date, if applicable:   9/28    Current medication list per [facility]     Medication list one, patient is currently taking    Current Outpatient Medications:     CALCIUM PO, 1 Tablet, Oral, DAILY    fish oil, 1,000 mg, Oral, DAILY    Xarelto, TAKE 1 TABLET BY MOUTH ONCE DAILY WITH DINNER    simvastatin, TAKE ONE TABLET BY MOUTH IN THE EVENING    metoprolol tartrate, TAKE TWO TABLETS BY MOUTH TWICE DAILY    Multiple Vitamins-Minerals (VITRUM 50+ SENIOR MULTI PO), 1 Tablet, Oral, QAM    Vitamin D-3, 1,000 Units, Oral, QAM      Medication list two, drugs that the patient has been prescribed or recommended to take by their healthcare provider on discharge summary     CHANGE how you take these medications         Instructions   metoprolol tartrate 25 MG Tabs  What changed:   how much to take  how to take this  when to take this  Commonly known as: Lopressor    TAKE TWO TABLETS BY MOUTH TWICE DAILY      simvastatin 40 MG Tabs  What changed: when to take this  Commonly known as: Zocor    TAKE ONE TABLET BY MOUTH IN THE EVENING      Xarelto 20 MG Tabs tablet  What changed:   how much to take  additional instructions  Generic drug: rivaroxaban    Doctor's comments: NEW MD  TAKE 1 TABLET BY MOUTH ONCE DAILY WITH DINNER                CONTINUE taking these medications         Instructions   CALCIUM PO    Take 1 Tablet by mouth every day.  Dose: 1 Tablet      fish oil 1000 MG Caps capsule    Take 1,000 mg by mouth every day.  Dose: 1,000 mg      Vitamin D-3 25 MCG (1000 UT) Caps    Take 1,000 Units by mouth every morning.  Dose: 1,000 Units      VITRUM 50+ SENIOR MULTI PO    Take 1 Tablet by mouth every morning. Indications: Vitamin Deficiency  Dose: 1 Tablet                 Allergies   Allergen Reactions    Holstein Unspecified     Itchy welts       Clindamycin Unspecified     Pt unsure of reaction    Oatmeal Diarrhea     Gas      Scallops Vomiting       Labs     Lab Results   Component Value Date/Time    SODIUM 135 09/26/2023 01:56 PM    POTASSIUM 4.3 09/26/2023 01:56 PM    CHLORIDE 101 09/26/2023 01:56 PM    CO2 23 09/26/2023 01:56 PM    GLUCOSE 103 (H) 09/26/2023 01:56 PM    BUN 12 09/26/2023 01:56 PM    CREATININE 0.60 09/26/2023 01:56 PM     Lab Results   Component Value Date/Time    ALKPHOSPHAT 58 09/26/2023 01:56 PM    ASTSGOT 25 09/26/2023 01:56 PM    ALTSGPT 11 09/26/2023 01:56 PM    TBILIRUBIN 0.5 09/26/2023 01:56 PM    INR 2.68 (H) 08/10/2022 05:33 AM    ALBUMIN 3.6 09/26/2023 01:56 PM        Assessment for clinically significant drug interactions, drug omissions/additions, duplicative therapies.            CC   Kathy Garcia M.D.  [address]  [facility] [address]  Fax: [phone]    [facility]  Phone [phone] fax [phone]    This note was created using voice recognition software (Dragon). The accuracy of the dictation is limited by the abilities of the software. I have reviewed the note prior to signing, however some errors in grammar and context are still possible. If you have any questions related to this note please do not hesitate to contact our office.

## 2023-10-10 ENCOUNTER — HOME CARE VISIT (OUTPATIENT)
Dept: HOME HEALTH SERVICES | Facility: HOME HEALTHCARE | Age: 88
End: 2023-10-10
Payer: MEDICARE

## 2023-10-10 VITALS
TEMPERATURE: 98.5 F | SYSTOLIC BLOOD PRESSURE: 124 MMHG | OXYGEN SATURATION: 92 % | DIASTOLIC BLOOD PRESSURE: 68 MMHG | RESPIRATION RATE: 18 BRPM | HEART RATE: 69 BPM

## 2023-10-10 PROCEDURE — G0151 HHCP-SERV OF PT,EA 15 MIN: HCPCS

## 2023-10-10 ASSESSMENT — ACTIVITIES OF DAILY LIVING (ADL): OASIS_M1830: 03

## 2023-10-10 NOTE — Clinical Note
I agree with these changes  ----- Message -----  From: Kendy Hernandez R.N.  Sent: 10/10/2023   6:48 AM PDT  To: Josselyn Gracia R.N.      Quality Review for 10.1.23 SOC OASIS performed on by GAEL Hernandez RN on 10.10.2023:     Edits completed by GAEL Hernandez RN:  1.  and  dx coding updated per chart review.   2. Changed  to 10.1.23 per the LSOC order  3. Added #4 ED to  per Epic  4. Changed  to 3 per narrative pt is SOB with min activity  5. Changed , , ,  to 2,  to 3,  to 2, [id number] C,F to 4 per ADL tab and narrative reporting pt needs supervision with ambulation, transfers and dressing  6. Changed  to 3 per ambulation score   7. Added adequate emergency plan, ambulate only with assistance and proper med use to safety measures  8. Updated F2F data  9. Changed  to 8

## 2023-10-10 NOTE — CASE COMMUNICATION
Quality Review for 10.1.23 SOC OASIS performed on by GAEL Hernandez RN on 10.10.2023:     Edits completed by GAEL Hernandez RN:  1.  and  dx coding updated per chart review.   2. Changed  to 10.1.23 per the LSOC order  3. Added #4 ED to  per Epic  4. Changed  to 3 per narrative pt is SOB with min activity  5. Changed , , ,  to 2,  to 3,  to 2, [id number] C,F to 4 per ADL tab and narrative reporti ng pt needs supervision with ambulation, transfers and dressing  6. Changed  to 3 per ambulation score   7. Added adequate emergency plan, ambulate only with assistance and proper med use to safety measures  8. Updated F2F data  9. Changed  to 8

## 2023-10-11 ENCOUNTER — HOME CARE VISIT (OUTPATIENT)
Dept: HOME HEALTH SERVICES | Facility: HOME HEALTHCARE | Age: 88
End: 2023-10-11
Payer: MEDICARE

## 2023-10-11 RX ORDER — BENZONATATE 100 MG/1
100 CAPSULE ORAL 3 TIMES DAILY PRN
Qty: 30 CAPSULE | Refills: 1 | Status: SHIPPED | OUTPATIENT
Start: 2023-10-11 | End: 2023-10-24

## 2023-10-11 ASSESSMENT — ACTIVITIES OF DAILY LIVING (ADL)
AMBULATION ASSISTANCE: STAND BY ASSIST
CURRENT_FUNCTION: STAND BY ASSIST

## 2023-10-11 ASSESSMENT — ENCOUNTER SYMPTOMS
MUSCLE WEAKNESS: 1
PAIN LOCATION - RELIEVING FACTORS: T

## 2023-10-12 ENCOUNTER — HOME CARE VISIT (OUTPATIENT)
Dept: HOME HEALTH SERVICES | Facility: HOME HEALTHCARE | Age: 88
End: 2023-10-12
Payer: MEDICARE

## 2023-10-12 PROCEDURE — G0299 HHS/HOSPICE OF RN EA 15 MIN: HCPCS

## 2023-10-12 ASSESSMENT — ENCOUNTER SYMPTOMS
LAST BOWEL MOVEMENT: 66759
COUGH CHARACTERISTICS: PRODUCTIVE
SPUTUM AMOUNT: MODERATE
SPUTUM CONSISTENCY: THICK
VOMITING: NO
SPUTUM PRODUCTION: 1
SPUTUM CONSISTENCY: THIN
NAUSEA: NO
SNORING: 1
BOWEL PATTERN NORMAL: 1
SPUTUM COLOR: CLEAR
COUGH: 1
STOOL FREQUENCY: DAILY
DIZZINESS: 1

## 2023-10-12 ASSESSMENT — ACTIVITIES OF DAILY LIVING (ADL): CURRENT_FUNCTION: STAND BY ASSIST

## 2023-10-13 ENCOUNTER — HOME CARE VISIT (OUTPATIENT)
Dept: HOME HEALTH SERVICES | Facility: HOME HEALTHCARE | Age: 88
End: 2023-10-13
Payer: MEDICARE

## 2023-10-13 VITALS
TEMPERATURE: 98.6 F | RESPIRATION RATE: 18 BRPM | HEART RATE: 70 BPM | OXYGEN SATURATION: 94 % | DIASTOLIC BLOOD PRESSURE: 70 MMHG | SYSTOLIC BLOOD PRESSURE: 116 MMHG

## 2023-10-13 VITALS
DIASTOLIC BLOOD PRESSURE: 71 MMHG | OXYGEN SATURATION: 95 % | HEART RATE: 86 BPM | TEMPERATURE: 98.8 F | SYSTOLIC BLOOD PRESSURE: 140 MMHG | RESPIRATION RATE: 18 BRPM

## 2023-10-13 PROCEDURE — G0151 HHCP-SERV OF PT,EA 15 MIN: HCPCS

## 2023-10-13 ASSESSMENT — ACTIVITIES OF DAILY LIVING (ADL)
AMBULATION ASSISTANCE: STAND BY ASSIST
TRANSPORTATION COMMENTS: CHRONIC COUGH

## 2023-10-13 ASSESSMENT — ENCOUNTER SYMPTOMS
PAIN SEVERITY GOAL: 0/10
LOWEST PAIN SEVERITY IN PAST 24 HOURS: 0/10
PERSON REPORTING PAIN: PATIENT
DENIES PAIN: 1
SUBJECTIVE PAIN PROGRESSION: UNCHANGED
HIGHEST PAIN SEVERITY IN PAST 24 HOURS: 0/10

## 2023-10-16 ENCOUNTER — HOME CARE VISIT (OUTPATIENT)
Dept: HOME HEALTH SERVICES | Facility: HOME HEALTHCARE | Age: 88
End: 2023-10-16
Payer: MEDICARE

## 2023-10-16 VITALS
RESPIRATION RATE: 16 BRPM | OXYGEN SATURATION: 95 % | DIASTOLIC BLOOD PRESSURE: 60 MMHG | HEART RATE: 72 BPM | SYSTOLIC BLOOD PRESSURE: 90 MMHG | TEMPERATURE: 99 F

## 2023-10-16 PROCEDURE — G0299 HHS/HOSPICE OF RN EA 15 MIN: HCPCS

## 2023-10-16 ASSESSMENT — ENCOUNTER SYMPTOMS
LOWEST PAIN SEVERITY IN PAST 24 HOURS: 0/10
PAIN SEVERITY GOAL: 0/10
HIGHEST PAIN SEVERITY IN PAST 24 HOURS: 0/10
LAST BOWEL MOVEMENT: 66762
STOOL FREQUENCY: DAILY
SUBJECTIVE PAIN PROGRESSION: UNCHANGED
BOWEL PATTERN NORMAL: 1
COUGH: 1
DENIES PAIN: 1
PERSON REPORTING PAIN: PATIENT

## 2023-10-16 ASSESSMENT — ACTIVITIES OF DAILY LIVING (ADL)
AMBULATION ASSISTANCE: 1
AMBULATION ASSISTANCE: INDEPENDENT
GROOMING_COMMENTS: UNABLE TO ASSESS
DRINKING_FROM_CUP_CURRENT_FUNCTIONINDEPENDENT: 1
BATHING_COMMENTS: UNABLE TO ASSESS

## 2023-10-17 ENCOUNTER — OFFICE VISIT (OUTPATIENT)
Dept: MEDICAL GROUP | Facility: PHYSICIAN GROUP | Age: 88
End: 2023-10-17
Payer: MEDICARE

## 2023-10-17 VITALS
BODY MASS INDEX: 23.16 KG/M2 | TEMPERATURE: 99.3 F | HEIGHT: 63 IN | HEART RATE: 73 BPM | WEIGHT: 130.7 LBS | SYSTOLIC BLOOD PRESSURE: 124 MMHG | OXYGEN SATURATION: 94 % | DIASTOLIC BLOOD PRESSURE: 62 MMHG

## 2023-10-17 DIAGNOSIS — R05.3 CHRONIC COUGHING: Primary | ICD-10-CM

## 2023-10-17 DIAGNOSIS — U07.1 REAL TIME REVERSE TRANSCRIPTASE PCR POSITIVE FOR COVID-19 VIRUS: ICD-10-CM

## 2023-10-17 LAB
FLUAV RNA SPEC QL NAA+PROBE: NEGATIVE
FLUBV RNA SPEC QL NAA+PROBE: NEGATIVE
RSV RNA SPEC QL NAA+PROBE: NEGATIVE
SARS-COV-2 RNA RESP QL NAA+PROBE: POSITIVE

## 2023-10-17 PROCEDURE — 99213 OFFICE O/P EST LOW 20 MIN: CPT | Performed by: INTERNAL MEDICINE

## 2023-10-17 PROCEDURE — 3074F SYST BP LT 130 MM HG: CPT | Performed by: INTERNAL MEDICINE

## 2023-10-17 PROCEDURE — 3078F DIAST BP <80 MM HG: CPT | Performed by: INTERNAL MEDICINE

## 2023-10-17 PROCEDURE — 0241U POCT CEPHEID COV-2, FLU A/B, RSV - PCR: CPT | Performed by: INTERNAL MEDICINE

## 2023-10-17 RX ORDER — PROMETHAZINE HYDROCHLORIDE, PHENYLEPHRINE HYDROCHLORIDE AND CODEINE PHOSPHATE 6.25; 5; 1 MG/5ML; MG/5ML; MG/5ML
5 SOLUTION ORAL EVERY 8 HOURS PRN
Qty: 280 ML | Refills: 0 | Status: SHIPPED | OUTPATIENT
Start: 2023-10-17 | End: 2023-11-06

## 2023-10-17 ASSESSMENT — FIBROSIS 4 INDEX: FIB4 SCORE: 3.58

## 2023-10-17 NOTE — PROGRESS NOTES
CC: Ongoing cough, COVID 19 infection.    HPI:  Lisa presents with the following    1. Real time reverse transcriptase PCR positive for COVID-19 virus  Patient presented to the emergency room on September 26 with complaints of cough, weakness, fevers and chills.  Diagnosed with COVID-19.  Patient remained in the hospital for 2 nights.  Initially, patient was hypoxic with O2 saturations of 85%.  Patient treated with Decadron and O2 was weaned off.  Patient was discharged with home health.  Patient now continues with ongoing cough and fatigue.  Patient denies shortness of breath, chest pain, palpitations, fevers or chills or congestion.  Patient is unable to control her cough.  Patient has been taking Tessalon Perles 3 times daily.  Having trouble sleeping due to the coughing.  Does not seem to be improving.        Patient Active Problem List    Diagnosis Date Noted    Acute hypoxemic respiratory failure due to COVID-19 (HCC) 09/26/2023    ACP (advance care planning) 09/26/2023    Acute hypoxemic respiratory failure (HCC) 09/26/2023    Prediabetes 08/16/2023    Dyslipidemia 08/03/2023    Diplopia 07/11/2023    Vitamin D deficiency 06/15/2023    Upper back pain on left side 12/12/2022    Functional diarrhea 12/12/2022    BMI 23.0-23.9, adult 12/05/2022    Peripheral vascular disease, unspecified (HCC) 12/05/2022    Chest pain 08/10/2022    Mitral stenosis 01/17/2022    Osteopenia 11/03/2021    Low back pain without sciatica 04/29/2021    Abdominal aortic atherosclerosis (HCC) 04/29/2021    History of melanoma 04/29/2021    Hypercoagulability due to atrial fibrillation (HCC) 04/29/2021    Full code status 04/29/2021    Urge incontinence 04/29/2021    Essential hypertension 11/15/2019    Mild mitral regurgitation 11/15/2019    Coronary artery calcification seen on CAT scan 09/16/2019    AF (atrial fibrillation) (HCC) 08/14/2018    Mild aortic stenosis 08/14/2018    Chronic anticoagulation 08/14/2018    Low  hemoglobin 03/27/2018    Hyperlipidemia 11/16/2017    Chronic seasonal allergic rhinitis due to pollen 11/16/2017    Osteoarthritis of right hip 07/06/2015    History of total left hip arthroplasty 05/21/2015    Osteoarthritis 05/21/2015    Osteoarthritis, multiple sites 05/21/2015       Current Outpatient Medications   Medication Sig Dispense Refill    Promethazine-Phenyleph-Codeine (PHENERGAN VC CODEINE) 6.25-5-10 MG/5ML Syrup Take 5 mL by mouth every 8 hours as needed (Moderate to severe cough) for up to 20 days. 280 mL 0    bismuth subsalicylate (PEPTO-BISMOL) 262 MG/15ML Suspension Take 30 mL by mouth every 6 hours as needed (diahrea). Indications: Diarrhea      benzonatate (TESSALON) 100 MG Cap Take 1 Capsule by mouth 3 times a day as needed for Cough. (Patient taking differently: Take 100 mg by mouth 3 times a day as needed for Cough.) 30 Capsule 1    rivaroxaban (XARELTO) 20 MG Tab tablet TAKE ONE TABLET BY MOUTH ONE TIME DAILY WITH DINNER 100 Tablet 2    CALCIUM PO Take 1 Tablet by mouth every day. Indications: Nutritional supplement      Omega-3 Fatty Acids (FISH OIL) 1000 MG Cap capsule Take 1,000 mg by mouth every day. Indications: High Blood Pressure Disorder, Heart health      simvastatin (ZOCOR) 40 MG Tab TAKE ONE TABLET BY MOUTH IN THE EVENING 100 Tablet 1    metoprolol tartrate (LOPRESSOR) 25 MG Tab TAKE TWO TABLETS BY MOUTH TWICE DAILY 400 Tablet 3    Multiple Vitamins-Minerals (VITRUM 50+ SENIOR MULTI PO) Take 1 Tablet by mouth every morning. Indications: Vitamin Deficiency      Cholecalciferol (VITAMIN D-3) 1000 UNITS CAPS Take 1,000 Units by mouth every morning. Indications: Vitamin D Deficiency       No current facility-administered medications for this visit.         Allergies as of 10/17/2023 - Reviewed 10/17/2023   Allergen Reaction Noted    Hernando Unspecified 08/10/2022    Clindamycin Unspecified 09/26/2023    Oatmeal Diarrhea 08/10/2022    Scallops Vomiting 08/10/2022        Social History      Socioeconomic History    Marital status:      Spouse name: Not on file    Number of children: Not on file    Years of education: Not on file    Highest education level: Not on file   Occupational History    Not on file   Tobacco Use    Smoking status: Former     Current packs/day: 0.00     Average packs/day: 1 pack/day for 33.0 years (33.0 ttl pk-yrs)     Types: Cigarettes     Start date: 1958     Quit date: 1991     Years since quittin.8    Smokeless tobacco: Never    Tobacco comments:     Started smoking at age 23   Vaping Use    Vaping Use: Never used   Substance and Sexual Activity    Alcohol use: Not Currently     Alcohol/week: 8.4 oz     Types: 14 Glasses of wine per week     Comment: 1 glass wine/day    Drug use: No    Sexual activity: Never     Partners: Male   Other Topics Concern    Not on file   Social History Narrative    She lost her   to Alzheimer's. She lives alone in her own home, 1 story. Has a son who lives nearby. She does all ADLs and IADLs w/o difficulty though recently hired a  to do her outside work. 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 She enjoys reading, learning to play the piano.      Social Determinants of Health     Financial Resource Strain: Not on file   Food Insecurity: Not on file   Transportation Needs: Not on file   Physical Activity: Not on file   Stress: Not on file   Social Connections: Feeling Socially Integrated (10/1/2023)    OASIS : Social Isolation     Frequency of experiencing loneliness or isolation: Rarely   Intimate Partner Violence: Not on file   Housing Stability: Not on file       Family History   Problem Relation Age of Onset    Cancer Father         GI    Heart Disease Father     Lung Disease Father     Stroke Sister     No Known Problems Son     No Known Problems Son     No Known Problems Maternal Grandmother     No Known Problems Maternal Grandfather     No Known Problems Paternal  "Grandmother     No Known Problems Paternal Grandfather     Hypertension Sister     Hyperlipidemia Sister        Past Surgical History:   Procedure Laterality Date    APPENDECTOMY LAPAROSCOPIC  10/10/2018    Procedure: APPENDECTOMY LAPAROSCOPIC;  Surgeon: Sushil Dalton M.D.;  Location: SURGERY [facility];  Service: General    HIP ARTHROPLASTY TOTAL Left 7/6/2015    Procedure: HIP ARTHROPLASTY TOTAL;  Surgeon: Navid Howard M.D.;  Location: SURGERY [facility];  Service:     OTHER Right 2006    cyst & bone spur on right hand    EYE SURGERY      bilateral IOL       ROS: Positive ROS per HPI.  Denies any Headache,Chest pain,  Shortness of breath,  Abdominal pain, Changes of bowel or bladder, Lower ext edema, Fevers, Nights sweats, Weight Changes, Focal weakness or numbness.  All other systems are negative.    /62 (BP Location: Right arm, Patient Position: Sitting, BP Cuff Size: Adult)   Pulse 73   Temp 37.4 °C (99.3 °F) (Temporal)   Ht 1.6 m (5' 3\")   Wt 59.3 kg (130 lb 11.2 oz)   SpO2 94%   BMI 23.15 kg/m²      Physical Exam:  Gen:         Alert and oriented, mild distress.  HEENT:   Perrla, TM clear,  Oralpharynx no erythema or exudates.  Neck:       No Jugular venous distension, Lymphadenopathy, Thyromegaly, Bruits.  Lungs:     Clear to auscultation bilaterally, no wheezing rhonchi or crackles.  CV:          Regular rate and rhythm. No murmurs, rubs or gallops.  Abd:         Soft non tender, non distended. Normal active bowel sounds.             Ext:          No clubbing, cyanosis, edema.      Assessment and Plan.   87 y.o. female presenting with the following.     1. Real time reverse transcriptase PCR positive for COVID-19 virus  Patient continues to test positive for COVID-19 in clinic.  Recommended for patient to continue to isolate, mask, hydrate, vitamin C, zinc, and vitamin D.    - Promethazine-Phenyleph-Codeine (PHENERGAN VC CODEINE) 6.25-5-10 MG/5ML Syrup; Take 5 mL by mouth every 8 " hours as needed (Moderate to severe cough) for up to 20 days.  Dispense: 280 mL; Refill: 0    2. Chronic coughing    - POCT Cepheid CoV-2, Flu A/B, RSV - PCR  - Promethazine-Phenyleph-Codeine (PHENERGAN VC CODEINE) 6.25-5-10 MG/5ML Syrup; Take 5 mL by mouth every 8 hours as needed (Moderate to severe cough) for up to 20 days.  Dispense: 280 mL; Refill: 0

## 2023-10-18 ENCOUNTER — HOME CARE VISIT (OUTPATIENT)
Dept: HOME HEALTH SERVICES | Facility: HOME HEALTHCARE | Age: 88
End: 2023-10-18
Payer: MEDICARE

## 2023-10-18 VITALS
SYSTOLIC BLOOD PRESSURE: 122 MMHG | RESPIRATION RATE: 18 BRPM | HEART RATE: 77 BPM | DIASTOLIC BLOOD PRESSURE: 72 MMHG | OXYGEN SATURATION: 98 % | TEMPERATURE: 98.7 F

## 2023-10-18 PROCEDURE — G0151 HHCP-SERV OF PT,EA 15 MIN: HCPCS

## 2023-10-20 ENCOUNTER — HOME CARE VISIT (OUTPATIENT)
Dept: HOME HEALTH SERVICES | Facility: HOME HEALTHCARE | Age: 88
End: 2023-10-20
Payer: MEDICARE

## 2023-10-20 VITALS
HEART RATE: 64 BPM | RESPIRATION RATE: 18 BRPM | TEMPERATURE: 98.8 F | DIASTOLIC BLOOD PRESSURE: 70 MMHG | SYSTOLIC BLOOD PRESSURE: 112 MMHG | OXYGEN SATURATION: 93 %

## 2023-10-20 PROCEDURE — G0151 HHCP-SERV OF PT,EA 15 MIN: HCPCS

## 2023-10-24 ENCOUNTER — HOME CARE VISIT (OUTPATIENT)
Dept: HOME HEALTH SERVICES | Facility: HOME HEALTHCARE | Age: 88
End: 2023-10-24
Payer: MEDICARE

## 2023-10-24 VITALS
SYSTOLIC BLOOD PRESSURE: 118 MMHG | HEART RATE: 62 BPM | TEMPERATURE: 98.6 F | DIASTOLIC BLOOD PRESSURE: 64 MMHG | RESPIRATION RATE: 16 BRPM | OXYGEN SATURATION: 94 %

## 2023-10-24 PROCEDURE — G0151 HHCP-SERV OF PT,EA 15 MIN: HCPCS

## 2023-10-25 SDOH — ECONOMIC STABILITY: HOUSING INSECURITY: HOME SAFETY: PT LIVES ALONE AND HAS FRIENDS AND FAMILY TO HELP

## 2023-10-25 ASSESSMENT — GAIT ASSESSMENTS
GAIT SCORE: 12
TRUNK SCORE: 2
BALANCE AND GAIT SCORE: 28
STEP SYMMETRY: 1 - RIGHT AND LEFT STEP LENGTH APPEAR EQUAL
WALKING STANCE: 1 - HEELS ALMOST TOUCHING WHILE WALKING
STEP CONTINUITY: 1 - STEPS APPEAR CONTINUOUS
PATH SCORE: 2
PATH: 2 - STRAIGHT WITHOUT WALKING AID
TRUNK: 2 - NO SWAY, NO FLEXION, NO USE OF ARMS, NO WALKING AID
INITIATION OF GAIT IMMEDIATELY AFTER GO: 1 - NO HESITANCY

## 2023-10-25 ASSESSMENT — BALANCE ASSESSMENTS
SITTING BALANCE: 1 - STEADY, SAFE
ATTEMPTS TO ARISE: 2 - ABLE TO RISE, ONE ATTEMPT
BALANCE SCORE: 16
NUDGED: 2 - STEADY
STANDING BALANCE: 2 - NARROW STANCE WITHOUT SUPPORT
ARISING SCORE: 2
TURNING 360 DEGREES STEPS: 1 - CONTINUOUS STEPS
ARISES: 2 - ABLE WITHOUT USING ARMS
NUDGED SCORE: 2
EYES CLOSED AT MAXIMUM POSITION NUDGED: 1 - STEADY
SITTING DOWN: 2 - SAFE, SMOOTH MOTION
IMMEDIATE STANDING BALANCE FIRST 5 SECONDS: 2 - STEADY WITHOUT WALKER OR OTHER SUPPORT

## 2023-10-25 ASSESSMENT — ACTIVITIES OF DAILY LIVING (ADL)
OASIS_M1830: 00
HOME_HEALTH_OASIS: 00

## 2023-10-26 ENCOUNTER — HOME CARE VISIT (OUTPATIENT)
Dept: HOME HEALTH SERVICES | Facility: HOME HEALTHCARE | Age: 88
End: 2023-10-26
Payer: MEDICARE

## 2023-10-26 NOTE — CASE COMMUNICATION
Quality Review for [place] OASIS by EROS Ruiz, SANDIP on  October 26, 2023       Edits completed by EROS Ruiz RN:  1.  E changed to yes per the plan of care

## 2023-10-28 NOTE — CASE COMMUNICATION
I agree with changes.     Supriya Arboleda PT, DPT    ----- Message -----  From: Key Ruiz R.N.  Sent: 10/26/2023   9:20 AM PDT  To: Supriya Arboleda PT      Quality Review for [place] OASIS by EROS Ruiz, SANDIP on  October 26, 2023       Edits completed by EROS Ruiz, RN:  1.  E changed to yes per the plan of care

## 2023-11-16 ENCOUNTER — HOSPITAL ENCOUNTER (OUTPATIENT)
Dept: CARDIOLOGY | Facility: MEDICAL CENTER | Age: 88
End: 2023-11-16
Attending: INTERNAL MEDICINE
Payer: MEDICARE

## 2023-11-16 DIAGNOSIS — I05.0 MITRAL VALVE STENOSIS, UNSPECIFIED ETIOLOGY: ICD-10-CM

## 2023-11-16 DIAGNOSIS — I35.0 MILD AORTIC STENOSIS: ICD-10-CM

## 2023-11-16 PROCEDURE — 93306 TTE W/DOPPLER COMPLETE: CPT

## 2023-11-20 ENCOUNTER — TELEPHONE (OUTPATIENT)
Dept: CARDIOLOGY | Facility: MEDICAL CENTER | Age: 88
End: 2023-11-20

## 2023-11-20 LAB
LV EJECT FRACT MOD 2C 99903: 71.21
LV EJECT FRACT MOD 4C 99902: 81.42
LV EJECT FRACT MOD BP 99901: 77.79

## 2023-11-20 PROCEDURE — 93306 TTE W/DOPPLER COMPLETE: CPT | Mod: 26 | Performed by: INTERNAL MEDICINE

## 2023-11-20 NOTE — TELEPHONE ENCOUNTER
----- Message from Jaydon Sutherland M.D. sent at 11/20/2023 12:14 PM PST -----  I tried to call the patient  Please call and notify Lisa that I have reviewed her echocardiogram which is unchanged since 1/13/2022 echocardiogram  Find out if she is having any new cardiac symptoms ie shortness of breath, lightheadedness chest pain; if not continue to monitor for them and otherwise keep her 2/15/2024 appt with me; but if she is having new problems let me know and put her in with me sooner; can overbook

## 2023-11-20 NOTE — TELEPHONE ENCOUNTER
S/W pt, informed of results and feedback as stated by SW. When asking about symptoms, pt says nothing new. She states she has mild sob with exertion, but this is not new, has had this for some time and has previously mentioned this to SW. Pt will monitor her symptom and report any worsening SOB or other symptoms, will keep Feb appt and knows if she has symptoms she will need sooner appt.

## 2023-11-20 NOTE — RESULT ENCOUNTER NOTE
I tried to call the patient  Please call and notify Lisa that I have reviewed her echocardiogram which is unchanged since 1/13/2022 echocardiogram  Find out if she is having any new cardiac symptoms ie shortness of breath, lightheadedness chest pain; if not continue to monitor for them and otherwise keep her 2/15/2024 appt with me; but if she is having new problems let me know and put her in with me sooner; can overbook

## 2023-11-30 ENCOUNTER — DOCUMENTATION (OUTPATIENT)
Dept: CARDIOLOGY | Facility: MEDICAL CENTER | Age: 88
End: 2023-11-30
Payer: MEDICARE

## 2023-11-30 NOTE — PROGRESS NOTES
This patient has been flagged through our echocardiogram surveillance with the following echocardiogram results:    Moderate-Severe Aortic Stenosis    Cardiologist: Dr. Sutherland    Current Plan of Care for Structural Heart Disease: Added to surveillance tracking list    Next Echo date needed by: 11/16/2024    Next Follow up appointment: 2/15/2024

## 2023-12-13 ENCOUNTER — NON-PROVIDER VISIT (OUTPATIENT)
Dept: MEDICAL GROUP | Facility: PHYSICIAN GROUP | Age: 88
End: 2023-12-13
Payer: MEDICARE

## 2023-12-13 DIAGNOSIS — Z23 NEED FOR VACCINATION: ICD-10-CM

## 2023-12-13 PROCEDURE — G0008 ADMIN INFLUENZA VIRUS VAC: HCPCS | Performed by: INTERNAL MEDICINE

## 2023-12-13 PROCEDURE — 90662 IIV NO PRSV INCREASED AG IM: CPT | Performed by: INTERNAL MEDICINE

## 2023-12-13 NOTE — PROGRESS NOTES
"Lisa Garza is a 88 y.o. female here for a non-provider visit for:   FLU    Reason for immunization: Annual Flu Vaccine  Immunization records indicate need for vaccine: Yes, confirmed with Epic  Minimum interval has been met for this vaccine: Yes  ABN completed: Yes    VIS Dated  12/13/23 was given to patient: Yes  All IAC Questionnaire questions were answered \"No.\"    Patient tolerated injection and no adverse effects were observed or reported: Yes    Pt scheduled for next dose in series: No  "

## 2024-01-01 NOTE — ED NOTES
Pt awaiting surgery. Pt removed all jewelry to send home with family. Pt exhibits no acute distress. Pt denies any further needs. Will continue to monitor.    Passed

## 2024-01-25 ENCOUNTER — OFFICE VISIT (OUTPATIENT)
Dept: MEDICAL GROUP | Facility: PHYSICIAN GROUP | Age: 89
End: 2024-01-25
Payer: MEDICARE

## 2024-01-25 VITALS
OXYGEN SATURATION: 94 % | WEIGHT: 133 LBS | DIASTOLIC BLOOD PRESSURE: 66 MMHG | HEART RATE: 68 BPM | TEMPERATURE: 98.4 F | HEIGHT: 63 IN | SYSTOLIC BLOOD PRESSURE: 118 MMHG | BODY MASS INDEX: 23.57 KG/M2

## 2024-01-25 DIAGNOSIS — I35.0 NONRHEUMATIC AORTIC VALVE STENOSIS: ICD-10-CM

## 2024-01-25 DIAGNOSIS — I73.9 PERIPHERAL VASCULAR DISEASE, UNSPECIFIED (HCC): ICD-10-CM

## 2024-01-25 DIAGNOSIS — D68.69 HYPERCOAGULABLE STATE DUE TO PAROXYSMAL ATRIAL FIBRILLATION (HCC): ICD-10-CM

## 2024-01-25 DIAGNOSIS — I70.0 ABDOMINAL AORTIC ATHEROSCLEROSIS (HCC): ICD-10-CM

## 2024-01-25 DIAGNOSIS — R73.03 PREDIABETES: ICD-10-CM

## 2024-01-25 DIAGNOSIS — I48.0 PAROXYSMAL ATRIAL FIBRILLATION (HCC): ICD-10-CM

## 2024-01-25 DIAGNOSIS — I48.0 HYPERCOAGULABLE STATE DUE TO PAROXYSMAL ATRIAL FIBRILLATION (HCC): ICD-10-CM

## 2024-01-25 PROBLEM — J96.01 ACUTE HYPOXEMIC RESPIRATORY FAILURE (HCC): Status: RESOLVED | Noted: 2023-09-26 | Resolved: 2024-01-25

## 2024-01-25 PROCEDURE — 3074F SYST BP LT 130 MM HG: CPT | Performed by: INTERNAL MEDICINE

## 2024-01-25 PROCEDURE — 99214 OFFICE O/P EST MOD 30 MIN: CPT | Performed by: INTERNAL MEDICINE

## 2024-01-25 PROCEDURE — 3078F DIAST BP <80 MM HG: CPT | Performed by: INTERNAL MEDICINE

## 2024-01-25 ASSESSMENT — FIBROSIS 4 INDEX: FIB4 SCORE: 3.62

## 2024-01-25 ASSESSMENT — PATIENT HEALTH QUESTIONNAIRE - PHQ9: CLINICAL INTERPRETATION OF PHQ2 SCORE: 0

## 2024-01-25 NOTE — PROGRESS NOTES
CC: 6-month follow-up    HPI:  Lisa presents with the following    1. Abdominal aortic atherosclerosis (HCC)  Chronic.  Stable.  Treating risk factors, statin.    2. Paroxysmal atrial fibrillation (HCC)  Chronic.  Stable.  Followed by cardiology.  Treated with Xarelto, metoprolol 25 mg tablets twice daily.  Asymptomatic.    3. Hypercoagulable state due to paroxysmal atrial fibrillation (HCC)  Chronic.  Stable.  Patient treating atrial fibrillation with Xarelto 20 mg tablets daily.    4. Peripheral vascular disease, unspecified (HCC)  Chronic.  Stable.  Patient was noted to have abnormal Quanta flow vascular study in 12/5/22.  No JOSE DE JESUS    5. Nonrheumatic aortic valve stenosis  Patient completed recent echocardiogram which showed moderate to severe aortic stenosis.  Patient is asymptomatic.    6. Prediabetes  Hemoglobin A1c 6.1.  Diet managed.      Patient Active Problem List    Diagnosis Date Noted    Nonrheumatic aortic valve stenosis 01/25/2024    Acute hypoxemic respiratory failure due to COVID-19 (HCC) 09/26/2023    ACP (advance care planning) 09/26/2023    Prediabetes 08/16/2023    Dyslipidemia 08/03/2023    Diplopia 07/11/2023    Vitamin D deficiency 06/15/2023    Upper back pain on left side 12/12/2022    Functional diarrhea 12/12/2022    BMI 23.0-23.9, adult 12/05/2022    Peripheral vascular disease, unspecified (HCC) 12/05/2022    Chest pain 08/10/2022    Mitral stenosis 01/17/2022    Osteopenia 11/03/2021    Low back pain without sciatica 04/29/2021    Abdominal aortic atherosclerosis (HCC) 04/29/2021    History of melanoma 04/29/2021    Hypercoagulability due to atrial fibrillation (HCC) 04/29/2021    Full code status 04/29/2021    Urge incontinence 04/29/2021    Essential hypertension 11/15/2019    Mild mitral regurgitation 11/15/2019    Coronary artery calcification seen on CAT scan 09/16/2019    AF (atrial fibrillation) (HCC) 08/14/2018    Mild aortic stenosis 08/14/2018    Chronic anticoagulation  08/14/2018    Low hemoglobin 03/27/2018    Hyperlipidemia 11/16/2017    Chronic seasonal allergic rhinitis due to pollen 11/16/2017    Osteoarthritis of right hip 07/06/2015    History of total left hip arthroplasty 05/21/2015    Osteoarthritis 05/21/2015    Osteoarthritis, multiple sites 05/21/2015       Current Outpatient Medications   Medication Sig Dispense Refill    simvastatin (ZOCOR) 40 MG Tab TAKE ONE TABLET BY MOUTH IN THE EVENING 100 Tablet 3    bismuth subsalicylate (PEPTO-BISMOL) 262 MG/15ML Suspension Take 30 mL by mouth every 6 hours as needed (diahrea). Indications: Diarrhea      rivaroxaban (XARELTO) 20 MG Tab tablet TAKE ONE TABLET BY MOUTH ONE TIME DAILY WITH DINNER 100 Tablet 2    CALCIUM PO Take 1 Tablet by mouth every day. Indications: Nutritional supplement      Omega-3 Fatty Acids (FISH OIL) 1000 MG Cap capsule Take 1,000 mg by mouth every day. Indications: High Blood Pressure Disorder, Heart health      metoprolol tartrate (LOPRESSOR) 25 MG Tab TAKE TWO TABLETS BY MOUTH TWICE DAILY 400 Tablet 3    Multiple Vitamins-Minerals (VITRUM 50+ SENIOR MULTI PO) Take 1 Tablet by mouth every morning. Indications: Vitamin Deficiency      Cholecalciferol (VITAMIN D-3) 1000 UNITS CAPS Take 1,000 Units by mouth every morning. Indications: Vitamin D Deficiency       No current facility-administered medications for this visit.         Allergies as of 01/25/2024 - Reviewed 01/25/2024   Allergen Reaction Noted    [place] Unspecified 08/10/2022    Clindamycin Unspecified 09/26/2023    Oatmeal Diarrhea 08/10/2022    Scallops Vomiting 08/10/2022        Social History     Socioeconomic History    Marital status:      Spouse name: Not on file    Number of children: Not on file    Years of education: Not on file    Highest education level: Not on file   Occupational History    Not on file   Tobacco Use    Smoking status: Former     Current packs/day: 0.00     Average packs/day: 1 pack/day for 33.0 years (33.0  ttl pk-yrs)     Types: Cigarettes     Start date: 1958     Quit date: 1991     Years since quittin.0    Smokeless tobacco: Never    Tobacco comments:     Started smoking at age 23   Vaping Use    Vaping Use: Never used   Substance and Sexual Activity    Alcohol use: Not Currently     Alcohol/week: 8.4 oz     Types: 14 Glasses of wine per week     Comment: 1 glass wine/day    Drug use: No    Sexual activity: Never     Partners: Male   Other Topics Concern    Not on file   Social History Narrative    She lost her   to Alzheimer's. She lives alone in her own home, 1 story. Has a son who lives nearby. She does all ADLs and IADLs w/o difficulty though recently hired a  to do her outside work. 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 She enjoys reading, learning to play the piano.      Social Determinants of Health     Financial Resource Strain: Not on file   Food Insecurity: Not on file   Transportation Needs: Not on file   Physical Activity: Not on file   Stress: Not on file   Social Connections: Feeling Socially Integrated (10/24/2023)    OASIS : Social Isolation     Frequency of experiencing loneliness or isolation: Never   Intimate Partner Violence: Not on file   Housing Stability: Not on file       Family History   Problem Relation Age of Onset    Cancer Father         GI    Heart Disease Father     Lung Disease Father     Stroke Sister     No Known Problems Son     No Known Problems Son     No Known Problems Maternal Grandmother     No Known Problems Maternal Grandfather     No Known Problems Paternal Grandmother     No Known Problems Paternal Grandfather     Hypertension Sister     Hyperlipidemia Sister        Past Surgical History:   Procedure Laterality Date    APPENDECTOMY LAPAROSCOPIC  10/10/2018    Procedure: APPENDECTOMY LAPAROSCOPIC;  Surgeon: Sushil Dalton M.D.;  Location: SURGERY [facility];  Service: General    HIP ARTHROPLASTY TOTAL Left  "7/6/2015    Procedure: HIP ARTHROPLASTY TOTAL;  Surgeon: Navid Howard M.D.;  Location: SURGERY [facility];  Service:     OTHER Right 2006    cyst & bone spur on right hand    EYE SURGERY      bilateral IOL       ROS:  Denies any Headache,Chest pain,  Shortness of breath,  Abdominal pain, Changes of bowel or bladder, Lower ext edema, Fevers, Nights sweats, Weight Changes, Focal weakness or numbness.  All other systems are negative.    /66 (BP Location: Right arm, Patient Position: Sitting, BP Cuff Size: Adult)   Pulse 68   Temp 36.9 °C (98.4 °F) (Temporal)   Ht 1.6 m (5' 3\")   Wt 60.3 kg (133 lb)   SpO2 94%   BMI 23.56 kg/m²      Constitutional: Alert, no distress, well-groomed.  Skin: Warm, dry, good turgor, no rashes in visible areas.  Eye: Equal, round and reactive, conjunctiva clear, lids normal.  ENMT: Lips without lesions, good dentition, moist mucous membranes.  Neck: Trachea midline, no masses, no thyromegaly.  Respiratory: Unlabored respiratory effort, no cough.  Abdomen: Soft, no gross masses.  MSK: Normal gait, moves all extremities.  Neuro: Grossly non-focal. No cranial nerve deficit. Strength and sensation intact.   Psych: Alert and oriented x3, normal affect and mood.      Assessment and Plan.   88 y.o. female presenting with the following.     1. Abdominal aortic atherosclerosis (HCC)  Chronic.  Stable.  Continue to treat risk factors, continue statin.    2. Paroxysmal atrial fibrillation (HCC)  Chronic.  Stable.  Patient has a follow-up appointment with cardiology in February.  Continue Xarelto and metoprolol.    3. Hypercoagulable state due to paroxysmal atrial fibrillation (HCC)  Chronic.  Stable.  Followed by cardiology.  Continue Xarelto for atrial fibrillation.    4. Peripheral vascular disease, unspecified (HCC)  Chronic.  Stable.  Continue to monitor, treat risk factors.  Consider JOSE DE JESUS.    5. Nonrheumatic aortic valve stenosis  Keep follow-up appointment with cardiology in " February to discuss echocardiogram results.    6. Prediabetes  Diet managed.    Due for annual wellness visit.    My total time spent caring for the patient on the day of the encounter was 32 minutes.   This does not include time spent on separately billable procedures/tests.

## 2024-02-05 ENCOUNTER — TELEPHONE (OUTPATIENT)
Dept: HEALTH INFORMATION MANAGEMENT | Facility: OTHER | Age: 89
End: 2024-02-05
Payer: MEDICARE

## 2024-02-15 ENCOUNTER — OFFICE VISIT (OUTPATIENT)
Dept: CARDIOLOGY | Facility: MEDICAL CENTER | Age: 89
End: 2024-02-15
Attending: INTERNAL MEDICINE
Payer: MEDICARE

## 2024-02-15 VITALS
HEART RATE: 65 BPM | DIASTOLIC BLOOD PRESSURE: 60 MMHG | OXYGEN SATURATION: 92 % | HEIGHT: 63 IN | WEIGHT: 132 LBS | BODY MASS INDEX: 23.39 KG/M2 | SYSTOLIC BLOOD PRESSURE: 138 MMHG

## 2024-02-15 DIAGNOSIS — I05.0 MITRAL VALVE STENOSIS, UNSPECIFIED ETIOLOGY: ICD-10-CM

## 2024-02-15 DIAGNOSIS — I35.0 AORTIC VALVE STENOSIS, ETIOLOGY OF CARDIAC VALVE DISEASE UNSPECIFIED: ICD-10-CM

## 2024-02-15 DIAGNOSIS — D68.69 SECONDARY HYPERCOAGULABLE STATE (HCC): ICD-10-CM

## 2024-02-15 DIAGNOSIS — I25.10 CORONARY ARTERY CALCIFICATION SEEN ON CAT SCAN: ICD-10-CM

## 2024-02-15 DIAGNOSIS — E78.5 DYSLIPIDEMIA: ICD-10-CM

## 2024-02-15 DIAGNOSIS — Z79.01 CHRONIC ANTICOAGULATION: ICD-10-CM

## 2024-02-15 DIAGNOSIS — I48.0 PAF (PAROXYSMAL ATRIAL FIBRILLATION) (HCC): ICD-10-CM

## 2024-02-15 PROBLEM — I48.91 AF (ATRIAL FIBRILLATION) (HCC): Status: RESOLVED | Noted: 2018-08-14 | Resolved: 2024-02-15

## 2024-02-15 PROCEDURE — 3078F DIAST BP <80 MM HG: CPT | Performed by: INTERNAL MEDICINE

## 2024-02-15 PROCEDURE — 99212 OFFICE O/P EST SF 10 MIN: CPT | Performed by: INTERNAL MEDICINE

## 2024-02-15 PROCEDURE — 99214 OFFICE O/P EST MOD 30 MIN: CPT | Performed by: INTERNAL MEDICINE

## 2024-02-15 PROCEDURE — 3075F SYST BP GE 130 - 139MM HG: CPT | Performed by: INTERNAL MEDICINE

## 2024-02-15 ASSESSMENT — FIBROSIS 4 INDEX: FIB4 SCORE: 3.62

## 2024-02-15 ASSESSMENT — ENCOUNTER SYMPTOMS
DIZZINESS: 0
LOSS OF CONSCIOUSNESS: 0
SHORTNESS OF BREATH: 0
MYALGIAS: 0
COUGH: 0
PALPITATIONS: 0
BRUISES/BLEEDS EASILY: 0

## 2024-02-15 NOTE — PROGRESS NOTES
Chief Complaint   Patient presents with    Atrial Fibrillation    Hypertension    Hyperlipidemia       Subjective     Lisa Garza is a 88 y.o. female who presents today for follow-up care.    The patient has PAF, NOAC with rivaroxaban, aortic stenosis, mitral stenosis, coronary calcification, dyslipidemia and hypertension.    Since 8/3/2023 appointment the patient was hospitalized at [facility] 9/26/2023-9/28/2023 for acute hypoxic respiratory failure, COVID-19 pneumonia treated with Decadron with postinfectious cough x 4-5 weeks improved with codeine.  Since she has had no cardiac symptoms including chest pain, palpitations, exertional shortness of breath, lightheadedness, dizziness or syncope.    Since 9/1/2022 appointment the patient was recently admitted to the [facility] 7/11/2023-7/12/2023 for frontal headache, diplopia with elevated blood pressure 166/72.  Head negative brain MRI and head CTA.  After discharge saw ophthalmologist Dr. Real who did not find any ophthalmologic abnormalities.  She has had no recurrent symptoms since.  The month before took a week trip to [place].  She denies any cardiac symptoms of shortness of breath, PND or LE edema.  No angina pectoris.     Hospitalized [facility] 8/10/2022 -for scapular and chest pain, normal troponins, MPI normal, echocardiogram with mild aortic stenosis moderate mitral stenosis, discharged on same medications.       The patient previously smoked but quit 20 years ago.  History of alcohol abuse.     Hospitalized [facility] 3/26/2018-3/31/2018 for pneumonia and atrial fibrillation. She had a new onset of atrial fibrillation she was started on Xarelto for anticoagulation. Her atrial fibrillation reverted back to normal rhythm. Echocardiogram showed diastolic dysfunction grade 2 and ejection fraction 70%.     Past Medical History:   Diagnosis Date    Arthritis     osteo, hip    Cancer (HCC)     skin on left shoulder    Functional diarrhea 12/12/2022    Heart  murmur     High cholesterol     Nonrheumatic aortic valve stenosis 2024    Prediabetes 2023    Unspecified cataract     tony IOL    Unspecified urinary incontinence     Upper back pain on left side 2022    Vitamin D deficiency 06/15/2023     Past Surgical History:   Procedure Laterality Date    APPENDECTOMY LAPAROSCOPIC  10/10/2018    Procedure: APPENDECTOMY LAPAROSCOPIC;  Surgeon: Sushil Dalton M.D.;  Location: SURGERY [facility];  Service: General    HIP ARTHROPLASTY TOTAL Left 2015    Procedure: HIP ARTHROPLASTY TOTAL;  Surgeon: Navid Howard M.D.;  Location: SURGERY [facility];  Service:     OTHER Right 2006    cyst & bone spur on right hand    EYE SURGERY      bilateral IOL     Family History   Problem Relation Age of Onset    Cancer Father         GI    Heart Disease Father     Lung Disease Father     Stroke Sister     No Known Problems Son     No Known Problems Son     No Known Problems Maternal Grandmother     No Known Problems Maternal Grandfather     No Known Problems Paternal Grandmother     No Known Problems Paternal Grandfather     Hypertension Sister     Hyperlipidemia Sister      Social History     Socioeconomic History    Marital status:      Spouse name: Not on file    Number of children: Not on file    Years of education: Not on file    Highest education level: Not on file   Occupational History    Not on file   Tobacco Use    Smoking status: Former     Current packs/day: 0.00     Average packs/day: 1 pack/day for 33.0 years (33.0 ttl pk-yrs)     Types: Cigarettes     Start date: 1958     Quit date: 1991     Years since quittin.1    Smokeless tobacco: Never    Tobacco comments:     Started smoking at age 23   Vaping Use    Vaping Use: Never used   Substance and Sexual Activity    Alcohol use: Not Currently     Alcohol/week: 4.2 oz     Types: 7 Glasses of wine per week     Comment: 1 glass wine/day    Drug use: No    Sexual activity: Never      Partners: Male   Other Topics Concern    Not on file   Social History Narrative    She lost her  9/19 to Alzheimer's. She lives alone in her own home, 1 story. Has a son who lives nearby. She does all ADLs and IADLs w/o difficulty though recently hired a  to do her outside work. 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 She enjoys reading, learning to play the piano.      Social Determinants of Health     Financial Resource Strain: Not on file   Food Insecurity: Not on file   Transportation Needs: Not on file   Physical Activity: Not on file   Stress: Not on file   Social Connections: Feeling Socially Integrated (10/24/2023)    OASIS : Social Isolation     Frequency of experiencing loneliness or isolation: Never   Intimate Partner Violence: Not on file   Housing Stability: Not on file     Allergies   Allergen Reactions    [place] Unspecified     Itchy welts      Clindamycin Unspecified     Pt unsure of reaction    Oatmeal Diarrhea     Gas      Scallops Vomiting     Outpatient Encounter Medications as of 2/15/2024   Medication Sig Dispense Refill    simvastatin (ZOCOR) 40 MG Tab TAKE ONE TABLET BY MOUTH IN THE EVENING 100 Tablet 3    bismuth subsalicylate (PEPTO-BISMOL) 262 MG/15ML Suspension Take 30 mL by mouth every 6 hours as needed (diahrea). Indications: Diarrhea      rivaroxaban (XARELTO) 20 MG Tab tablet TAKE ONE TABLET BY MOUTH ONE TIME DAILY WITH DINNER 100 Tablet 2    CALCIUM PO Take 1 Tablet by mouth every day. Indications: Nutritional supplement      Omega-3 Fatty Acids (FISH OIL) 1000 MG Cap capsule Take 1,000 mg by mouth every day. Indications: High Blood Pressure Disorder, Heart health      metoprolol tartrate (LOPRESSOR) 25 MG Tab TAKE TWO TABLETS BY MOUTH TWICE DAILY 400 Tablet 3    Multiple Vitamins-Minerals (VITRUM 50+ SENIOR MULTI PO) Take 1 Tablet by mouth every morning. Indications: Vitamin Deficiency      Cholecalciferol (VITAMIN D-3) 1000 UNITS CAPS Take  "1,000 Units by mouth every morning. Indications: Vitamin D Deficiency       No facility-administered encounter medications on file as of 2/15/2024.     Review of Systems   HENT:  Negative for nosebleeds.    Respiratory:  Negative for cough and shortness of breath.    Cardiovascular:  Negative for chest pain and palpitations.   Musculoskeletal:  Negative for myalgias.   Neurological:  Negative for dizziness and loss of consciousness.   Endo/Heme/Allergies:  Does not bruise/bleed easily.              Objective     /60 (BP Location: Left arm, Patient Position: Sitting, BP Cuff Size: Adult)   Pulse 65   Ht 1.6 m (5' 3\")   Wt 59.9 kg (132 lb)   SpO2 92%   BMI 23.38 kg/m²     Physical Exam  Constitutional:       General: She is not in acute distress.     Appearance: She is well-developed.   Neck:      Vascular: No JVD.   Cardiovascular:      Rate and Rhythm: Normal rate. Rhythm irregularly irregular.      Pulses: Normal pulses.      Heart sounds: Murmur heard.      No friction rub. No gallop.   Pulmonary:      Effort: Pulmonary effort is normal. No accessory muscle usage or respiratory distress.      Breath sounds: Normal breath sounds. No wheezing or rales.   Musculoskeletal:      Right lower leg: No edema.      Left lower leg: No edema.   Skin:     General: Skin is warm and dry.      Findings: No rash.      Nails: There is no clubbing.   Neurological:      Mental Status: She is alert and oriented to person, place, and time.   Psychiatric:         Behavior: Behavior normal.            ECHOCARDIOGRAM 3/28/2018  Left ventricular ejection fraction is visually estimated to be 70%.  Mild concentric left ventricular hypertrophy.  Grade II diastolic dysfunction.  Moderately dilated left atrium.  Mild mitral regurgitation.    Mild aortic stenosis.     ECHOCARDIOGRAM 1/13/2022  The left ventricular ejection fraction is visually estimated to be 75%,   borderline hyperdynamic.  Mild concentric left ventricular " hypertrophy.  Biatrial enlargement.  Aortic valve leaflets not well visualized, appear heavily calcified with moderate aortic stenosis:V-max 3.9 m/s, MG 38 mmHg, KONG 1 cm2, DI 0.32.  Calcification of the mitral valve leaflets, consider rheumatic heart disease.  Mild to moderate mitral stenosis: MG 4-5.5 mmHg, HR 58 bpm.  Mild mitral regurgitation, multiple jets present.  Estimated right ventricular systolic pressure is 35 mmHg.  Normal estimated right atrial pressure.    ECHOCARDIOGRAM 8/10/2022  Normal left ventricular size, thickness, systolic function.  Moderate mitral stenosis with restriction in the anterior mitral valve leaflet.  Mild calcification of the aortic valve leaflets and apparently   elongated LVOT with subvalvular flow acceleration (LVOT VTI 38 cm)  Mild aortic stenosis/LVOT obstruction - mean gradient of 21 mmHg.      MPI 8/10/2022   No evidence of significant jeopardized viable myocardium or prior myocardial    infarction.   Significantly limited gated portion of the examination. Correlation with    echocardiogram is recommended for EF evaluation.    ECG INTERPRETATION   Negative stress ECG for ischemia.    Assessment & Plan     1. Aortic valve stenosis, etiology of cardiac valve disease unspecified        2. PAF (paroxysmal atrial fibrillation) ([facility])        3. Secondary hypercoagulable state (HCC)        4. Chronic anticoagulation        5. Mitral valve stenosis, unspecified etiology        6. Dyslipidemia        7. Coronary artery calcification seen on CAT scan              Medical Decision Making: Today's Assessment/Status/Plan:   Assessment  PAF.  Anticoagulation, rivaroxaban  Aortic stenosis   Mitral stenosis   Coronary calcification by CT scan.  Hypertension.  Dyslipidemia.  History of excessive alcohol use.  COVID infection 10/2023     Recommendation Discussion  PAF, clinically stable, no recurrence, maintaining sinus rhythm, continue rivaroxaban, metoprolol.  OAC on rivaroxaban, calculated  CrCl 60, continue 20 mg daily  Coronary calcification, asymptomatic, prior normal MPI, continue simvastatin, rivaroxaban, metoprolol.  Aortic stenosis, mitral stenosis, asymptomatic, I reviewed the echocardiogram images and results with the patient, reviewed educational video information regarding aortic stenosis and potential future treatment i.e. TAVR with the patient  Hypertension, BP currently acceptable, continue to monitor on home BP device maintain a daily BP log, continue metoprolol  Dyslipidemia, LDL 63, continue simvastatin  Instructed to contact the the office for any new exertional shortness of breath, lightheadedness or faintness or persistent elevated blood pressures of greater than 130/80.  RTC 3 months.

## 2024-03-19 ENCOUNTER — APPOINTMENT (RX ONLY)
Dept: URBAN - METROPOLITAN AREA CLINIC 4 | Facility: CLINIC | Age: 89
Setting detail: DERMATOLOGY
End: 2024-03-19

## 2024-03-19 DIAGNOSIS — Z85.828 PERSONAL HISTORY OF OTHER MALIGNANT NEOPLASM OF SKIN: ICD-10-CM

## 2024-03-19 DIAGNOSIS — D485 NEOPLASM OF UNCERTAIN BEHAVIOR OF SKIN: ICD-10-CM

## 2024-03-19 DIAGNOSIS — L57.0 ACTINIC KERATOSIS: ICD-10-CM

## 2024-03-19 DIAGNOSIS — L82.1 OTHER SEBORRHEIC KERATOSIS: ICD-10-CM

## 2024-03-19 DIAGNOSIS — Z86.006 PERSONAL HISTORY OF MELANOMA IN-SITU: ICD-10-CM

## 2024-03-19 DIAGNOSIS — L81.4 OTHER MELANIN HYPERPIGMENTATION: ICD-10-CM

## 2024-03-19 PROBLEM — D48.5 NEOPLASM OF UNCERTAIN BEHAVIOR OF SKIN: Status: ACTIVE | Noted: 2024-03-19

## 2024-03-19 PROCEDURE — 11102 TANGNTL BX SKIN SINGLE LES: CPT

## 2024-03-19 PROCEDURE — ? BIOPSY BY SHAVE METHOD

## 2024-03-19 PROCEDURE — 17000 DESTRUCT PREMALG LESION: CPT | Mod: 59

## 2024-03-19 PROCEDURE — ? COUNSELING

## 2024-03-19 PROCEDURE — ? LIQUID NITROGEN

## 2024-03-19 PROCEDURE — 99213 OFFICE O/P EST LOW 20 MIN: CPT | Mod: 25

## 2024-03-19 PROCEDURE — 17003 DESTRUCT PREMALG LES 2-14: CPT

## 2024-03-19 ASSESSMENT — LOCATION DETAILED DESCRIPTION DERM
LOCATION DETAILED: RIGHT INFERIOR LATERAL MIDBACK
LOCATION DETAILED: RIGHT MEDIAL SUPERIOR CHEST
LOCATION DETAILED: LEFT SUPERIOR LATERAL MIDBACK
LOCATION DETAILED: 2ND WEB SPACE LEFT HAND
LOCATION DETAILED: RIGHT PROXIMAL POSTERIOR UPPER ARM
LOCATION DETAILED: LEFT PROXIMAL POSTERIOR UPPER ARM
LOCATION DETAILED: RIGHT MID-UPPER BACK
LOCATION DETAILED: LEFT SUPERIOR UPPER BACK
LOCATION DETAILED: LEFT ANTERIOR SHOULDER
LOCATION DETAILED: LEFT ANTERIOR LATERAL PROXIMAL UPPER ARM
LOCATION DETAILED: RIGHT DISTAL DORSAL FOREARM
LOCATION DETAILED: LEFT MEDIAL SUPERIOR CHEST
LOCATION DETAILED: RIGHT PROXIMAL DORSAL FOREARM
LOCATION DETAILED: NASAL DORSUM

## 2024-03-19 ASSESSMENT — LOCATION SIMPLE DESCRIPTION DERM
LOCATION SIMPLE: LEFT LOWER BACK
LOCATION SIMPLE: NOSE
LOCATION SIMPLE: CHEST
LOCATION SIMPLE: LEFT POSTERIOR UPPER ARM
LOCATION SIMPLE: LEFT UPPER ARM
LOCATION SIMPLE: RIGHT LOWER BACK
LOCATION SIMPLE: LEFT HAND
LOCATION SIMPLE: LEFT SHOULDER
LOCATION SIMPLE: LEFT UPPER BACK
LOCATION SIMPLE: RIGHT UPPER BACK
LOCATION SIMPLE: RIGHT FOREARM
LOCATION SIMPLE: RIGHT POSTERIOR UPPER ARM

## 2024-03-19 ASSESSMENT — LOCATION ZONE DERM
LOCATION ZONE: HAND
LOCATION ZONE: ARM
LOCATION ZONE: TRUNK
LOCATION ZONE: NOSE

## 2024-03-19 NOTE — PROCEDURE: BIOPSY BY SHAVE METHOD
Detail Level: Detailed
Depth Of Biopsy: dermis
Was A Bandage Applied: Yes
Size Of Lesion In Cm: 0
Biopsy Type: H and E
Biopsy Method: Dermablade
Anesthesia Type: 1% lidocaine with epinephrine
Anesthesia Volume In Cc: 0.5
Hemostasis: Drysol
Wound Care: Petrolatum
Dressing: bandage
Destruction After The Procedure: No
Type Of Destruction Used: Curettage
Curettage Text: The wound bed was treated with curettage after the biopsy was performed.
Cryotherapy Text: The wound bed was treated with cryotherapy after the biopsy was performed.
Electrodesiccation Text: The wound bed was treated with electrodesiccation after the biopsy was performed.
Electrodesiccation And Curettage Text: The wound bed was treated with electrodesiccation and curettage after the biopsy was performed.
Silver Nitrate Text: The wound bed was treated with silver nitrate after the biopsy was performed.
Lab: [id number]
Lab Facility: 
Consent: Verbal consent was obtained and risks were reviewed including but not limited to scarring, infection, bleeding, scabbing, incomplete removal, nerve damage and allergy to anesthesia.
Post-Care Instructions: I reviewed with the patient in detail post-care instructions. Patient is to keep the biopsy site dry overnight, and then apply bacitracin twice daily until healed. Patient may apply hydrogen peroxide soaks to remove any crusting.
Notification Instructions: Patient will be notified of biopsy results. However, patient instructed to call the office if not contacted within 2 weeks.
Billing Type: Third-Party Bill
Information: Selecting Yes will display possible errors in your note based on the variables you have selected. This validation is only offered as a suggestion for you. PLEASE NOTE THAT THE VALIDATION TEXT WILL BE REMOVED WHEN YOU FINALIZE YOUR NOTE. IF YOU WANT TO FAX A PRELIMINARY NOTE YOU WILL NEED TO TOGGLE THIS TO 'NO' IF YOU DO NOT WANT IT IN YOUR FAXED NOTE.

## 2024-03-29 ENCOUNTER — APPOINTMENT (RX ONLY)
Dept: URBAN - METROPOLITAN AREA CLINIC 4 | Facility: CLINIC | Age: 89
Setting detail: DERMATOLOGY
End: 2024-03-29

## 2024-03-29 DIAGNOSIS — L57.0 ACTINIC KERATOSIS: ICD-10-CM

## 2024-03-29 DIAGNOSIS — L82.1 OTHER SEBORRHEIC KERATOSIS: ICD-10-CM

## 2024-03-29 PROCEDURE — ? COUNSELING

## 2024-03-29 PROCEDURE — 99212 OFFICE O/P EST SF 10 MIN: CPT | Mod: 24

## 2024-03-29 PROCEDURE — ? DIAGNOSIS COMMENT

## 2024-03-29 PROCEDURE — ? SEPARATE AND IDENTIFIABLE DOCUMENTATION

## 2024-03-29 ASSESSMENT — LOCATION DETAILED DESCRIPTION DERM
LOCATION DETAILED: 2ND WEB SPACE RIGHT HAND
LOCATION DETAILED: LEFT MID TEMPLE

## 2024-03-29 ASSESSMENT — LOCATION ZONE DERM
LOCATION ZONE: FACE
LOCATION ZONE: HAND

## 2024-03-29 ASSESSMENT — LOCATION SIMPLE DESCRIPTION DERM
LOCATION SIMPLE: LEFT TEMPLE
LOCATION SIMPLE: RIGHT HAND

## 2024-03-29 NOTE — PROCEDURE: DIAGNOSIS COMMENT
Detail Level: Detailed
Comment: Patient presented as an add on patient wishing to receive results as she had not heard from anyone about results yet. Discussed AK as a pre cancerous lesion that patient and provider jointly opted to treat once healed. Given that area is continuing to heal, she will return for treatment.
Render Risk Assessment In Note?: no

## 2024-04-09 ENCOUNTER — APPOINTMENT (OUTPATIENT)
Dept: MEDICAL GROUP | Facility: PHYSICIAN GROUP | Age: 89
End: 2024-04-09
Payer: MEDICARE

## 2024-04-09 VITALS
BODY MASS INDEX: 23.39 KG/M2 | SYSTOLIC BLOOD PRESSURE: 126 MMHG | TEMPERATURE: 98.6 F | OXYGEN SATURATION: 95 % | HEIGHT: 63 IN | WEIGHT: 132 LBS | HEART RATE: 61 BPM | RESPIRATION RATE: 14 BRPM | DIASTOLIC BLOOD PRESSURE: 62 MMHG

## 2024-04-09 DIAGNOSIS — E55.9 VITAMIN D DEFICIENCY: ICD-10-CM

## 2024-04-09 DIAGNOSIS — Z00.00 ENCOUNTER FOR ANNUAL WELLNESS VISIT (AWV) IN MEDICARE PATIENT: ICD-10-CM

## 2024-04-09 DIAGNOSIS — I35.0 AORTIC VALVE STENOSIS, ETIOLOGY OF CARDIAC VALVE DISEASE UNSPECIFIED: ICD-10-CM

## 2024-04-09 DIAGNOSIS — R73.03 PREDIABETES: ICD-10-CM

## 2024-04-09 DIAGNOSIS — I73.9 PERIPHERAL VASCULAR DISEASE, UNSPECIFIED (HCC): ICD-10-CM

## 2024-04-09 DIAGNOSIS — K59.1 FUNCTIONAL DIARRHEA: ICD-10-CM

## 2024-04-09 DIAGNOSIS — I48.0 HYPERCOAGULABLE STATE DUE TO PAROXYSMAL ATRIAL FIBRILLATION (HCC): ICD-10-CM

## 2024-04-09 DIAGNOSIS — D68.69 HYPERCOAGULABLE STATE DUE TO PAROXYSMAL ATRIAL FIBRILLATION (HCC): ICD-10-CM

## 2024-04-09 DIAGNOSIS — E78.5 DYSLIPIDEMIA: ICD-10-CM

## 2024-04-09 PROBLEM — R19.7 DIARRHEA: Status: ACTIVE | Noted: 2024-04-09

## 2024-04-09 PROCEDURE — 3074F SYST BP LT 130 MM HG: CPT | Performed by: INTERNAL MEDICINE

## 2024-04-09 PROCEDURE — 3078F DIAST BP <80 MM HG: CPT | Performed by: INTERNAL MEDICINE

## 2024-04-09 PROCEDURE — G0439 PPPS, SUBSEQ VISIT: HCPCS | Performed by: INTERNAL MEDICINE

## 2024-04-09 ASSESSMENT — ACTIVITIES OF DAILY LIVING (ADL): BATHING_REQUIRES_ASSISTANCE: 0

## 2024-04-09 ASSESSMENT — ENCOUNTER SYMPTOMS: GENERAL WELL-BEING: GOOD

## 2024-04-09 ASSESSMENT — FIBROSIS 4 INDEX: FIB4 SCORE: 3.62

## 2024-04-09 ASSESSMENT — PATIENT HEALTH QUESTIONNAIRE - PHQ9: CLINICAL INTERPRETATION OF PHQ2 SCORE: 0

## 2024-04-09 NOTE — PROGRESS NOTES
Chief Complaint   Patient presents with    Annual Wellness Visit       HPI:  Lisa Garza is a 88 y.o. here for Medicare Annual Wellness Visit     1. Encounter for annual wellness visit (AWV) in Medicare patient  Patient is up-to-date with colonoscopy.  Up-to-date with vaccinations.  DEXA scan due 2026.  Labs due.  - Subsequent Annual Wellness Visit - Includes PPPS ()    2. Peripheral vascular disease, unspecified (HCC)  Chronic.  Stable.  Patient was noted to have abnormal Quanta flow vascular study in 12/5/22.  No JOSE DE JESUS.  Asymptomatic.  - Subsequent Annual Wellness Visit - Includes PPPS ()    3. Hypercoagulable state due to paroxysmal atrial fibrillation (HCC)  Chronic. Stable. Patient treating atrial fibrillation with Xarelto 20 mg tablets daily.   - Subsequent Annual Wellness Visit - Includes PPPS ()    4. Prediabetes  Most recent hemoglobin A1c 6.1.  No PMH of T2DM.  - Subsequent Annual Wellness Visit - Includes PPPS ()  - HEMOGLOBIN A1C; Future    5. Dyslipidemia  Patient continues taking Zocor 40 mg daily.  - Subsequent Annual Wellness Visit - Includes PPPS ()  - Lipid Profile; Future  - CBC WITH DIFFERENTIAL; Future  - Comp Metabolic Panel; Future    6. Aortic valve stenosis, etiology of cardiac valve disease unspecified  Patient completed recent echocardiogram which showed moderate to severe aortic stenosis. Patient is asymptomatic.  Patient followed up with Dr. Hernandez, from cardiology in February.  Discussed possible candidate for TAVR.  Patient considering.  - Subsequent Annual Wellness Visit - Includes PPPS ()    7. Functional diarrhea  Patient had 3 episodes of explosive diarrhea that lasted only 1 episode.  First 1 in November 9, and then March 3, April 5.  Patient denies blood in stool, abdominal pain, nausea, vomiting or GERD.  - Subsequent Annual Wellness Visit - Includes PPPS ()  - TSH WITH REFLEX TO FT4; Future    8. Vitamin D deficiency  Vitamin D level 52.  -  Subsequent Annual Wellness Visit - Includes PPPS ()  - VITAMIN D,25 HYDROXY (DEFICIENCY); Future    9. Disposition.  Patient is  after 65 years.   passed away from Alzheimer's complications.  Patient lives alone however has a son in town.  Patient relates and does puzzles.  Maintains a large home with the half acre garden.  Patient recently started self to play with panel and socializes with friends.      Patient Active Problem List    Diagnosis Date Noted    Secondary hypercoagulable state ([facility]) 02/15/2024    PAF (paroxysmal atrial fibrillation) ([facility]) 02/15/2024    Aortic stenosis 02/15/2024    Acute hypoxemic respiratory failure due to COVID-19 ([facility]) 09/26/2023    ACP (advance care planning) 09/26/2023    Prediabetes 08/16/2023    Dyslipidemia 08/03/2023    Diplopia 07/11/2023    Vitamin D deficiency 06/15/2023    Upper back pain on left side 12/12/2022    Functional diarrhea 12/12/2022    BMI 23.0-23.9, adult 12/05/2022    Peripheral vascular disease, unspecified ([facility]) 12/05/2022    Chest pain 08/10/2022    Mitral stenosis 01/17/2022    Osteopenia 11/03/2021    Low back pain without sciatica 04/29/2021    Abdominal aortic atherosclerosis ([facility]) 04/29/2021    History of melanoma 04/29/2021    Hypercoagulability due to atrial fibrillation ([facility]) 04/29/2021    Full code status 04/29/2021    Urge incontinence 04/29/2021    Essential hypertension 11/15/2019    Mild mitral regurgitation 11/15/2019    Coronary artery calcification seen on CAT scan 09/16/2019    Chronic anticoagulation 08/14/2018    Low hemoglobin 03/27/2018    Hyperlipidemia 11/16/2017    Chronic seasonal allergic rhinitis due to pollen 11/16/2017    Osteoarthritis of right hip 07/06/2015    History of total left hip arthroplasty 05/21/2015    Osteoarthritis 05/21/2015    Osteoarthritis, multiple sites 05/21/2015       Current Outpatient Medications   Medication Sig Dispense Refill    simvastatin (ZOCOR) 40 MG Tab TAKE ONE TABLET BY  MOUTH IN THE EVENING 100 Tablet 3    bismuth subsalicylate (PEPTO-BISMOL) 262 MG/15ML Suspension Take 30 mL by mouth every 6 hours as needed (diahrea). Indications: Diarrhea      rivaroxaban (XARELTO) 20 MG Tab tablet TAKE ONE TABLET BY MOUTH ONE TIME DAILY WITH DINNER 100 Tablet 2    CALCIUM PO Take 1 Tablet by mouth every day. Indications: Nutritional supplement      Omega-3 Fatty Acids (FISH OIL) 1000 MG Cap capsule Take 1,000 mg by mouth every day. Indications: High Blood Pressure Disorder, Heart health      metoprolol tartrate (LOPRESSOR) 25 MG Tab TAKE TWO TABLETS BY MOUTH TWICE DAILY 400 Tablet 3    Multiple Vitamins-Minerals (VITRUM 50+ SENIOR MULTI PO) Take 1 Tablet by mouth every morning. Indications: Vitamin Deficiency      Cholecalciferol (VITAMIN D-3) 1000 UNITS CAPS Take 1,000 Units by mouth every morning. Indications: Vitamin D Deficiency       No current facility-administered medications for this visit.          Current supplements as per medication list.     Allergies: Citrus, Clindamycin, Oatmeal, and Scallops    Current social contact/activities: Lunch with friends, theater, volunteer at [facility]      She  reports that she quit smoking about 33 years ago. Her smoking use included cigarettes. She started smoking about 66 years ago. She has a 33 pack-year smoking history. She has never used smokeless tobacco. She reports that she does not currently use alcohol after a past usage of about 4.2 oz of alcohol per week. She reports that she does not use drugs.  Counseling given: Not Answered  Tobacco comments: Started smoking at age 23      ROS:    Gait: Uses no assistive device  Ostomy: No  Other tubes: No  Amputations: No  Chronic oxygen use: No  Last eye exam: Pt states its been over a year  Wears hearing aids: Yes   : Reports urinary leakage during the last 6 months that has not interfered at all with their daily activities or sleep.    Screenin  Depression Screening  Little interest or pleasure in  doing things?  0 - not at all  Feeling down, depressed , or hopeless? 0 - not at all  Patient Health Questionnaire Score: 0     If depressive symptoms identified deferred to follow up visit unless specifically addressed in assessment and plan.    Interpretation of PHQ-9 Total Score   Score Severity   1-4 No Depression   5-9 Mild Depression   10-14 Moderate Depression   15-19 Moderately Severe Depression   20-27 Severe Depression    Screening for Cognitive Impairment  Do you or any of your friends or family members have any concern about your memory? No  Three Minute Recall (Leader, Season, Table) 2/3    Jorje clock face with all 12 numbers and set the hands to show 10 minutes after 11.  Yes    Cognitive concerns identified deferred for follow up unless specifically addressed in assessment and plan.    Fall Risk Assessment  Has the patient had two or more falls in the last year or any fall with injury in the last year?  No    Safety Assessment  Do you always wear your seatbelt?  Yes  Any changes to home needed to function safely? No  Difficulty hearing.  No  Patient counseled about all safety risks that were identified.    Functional Assessment ADLs  Are there any barriers preventing you from cooking for yourself or meeting nutritional needs?  No.    Are there any barriers preventing you from driving safely or obtaining transportation?  No.    Are there any barriers preventing you from using a telephone or calling for help?  No    Are there any barriers preventing you from shopping?  No.    Are there any barriers preventing you from taking care of your own finances?  No    Are there any barriers preventing you from managing your medications?  No    Are there any barriers preventing you from showering, bathing or dressing yourself? No    Are there any barriers preventing you from doing housework or laundry? No  Are there any barriers preventing you from using the toilet?No  Are you currently engaging in any exercise or  physical activity?  Yes.      Self-Assessment of Health  What is your perception of your health? Good  Do you sleep more than six hours a night? Yes  In the past 7 days, how much did pain keep you from doing your normal work?    Do you spend quality time with family or friends (virtually or in person)? Yes  Do you usually eat a heart healthy diet that constists of a variety of fruits, vegetables, whole grains and fiber? Yes  Do you eat foods high in fat and/or Fast Food more than three times per week?      Advance Care Planning  Do you have an Advance Directive, Living Will, Durable Power of , or POLST? Yes  Advance Directive       is on file      Health Maintenance Summary            Overdue - Annual Wellness Visit (Yearly) Overdue since 12/5/2023 12/05/2022  Level of Service: MD ANNUAL WELLNESS VISIT-INCLUDES PPPS SUBSEQUE*    11/15/2019  Subsequent Annual Wellness Visit - Includes PPPS ()    11/02/2018  Visit Dx: Medicare annual wellness visit, subsequent    11/16/2017  Visit Dx: Medicare annual wellness visit, subsequent    11/22/2016  Done    Only the first 5 history entries have been loaded, but more history exists.              Bone Density Scan (Every 5 Years) Tentatively due on 5/26/2026 05/26/2021  DS-BONE DENSITY STUDY (DEXA)    12/07/2016  DS-BONE DENSITY STUDY (DEXA)    11/30/2015  Postponed              IMM DTaP/Tdap/Td Vaccine (2 - Td or Tdap) Next due on 11/22/2026 11/22/2016  Imm Admin: Tdap Vaccine              Pneumococcal Vaccine: 65+ Years (Series Information) Completed      11/16/2017  Imm Admin: Pneumococcal Conjugate Vaccine (Prevnar/PCV-13)    09/06/2016  Imm Admin: Pneumococcal polysaccharide vaccine (PPSV-23)    10/01/2002  Imm Admin: Pneumococcal polysaccharide vaccine (PPSV-23)              Zoster (Shingles) Vaccines (Series Information) Completed      11/03/2021  Imm Admin: Zoster Vaccine Recombinant (RZV) (SHINGRIX)    04/29/2021  Imm Admin: Zoster Vaccine  Recombinant (RZV) (SHINGRIX)              Influenza Vaccine (Series Information) Completed      12/13/2023  Imm Admin: Influenza Vaccine Adult HD    10/12/2022  Imm Admin: Influenza Vaccine Adult HD    10/19/2021  Imm Admin: Influenza Vaccine Adult HD    09/29/2020  Imm Admin: Influenza Vaccine Quad Inj (Pf)    09/24/2019  Imm Admin: Influenza, Unspecified - HISTORICAL DATA    Only the first 5 history entries have been loaded, but more history exists.              COVID-19 Vaccine (Series Information) Completed      01/26/2024  Imm Admin: Comirnaty (Covid-19 Vaccine, Mrna, [id number] Formula)    09/16/2022  Imm Admin: PFIZER BIVALENT SARS-COV-2 VACCINE (12+)    04/07/2022  Imm Admin: PFIZER SPEARS CAP SARS-COV-2 VACCINATION (12+)    09/28/2021  Imm Admin: PFIZER PURPLE CAP SARS-COV-2 VACCINATION (12+)    03/03/2021  Imm Admin: PFIZER PURPLE CAP SARS-COV-2 VACCINATION (12+)    Only the first 5 history entries have been loaded, but more history exists.              Hepatitis A Vaccine (Hep A) (Series Information) Aged Out      No completion history exists for this topic.              Hepatitis B Vaccine (Hep B) (Series Information) Aged Out      No completion history exists for this topic.              HPV Vaccines (Series Information) Aged Out      No completion history exists for this topic.              Polio Vaccine (Inactivated Polio) (Series Information) Aged Out      No completion history exists for this topic.              Meningococcal Immunization (Series Information) Aged Out      No completion history exists for this topic.              Discontinued - Mammogram  Discontinued        Frequency changed to Never automatically (Topic No Longer Applies)    05/26/2021  MA-SCREENING MAMMO BILAT W/TOMOSYNTHESIS W/CAD    06/18/2018  MA-MAMMO SCREENING BILAT W/BIBIANA W/CAD    01/17/2017  MA-MAMMO SCREENING BILAT W/BIBIANA W/CAD    12/17/2015  MA-SCREEN MAMMO W/CAD-BILAT    Only the first 5 history entries have been loaded,  but more history exists.              Discontinued - Colorectal Cancer Screening  Discontinued        Frequency changed to Never automatically (Topic No Longer Applies)    2015  Colonoscopy (Postponed)    2014  OCCULT BLOOD FECES IMMUNOASSAY                    Patient Care Team:  Kathy Garcia M.D. as PCP - General (Internal Medicine)  Brandon Pearl M.D. as PCP - [facility] Paneled  [facility] as Home Health Provider  Risa Bryant [facility] as          Social History     Tobacco Use    Smoking status: Former     Current packs/day: 0.00     Average packs/day: 1 pack/day for 33.0 years (33.0 ttl pk-yrs)     Types: Cigarettes     Start date: 1958     Quit date: 1991     Years since quittin.2    Smokeless tobacco: Never    Tobacco comments:     Started smoking at age 23   Vaping Use    Vaping Use: Never used   Substance Use Topics    Alcohol use: Not Currently     Alcohol/week: 4.2 oz     Types: 7 Glasses of wine per week     Comment: 1 glass wine/day    Drug use: No     Family History   Problem Relation Age of Onset    Cancer Father         GI    Heart Disease Father     Lung Disease Father     Stroke Sister     No Known Problems Son     No Known Problems Son     No Known Problems Maternal Grandmother     No Known Problems Maternal Grandfather     No Known Problems Paternal Grandmother     No Known Problems Paternal Grandfather     Hypertension Sister     Hyperlipidemia Sister      She  has a past medical history of Arthritis, Cancer (HCC), Functional diarrhea (2022), Heart murmur, High cholesterol, Nonrheumatic aortic valve stenosis (2024), Prediabetes (2023), Unspecified cataract, Unspecified urinary incontinence, Upper back pain on left side (2022), and Vitamin D deficiency (06/15/2023).   Past Surgical History:   Procedure Laterality Date    APPENDECTOMY LAPAROSCOPIC  10/10/2018    Procedure: APPENDECTOMY  LAPAROSCOPIC;  Surgeon: Sushil Dalton M.D.;  Location: SURGERY [facility];  Service: General    HIP ARTHROPLASTY TOTAL Left 7/6/2015    Procedure: HIP ARTHROPLASTY TOTAL;  Surgeon: Navid Howard M.D.;  Location: SURGERY [facility];  Service:     OTHER Right 2006    cyst & bone spur on right hand    EYE SURGERY      bilateral IOL       Exam:   There were no vitals taken for this visit. There is no height or weight on file to calculate BMI.    Hearing good.    Dentition good  Alert, oriented in no acute distress.  Eye contact is good, speech goal directed, affect calm    Constitutional: Alert, no distress, well-groomed.  Skin: Warm, dry, good turgor, no rashes in visible areas.  Eye: Equal, round and reactive, conjunctiva clear, lids normal.  ENMT: Lips without lesions, good dentition, moist mucous membranes.  Neck: Trachea midline, no masses, no thyromegaly.  Respiratory: Unlabored respiratory effort, no cough.  Abdomen: Soft, no gross masses.  MSK: Normal gait, moves all extremities.  Neuro: Grossly non-focal. No cranial nerve deficit. Strength and sensation intact.   Psych: Alert and oriented x3, normal affect and mood.      Assessment and Plan. The following treatment and monitoring plan is recommended:    There are no diagnoses linked to this encounter.      1. Encounter for annual wellness visit (AWV) in Medicare patient    - Subsequent Annual Wellness Visit - Includes PPPS ()    2. Peripheral vascular disease, unspecified (HCC)  Chronic.  Stable.  Treat risk factors.  - Subsequent Annual Wellness Visit - Includes PPPS ()    3. Hypercoagulable state due to paroxysmal atrial fibrillation (HCC)  Chronic.  Stable.  Continue plan of care per cardiology.  Xarelto.  - Subsequent Annual Wellness Visit - Includes PPPS ()    4. Prediabetes    - Subsequent Annual Wellness Visit - Includes PPPS ()  - HEMOGLOBIN A1C; Future    5. Dyslipidemia  Continue Zocor 40 mg daily.  Heart healthy diet.   Mediterranean diet. - Subsequent Annual Wellness Visit - Includes PPPS ()  - Lipid Profile; Future  - CBC WITH DIFFERENTIAL; Future  - Comp Metabolic Panel; Future    6. Aortic valve stenosis, etiology of cardiac valve disease unspecified  Cardiology records reviewed.  Patient will consider TAVR.  - Subsequent Annual Wellness Visit - Includes PPPS ()    7. Functional diarrhea  .  Continue to monitor.  Watch diet.  Probiotics.  - Subsequent Annual Wellness Visit - Includes PPPS ()  - TSH WITH REFLEX TO FT4; Future    8. Vitamin D deficiency    - Subsequent Annual Wellness Visit - Includes PPPS ()  - VITAMIN D,25 HYDROXY (DEFICIENCY); Future    Services suggested: No services needed at this time  Health Care Screening: Age-appropriate preventive services recommended by USPTF and ACIP covered by Medicare were discussed today. Services ordered if indicated and agreed upon by the patient.  Referrals offered: Community-based lifestyle interventions to reduce health risks and promote self-management and wellness, fall prevention, nutrition, physical activity, tobacco-use cessation, weight loss, and mental health services as per orders if indicated.    Discussion today about general wellness and lifestyle habits:    Prevent falls and reduce trip hazards; Cautioned about securing or removing rugs.  Have a working fire alarm and carbon monoxide detector;   Engage in regular physical activity and social activities     Follow-up: No follow-ups on file.

## 2024-04-26 ENCOUNTER — HOSPITAL ENCOUNTER (EMERGENCY)
Facility: MEDICAL CENTER | Age: 89
End: 2024-04-26
Attending: EMERGENCY MEDICINE
Payer: MEDICARE

## 2024-04-26 ENCOUNTER — APPOINTMENT (OUTPATIENT)
Dept: RADIOLOGY | Facility: MEDICAL CENTER | Age: 89
End: 2024-04-26
Attending: EMERGENCY MEDICINE
Payer: MEDICARE

## 2024-04-26 VITALS
RESPIRATION RATE: 18 BRPM | HEART RATE: 66 BPM | SYSTOLIC BLOOD PRESSURE: 166 MMHG | OXYGEN SATURATION: 94 % | DIASTOLIC BLOOD PRESSURE: 81 MMHG | HEIGHT: 63 IN | BODY MASS INDEX: 23.75 KG/M2 | TEMPERATURE: 98.2 F | WEIGHT: 134.04 LBS

## 2024-04-26 DIAGNOSIS — M54.50 ACUTE LEFT-SIDED LOW BACK PAIN WITHOUT SCIATICA: ICD-10-CM

## 2024-04-26 DIAGNOSIS — R19.7 DIARRHEA, UNSPECIFIED TYPE: ICD-10-CM

## 2024-04-26 LAB
ALBUMIN SERPL BCP-MCNC: 4 G/DL (ref 3.2–4.9)
ALBUMIN/GLOB SERPL: 1.4 G/DL
ALP SERPL-CCNC: 65 U/L (ref 30–99)
ALT SERPL-CCNC: 15 U/L (ref 2–50)
ANION GAP SERPL CALC-SCNC: 12 MMOL/L (ref 7–16)
APPEARANCE UR: CLEAR
AST SERPL-CCNC: 30 U/L (ref 12–45)
BASOPHILS # BLD AUTO: 0.5 % (ref 0–1.8)
BASOPHILS # BLD: 0.04 K/UL (ref 0–0.12)
BILIRUB SERPL-MCNC: 0.3 MG/DL (ref 0.1–1.5)
BILIRUB UR QL STRIP.AUTO: NEGATIVE
BUN SERPL-MCNC: 15 MG/DL (ref 8–22)
CALCIUM ALBUM COR SERPL-MCNC: 9.6 MG/DL (ref 8.5–10.5)
CALCIUM SERPL-MCNC: 9.6 MG/DL (ref 8.5–10.5)
CHLORIDE SERPL-SCNC: 102 MMOL/L (ref 96–112)
CO2 SERPL-SCNC: 25 MMOL/L (ref 20–33)
COLOR UR: YELLOW
CREAT SERPL-MCNC: 0.65 MG/DL (ref 0.5–1.4)
EOSINOPHIL # BLD AUTO: 0.16 K/UL (ref 0–0.51)
EOSINOPHIL NFR BLD: 2.1 % (ref 0–6.9)
ERYTHROCYTE [DISTWIDTH] IN BLOOD BY AUTOMATED COUNT: 52.9 FL (ref 35.9–50)
GFR SERPLBLD CREATININE-BSD FMLA CKD-EPI: 84 ML/MIN/1.73 M 2
GLOBULIN SER CALC-MCNC: 2.9 G/DL (ref 1.9–3.5)
GLUCOSE SERPL-MCNC: 107 MG/DL (ref 65–99)
GLUCOSE UR STRIP.AUTO-MCNC: NEGATIVE MG/DL
HCT VFR BLD AUTO: 44.5 % (ref 37–47)
HGB BLD-MCNC: 14.5 G/DL (ref 12–16)
IMM GRANULOCYTES # BLD AUTO: 0.02 K/UL (ref 0–0.11)
IMM GRANULOCYTES NFR BLD AUTO: 0.3 % (ref 0–0.9)
KETONES UR STRIP.AUTO-MCNC: NEGATIVE MG/DL
LEUKOCYTE ESTERASE UR QL STRIP.AUTO: NEGATIVE
LIPASE SERPL-CCNC: 19 U/L (ref 11–82)
LYMPHOCYTES # BLD AUTO: 1.74 K/UL (ref 1–4.8)
LYMPHOCYTES NFR BLD: 23.2 % (ref 22–41)
MCH RBC QN AUTO: 33.3 PG (ref 27–33)
MCHC RBC AUTO-ENTMCNC: 32.6 G/DL (ref 32.2–35.5)
MCV RBC AUTO: 102.3 FL (ref 81.4–97.8)
MICRO URNS: NORMAL
MONOCYTES # BLD AUTO: 0.84 K/UL (ref 0–0.85)
MONOCYTES NFR BLD AUTO: 11.2 % (ref 0–13.4)
NEUTROPHILS # BLD AUTO: 4.7 K/UL (ref 1.82–7.42)
NEUTROPHILS NFR BLD: 62.7 % (ref 44–72)
NITRITE UR QL STRIP.AUTO: NEGATIVE
NRBC # BLD AUTO: 0 K/UL
NRBC BLD-RTO: 0 /100 WBC (ref 0–0.2)
PH UR STRIP.AUTO: 5 [PH] (ref 5–8)
PLATELET # BLD AUTO: 194 K/UL (ref 164–446)
PMV BLD AUTO: 10.9 FL (ref 9–12.9)
POTASSIUM SERPL-SCNC: 4.3 MMOL/L (ref 3.6–5.5)
PROT SERPL-MCNC: 6.9 G/DL (ref 6–8.2)
PROT UR QL STRIP: NEGATIVE MG/DL
RBC # BLD AUTO: 4.35 M/UL (ref 4.2–5.4)
RBC UR QL AUTO: NEGATIVE
SODIUM SERPL-SCNC: 139 MMOL/L (ref 135–145)
SP GR UR STRIP.AUTO: 1.03
UROBILINOGEN UR STRIP.AUTO-MCNC: 0.2 MG/DL
WBC # BLD AUTO: 7.5 K/UL (ref 4.8–10.8)

## 2024-04-26 PROCEDURE — 700102 HCHG RX REV CODE 250 W/ 637 OVERRIDE(OP): Performed by: EMERGENCY MEDICINE

## 2024-04-26 PROCEDURE — 36415 COLL VENOUS BLD VENIPUNCTURE: CPT

## 2024-04-26 PROCEDURE — 85025 COMPLETE CBC W/AUTO DIFF WBC: CPT

## 2024-04-26 PROCEDURE — 74177 CT ABD & PELVIS W/CONTRAST: CPT

## 2024-04-26 PROCEDURE — 80053 COMPREHEN METABOLIC PANEL: CPT

## 2024-04-26 PROCEDURE — 81003 URINALYSIS AUTO W/O SCOPE: CPT

## 2024-04-26 PROCEDURE — 99285 EMERGENCY DEPT VISIT HI MDM: CPT

## 2024-04-26 PROCEDURE — 700117 HCHG RX CONTRAST REV CODE 255: Performed by: EMERGENCY MEDICINE

## 2024-04-26 PROCEDURE — 83690 ASSAY OF LIPASE: CPT

## 2024-04-26 PROCEDURE — A9270 NON-COVERED ITEM OR SERVICE: HCPCS | Performed by: EMERGENCY MEDICINE

## 2024-04-26 RX ORDER — CYCLOBENZAPRINE HCL 5 MG
5-10 TABLET ORAL 3 TIMES DAILY PRN
Qty: 20 TABLET | Refills: 0 | Status: SHIPPED | OUTPATIENT
Start: 2024-04-26 | End: 2024-04-30 | Stop reason: SDUPTHER

## 2024-04-26 RX ORDER — DICYCLOMINE HCL 20 MG
20 TABLET ORAL EVERY 6 HOURS PRN
Qty: 20 TABLET | Refills: 0 | Status: SHIPPED | OUTPATIENT
Start: 2024-04-26

## 2024-04-26 RX ORDER — DICYCLOMINE HCL 20 MG
20 TABLET ORAL ONCE
Status: COMPLETED | OUTPATIENT
Start: 2024-04-26 | End: 2024-04-26

## 2024-04-26 RX ORDER — CYCLOBENZAPRINE HCL 10 MG
5 TABLET ORAL ONCE
Status: COMPLETED | OUTPATIENT
Start: 2024-04-26 | End: 2024-04-26

## 2024-04-26 RX ADMIN — DICYCLOMINE HYDROCHLORIDE 20 MG: 20 TABLET ORAL at 20:12

## 2024-04-26 RX ADMIN — CYCLOBENZAPRINE 5 MG: 10 TABLET, FILM COATED ORAL at 18:25

## 2024-04-26 RX ADMIN — IOHEXOL 100 ML: 350 INJECTION, SOLUTION INTRAVENOUS at 19:34

## 2024-04-26 ASSESSMENT — FIBROSIS 4 INDEX: FIB4 SCORE: 3.62

## 2024-04-26 NOTE — ED TRIAGE NOTES
.  Chief Complaint   Patient presents with    Low Back Pain     Left lower back pain x 9 days.   Denies trauma.     Diarrhea     X 9 days   Denies blood in stool    Abdominal Pain     +bloated. Intermittent LLQ abd pain x 9 days   -N/V.      Denies any recent abx use.   HX of functional diarrhea as of 4/9 from PCP.   Pt amb to triage for above.   Pt appropriate oral intake.   Aox4, GCS15 and speaking in complete sentences. Protocol ordered. Pt has been working in the yard and wonders if that where the back pain came from.   Eductaed on triage process and to alert staff if anyhtign changes. Pt appears to be in no discomfort in triage.

## 2024-04-27 NOTE — ED PROVIDER NOTES
"ER Provider Note    Scribed for Nick Gomes M.D. by Brice Lamar. 4/26/2024   5:35 PM    Primary Care Provider: Kathy Garcia M.D.    CHIEF COMPLAINT  Chief Complaint   Patient presents with    Low Back Pain     Left lower back pain x 9 days.   Denies trauma.     Diarrhea     X 9 days   Denies blood in stool    Abdominal Pain     +bloated. Intermittent LLQ abd pain x 9 days   -N/V.      EXTERNAL RECORDS REVIEWED  Outpatient Notes PCP on 4-9-2024, was diagnosed with functional diarrhea.    HPI/ROS  LIMITATION TO HISTORY   Select: : None  OUTSIDE HISTORIAN(S):  Family present at bedside.    Lisa Garza is a 88 y.o. female who presents to the ED for evaluation of lower back pain onset nine days ago. Patient reports that her pain came on as left lower back pain which she descirbes as constant, and becomes \"sharp\" if she sits uproight for extended periods of time. She reports pain exacerbation with movement, and denies any pain radiation down her legs. She has been treating her pain with Tylenol and heating pads, and has contacted [facility], who came out and prescribed lidocaine patches and muscle relaxer, which the patient has not yet attempted for pain control. Patient notes that she was working in the yard last week, but did not have any inujries at that time. Patient adds that she has been having liquid like diarrhea, and has felt gasy and bloated at times. She denies any abdominal pain, nausea, vomiting, fever, decreased PO intake, dysuria, or hematuria. She has followed up with her primary care physician for these concerns, but has not followed up with GI. Patient denies any recent antibiotic use. She takes a blood thinner for her history of atrial fibrillation.      PAST MEDICAL HISTORY  Past Medical History:   Diagnosis Date    Arthritis     osteo, hip    Cancer (HCC)     skin on left shoulder    Diarrhea 04/09/2024    Functional diarrhea 12/12/2022    Heart murmur     High cholesterol     " Nonrheumatic aortic valve stenosis 01/25/2024    Prediabetes 08/16/2023    Unspecified cataract     tony IOL    Unspecified urinary incontinence     Upper back pain on left side 12/12/2022    Vitamin D deficiency 06/15/2023       SURGICAL HISTORY  Past Surgical History:   Procedure Laterality Date    APPENDECTOMY LAPAROSCOPIC  10/10/2018    Procedure: APPENDECTOMY LAPAROSCOPIC;  Surgeon: Sushil Dalton M.D.;  Location: SURGERY [facility];  Service: General    HIP ARTHROPLASTY TOTAL Left 7/6/2015    Procedure: HIP ARTHROPLASTY TOTAL;  Surgeon: Navid Howard M.D.;  Location: SURGERY [facility];  Service:     OTHER Right 2006    cyst & bone spur on right hand    EYE SURGERY      bilateral IOL       FAMILY HISTORY  Family History   Problem Relation Age of Onset    Cancer Father         GI    Heart Disease Father     Lung Disease Father     Stroke Sister     No Known Problems Son     No Known Problems Son     No Known Problems Maternal Grandmother     No Known Problems Maternal Grandfather     No Known Problems Paternal Grandmother     No Known Problems Paternal Grandfather     Hypertension Sister     Hyperlipidemia Sister        SOCIAL HISTORY   reports that she quit smoking about 33 years ago. Her smoking use included cigarettes. She started smoking about 66 years ago. She has a 33 pack-year smoking history. She has never used smokeless tobacco. She reports that she does not currently use alcohol after a past usage of about 4.2 oz of alcohol per week. She reports that she does not use drugs.    CURRENT MEDICATIONS  Discharge Medication List as of 4/26/2024  8:22 PM        CONTINUE these medications which have NOT CHANGED    Details   simvastatin (ZOCOR) 40 MG Tab TAKE ONE TABLET BY MOUTH IN THE EVENING, Disp-100 Tablet, R-3, Normal      bismuth subsalicylate (PEPTO-BISMOL) 262 MG/15ML Suspension Take 30 mL by mouth every 6 hours as needed (diahrea). Indications: Diarrhea, Historical Med      rivaroxaban  "(XARELTO) 20 MG Tab tablet TAKE ONE TABLET BY MOUTH ONE TIME DAILY WITH DINNER, Disp-100 Tablet, R-2, Normal      CALCIUM PO Take 1 Tablet by mouth every day. Indications: Nutritional supplement, Historical Med      Omega-3 Fatty Acids (FISH OIL) 1000 MG Cap capsule Take 1,000 mg by mouth every day. Indications: High Blood Pressure Disorder, Heart health, Historical Med      metoprolol tartrate (LOPRESSOR) 25 MG Tab TAKE TWO TABLETS BY MOUTH TWICE DAILY, Disp-400 Tablet, R-3, Normal      Multiple Vitamins-Minerals (VITRUM 50+ SENIOR MULTI PO) Take 1 Tablet by mouth every morning. Indications: Vitamin Deficiency, Historical Med      Cholecalciferol (VITAMIN D-3) 1000 UNITS CAPS Take 1,000 Units by mouth every morning. Indications: Vitamin D Deficiency, Historical Med             ALLERGIES  Allergies   Allergen Reactions    [place] Unspecified     Itchy welts      Clindamycin Unspecified     Pt unsure of reaction    Oatmeal Diarrhea     Gas      Scallops Vomiting        PHYSICAL EXAM  /81   Pulse 72   Temp 36.9 °C (98.5 °F) (Temporal)   Resp 14   Ht 1.6 m (5' 3\")   Wt 60.8 kg (134 lb 0.6 oz)   SpO2 93%   BMI 23.74 kg/m²    Constitutional: Well developed, Well nourished, Mild distress,    HENT: Normocephalic, Atraumatic   Eyes: PERRLA, EOMI, Conjunctiva normal, No discharge.   Neck: No tenderness, Supple, No stridor.   Cardiovascular: Normal heart rate, Normal rhythm.   Thorax & Lungs: Clear to auscultation bilaterally, No respiratory distress.   Abdomen: Soft, Mild left lower quadrant tenderness to palpation, No masses.   Skin: Warm, Dry, No rash.    Musculoskeletal: No major deformities noted. Slight tenderness on the posterior superior iliac crest area from lumbar spine.   Neurologic: Awake, alert. Moves all extremities spontaneously.  Psychiatric: Affect normal, Judgment normal, Mood normal.      DIAGNOSTIC STUDIES    Labs:   Results for orders placed or performed during the hospital encounter of " 04/26/24   CBC with Differential   Result Value Ref Range    WBC 7.5 4.8 - 10.8 K/uL    RBC 4.35 4.20 - 5.40 M/uL    Hemoglobin 14.5 12.0 - 16.0 g/dL    Hematocrit 44.5 37.0 - 47.0 %    .3 (H) 81.4 - 97.8 fL    MCH 33.3 (H) 27.0 - 33.0 pg    MCHC 32.6 32.2 - 35.5 g/dL    RDW 52.9 (H) 35.9 - 50.0 fL    Platelet Count 194 164 - 446 K/uL    MPV 10.9 9.0 - 12.9 fL    Neutrophils-Polys 62.70 44.00 - 72.00 %    Lymphocytes 23.20 22.00 - 41.00 %    Monocytes 11.20 0.00 - 13.40 %    Eosinophils 2.10 0.00 - 6.90 %    Basophils 0.50 0.00 - 1.80 %    Immature Granulocytes 0.30 0.00 - 0.90 %    Nucleated RBC 0.00 0.00 - 0.20 /100 WBC    Neutrophils (Absolute) 4.70 1.82 - 7.42 K/uL    Lymphs (Absolute) 1.74 1.00 - 4.80 K/uL    Monos (Absolute) 0.84 0.00 - 0.85 K/uL    Eos (Absolute) 0.16 0.00 - 0.51 K/uL    Baso (Absolute) 0.04 0.00 - 0.12 K/uL    Immature Granulocytes (abs) 0.02 0.00 - 0.11 K/uL    NRBC (Absolute) 0.00 K/uL   Complete Metabolic Panel   Result Value Ref Range    Sodium 139 135 - 145 mmol/L    Potassium 4.3 3.6 - 5.5 mmol/L    Chloride 102 96 - 112 mmol/L    Co2 25 20 - 33 mmol/L    Anion Gap 12.0 7.0 - 16.0    Glucose 107 (H) 65 - 99 mg/dL    Bun 15 8 - 22 mg/dL    Creatinine 0.65 0.50 - 1.40 mg/dL    Calcium 9.6 8.5 - 10.5 mg/dL    Correct Calcium 9.6 8.5 - 10.5 mg/dL    AST(SGOT) 30 12 - 45 U/L    ALT(SGPT) 15 2 - 50 U/L    Alkaline Phosphatase 65 30 - 99 U/L    Total Bilirubin 0.3 0.1 - 1.5 mg/dL    Albumin 4.0 3.2 - 4.9 g/dL    Total Protein 6.9 6.0 - 8.2 g/dL    Globulin 2.9 1.9 - 3.5 g/dL    A-G Ratio 1.4 g/dL   Lipase   Result Value Ref Range    Lipase 19 11 - 82 U/L   Urinalysis    Specimen: Urine   Result Value Ref Range    Color Yellow     Character Clear     Specific Gravity 1.031 <1.035    Ph 5.0 5.0 - 8.0    Glucose Negative Negative mg/dL    Ketones Negative Negative mg/dL    Protein Negative Negative mg/dL    Bilirubin Negative Negative    Urobilinogen, Urine 0.2 Negative    Nitrite  Negative Negative    Leukocyte Esterase Negative Negative    Occult Blood Negative Negative    Micro Urine Req see below    ESTIMATED GFR   Result Value Ref Range    GFR (CKD-EPI) 84 >60 mL/min/1.73 m 2       Radiology:   This attending emergency physician has independently interpreted the diagnostic imaging associated with this visit and is awaiting the final reading from the radiologist.   Preliminary interpretation is a follows: No obstruction  Radiologist interpretation:   CT-ABDOMEN-PELVIS WITH   Final Result         1. No acute inflammatory change in the abdomen or pelvis.      2. Sigmoid diverticulosis.             COURSE & MEDICAL DECISION MAKING         INITIAL ASSESSMENT, COURSE AND PLAN  Differential diagnoses include but not limited to: Diverticulitis vs. metabolic vs. musculoskeletal.      Care Narrative: Patient is coming in with left posterior superior iliac crest pain, likely low back pain, improved after Flexeril.  CT scan was unremarkable, laboratory test were unremarkable.  Discussed with the musculoskeletal back pain, functional diarrhea, will put the patient on Bentyl, referral to GI, return with worsening symptoms.    5:35 PM - Patient was evaluated at bedside. Patient presents today with left lower back pain and diarrhea. She has treated at home with tylenol and heating pads, and has not yet obtained prescriptions for lidocaine patches and muscle relaxer. See HPI for further details. Ordered for UA, Lipase, CMP, CBC with differential, and CT-Abdomen pelvis with to evaluate. The patient will be medicated with oral and Bentyl for her symptoms. Patient verbalizes understanding and support with my plan of care.           DISPOSITION AND DISCUSSIONS    Decision tools and prescription drugs considered including, but not limited to: Bentyl and muscle relaxers.        FINAL DIAGNOSIS  1. Acute left-sided low back pain without sciatica    2. Diarrhea, unspecified type         I, Brice Lamar  (Scribe), am scribing for, and in the presence of, Nick Gomes M.D..    Electronically signed by: Brice Lamar (Scribe), 4/26/2024    I, Nick Gomes M.D. personally performed the services described in this documentation, as scribed by Brice Lamar in my presence, and it is both accurate and complete.      The note accurately reflects work and decisions made by me.  Nick Gomes M.D.  4/26/2024  9:32 PM

## 2024-04-27 NOTE — ED NOTES
"Pt discharged home. IV discontinued and gauze placed, pt in possession of belongings. Pt provided discharge education and information pertaining to medications and follow up appointments. Pt received copy of discharge instructions and verbalized understanding. BP (!) 166/81   Pulse 66   Temp 36.8 °C (98.2 °F) (Temporal)   Resp 18   Ht 1.6 m (5' 3\")   Wt 60.8 kg (134 lb 0.6 oz)   SpO2 94%   BMI 23.74 kg/m²     "

## 2024-04-30 ENCOUNTER — OFFICE VISIT (OUTPATIENT)
Dept: MEDICAL GROUP | Facility: PHYSICIAN GROUP | Age: 89
End: 2024-04-30
Payer: MEDICARE

## 2024-04-30 VITALS
BODY MASS INDEX: 23.41 KG/M2 | OXYGEN SATURATION: 93 % | DIASTOLIC BLOOD PRESSURE: 60 MMHG | RESPIRATION RATE: 16 BRPM | SYSTOLIC BLOOD PRESSURE: 118 MMHG | TEMPERATURE: 98.6 F | WEIGHT: 132.1 LBS | HEIGHT: 63 IN | HEART RATE: 66 BPM

## 2024-04-30 DIAGNOSIS — M54.50 ACUTE LEFT-SIDED LOW BACK PAIN WITHOUT SCIATICA: ICD-10-CM

## 2024-04-30 DIAGNOSIS — I48.91 ATRIAL FIBRILLATION WITH RVR (HCC): ICD-10-CM

## 2024-04-30 DIAGNOSIS — K59.1 FUNCTIONAL DIARRHEA: ICD-10-CM

## 2024-04-30 RX ORDER — CYCLOBENZAPRINE HCL 5 MG
5-10 TABLET ORAL 3 TIMES DAILY PRN
Qty: 60 TABLET | Refills: 0 | Status: SHIPPED | OUTPATIENT
Start: 2024-04-30

## 2024-04-30 RX ORDER — METHYLPREDNISOLONE 4 MG/1
TABLET ORAL
Qty: 21 TABLET | Refills: 0 | Status: SHIPPED | OUTPATIENT
Start: 2024-04-30

## 2024-04-30 ASSESSMENT — FIBROSIS 4 INDEX: FIB4 SCORE: 3.51

## 2024-04-30 NOTE — PROGRESS NOTES
CC: Low back pain, edema visit.  Ongoing diarrhea.    HPI:  Lisa presents with the following    1. Acute left-sided low back pain without sciatica  Patient presented to the ER on April 26, 2022 with severe acute low back pain present for the last 2 weeks.  Symptoms are worse with movement.  Able to sleep.    Patient was working in the yard 1 day prior.  No injury no fall.  Patient was lifting pavers.  Patient denies any lower extremity weakness or sciatica.  Patient has been using Tylenol and heating pads which help.  CT scan of the abdomen pelvis was also completed which was negative.  Patient has used up all her Flexeril.  Using also lidocaine pads.  Pain can be as worse is 9/10.  Only slowly improving.    2. Functional diarrhea  4/9/24:Patient had 3 episodes of explosive diarrhea that lasted only 1 episode. First 1 in November 9, and then March 3, April 5. Patient denies blood in stool, abdominal pain, nausea, vomiting or GERD.     4/30/2024:.  Patient's symptoms are now more frequent.  Patient had episodes on April 5, 2018, 20, 24 and 26.  Patient would like referral to GI.      Patient Active Problem List    Diagnosis Date Noted    Diarrhea 04/09/2024    Secondary hypercoagulable state ([facility]) 02/15/2024    PAF (paroxysmal atrial fibrillation) ([facility]) 02/15/2024    Aortic stenosis 02/15/2024    Acute hypoxemic respiratory failure due to COVID-19 ([facility]) 09/26/2023    ACP (advance care planning) 09/26/2023    Prediabetes 08/16/2023    Dyslipidemia 08/03/2023    Diplopia 07/11/2023    Vitamin D deficiency 06/15/2023    Upper back pain on left side 12/12/2022    Functional diarrhea 12/12/2022    BMI 23.0-23.9, adult 12/05/2022    Peripheral vascular disease, unspecified ([facility]) 12/05/2022    Chest pain 08/10/2022    Mitral stenosis 01/17/2022    Osteopenia 11/03/2021    Low back pain without sciatica 04/29/2021    Abdominal aortic atherosclerosis (HCC) 04/29/2021    History of melanoma 04/29/2021     Hypercoagulability due to atrial fibrillation (HCC) 04/29/2021    Full code status 04/29/2021    Urge incontinence 04/29/2021    Essential hypertension 11/15/2019    Mild mitral regurgitation 11/15/2019    Coronary artery calcification seen on CAT scan 09/16/2019    Chronic anticoagulation 08/14/2018    Low hemoglobin 03/27/2018    Hyperlipidemia 11/16/2017    Chronic seasonal allergic rhinitis due to pollen 11/16/2017    Osteoarthritis of right hip 07/06/2015    History of total left hip arthroplasty 05/21/2015    Osteoarthritis 05/21/2015    Osteoarthritis, multiple sites 05/21/2015       Current Outpatient Medications   Medication Sig Dispense Refill    methylPREDNISolone (MEDROL DOSEPAK) 4 MG Tablet Therapy Pack As directed on the packaging label. 21 Tablet 0    cyclobenzaprine (FLEXERIL) 5 mg tablet Take 1-2 Tablets by mouth 3 times a day as needed for Mild Pain or Moderate Pain. 60 Tablet 0    dicyclomine (BENTYL) 20 MG Tab Take 1 Tablet by mouth every 6 hours as needed (abdominal pain). 20 Tablet 0    simvastatin (ZOCOR) 40 MG Tab TAKE ONE TABLET BY MOUTH IN THE EVENING 100 Tablet 3    bismuth subsalicylate (PEPTO-BISMOL) 262 MG/15ML Suspension Take 30 mL by mouth every 6 hours as needed (diahrea). Indications: Diarrhea      rivaroxaban (XARELTO) 20 MG Tab tablet TAKE ONE TABLET BY MOUTH ONE TIME DAILY WITH DINNER 100 Tablet 2    CALCIUM PO Take 1 Tablet by mouth every day. Indications: Nutritional supplement      Omega-3 Fatty Acids (FISH OIL) 1000 MG Cap capsule Take 1,000 mg by mouth every day. Indications: High Blood Pressure Disorder, Heart health      metoprolol tartrate (LOPRESSOR) 25 MG Tab TAKE TWO TABLETS BY MOUTH TWICE DAILY 400 Tablet 3    Multiple Vitamins-Minerals (VITRUM 50+ SENIOR MULTI PO) Take 1 Tablet by mouth every morning. Indications: Vitamin Deficiency      Cholecalciferol (VITAMIN D-3) 1000 UNITS CAPS Take 1,000 Units by mouth every morning. Indications: Vitamin D Deficiency       No  current facility-administered medications for this visit.         Allergies as of 2024 - Reviewed 2024   Allergen Reaction Noted    [place] Unspecified 08/10/2022    Clindamycin Unspecified 2023    Oatmeal Diarrhea 08/10/2022    Scallops Vomiting 08/10/2022        Social History     Socioeconomic History    Marital status:      Spouse name: Not on file    Number of children: Not on file    Years of education: Not on file    Highest education level: Not on file   Occupational History    Not on file   Tobacco Use    Smoking status: Former     Current packs/day: 0.00     Average packs/day: 1 pack/day for 33.0 years (33.0 ttl pk-yrs)     Types: Cigarettes     Start date: 1958     Quit date: 1991     Years since quittin.3    Smokeless tobacco: Never    Tobacco comments:     Started smoking at age 23   Vaping Use    Vaping Use: Never used   Substance and Sexual Activity    Alcohol use: Not Currently     Alcohol/week: 4.2 oz     Types: 7 Glasses of wine per week     Comment: 1 glass wine/day    Drug use: No    Sexual activity: Never     Partners: Male   Other Topics Concern    Not on file   Social History Narrative    She lost her   to Alzheimer's. She lives alone in her own home, 1 story. Has a son who lives nearby. She does all ADLs and IADLs w/o difficulty though recently hired a  to do her outside work. 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 She enjoys reading, learning to play the piano.      Social Determinants of Health     Financial Resource Strain: Not on file   Food Insecurity: Not on file   Transportation Needs: Not on file   Physical Activity: Not on file   Stress: Not on file   Social Connections: Feeling Socially Integrated (10/24/2023)    OASIS : Social Isolation     Frequency of experiencing loneliness or isolation: Never   Intimate Partner Violence: Not on file   Housing Stability: Not on file       Family History   Problem  "Relation Age of Onset    Cancer Father         GI    Heart Disease Father     Lung Disease Father     Stroke Sister     No Known Problems Son     No Known Problems Son     No Known Problems Maternal Grandmother     No Known Problems Maternal Grandfather     No Known Problems Paternal Grandmother     No Known Problems Paternal Grandfather     Hypertension Sister     Hyperlipidemia Sister        Past Surgical History:   Procedure Laterality Date    APPENDECTOMY LAPAROSCOPIC  10/10/2018    Procedure: APPENDECTOMY LAPAROSCOPIC;  Surgeon: Sushil Dalton M.D.;  Location: SURGERY [facility];  Service: General    HIP ARTHROPLASTY TOTAL Left 7/6/2015    Procedure: HIP ARTHROPLASTY TOTAL;  Surgeon: Navid Howard M.D.;  Location: SURGERY [facility];  Service:     OTHER Right 2006    cyst & bone spur on right hand    EYE SURGERY      bilateral IOL       ROS: Positive ROS per HPI.  Denies any Headache,Chest pain,  Shortness of breath,  Abdominal pain, Changes of bowel or bladder, Lower ext edema, Fevers, Nights sweats, Weight Changes, Focal weakness or numbness.  All other systems are negative.    /60 (BP Location: Right arm, Patient Position: Sitting)   Pulse 66   Temp 37 °C (98.6 °F) (Temporal)   Resp 16   Ht 1.6 m (5' 3\")   Wt 59.9 kg (132 lb 1.6 oz)   SpO2 93%   BMI 23.40 kg/m²      Constitutional: Alert, no distress, well-groomed.  Skin: Warm, dry, good turgor, no rashes in visible areas.  Eye: Equal, round and reactive, conjunctiva clear, lids normal.  ENMT: Lips without lesions, good dentition, moist mucous membranes.  Neck: Trachea midline, no masses, no thyromegaly.  Respiratory: Unlabored respiratory effort, no cough.  Abdomen: Soft, no gross masses.  MSK: Slow rise from chair.  Moderate tenderness to palpation left-sided paravertebral muscle group between L1-L4.  Reproducible pain.  Neuro: Grossly non-focal. No cranial nerve deficit. Strength and sensation intact.   Psych: Alert and oriented " x3, normal affect and mood.      Assessment and Plan.   88 y.o. female presenting with the following.     1. Acute left-sided low back pain without sciatica  Continue with heat, lidocaine.  Refill prescription for Flexeril to use as needed every 8 hours.  Medrol Dosepak provided.  Care taken with concurrent use with Xarelto.  Tylenol as needed for pain relief.  UC/ER precautions provided.  - cyclobenzaprine (FLEXERIL) 5 mg tablet; Take 1-2 Tablets by mouth 3 times a day as needed for Mild Pain or Moderate Pain.  Dispense: 60 Tablet; Refill: 0    2. Functional diarrhea  Patient did not use Bentyl which was provided from the ER.  Patient will continue to try Metamucil.  Referral to GI for further evaluation and treatment options.    - Referral to Gastroenterology

## 2024-04-30 NOTE — TELEPHONE ENCOUNTER
Received request via: Pharmacy    Was the patient seen in the last year in this department? Yes    Does the patient have an active prescription (recently filled or refills available) for medication(s) requested? No    Pharm[facility] Name: [facility]    Does the patient have detention Plus and need 100 day supply (blood pressure, diabetes and cholesterol meds only)? Yes, quantity updated to 100 days

## 2024-05-07 NOTE — ED NOTES
2nd PIV placed, blood/BC x 1 set drawn and sent to the lab.   collected 2nd set BC.  Awaiting ERP eval.  Pt placed on o2 @ 2 for spo2 dropping 88-89% while sleeping.  
Bedside report received from SANDIP Lester. Assumed care of pt. Pt resting in gurney, respirations even and unlabored. Pt A&Ox4, on 2L O2 NC, cardiac monitor and vital equipment in place. Fall precautions in place, call light within reach.   Pt to B14 for droplet isolation.   Bedside report given to SANDIP Wilson.   
Bedside report received from off going RN: Dusty, assumed care of patient.  POC discussed with patient. Call light within reach, all needs addressed at this time.     Fall risk interventions in place: Move the patient closer to the nurse's station, Patient's personal possessions are with in their safe reach, Place socks on patient, and Keep floor surfaces clean and dry (all applicable per [place] Fall risk assessment)   Continuous monitoring: Cardiac Leads, Pulse Ox, or Blood Pressure  IVF/IV medications: LR  Oxygen: 2L nasal canula  Bedside sitter: Not Applicable   Isolation: Isolation precautions for Covid (Isolation order)            
ERP bedside at this time.  Covid swab collected and sent to the lab.  
Female mini cath complete, specimen x 2 sent to the lab.  Pt updated on POC, all needs met.  Pt repositioned.  
Lab requesting recollect on viral swab.  New swab collected by EDT and sent to the lab.  
Lr hung per mar.  Pt updating son by phone.  All needs met.  
Med rec updated and complete. Allergies reviewed. Confirmed name and date of birth.  Pt denies antibiotic use in last 30 days.  Last dose of Xarelto 09/25/23    Home  pharmacy  [facility] = [phone]  
Oxygen titrated off   
Pt ambulated to and from restroom and back to bed with the assistance of a walker. Pt placed back on the monitor, call light within reach  
Pt ambulated to restroom and back to bed. Pt oxygen dropped to a low of 88% with ambulation. Pt noted to have room air saturation of 95%. Pt's oxygen decreases when the patient coughs to 87%. ERP notified.    
Pt found to have oxygen saturation of 86% on RA, pt placed back on 2L  
Pt medicated per MAR  
Pt medicated per MAR  
Pt resting comfortably, unable to void.  VSS.  
Report to SANDIP Cesar  All questions answered  Pt to be transported upstairs  
Transport at bedside to take pt upstairs  At time of transport pt has equal and unlabored respirations. Pt is resting in bed with a mask on and proper PPE is in place   
show

## 2024-05-09 ENCOUNTER — OFFICE VISIT (OUTPATIENT)
Dept: URGENT CARE | Facility: CLINIC | Age: 89
End: 2024-05-09
Payer: MEDICARE

## 2024-05-09 ENCOUNTER — APPOINTMENT (OUTPATIENT)
Dept: RADIOLOGY | Facility: IMAGING CENTER | Age: 89
End: 2024-05-09
Attending: NURSE PRACTITIONER
Payer: MEDICARE

## 2024-05-09 VITALS
TEMPERATURE: 97.8 F | SYSTOLIC BLOOD PRESSURE: 112 MMHG | BODY MASS INDEX: 23.04 KG/M2 | DIASTOLIC BLOOD PRESSURE: 60 MMHG | HEIGHT: 63 IN | RESPIRATION RATE: 12 BRPM | WEIGHT: 130 LBS | HEART RATE: 78 BPM | OXYGEN SATURATION: 94 %

## 2024-05-09 DIAGNOSIS — M51.36 DEGENERATIVE LUMBAR DISC: ICD-10-CM

## 2024-05-09 DIAGNOSIS — M54.50 ACUTE LEFT-SIDED LOW BACK PAIN WITHOUT SCIATICA: ICD-10-CM

## 2024-05-09 PROCEDURE — 3074F SYST BP LT 130 MM HG: CPT | Performed by: NURSE PRACTITIONER

## 2024-05-09 PROCEDURE — 3078F DIAST BP <80 MM HG: CPT | Performed by: NURSE PRACTITIONER

## 2024-05-09 PROCEDURE — 72100 X-RAY EXAM L-S SPINE 2/3 VWS: CPT | Mod: TC | Performed by: NURSE PRACTITIONER

## 2024-05-09 PROCEDURE — 99213 OFFICE O/P EST LOW 20 MIN: CPT | Performed by: NURSE PRACTITIONER

## 2024-05-09 ASSESSMENT — FIBROSIS 4 INDEX: FIB4 SCORE: 3.51

## 2024-05-09 ASSESSMENT — ENCOUNTER SYMPTOMS
LEG PAIN: 0
PERIANAL NUMBNESS: 0
BACK PAIN: 1
WEAKNESS: 0
PARESTHESIAS: 0
PARESIS: 0

## 2024-05-10 NOTE — PROGRESS NOTES
Subjective:   Lisa Garza is a 88 y.o. female who presents for Back Pain (Lower left x 22 days, no injury)      Back Pain  This is a new problem. The current episode started 1 to 4 weeks ago. The problem occurs constantly. The problem has been gradually worsening since onset. The pain is present in the lumbar spine. The quality of the pain is described as aching. The pain does not radiate. The pain is severe. The pain is The same all the time. The symptoms are aggravated by position and bending. Stiffness is present All day. Pertinent negatives include no dysuria, leg pain, paresis, paresthesias, pelvic pain, perianal numbness or weakness. She has tried analgesics for the symptoms. The treatment provided no relief.       Review of Systems   Genitourinary:  Negative for dysuria and pelvic pain.   Musculoskeletal:  Positive for back pain.   Neurological:  Negative for weakness and paresthesias.       Medications:    CALCIUM PO  cyclobenzaprine  dicyclomine Tabs  fish oil Caps  methylPREDNISolone Tbpk  metoprolol tartrate Tabs  simvastatin Tabs  Vitamin D-3 Caps  VITRUM 50+ SENIOR MULTI PO  Xarelto Tabs    Allergies: Citrus, Clindamycin, Oatmeal, and Scallops    Problem List: Lisa Garza does not have any pertinent problems on file.    Surgical History:  Past Surgical History:   Procedure Laterality Date    APPENDECTOMY LAPAROSCOPIC  10/10/2018    Procedure: APPENDECTOMY LAPAROSCOPIC;  Surgeon: Sushil Dalton M.D.;  Location: SURGERY [facility];  Service: General    HIP ARTHROPLASTY TOTAL Left 7/6/2015    Procedure: HIP ARTHROPLASTY TOTAL;  Surgeon: Navid Howard M.D.;  Location: SURGERY [facility];  Service:     OTHER Right 2006    cyst & bone spur on right hand    EYE SURGERY      bilateral IOL       Past Social Hx: Lisa Graza  reports that she quit smoking about 33 years ago. Her smoking use included cigarettes. She started smoking about 66 years ago. She has a 33 pack-year smoking  "history. She has never used smokeless tobacco. She reports current alcohol use of about 4.2 oz of alcohol per week. She reports that she does not use drugs.     Past Family Hx:  Lisa Garza family history includes Cancer in her father; Heart Disease in her father; Hyperlipidemia in her sister; Hypertension in her sister; Lung Disease in her father; No Known Problems in her maternal grandfather, maternal grandmother, paternal grandfather, paternal grandmother, son, and son; Stroke in her sister.     Problem list, medications, and allergies reviewed by myself today in Epic.     Objective:     /60   Pulse 78   Temp 36.6 °C (97.8 °F) (Temporal)   Resp 12   Ht 1.6 m (5' 3\")   Wt 59 kg (130 lb)   SpO2 94%   BMI 23.03 kg/m²     Physical Exam  Constitutional:       Appearance: Normal appearance. She is not ill-appearing or toxic-appearing.   HENT:      Head: Normocephalic.      Right Ear: External ear normal.      Left Ear: External ear normal.      Nose: Nose normal.      Mouth/Throat:      Lips: Pink.   Eyes:      General: Lids are normal.   Pulmonary:      Effort: Pulmonary effort is normal. No accessory muscle usage.   Musculoskeletal:      Cervical back: Normal and full passive range of motion without pain.      Thoracic back: Normal.      Lumbar back: Spasms and tenderness present. No swelling or bony tenderness. Decreased range of motion. Negative right straight leg raise test and negative left straight leg raise test.        Back:    Neurological:      Mental Status: She is alert and oriented to person, place, and time.   Psychiatric:         Mood and Affect: Mood normal.         Thought Content: Thought content normal.         Assessment/Plan:     Diagnosis and associated orders:   1. Acute left-sided low back pain without sciatica  DX-LUMBAR SPINE-2 OR 3 VIEWS    Referral to Pain Clinic      2. Degenerative lumbar disc               Comments/MDM:     I independently reviewed the patient's imaging " and agree with the interpretation of the radiologist.    Moderately advanced lumbar spondylosis with multilevel disc and facet degeneration.     I personally reviewed prior external notes and prior test results pertinent to today's visit.  Patient with degenerative changes no signs of cauda equina encouraged rest, ice, Tyle Motrin as needed pain gentle range of motion referral to specialist provided.  S  Discussed management options, risks and benefits, and alternatives to treatment plan agreed upon.   Red flags discussed and indications to immediately call 911 or present to the Emergency Department.   Supportive care, differential diagnoses, and indications for immediate follow-up discussed with patient.    Patient expresses understanding and agrees to plan. Patient denies any other questions or concerns.            Please note that this dictation was created using voice recognition software. I have made a reasonable attempt to correct obvious errors, but I expect that there are errors of grammar and possibly content that I did not discover before finalizing the note.    This note was electronically signed by Brian CEVALLOS.

## 2024-05-17 ENCOUNTER — APPOINTMENT (OUTPATIENT)
Dept: PHYSICAL MEDICINE AND REHAB | Facility: MEDICAL CENTER | Age: 89
End: 2024-05-17
Payer: MEDICARE

## 2024-05-23 ENCOUNTER — APPOINTMENT (RX ONLY)
Dept: URBAN - METROPOLITAN AREA CLINIC 4 | Facility: CLINIC | Age: 89
Setting detail: DERMATOLOGY
End: 2024-05-23

## 2024-05-23 DIAGNOSIS — L57.0 ACTINIC KERATOSIS: ICD-10-CM

## 2024-05-23 PROCEDURE — 17000 DESTRUCT PREMALG LESION: CPT

## 2024-05-23 PROCEDURE — ? LIQUID NITROGEN

## 2024-05-23 PROCEDURE — 17003 DESTRUCT PREMALG LES 2-14: CPT

## 2024-05-23 PROCEDURE — ? COUNSELING

## 2024-05-23 PROCEDURE — ? PRESCRIPTION

## 2024-05-23 PROCEDURE — ? DIAGNOSIS COMMENT

## 2024-05-23 RX ORDER — FLUOROURACIL 2 G/40G
CREAM TOPICAL QD
Qty: 40 | Refills: 0 | Status: ERX | COMMUNITY
Start: 2024-05-23

## 2024-05-23 RX ADMIN — FLUOROURACIL: 2 CREAM TOPICAL at 00:00

## 2024-05-23 ASSESSMENT — LOCATION SIMPLE DESCRIPTION DERM
LOCATION SIMPLE: LEFT FOREARM
LOCATION SIMPLE: LEFT HAND
LOCATION SIMPLE: RIGHT HAND
LOCATION SIMPLE: RIGHT FOREARM
LOCATION SIMPLE: NOSE

## 2024-05-23 ASSESSMENT — LOCATION DETAILED DESCRIPTION DERM
LOCATION DETAILED: RIGHT PROXIMAL DORSAL FOREARM
LOCATION DETAILED: 2ND WEB SPACE RIGHT HAND
LOCATION DETAILED: LEFT PROXIMAL RADIAL DORSAL FOREARM
LOCATION DETAILED: NASAL ROOT
LOCATION DETAILED: LEFT RADIAL DORSAL HAND
LOCATION DETAILED: RIGHT RADIAL DORSAL HAND
LOCATION DETAILED: NASAL DORSUM

## 2024-05-23 ASSESSMENT — LOCATION ZONE DERM
LOCATION ZONE: ARM
LOCATION ZONE: NOSE
LOCATION ZONE: HAND

## 2024-05-23 NOTE — PROCEDURE: DIAGNOSIS COMMENT
Render Risk Assessment In Note?: no
Comment: Biopsy proven — Accession #[id number], [place] right hand.
Detail Level: Simple

## 2024-06-07 ENCOUNTER — OFFICE VISIT (OUTPATIENT)
Dept: CARDIOLOGY | Facility: MEDICAL CENTER | Age: 89
End: 2024-06-07
Attending: INTERNAL MEDICINE
Payer: MEDICARE

## 2024-06-07 VITALS
RESPIRATION RATE: 14 BRPM | SYSTOLIC BLOOD PRESSURE: 118 MMHG | WEIGHT: 136 LBS | BODY MASS INDEX: 24.1 KG/M2 | OXYGEN SATURATION: 93 % | DIASTOLIC BLOOD PRESSURE: 80 MMHG | HEIGHT: 63 IN | HEART RATE: 76 BPM

## 2024-06-07 DIAGNOSIS — E78.5 DYSLIPIDEMIA: ICD-10-CM

## 2024-06-07 DIAGNOSIS — I05.0 MITRAL VALVE STENOSIS, UNSPECIFIED ETIOLOGY: ICD-10-CM

## 2024-06-07 DIAGNOSIS — I35.0 AORTIC VALVE STENOSIS, ETIOLOGY OF CARDIAC VALVE DISEASE UNSPECIFIED: ICD-10-CM

## 2024-06-07 DIAGNOSIS — I48.0 PAF (PAROXYSMAL ATRIAL FIBRILLATION) (HCC): ICD-10-CM

## 2024-06-07 DIAGNOSIS — D68.69 SECONDARY HYPERCOAGULABLE STATE (HCC): ICD-10-CM

## 2024-06-07 DIAGNOSIS — I25.10 CORONARY ARTERY CALCIFICATION SEEN ON CAT SCAN: ICD-10-CM

## 2024-06-07 DIAGNOSIS — Z79.01 CHRONIC ANTICOAGULATION: ICD-10-CM

## 2024-06-07 DIAGNOSIS — I10 ESSENTIAL HYPERTENSION: ICD-10-CM

## 2024-06-07 PROCEDURE — 3074F SYST BP LT 130 MM HG: CPT | Performed by: INTERNAL MEDICINE

## 2024-06-07 PROCEDURE — 99214 OFFICE O/P EST MOD 30 MIN: CPT | Performed by: INTERNAL MEDICINE

## 2024-06-07 PROCEDURE — 3079F DIAST BP 80-89 MM HG: CPT | Performed by: INTERNAL MEDICINE

## 2024-06-07 PROCEDURE — 99212 OFFICE O/P EST SF 10 MIN: CPT | Performed by: INTERNAL MEDICINE

## 2024-06-07 RX ORDER — FLUOROURACIL 50 MG/G
CREAM TOPICAL
COMMUNITY
Start: 2024-05-23

## 2024-06-07 ASSESSMENT — ENCOUNTER SYMPTOMS
LOSS OF CONSCIOUSNESS: 0
DIZZINESS: 0
PALPITATIONS: 0
MYALGIAS: 0
COUGH: 0
SHORTNESS OF BREATH: 0
BRUISES/BLEEDS EASILY: 0

## 2024-06-07 ASSESSMENT — FIBROSIS 4 INDEX: FIB4 SCORE: 3.51

## 2024-06-07 NOTE — PROGRESS NOTES
"Chief Complaint   Patient presents with    Follow-Up     F/V Dx: Aortic valve stenosis, etiology of cardiac valve disease unspecified    Atrial Fibrillation     F/V Dx: PAF (paroxysmal atrial fibrillation) (HCC)       Subjective     Lisa Garza is a 88 y.o. female who presents today for follow-up care.    The patient has PAF, NOAC with rivaroxaban, aortic stenosis, mitral stenosis, coronary calcification, dyslipidemia and hypertension.    Since 2/15/2024 appointment the patient has perceived that she has been more short of breath with exertion.  For the past months she has had \"no energy at all\" though this has seemingly improved recently.  She lives alone.  She has a son that lives locally and that is supportive.  She does all her own chores and runs her errands but has noted a decline in her stamina having to rest earlier in order to complete those responsibilities.  No lightheadedness, dizziness or syncope.  Was seen in the ER in 4/2024 for diarrhea and back pain all of which is resolved.    Hospitalized at [facility] 9/26/2023-9/28/2023 for acute hypoxic respiratory failure, COVID-19 pneumonia treated with Decadron with postinfectious cough x 4-5 weeks improved with codeine.  Since she has had no cardiac symptoms including chest pain, palpitations, exertional shortness of breath, lightheadedness, dizziness or syncope.    Hospitalized at [facility] 7/11/2023-7/12/2023 for frontal headache, diplopia with elevated blood pressure 166/72.  Head negative brain MRI and head CTA.  After discharge saw ophthalmologist Dr. Real who did not find any ophthalmologic abnormalities.  She has had no recurrent symptoms since.  The month before took a week trip to [place].  She denies any cardiac symptoms of shortness of breath, PND or LE edema.  No angina pectoris.     Hospitalized [facility] 8/10/2022 -for scapular and chest pain, normal troponins, MPI normal, echocardiogram with mild aortic stenosis " moderate mitral stenosis, discharged on same medications.       The patient previously smoked but quit 20 years ago.  History of alcohol abuse.     Hospitalized [facility] 3/26/2018-3/31/2018 for pneumonia and atrial fibrillation. She had a new onset of atrial fibrillation she was started on Xarelto for anticoagulation. Her atrial fibrillation reverted back to normal rhythm. Echocardiogram showed diastolic dysfunction grade 2 and ejection fraction 70%.     Past Medical History:   Diagnosis Date    Arthritis     osteo, hip    Cancer (HCC)     skin on left shoulder    Diarrhea 04/09/2024    Functional diarrhea 12/12/2022    Heart murmur     High cholesterol     Nonrheumatic aortic valve stenosis 01/25/2024    Prediabetes 08/16/2023    Unspecified cataract     tony IOL    Unspecified urinary incontinence     Upper back pain on left side 12/12/2022    Vitamin D deficiency 06/15/2023     Past Surgical History:   Procedure Laterality Date    APPENDECTOMY LAPAROSCOPIC  10/10/2018    Procedure: APPENDECTOMY LAPAROSCOPIC;  Surgeon: Sushil Dalton M.D.;  Location: SURGERY [facility];  Service: General    HIP ARTHROPLASTY TOTAL Left 7/6/2015    Procedure: HIP ARTHROPLASTY TOTAL;  Surgeon: Navid Howard M.D.;  Location: SURGERY [facility];  Service:     OTHER Right 2006    cyst & bone spur on right hand    EYE SURGERY      bilateral IOL     Family History   Problem Relation Age of Onset    Cancer Father         GI    Heart Disease Father     Lung Disease Father     Stroke Sister     No Known Problems Son     No Known Problems Son     No Known Problems Maternal Grandmother     No Known Problems Maternal Grandfather     No Known Problems Paternal Grandmother     No Known Problems Paternal Grandfather     Hypertension Sister     Hyperlipidemia Sister      Social History     Socioeconomic History    Marital status:      Spouse name: Not on file    Number of children: Not on file    Years of education: Not on file     Highest education level: Not on file   Occupational History    Not on file   Tobacco Use    Smoking status: Former     Current packs/day: 0.00     Average packs/day: 1 pack/day for 33.0 years (33.0 ttl pk-yrs)     Types: Cigarettes     Start date: 1958     Quit date: 1991     Years since quittin.4    Smokeless tobacco: Never    Tobacco comments:     Started smoking at age 23   Vaping Use    Vaping status: Never Used   Substance and Sexual Activity    Alcohol use: Yes     Alcohol/week: 4.2 oz     Types: 7 Glasses of wine per week     Comment: 1 glass wine/day    Drug use: No    Sexual activity: Never     Partners: Male   Other Topics Concern    Not on file   Social History Narrative    She lost her   to Alzheimer's. She lives alone in her own home, 1 story. Has a son who lives nearby. She does all ADLs and IADLs w/o difficulty though recently hired a  to do her outside work. 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 She enjoys reading, learning to play the piano.      Social Determinants of Health     Financial Resource Strain: Not on file   Food Insecurity: Not on file   Transportation Needs: Not on file   Physical Activity: Not on file   Stress: Not on file   Social Connections: Feeling Socially Integrated (10/24/2023)    OASIS : Social Isolation     Frequency of experiencing loneliness or isolation: Never   Intimate Partner Violence: Not on file   Housing Stability: Not on file     Allergies   Allergen Reactions    [place] Unspecified     Itchy welts      Clindamycin Unspecified     Pt unsure of reaction    Oatmeal Diarrhea     Gas      Scallops Vomiting     Outpatient Encounter Medications as of 2024   Medication Sig Dispense Refill    fluorouracil (EFUDEX) 5 % cream       metoprolol tartrate (LOPRESSOR) 25 MG Tab TAKE TWO TABLETS BY MOUTH TWICE DAILY 100 Tablet 3    simvastatin (ZOCOR) 40 MG Tab TAKE ONE TABLET BY MOUTH IN THE EVENING 100 Tablet 3     "rivaroxaban (XARELTO) 20 MG Tab tablet TAKE ONE TABLET BY MOUTH ONE TIME DAILY WITH DINNER 100 Tablet 2    CALCIUM PO Take 1 Tablet by mouth every day. Indications: Nutritional supplement      Omega-3 Fatty Acids (FISH OIL) 1000 MG Cap capsule Take 1,000 mg by mouth every day. Indications: High Blood Pressure Disorder, Heart health      Multiple Vitamins-Minerals (VITRUM 50+ SENIOR MULTI PO) Take 1 Tablet by mouth every morning. Indications: Vitamin Deficiency      Cholecalciferol (VITAMIN D-3) 1000 UNITS CAPS Take 1,000 Units by mouth every morning. Indications: Vitamin D Deficiency      [DISCONTINUED] methylPREDNISolone (MEDROL DOSEPAK) 4 MG Tablet Therapy Pack As directed on the packaging label. (Patient not taking: Reported on 6/7/2024) 21 Tablet 0    [DISCONTINUED] cyclobenzaprine (FLEXERIL) 5 mg tablet Take 1-2 Tablets by mouth 3 times a day as needed for Mild Pain or Moderate Pain. (Patient not taking: Reported on 6/7/2024) 60 Tablet 0    [DISCONTINUED] dicyclomine (BENTYL) 20 MG Tab Take 1 Tablet by mouth every 6 hours as needed (abdominal pain). (Patient not taking: Reported on 6/7/2024) 20 Tablet 0     No facility-administered encounter medications on file as of 6/7/2024.     Review of Systems   HENT:  Negative for nosebleeds.    Respiratory:  Negative for cough and shortness of breath.    Cardiovascular:  Negative for chest pain and palpitations.   Musculoskeletal:  Negative for myalgias.   Neurological:  Negative for dizziness and loss of consciousness.   Endo/Heme/Allergies:  Does not bruise/bleed easily.              Objective     /80 (BP Location: Left arm, Patient Position: Sitting, BP Cuff Size: Adult)   Pulse 76   Resp 14   Ht 1.6 m (5' 3\")   Wt 61.7 kg (136 lb)   SpO2 93%   BMI 24.09 kg/m²     Physical Exam  Constitutional:       General: She is not in acute distress.     Appearance: She is well-developed.   Neck:      Vascular: No JVD.   Cardiovascular:      Rate and Rhythm: Normal " rate. Rhythm irregularly irregular.      Pulses: Normal pulses.      Heart sounds: Murmur heard.      No friction rub. No gallop.      Comments: A2 present  Pulmonary:      Effort: Pulmonary effort is normal. No accessory muscle usage or respiratory distress.      Breath sounds: Normal breath sounds. No wheezing or rales.   Musculoskeletal:      Right lower leg: No edema.      Left lower leg: No edema.   Skin:     General: Skin is warm and dry.      Findings: No rash.      Nails: There is no clubbing.   Neurological:      Mental Status: She is alert and oriented to person, place, and time.   Psychiatric:         Behavior: Behavior normal.            ECHOCARDIOGRAM 3/28/2018  Left ventricular ejection fraction is visually estimated to be 70%.  Mild concentric left ventricular hypertrophy.  Grade II diastolic dysfunction.  Moderately dilated left atrium.  Mild mitral regurgitation.    Mild aortic stenosis.     ECHOCARDIOGRAM 1/13/2022  The left ventricular ejection fraction is visually estimated to be 75%,   borderline hyperdynamic.  Mild concentric left ventricular hypertrophy.  Biatrial enlargement.  Aortic valve leaflets not well visualized, appear heavily calcified with moderate aortic stenosis:V-max 3.9 m/s, MG 38 mmHg, KONG 1 cm2, DI 0.32.  Calcification of the mitral valve leaflets, consider rheumatic heart disease.  Mild to moderate mitral stenosis: MG 4-5.5 mmHg, HR 58 bpm.  Mild mitral regurgitation, multiple jets present.  Estimated right ventricular systolic pressure is 35 mmHg.  Normal estimated right atrial pressure.    ECHOCARDIOGRAM 8/10/2022  Normal left ventricular size, thickness, systolic function.  Moderate mitral stenosis with restriction in the anterior mitral valve leaflet.  Mild calcification of the aortic valve leaflets and apparently   elongated LVOT with subvalvular flow acceleration (LVOT VTI 38 cm)  Mild aortic stenosis/LVOT obstruction - mean gradient of 21 mmHg.     ECHOCARDIOGRAM  11/16/2023  Hyperdynamic left ventricular systolic function.  The ejection fraction is measured to be 77 % by Laws's biplane.  Moderate to severe aortic valve stenosis. Peak:  68 mmHg, Mean: 40    mmHg. Vmax is 4.1 m/s. DI 0.33  Mild to moderate mitral stenosis.  Normal right ventricular size and systolic function.  Comparisons were made to the prior studies on  1/13/2022 and 8/10/2022   and current study similar to 1/13/2022 study; 8/10/2022 study possibly   underestimated aortic valve gradients; left ventricular function   remains hyperdynamic with increased LVOT flow acceleration (LVOT VTI   36); mitral stenosis unchanged.    MPI 8/10/2022   No evidence of significant jeopardized viable myocardium or prior myocardial    infarction.   Significantly limited gated portion of the examination. Correlation with    echocardiogram is recommended for EF evaluation.    ECG INTERPRETATION   Negative stress ECG for ischemia.    Assessment & Plan     1. PAF (paroxysmal atrial fibrillation) (HCC)        2. Secondary hypercoagulable state (HCC)        3. Chronic anticoagulation        4. Aortic valve stenosis, etiology of cardiac valve disease unspecified        5. Mitral valve stenosis, unspecified etiology        6. Essential hypertension        7. Dyslipidemia        8. Coronary artery calcification seen on CAT scan                Medical Decision Making: Today's Assessment/Status/Plan:   Assessment  PAF.  Anticoagulation, rivaroxaban  Aortic stenosis   Mitral stenosis   Coronary calcification by CT scan.  Hypertension.  Dyslipidemia.  History of excessive alcohol use.  COVID infection 10/2023     Recommendation Discussion  Aortic stenosis, mitral stenosis, now with symptoms indicative of progressive valve disease most likely due to aortic stenosis, after further discussion we will refer to [facility].  Reviewed prior echocardiogram which in addition to aortic stenosis and mitral stenosis suggest a calcified  shelf in the LVOT but difficult to fully appreciate and may need a STEVIE but will defer to Valve Clinic  PAF, clinically stable, no recurrence, maintaining sinus rhythm, continue rivaroxaban, metoprolol.  OAC on rivaroxaban, calculated CrCl 57 continue 20 mg daily  Coronary calcification, asymptomatic, prior normal MPI, continue simvastatin, rivaroxaban, metoprolol.  Hypertension, BP normal on outpatient blood pressure log, on metoprolol  Dyslipidemia, LDL 63, on simvastatin  I RTC 6 months

## 2024-06-10 ENCOUNTER — TELEPHONE (OUTPATIENT)
Dept: CARDIOLOGY | Facility: MEDICAL CENTER | Age: 89
End: 2024-06-10
Payer: MEDICARE

## 2024-06-10 DIAGNOSIS — Z01.810 PRE-PROCEDURAL CARDIOVASCULAR EXAMINATION: ICD-10-CM

## 2024-06-10 DIAGNOSIS — I35.0 AORTIC VALVE STENOSIS, ETIOLOGY OF CARDIAC VALVE DISEASE UNSPECIFIED: ICD-10-CM

## 2024-06-10 NOTE — TELEPHONE ENCOUNTER
----- Message from Mary Dillard R.N. sent at 6/10/2024 10:41 AM PDT -----  Regarding: pre tavr cath  Please hold pre tavr cath 6/24, thanks!

## 2024-06-10 NOTE — TELEPHONE ENCOUNTER
Pre TAVR angio spot held on 6-24-24 at 9:30 with Dr.Jad Teran. Consult on 6-19-24 with  at 8:20. Instruction sheet emailed to Mary OROPEZA.

## 2024-06-10 NOTE — PROGRESS NOTES
REFERRING PHYSICIAN: Jaydon Morel MD.     CONSULTING PHYSICIAN: Odalis Gaviria MD     CHIEF COMPLAINT: Shortness of breath    HISTORY OF PRESENT ILLNESS: The patient is a 88 y.o. female with a past medical history significant for paroxysmal atrial fibrillation on NOAC, dyslipidemia, hypertension, CAD, mitral stenosis, aortic stenosis, who presents today with progressive shortness of breath with minimal exertion over the last few months. She has associated fatigue. She denies chest pain, palpitations, dizziness, syncope, orthopnea, lower extremity edema.      PAST MEDICAL HISTORY:   Active Ambulatory Problems     Diagnosis Date Noted    History of total left hip arthroplasty 05/21/2015    Osteoarthritis, multiple sites 05/21/2015    Chronic seasonal allergic rhinitis due to pollen 11/16/2017    Low hemoglobin 03/27/2018    Chronic anticoagulation 08/14/2018    Coronary artery calcification seen on CAT scan 09/16/2019    Essential hypertension 11/15/2019    Mild mitral regurgitation 11/15/2019    Low back pain without sciatica 04/29/2021    Abdominal aortic atherosclerosis (HCC) 04/29/2021    History of melanoma 04/29/2021    Hypercoagulability due to atrial fibrillation (HCC) 04/29/2021    Urge incontinence 04/29/2021    Osteopenia 11/03/2021    Mitral stenosis 01/17/2022    Chest pain 08/10/2022    BMI 23.0-23.9, adult 12/05/2022    Peripheral vascular disease, unspecified (HCC) 12/05/2022    Upper back pain on left side 12/12/2022    Functional diarrhea 12/12/2022    Vitamin D deficiency 06/15/2023    Diplopia 07/11/2023    Dyslipidemia 08/03/2023    Prediabetes 08/16/2023    PAF (paroxysmal atrial fibrillation) (HCC) 02/15/2024    Aortic stenosis 02/15/2024     Resolved Ambulatory Problems     Diagnosis Date Noted    Osteoarthritis 05/21/2015    Osteoarthritis of right hip 07/06/2015    Systolic murmur 11/30/2015    Hyperlipidemia 11/16/2017    Abnormal LFTs 03/27/2018    Atrial fibrillation with RVR  ([facility]) 03/27/2018    Sepsis due to pneumonia ([facility]) 03/27/2018    Hyponatremia 03/27/2018    Hypoalbuminemia 03/27/2018    Alcohol abuse 03/27/2018    Acute respiratory failure with hypoxia ([facility]) 03/28/2018    Pneumonia due to infectious organism 03/29/2018    AF (atrial fibrillation) ([facility]) 08/14/2018    Mild aortic stenosis 08/14/2018    Acute appendicitis with localized peritonitis, without perforation, abscess, or gangrene 10/11/2018    Diastolic dysfunction 11/15/2019    Full code status 04/29/2021    Acute hypoxemic respiratory failure due to COVID-19 ([facility]) 09/26/2023    ACP (advance care planning) 09/26/2023    Acute hypoxemic respiratory failure ([facility]) 09/26/2023    Nonrheumatic aortic valve stenosis 01/25/2024    Secondary hypercoagulable state ([facility]) 02/15/2024    Diarrhea 04/09/2024     Past Medical History:   Diagnosis Date    Arthritis     Cancer ([facility])     Heart murmur     High cholesterol     Unspecified cataract     Unspecified urinary incontinence        PAST SURGICAL HISTORY:   Past Surgical History:   Procedure Laterality Date    APPENDECTOMY LAPAROSCOPIC  10/10/2018    Procedure: APPENDECTOMY LAPAROSCOPIC;  Surgeon: Sushil Dalton M.D.;  Location: SURGERY [facility];  Service: General    HIP ARTHROPLASTY TOTAL Left 7/6/2015    Procedure: HIP ARTHROPLASTY TOTAL;  Surgeon: Navid Howard M.D.;  Location: SURGERY [facility];  Service:     OTHER Right 2006    cyst & bone spur on right hand    EYE SURGERY      bilateral IOL        ALLERGIES:   Allergies   Allergen Reactions    [place] Unspecified     Itchy welts      Clindamycin Unspecified     Pt unsure of reaction    Oatmeal Diarrhea     Gas      Scallops Vomiting        CURRENT MEDICATIONS:   Current Outpatient Medications:     fluorouracil (EFUDEX) 5 % cream, , Disp: , Rfl:     metoprolol tartrate (LOPRESSOR) 25 MG Tab, TAKE TWO TABLETS BY MOUTH TWICE DAILY, Disp: 100 Tablet, Rfl: 3    simvastatin (ZOCOR) 40 MG Tab, TAKE ONE TABLET BY MOUTH IN  THE EVENING, Disp: 100 Tablet, Rfl: 3    rivaroxaban (XARELTO) 20 MG Tab tablet, TAKE ONE TABLET BY MOUTH ONE TIME DAILY WITH DINNER, Disp: 100 Tablet, Rfl: 2    CALCIUM PO, Take 1 Tablet by mouth every day. Indications: Nutritional supplement, Disp: , Rfl:     Omega-3 Fatty Acids (FISH OIL) 1000 MG Cap capsule, Take 1,000 mg by mouth every day. Indications: High Blood Pressure Disorder, Heart health, Disp: , Rfl:     Multiple Vitamins-Minerals (VITRUM 50+ SENIOR MULTI PO), Take 1 Tablet by mouth every morning. Indications: Vitamin Deficiency, Disp: , Rfl:     Cholecalciferol (VITAMIN D-3) 1000 UNITS CAPS, Take 1,000 Units by mouth every morning. Indications: Vitamin D Deficiency, Disp: , Rfl:     FAMILY HISTORY:   Family History   Problem Relation Age of Onset    Cancer Father         GI    Heart Disease Father     Lung Disease Father     Stroke Sister     No Known Problems Son     No Known Problems Son     No Known Problems Maternal Grandmother     No Known Problems Maternal Grandfather     No Known Problems Paternal Grandmother     No Known Problems Paternal Grandfather     Hypertension Sister     Hyperlipidemia Sister         SOCIAL HISTORY:   Social History     Socioeconomic History    Marital status:      Spouse name: Not on file    Number of children: Not on file    Years of education: Not on file    Highest education level: Not on file   Occupational History    Not on file   Tobacco Use    Smoking status: Former     Current packs/day: 0.00     Average packs/day: 1 pack/day for 33.0 years (33.0 ttl pk-yrs)     Types: Cigarettes     Start date: 1958     Quit date: 1991     Years since quittin.4    Smokeless tobacco: Never    Tobacco comments:     Started smoking at age 23   Vaping Use    Vaping status: Never Used   Substance and Sexual Activity    Alcohol use: Yes     Alcohol/week: 4.2 oz     Types: 7 Glasses of wine per week     Comment: 1 glass wine/day    Drug use: No    Sexual activity:  "Never     Partners: Male   Other Topics Concern    Not on file   Social History Narrative    She lost her  9/19 to Alzheimer's. She lives alone in her own home, 1 story. Has a son who lives nearby. She does all ADLs and IADLs w/o difficulty though recently hired a  to do her outside work. 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 She enjoys reading, learning to play the piano.      Social Determinants of Health     Financial Resource Strain: Not on file   Food Insecurity: Not on file   Transportation Needs: Not on file   Physical Activity: Not on file   Stress: Not on file   Social Connections: Feeling Socially Integrated (10/24/2023)    OASIS : Social Isolation     Frequency of experiencing loneliness or isolation: Never   Intimate Partner Violence: Not on file   Housing Stability: Not on file       REVIEW OF SYSTEMS:  Review of Systems   Constitutional:  Positive for malaise/fatigue. Negative for chills, fever and weight loss.   HENT:  Negative for ear pain, nosebleeds and tinnitus.    Eyes:  Negative for double vision, photophobia and pain.   Respiratory:  Positive for shortness of breath (with min exertion). Negative for cough and hemoptysis.    Cardiovascular:  Negative for chest pain, palpitations, orthopnea, leg swelling and PND.   Gastrointestinal:  Negative for abdominal pain, blood in stool, nausea and vomiting.   Genitourinary:  Negative for frequency, hematuria and urgency.   Musculoskeletal: Negative.    Skin:  Negative for rash.   Neurological:  Negative for dizziness, tremors, speech change, focal weakness, seizures and headaches.   Endo/Heme/Allergies:  Negative for polydipsia. Does not bruise/bleed easily.   Psychiatric/Behavioral:  Negative for hallucinations and memory loss.      PHYSICAL EXAMINATION:    /62 (BP Location: Left arm, Patient Position: Sitting, BP Cuff Size: Adult)   Pulse 91   Temp 36.6 °C (97.8 °F) (Temporal)   Ht 1.6 m (5' 3\")   Wt " 60.3 kg (133 lb)   SpO2 94%   BMI 23.56 kg/m²    Physical Exam  Vitals reviewed.   Constitutional:       General: She is not in acute distress.     Appearance: Normal appearance.      Comments: Appears younger than stated age   HENT:      Head: Normocephalic and atraumatic.      Right Ear: External ear normal.      Left Ear: External ear normal.      Nose: Nose normal. No congestion.   Eyes:      General: No scleral icterus.     Extraocular Movements: Extraocular movements intact.      Conjunctiva/sclera: Conjunctivae normal.   Cardiovascular:      Rate and Rhythm: Normal rate and regular rhythm.   Pulmonary:      Effort: Pulmonary effort is normal. No respiratory distress.   Abdominal:      General: Abdomen is flat. There is no distension.   Musculoskeletal:         General: Normal range of motion.      Cervical back: Normal range of motion.   Skin:     General: Skin is warm and dry.   Neurological:      Mental Status: She is alert and oriented to person, place, and time. Mental status is at baseline.      Cranial Nerves: No cranial nerve deficit.   Psychiatric:         Mood and Affect: Mood normal.         Judgment: Judgment normal.       LABS REVIEWED:  Lab Results   Component Value Date/Time    SODIUM 139 04/26/2024 04:11 PM    POTASSIUM 4.3 04/26/2024 04:11 PM    CHLORIDE 102 04/26/2024 04:11 PM    CO2 25 04/26/2024 04:11 PM    GLUCOSE 107 (H) 04/26/2024 04:11 PM    BUN 15 04/26/2024 04:11 PM    CREATININE 0.65 04/26/2024 04:11 PM      Lab Results   Component Value Date/Time    PROTHROMBTM 27.7 (H) 08/10/2022 05:33 AM    INR 2.68 (H) 08/10/2022 05:33 AM      Lab Results   Component Value Date/Time    WBC 7.5 04/26/2024 04:11 PM    RBC 4.35 04/26/2024 04:11 PM    HEMOGLOBIN 14.5 04/26/2024 04:11 PM    HEMATOCRIT 44.5 04/26/2024 04:11 PM    .3 (H) 04/26/2024 04:11 PM    MCH 33.3 (H) 04/26/2024 04:11 PM    MCHC 32.6 04/26/2024 04:11 PM    MPV 10.9 04/26/2024 04:11 PM    NEUTSPOLYS 62.70 04/26/2024 04:11  PM    LYMPHOCYTES 23.20 04/26/2024 04:11 PM    MONOCYTES 11.20 04/26/2024 04:11 PM    EOSINOPHILS 2.10 04/26/2024 04:11 PM    BASOPHILS 0.50 04/26/2024 04:11 PM        IMAGING REVIEWED AND INTERPRETED:    ECHOCARDIOGRAM   11/16/23 @ [facility]  CONCLUSIONS  Hyperdynamic left ventricular systolic function.  The ejection fraction is measured to be 77 % by Laws's biplane.  Moderate to severe aortic valve stenosis. Peak:  68 mmHg, Mean: 40    mmHg. Vmax is 4.1 m/s. DI 0.33  Mild to moderate mitral stenosis.  Normal right ventricular size and systolic function.  Comparisons were made to the prior studies on  1/13/2022 and 8/10/2022   and current study similar to 1/13/2022 study; 8/10/2022 study possibly   underestimated aortic valve gradients; left ventricular function   remains hyperdynamic with increased LVOT flow acceleration (LVOT VTI   36); mitral stenosis unchanged..     CARDIAC CATHETERIZATION   None     CT SCAN CHEST   Scheduled 6/20/24 at [facility] @ 1100      IMPRESSION:  88 y.o. female with a past medical history significant for paroxysmal atrial fibrillation on NOAC, dyslipidemia, hypertension, CAD, mitral stenosis, now with symptomatic severe aortic stenosis      PLAN:  I recommend that the patient is a reasonable TAVR candidate given advanced age and strong preference. We will continue to follow workup and discuss in multidisciplinary conference.    The STS mortality risk score for SAVR is 4.5% and the morbidity and mortality risk score for SAVR is 11%.     Thank you for this very challenging consultation and participation in the patient’s care.  I will keep you apprised of all future developments.            Sincerely,       Odalis Gaviria MD.

## 2024-06-10 NOTE — TELEPHONE ENCOUNTER
Referral from: Dr. Sutherland for mod-sev AS (TAVR pathway per Dr. Stoddard).     Patient called on 6/10/2024.     Discussed with patient consultation appointments, testing needed, and plan of care.    Patient given dates and times of testing and consultations.    All questions answered.    Phone number given to patient for [facility] for any further questions or concerns.

## 2024-06-17 ENCOUNTER — TELEPHONE (OUTPATIENT)
Dept: CARDIOLOGY | Facility: MEDICAL CENTER | Age: 89
End: 2024-06-17
Payer: MEDICARE

## 2024-06-17 NOTE — TELEPHONE ENCOUNTER
Received call from patient concerned as she was previously informed she did not need a ride to CTA on Thursday. Per patient, Dr. Sutherland informed her she will be given anesthesia so they can place a probe down her throat. Explained to patient that MD was likely referring to STEVIE, which is not ordered and not a part of normal TAVR workup. Discussed the difference between STEVIE and CTA in detail. Advised she will not be receiving sedation for CTA and can drive herself to appointment. Patient verbalizes understanding.

## 2024-06-19 ENCOUNTER — DOCUMENTATION (OUTPATIENT)
Dept: CARDIOLOGY | Facility: MEDICAL CENTER | Age: 89
End: 2024-06-19

## 2024-06-19 ENCOUNTER — APPOINTMENT (OUTPATIENT)
Dept: ADMISSIONS | Facility: MEDICAL CENTER | Age: 89
End: 2024-06-19
Attending: INTERNAL MEDICINE
Payer: MEDICARE

## 2024-06-19 ENCOUNTER — TELEPHONE (OUTPATIENT)
Dept: CARDIOLOGY | Facility: MEDICAL CENTER | Age: 89
End: 2024-06-19

## 2024-06-19 ENCOUNTER — OFFICE VISIT (OUTPATIENT)
Dept: CARDIOLOGY | Facility: MEDICAL CENTER | Age: 89
End: 2024-06-19
Attending: INTERNAL MEDICINE
Payer: MEDICARE

## 2024-06-19 ENCOUNTER — OFFICE VISIT (OUTPATIENT)
Dept: CARDIOTHORACIC SURGERY | Facility: MEDICAL CENTER | Age: 89
End: 2024-06-19
Payer: MEDICARE

## 2024-06-19 VITALS
BODY MASS INDEX: 23.57 KG/M2 | SYSTOLIC BLOOD PRESSURE: 104 MMHG | WEIGHT: 133 LBS | DIASTOLIC BLOOD PRESSURE: 62 MMHG | TEMPERATURE: 97.8 F | HEART RATE: 91 BPM | OXYGEN SATURATION: 94 % | HEIGHT: 63 IN

## 2024-06-19 VITALS
HEIGHT: 63 IN | BODY MASS INDEX: 23.57 KG/M2 | DIASTOLIC BLOOD PRESSURE: 60 MMHG | SYSTOLIC BLOOD PRESSURE: 110 MMHG | OXYGEN SATURATION: 94 % | HEART RATE: 75 BPM | RESPIRATION RATE: 16 BRPM | WEIGHT: 133 LBS

## 2024-06-19 DIAGNOSIS — D68.69 HYPERCOAGULABLE STATE DUE TO PAROXYSMAL ATRIAL FIBRILLATION (HCC): ICD-10-CM

## 2024-06-19 DIAGNOSIS — I44.0 FIRST DEGREE AV BLOCK: ICD-10-CM

## 2024-06-19 DIAGNOSIS — I35.0 SEVERE AORTIC STENOSIS: ICD-10-CM

## 2024-06-19 DIAGNOSIS — I48.0 HYPERCOAGULABLE STATE DUE TO PAROXYSMAL ATRIAL FIBRILLATION (HCC): ICD-10-CM

## 2024-06-19 DIAGNOSIS — I35.0 NONRHEUMATIC AORTIC VALVE STENOSIS: ICD-10-CM

## 2024-06-19 DIAGNOSIS — Z00.6 EXAMINATION OF PARTICIPANT IN CLINICAL TRIAL: ICD-10-CM

## 2024-06-19 DIAGNOSIS — I70.0 ABDOMINAL AORTIC ATHEROSCLEROSIS (HCC): ICD-10-CM

## 2024-06-19 DIAGNOSIS — I34.0 MILD MITRAL REGURGITATION: ICD-10-CM

## 2024-06-19 DIAGNOSIS — I25.10 CORONARY ARTERY CALCIFICATION SEEN ON CAT SCAN: ICD-10-CM

## 2024-06-19 DIAGNOSIS — I10 ESSENTIAL HYPERTENSION: ICD-10-CM

## 2024-06-19 DIAGNOSIS — E78.5 DYSLIPIDEMIA: ICD-10-CM

## 2024-06-19 PROBLEM — Z78.9 FULL CODE STATUS: Status: RESOLVED | Noted: 2021-04-29 | Resolved: 2024-06-19

## 2024-06-19 PROBLEM — R19.7 DIARRHEA: Status: RESOLVED | Noted: 2024-04-09 | Resolved: 2024-06-19

## 2024-06-19 PROBLEM — U07.1 ACUTE HYPOXEMIC RESPIRATORY FAILURE DUE TO COVID-19 (HCC): Status: RESOLVED | Noted: 2023-09-26 | Resolved: 2024-06-19

## 2024-06-19 PROBLEM — J96.01 ACUTE HYPOXEMIC RESPIRATORY FAILURE DUE TO COVID-19 (HCC): Status: RESOLVED | Noted: 2023-09-26 | Resolved: 2024-06-19

## 2024-06-19 PROBLEM — Z71.89 ACP (ADVANCE CARE PLANNING): Status: RESOLVED | Noted: 2023-09-26 | Resolved: 2024-06-19

## 2024-06-19 LAB — EKG IMPRESSION: NORMAL

## 2024-06-19 PROCEDURE — 3074F SYST BP LT 130 MM HG: CPT | Performed by: THORACIC SURGERY (CARDIOTHORACIC VASCULAR SURGERY)

## 2024-06-19 PROCEDURE — 99213 OFFICE O/P EST LOW 20 MIN: CPT | Performed by: INTERNAL MEDICINE

## 2024-06-19 PROCEDURE — 3074F SYST BP LT 130 MM HG: CPT | Performed by: INTERNAL MEDICINE

## 2024-06-19 PROCEDURE — 93005 ELECTROCARDIOGRAM TRACING: CPT | Performed by: INTERNAL MEDICINE

## 2024-06-19 PROCEDURE — 3078F DIAST BP <80 MM HG: CPT | Performed by: INTERNAL MEDICINE

## 2024-06-19 PROCEDURE — 93010 ELECTROCARDIOGRAM REPORT: CPT | Performed by: INTERNAL MEDICINE

## 2024-06-19 PROCEDURE — 99215 OFFICE O/P EST HI 40 MIN: CPT | Mod: 25 | Performed by: INTERNAL MEDICINE

## 2024-06-19 PROCEDURE — 99212 OFFICE O/P EST SF 10 MIN: CPT | Performed by: INTERNAL MEDICINE

## 2024-06-19 PROCEDURE — 3078F DIAST BP <80 MM HG: CPT | Performed by: THORACIC SURGERY (CARDIOTHORACIC VASCULAR SURGERY)

## 2024-06-19 PROCEDURE — 99205 OFFICE O/P NEW HI 60 MIN: CPT | Performed by: THORACIC SURGERY (CARDIOTHORACIC VASCULAR SURGERY)

## 2024-06-19 ASSESSMENT — ENCOUNTER SYMPTOMS
COUGH: 0
PALPITATIONS: 0
POLYDIPSIA: 0
FOCAL WEAKNESS: 0
TREMORS: 0
NAUSEA: 0
SPEECH CHANGE: 0
BRUISES/BLEEDS EASILY: 0
BLOOD IN STOOL: 0
ORTHOPNEA: 0
HEMOPTYSIS: 0
ABDOMINAL PAIN: 0
DOUBLE VISION: 0
PHOTOPHOBIA: 0
SEIZURES: 0
WEIGHT LOSS: 0
FEVER: 0
PND: 0
DIZZINESS: 0
EYE PAIN: 0
SHORTNESS OF BREATH: 1
MEMORY LOSS: 0
HALLUCINATIONS: 0
CHILLS: 0
MUSCULOSKELETAL NEGATIVE: 1
HEADACHES: 0
VOMITING: 0

## 2024-06-19 ASSESSMENT — FIBROSIS 4 INDEX
FIB4 SCORE: 3.51
FIB4 SCORE: 3.51

## 2024-06-19 ASSESSMENT — PATIENT HEALTH QUESTIONNAIRE - PHQ9: CLINICAL INTERPRETATION OF PHQ2 SCORE: 0

## 2024-06-19 NOTE — PROGRESS NOTES
CARDIOLOGY STRUCTURAL HEART CONSULTATION    PCP: Kathy Garcia M.D.  REFERRING: Jaydon Sutherland MD    1. Aortic valve stenosis, etiology of cardiac valve disease unspecified    2. Abdominal aortic atherosclerosis (HCC)    3. Hypercoagulable state due to paroxysmal atrial fibrillation (HCC)    4. Dyslipidemia    5. Essential hypertension    6. Mild mitral regurgitation    7. Coronary artery calcification seen on CAT scan        Lisa Garza has class II, D1 aortic stenosis.  I believe there is very low probability that she does not fact have a subvalvular membrane and believe the echodensity represents typical LVOT calcification and is not associated with any flow acceleration at this level.  We will proceed with TAVR evaluation and plan on transesophageal echocardiogram as well as pay close attention to the LVOT during planning CT scan.      We discussed the risks, benefits and alternatives to TAVR including but not limited to a 10% risk of pacemaker, 5% risk of bleeding/access site complication, 2% risk of stroke, risk of contrast nephropathy and 2% risk of mortality. The patient is in agreement with proceeding.      Follow up: 6 weeks    History: Lisa Garza is a 88 y.o. female with history of paroxysmal atrial fibrillation presenting for assessment of aortic stenosis.    She has been feeling short of breath and low energy since about February.  The symptoms have progressed.  She finds it difficult to keep up with her household which she maintains independently.  Become short of breath making the bed but other ADLs and IADLs does fine with.    An echocardiogram November 2023 was interpreted by myself and is consistent with severe aortic stenosis, normal ventricular function as well as mild mitral stenosis.  There does appear to be LVOT calcification with a bandlike density and no flow acceleration.  There is mild hypertrophy of the basal septum with some flow acceleration at this level though I am not  "particularly concerned about a subvalvular membrane.    She has multiple ECGs on file which shows sinus rhythm with a first-degree AV block, inferior infarct pattern though without corresponding abnormalities detected on either the echocardiogram or MPI in 2022.  She did have AF last in 2018.  At that time she was hospitalized with pneumonia      ROS:   10 point review systems is otherwise negative except as per the HPI    PE:  /60 (BP Location: Left arm, Patient Position: Sitting, BP Cuff Size: Adult)   Pulse 75   Resp 16   Ht 1.6 m (5' 3\")   Wt 60.3 kg (133 lb)   SpO2 94%   BMI 23.56 kg/m²   GEN: NAD  CARDIAC: regular mid peaking systolic ejection murmur normal S1, S2 diminished and delayed carotid upstrokes  RESP: Clear to auscultation bilaterally  ABD: Soft, non-tender, non-distended  EXT: No edema  NEURO: No focal deficit    Today's encounter addressed an illness with threat to life/bodily function symptomatic severe aortic stenosis  () Today's E/M visit is associated with medical care services that serve as the continuing focal point for all needed health care services and/or with medical care services that  are part of ongoing care related to a patient's single, serious condition, or a complex condition: This includes  furnishing services to patients on an ongoing basis that result in care that is personalized  to the patient. The services result in a comprehensive, longitudinal, and continuous  relationship with the patient and involve delivery of team-based care that is accessible, coordinated with other practitioners and providers, and integrated with the broader health  care landscape.     Past Medical History:   Diagnosis Date    Arthritis     osteo, hip    Cancer (HCC)     skin on left shoulder    Diarrhea 04/09/2024    Functional diarrhea 12/12/2022    Heart murmur     High cholesterol     Nonrheumatic aortic valve stenosis 01/25/2024    Prediabetes 08/16/2023    Unspecified cataract  "    tony IOL    Unspecified urinary incontinence     Upper back pain on left side 12/12/2022    Vitamin D deficiency 06/15/2023     Past Surgical History:   Procedure Laterality Date    APPENDECTOMY LAPAROSCOPIC  10/10/2018    Procedure: APPENDECTOMY LAPAROSCOPIC;  Surgeon: Sushil Dalton M.D.;  Location: SURGERY [facility];  Service: General    HIP ARTHROPLASTY TOTAL Left 7/6/2015    Procedure: HIP ARTHROPLASTY TOTAL;  Surgeon: Navid Howard M.D.;  Location: SURGERY [facility];  Service:     OTHER Right 2006    cyst & bone spur on right hand    EYE SURGERY      bilateral IOL     Allergies   Allergen Reactions    [place] Unspecified     Itchy welts      Clindamycin Unspecified     Pt unsure of reaction    Oatmeal Diarrhea     Gas      Scallops Vomiting     Outpatient Encounter Medications as of 6/19/2024   Medication Sig Dispense Refill    fluorouracil (EFUDEX) 5 % cream       metoprolol tartrate (LOPRESSOR) 25 MG Tab TAKE TWO TABLETS BY MOUTH TWICE DAILY 100 Tablet 3    simvastatin (ZOCOR) 40 MG Tab TAKE ONE TABLET BY MOUTH IN THE EVENING 100 Tablet 3    rivaroxaban (XARELTO) 20 MG Tab tablet TAKE ONE TABLET BY MOUTH ONE TIME DAILY WITH DINNER 100 Tablet 2    CALCIUM PO Take 1 Tablet by mouth every day. Indications: Nutritional supplement      Omega-3 Fatty Acids (FISH OIL) 1000 MG Cap capsule Take 1,000 mg by mouth every day. Indications: High Blood Pressure Disorder, Heart health      Multiple Vitamins-Minerals (VITRUM 50+ SENIOR MULTI PO) Take 1 Tablet by mouth every morning. Indications: Vitamin Deficiency      Cholecalciferol (VITAMIN D-3) 1000 UNITS CAPS Take 1,000 Units by mouth every morning. Indications: Vitamin D Deficiency       No facility-administered encounter medications on file as of 6/19/2024.     Social History     Socioeconomic History    Marital status:      Spouse name: Not on file    Number of children: Not on file    Years of education: Not on file    Highest education level:  Not on file   Occupational History    Not on file   Tobacco Use    Smoking status: Former     Current packs/day: 0.00     Average packs/day: 1 pack/day for 33.0 years (33.0 ttl pk-yrs)     Types: Cigarettes     Start date: 1958     Quit date: 1991     Years since quittin.4    Smokeless tobacco: Never    Tobacco comments:     Started smoking at age 23   Vaping Use    Vaping status: Never Used   Substance and Sexual Activity    Alcohol use: Yes     Alcohol/week: 4.2 oz     Types: 7 Glasses of wine per week     Comment: 1 glass wine/day    Drug use: No    Sexual activity: Never     Partners: Male   Other Topics Concern    Not on file   Social History Narrative    She lost her   to Alzheimer's. She lives alone in her own home, 1 story. Has a son who lives nearby. She does all ADLs and IADLs w/o difficulty though recently hired a  to do her outside work. 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 She enjoys reading, learning to play the piano.      Social Determinants of Health     Financial Resource Strain: Not on file   Food Insecurity: Not on file   Transportation Needs: Not on file   Physical Activity: Not on file   Stress: Not on file   Social Connections: Feeling Socially Integrated (10/24/2023)    OASIS : Social Isolation     Frequency of experiencing loneliness or isolation: Never   Intimate Partner Violence: Not on file   Housing Stability: Not on file     Family History   Problem Relation Age of Onset    Cancer Father         GI    Heart Disease Father     Lung Disease Father     Stroke Sister     No Known Problems Son     No Known Problems Son     No Known Problems Maternal Grandmother     No Known Problems Maternal Grandfather     No Known Problems Paternal Grandmother     No Known Problems Paternal Grandfather     Hypertension Sister     Hyperlipidemia Sister          Studies  Lab Results   Component Value Date/Time    CHOLSTRLTOT 140 2023 12:38  AM    LDL 63 07/12/2023 12:38 AM    HDL 59 07/12/2023 12:38 AM    TRIGLYCERIDE 89 07/12/2023 12:38 AM       Lab Results   Component Value Date/Time    SODIUM 139 04/26/2024 04:11 PM    POTASSIUM 4.3 04/26/2024 04:11 PM    CHLORIDE 102 04/26/2024 04:11 PM    CO2 25 04/26/2024 04:11 PM    GLUCOSE 107 (H) 04/26/2024 04:11 PM    BUN 15 04/26/2024 04:11 PM    CREATININE 0.65 04/26/2024 04:11 PM      Lab Results   Component Value Date/Time    PROTHROMBTM 27.7 (H) 08/10/2022 05:33 AM    INR 2.68 (H) 08/10/2022 05:33 AM      Lab Results   Component Value Date/Time    WBC 7.5 04/26/2024 04:11 PM    RBC 4.35 04/26/2024 04:11 PM    HEMOGLOBIN 14.5 04/26/2024 04:11 PM    HEMATOCRIT 44.5 04/26/2024 04:11 PM    .3 (H) 04/26/2024 04:11 PM    MCH 33.3 (H) 04/26/2024 04:11 PM    MCHC 32.6 04/26/2024 04:11 PM    MPV 10.9 04/26/2024 04:11 PM    NEUTSPOLYS 62.70 04/26/2024 04:11 PM    LYMPHOCYTES 23.20 04/26/2024 04:11 PM    MONOCYTES 11.20 04/26/2024 04:11 PM    EOSINOPHILS 2.10 04/26/2024 04:11 PM    BASOPHILS 0.50 04/26/2024 04:11 PM

## 2024-06-19 NOTE — PROGRESS NOTES
Valve Program Functional Assessment:     [id number]   1a) Showering/bathin  1b) Walking 1 block on ground: 6  1c) Hurrying or joggin  2) Swellin  3) Fatigue: 2  4) Shortness of breath: 2  5) Sleep sitting up: 5  6) Limited enjoyment of life: 4  7) Spend the rest of your life with HF: 3  8a) Hobbies, recreational activities:3  8b) Working or doing household chores:3  8c) Visiting family or friends: 3    5 meter walk test  1) __5.75____ s/5m  2) __4.75____ s/5m  3) __4.65____ s/5m  AVG:_5.05______     Strength   1) _10_____ kg  2) _10_____ kg  3) _10_____ kg  AVG:__10____    COBIAN ADLs  Patient independently preforms...   - Bathing: Yes   - Dressing: Yes  - Toileting: Yes   - Transferring: Yes   - Continence: Yes   - Feeding: Yes   Total Score: _6_/6    Living Situation  Patient lives: alone    Mobility Aids   Patient uses: none      FRAILTY SCORE: __ 4

## 2024-06-19 NOTE — PROGRESS NOTES
"Valve Program Consultation: 6/19/2024 for tentative TAVR 7/1/2024    Mental Status Assessment:  Appearance: normal  Behavior: normal  Mood/Affect: tearful  Thought process/content: normal  Cognition: normal  Functional ability: normal  Dental Concerns: natural teeth; patient denies s/s of active oral infection/irritation  Further mental assessment needed: no    Post-op Plan of Care:  Support systems: jerrell Interiano ([facility]) and Agapito ([facility]), RN friend/neighbor; patient alone at consult today  Patient understands discharge plan: yes  DME: hearing aids  Home health warranted prior to procedure: no  Social concerns for discharge: none  PCP alerted of social concerns: no  POLST: none  Advance directive: DPOA-HC dated 5/9/2017 on file  Post-op goal: \"live longer, decrease SOB and fatigue\"  Medication instructions: hold all vitamins and supplements 7 days prior, hold Xarelto 48 hours prior    Concerns prior to procedure: none    Met with patient during New TAVR consult.     All patient's questions and concerns were addressed during this visit. They understood pre-operative and post-operative plan of care.    Reviewed patient TAVR education packet explaining that information is provided regarding preparation for TAVR the night prior, what to expect during the hospital stay, average LOS, and what to look out for post TAVR discharge. Explained that patient will require SBE prophylactic antibiotic prior to any dental treatment post TAVR. Advised patient not to receive any new vaccinations within 1 week pre- and 1 week post-procedure.     Explained that patient should not eat or drink anything past midnight the day of the procedure. Encouraged patient to wear something clean and comfortable, easy to get on/off to check in Monday morning, time TBD. Patient may have friends and family in the pre-operational area until patient is taken to the operating room, at which time family and friends will be asked to wait on floor 1 " [facility] surgical waiting area. On completion of TAVR, heart team will update family. Once update is given, there is some time before family/friends may visit patient in their assigned room. Length of stay on average is one night, however patient may stay longer depending on specific needs at that time. Patient given printed instructions sheet with all above stated instructions. Patient states understanding of all material and education presented today and has no further questions at this time. Encouraged patient again, to contact me with any questions or concerns during this work-up process. Patient states understanding.

## 2024-06-19 NOTE — TELEPHONE ENCOUNTER
Patient is scheduled on 6-24-24 for a Pre TAVR angio with Dr. Ruddy Teran.Patient was told to hold Xarelto for 2 days prior an to check in at 7:30 for a 9:30 procedure. H&P was done on 6-19-24 by . Pre admit to call patient.

## 2024-06-20 ENCOUNTER — PRE-ADMISSION TESTING (OUTPATIENT)
Dept: ADMISSIONS | Facility: MEDICAL CENTER | Age: 89
End: 2024-06-20
Attending: INTERNAL MEDICINE
Payer: MEDICARE

## 2024-06-20 ENCOUNTER — HOSPITAL ENCOUNTER (OUTPATIENT)
Dept: RADIOLOGY | Facility: MEDICAL CENTER | Age: 89
End: 2024-06-20
Payer: MEDICARE

## 2024-06-20 DIAGNOSIS — Z01.810 PRE-PROCEDURAL CARDIOVASCULAR EXAMINATION: ICD-10-CM

## 2024-06-20 DIAGNOSIS — I35.0 AORTIC VALVE STENOSIS, ETIOLOGY OF CARDIAC VALVE DISEASE UNSPECIFIED: ICD-10-CM

## 2024-06-20 PROCEDURE — 700117 HCHG RX CONTRAST REV CODE 255

## 2024-06-20 PROCEDURE — 71275 CT ANGIOGRAPHY CHEST: CPT

## 2024-06-20 RX ORDER — LORATADINE 10 MG/1
10 TABLET ORAL PRN
COMMUNITY

## 2024-06-20 RX ADMIN — IOHEXOL 100 ML: 350 INJECTION, SOLUTION INTRAVENOUS at 11:15

## 2024-06-20 NOTE — OR NURSING
Pre admit appointment completed with Lisa.     Medication and fasting instructions given per cardiology for procedure/surgery on 6/24/24 and 7/1/24.     Pt verbalizes understanding of all instructions given. No further questions at this time.

## 2024-06-21 ENCOUNTER — PRE-ADMISSION TESTING (OUTPATIENT)
Dept: ADMISSIONS | Facility: MEDICAL CENTER | Age: 89
End: 2024-06-21
Attending: INTERNAL MEDICINE
Payer: MEDICARE

## 2024-06-21 ENCOUNTER — DOCUMENTATION (OUTPATIENT)
Dept: CARDIOLOGY | Facility: MEDICAL CENTER | Age: 89
End: 2024-06-21
Payer: MEDICARE

## 2024-06-21 DIAGNOSIS — Z01.812 PRE-OPERATIVE LABORATORY EXAMINATION: ICD-10-CM

## 2024-06-21 LAB
ABO GROUP BLD: NORMAL
ALBUMIN SERPL BCP-MCNC: 3.9 G/DL (ref 3.2–4.9)
ALBUMIN/GLOB SERPL: 1.2 G/DL
ALP SERPL-CCNC: 63 U/L (ref 30–99)
ALT SERPL-CCNC: 18 U/L (ref 2–50)
ANION GAP SERPL CALC-SCNC: 11 MMOL/L (ref 7–16)
APTT PPP: 29.9 SEC (ref 24.7–36)
AST SERPL-CCNC: 27 U/L (ref 12–45)
BASOPHILS # BLD AUTO: 0.7 % (ref 0–1.8)
BASOPHILS # BLD: 0.05 K/UL (ref 0–0.12)
BILIRUB SERPL-MCNC: 0.6 MG/DL (ref 0.1–1.5)
BLD GP AB SCN SERPL QL: NORMAL
BUN SERPL-MCNC: 17 MG/DL (ref 8–22)
CALCIUM ALBUM COR SERPL-MCNC: 10.6 MG/DL (ref 8.5–10.5)
CALCIUM SERPL-MCNC: 10.5 MG/DL (ref 8.5–10.5)
CHLORIDE SERPL-SCNC: 103 MMOL/L (ref 96–112)
CO2 SERPL-SCNC: 28 MMOL/L (ref 20–33)
CREAT SERPL-MCNC: 0.6 MG/DL (ref 0.5–1.4)
EOSINOPHIL # BLD AUTO: 0.3 K/UL (ref 0–0.51)
EOSINOPHIL NFR BLD: 4 % (ref 0–6.9)
ERYTHROCYTE [DISTWIDTH] IN BLOOD BY AUTOMATED COUNT: 51.5 FL (ref 35.9–50)
GFR SERPLBLD CREATININE-BSD FMLA CKD-EPI: 86 ML/MIN/1.73 M 2
GLOBULIN SER CALC-MCNC: 3.3 G/DL (ref 1.9–3.5)
GLUCOSE SERPL-MCNC: 110 MG/DL (ref 65–99)
HCT VFR BLD AUTO: 43.7 % (ref 37–47)
HGB BLD-MCNC: 14.3 G/DL (ref 12–16)
IMM GRANULOCYTES # BLD AUTO: 0.03 K/UL (ref 0–0.11)
IMM GRANULOCYTES NFR BLD AUTO: 0.4 % (ref 0–0.9)
INR PPP: 1.4 (ref 0.87–1.13)
LYMPHOCYTES # BLD AUTO: 1.16 K/UL (ref 1–4.8)
LYMPHOCYTES NFR BLD: 15.5 % (ref 22–41)
MCH RBC QN AUTO: 33.5 PG (ref 27–33)
MCHC RBC AUTO-ENTMCNC: 32.7 G/DL (ref 32.2–35.5)
MCV RBC AUTO: 102.3 FL (ref 81.4–97.8)
MONOCYTES # BLD AUTO: 0.84 K/UL (ref 0–0.85)
MONOCYTES NFR BLD AUTO: 11.2 % (ref 0–13.4)
NEUTROPHILS # BLD AUTO: 5.09 K/UL (ref 1.82–7.42)
NEUTROPHILS NFR BLD: 68.2 % (ref 44–72)
NRBC # BLD AUTO: 0 K/UL
NRBC BLD-RTO: 0 /100 WBC (ref 0–0.2)
NT-PROBNP SERPL IA-MCNC: 340 PG/ML (ref 0–125)
PLATELET # BLD AUTO: 236 K/UL (ref 164–446)
PMV BLD AUTO: 11 FL (ref 9–12.9)
POTASSIUM SERPL-SCNC: 4.8 MMOL/L (ref 3.6–5.5)
PROT SERPL-MCNC: 7.2 G/DL (ref 6–8.2)
PROTHROMBIN TIME: 17.3 SEC (ref 12–14.6)
RBC # BLD AUTO: 4.27 M/UL (ref 4.2–5.4)
RH BLD: NORMAL
SODIUM SERPL-SCNC: 142 MMOL/L (ref 135–145)
WBC # BLD AUTO: 7.5 K/UL (ref 4.8–10.8)

## 2024-06-21 PROCEDURE — 85025 COMPLETE CBC W/AUTO DIFF WBC: CPT

## 2024-06-21 PROCEDURE — 86900 BLOOD TYPING SEROLOGIC ABO: CPT

## 2024-06-21 PROCEDURE — 86850 RBC ANTIBODY SCREEN: CPT

## 2024-06-21 PROCEDURE — 86901 BLOOD TYPING SEROLOGIC RH(D): CPT

## 2024-06-21 PROCEDURE — 85610 PROTHROMBIN TIME: CPT

## 2024-06-21 PROCEDURE — 80053 COMPREHEN METABOLIC PANEL: CPT

## 2024-06-21 PROCEDURE — 83880 ASSAY OF NATRIURETIC PEPTIDE: CPT

## 2024-06-21 PROCEDURE — 85730 THROMBOPLASTIN TIME PARTIAL: CPT

## 2024-06-21 PROCEDURE — 36415 COLL VENOUS BLD VENIPUNCTURE: CPT

## 2024-06-21 NOTE — PROGRESS NOTES
Mckenzie TAVR review: Consider a 23 S3UR from a right femoral approach. (may need to PTA right iliac)  Annular calcium extending into the LVOT  Transverse arch calcium  Heavy NCC calcium  Minimal leaflet calcium  Tortuous descending aorta and left iliac  High bifurcations bilaterally  L27, Cr9

## 2024-06-24 ENCOUNTER — HOSPITAL ENCOUNTER (OUTPATIENT)
Facility: MEDICAL CENTER | Age: 89
End: 2024-06-24
Attending: INTERNAL MEDICINE | Admitting: INTERNAL MEDICINE
Payer: MEDICARE

## 2024-06-24 ENCOUNTER — APPOINTMENT (OUTPATIENT)
Dept: CARDIOLOGY | Facility: MEDICAL CENTER | Age: 89
End: 2024-06-24
Attending: INTERNAL MEDICINE
Payer: MEDICARE

## 2024-06-24 VITALS
TEMPERATURE: 98.1 F | RESPIRATION RATE: 16 BRPM | OXYGEN SATURATION: 94 % | WEIGHT: 132.28 LBS | HEIGHT: 63 IN | HEART RATE: 70 BPM | DIASTOLIC BLOOD PRESSURE: 61 MMHG | BODY MASS INDEX: 23.44 KG/M2 | SYSTOLIC BLOOD PRESSURE: 112 MMHG

## 2024-06-24 DIAGNOSIS — Z01.810 PRE-PROCEDURAL CARDIOVASCULAR EXAMINATION: ICD-10-CM

## 2024-06-24 DIAGNOSIS — I35.0 AORTIC VALVE STENOSIS, ETIOLOGY OF CARDIAC VALVE DISEASE UNSPECIFIED: ICD-10-CM

## 2024-06-24 PROCEDURE — 700117 HCHG RX CONTRAST REV CODE 255: Performed by: INTERNAL MEDICINE

## 2024-06-24 PROCEDURE — G0278 ILIAC ART ANGIO,CARDIAC CATH: HCPCS | Performed by: INTERNAL MEDICINE

## 2024-06-24 PROCEDURE — C1887 CATHETER, GUIDING: HCPCS

## 2024-06-24 PROCEDURE — 700101 HCHG RX REV CODE 250

## 2024-06-24 PROCEDURE — 160036 HCHG PACU - EA ADDL 30 MINS PHASE I

## 2024-06-24 PROCEDURE — 160035 HCHG PACU - 1ST 60 MINS PHASE I

## 2024-06-24 PROCEDURE — 160002 HCHG RECOVERY MINUTES (STAT)

## 2024-06-24 PROCEDURE — 93454 CORONARY ARTERY ANGIO S&I: CPT | Mod: 26 | Performed by: INTERNAL MEDICINE

## 2024-06-24 PROCEDURE — A9270 NON-COVERED ITEM OR SERVICE: HCPCS

## 2024-06-24 PROCEDURE — 99152 MOD SED SAME PHYS/QHP 5/>YRS: CPT | Performed by: INTERNAL MEDICINE

## 2024-06-24 PROCEDURE — 700102 HCHG RX REV CODE 250 W/ 637 OVERRIDE(OP)

## 2024-06-24 PROCEDURE — 700111 HCHG RX REV CODE 636 W/ 250 OVERRIDE (IP)

## 2024-06-24 RX ORDER — LIDOCAINE HYDROCHLORIDE 20 MG/ML
INJECTION, SOLUTION INFILTRATION; PERINEURAL
Status: COMPLETED
Start: 2024-06-24 | End: 2024-06-24

## 2024-06-24 RX ORDER — HEPARIN SODIUM 1000 [USP'U]/ML
INJECTION, SOLUTION INTRAVENOUS; SUBCUTANEOUS
Status: COMPLETED
Start: 2024-06-24 | End: 2024-06-24

## 2024-06-24 RX ORDER — VERAPAMIL HYDROCHLORIDE 2.5 MG/ML
INJECTION, SOLUTION INTRAVENOUS
Status: COMPLETED
Start: 2024-06-24 | End: 2024-06-24

## 2024-06-24 RX ORDER — MIDAZOLAM HYDROCHLORIDE 1 MG/ML
INJECTION INTRAMUSCULAR; INTRAVENOUS
Status: COMPLETED
Start: 2024-06-24 | End: 2024-06-24

## 2024-06-24 RX ORDER — HEPARIN SODIUM 200 [USP'U]/100ML
INJECTION, SOLUTION INTRAVENOUS
Status: COMPLETED
Start: 2024-06-24 | End: 2024-06-24

## 2024-06-24 RX ORDER — ASPIRIN 81 MG/1
TABLET, CHEWABLE ORAL
Status: COMPLETED
Start: 2024-06-24 | End: 2024-06-24

## 2024-06-24 RX ADMIN — LIDOCAINE HYDROCHLORIDE: 20 INJECTION, SOLUTION INFILTRATION; PERINEURAL at 09:38

## 2024-06-24 RX ADMIN — HEPARIN SODIUM: 1000 INJECTION, SOLUTION INTRAVENOUS; SUBCUTANEOUS at 09:38

## 2024-06-24 RX ADMIN — MIDAZOLAM HYDROCHLORIDE 1 MG: 1 INJECTION, SOLUTION INTRAMUSCULAR; INTRAVENOUS at 09:45

## 2024-06-24 RX ADMIN — NITROGLYCERIN 10 ML: 20 INJECTION INTRAVENOUS at 09:38

## 2024-06-24 RX ADMIN — IOHEXOL 100 ML: 350 INJECTION, SOLUTION INTRAVENOUS at 10:08

## 2024-06-24 RX ADMIN — VERAPAMIL HYDROCHLORIDE 2.5 MG: 2.5 INJECTION, SOLUTION INTRAVENOUS at 09:38

## 2024-06-24 RX ADMIN — FENTANYL CITRATE 75 MCG: 50 INJECTION, SOLUTION INTRAMUSCULAR; INTRAVENOUS at 09:45

## 2024-06-24 RX ADMIN — ASPIRIN 324 MG: 81 TABLET, CHEWABLE ORAL at 09:37

## 2024-06-24 RX ADMIN — HEPARIN SODIUM 2000 UNITS: 200 INJECTION, SOLUTION INTRAVENOUS at 09:37

## 2024-06-24 ASSESSMENT — FIBROSIS 4 INDEX: FIB4 SCORE: 2.37

## 2024-06-24 NOTE — PROCEDURES
Cardiac Catheterization Laboratory Procedure Note    DATE: 6/24/2024    : Ruddy Teran MD, MPH    PROCEDURES PERFORMED:  Coronary angiography  Aortobifemoral angiography  Moderate conscious sedation    INDICATIONS:  Severe aortic valve stenosis    CONSENT:  The complete alternatives, risks, and benefits of the procedure were explained to the patient. Informed consent was obtained prior to the procedure.    MEDICATIONS:  Lidocaine  Fentanyl  Midazolam  Nitroglycerin  Verapamil  Heparin    MODERATE CONSCIOUS SEDATION:  I personally supervised the administration of moderate conscious sedation by the nursing staff for 35 minutes.  Start time: 9:33 AM  End time: 10:08 AM    CONTRAST: Omnipaque 100 cc    ACCESS:  6-Romanian Glidesheath in the right radial artery    ESTIMATED BLOOD LOSS: <20 cc    COMPLICATIONS: None    PROCEDURE IN DETAIL:  The patient was brought to the cardiac catheterization laboratory in the fasting state. The skin over the right wrist was prepped and draped in the usual sterile fashion. Lidocaine infiltration was used to anesthetize the tissue over the right radial artery. Using the micropuncture technique, a 6-Romanian Glidesheath was inserted in the right radial artery. A 5-Fr Terumo Tiger diagnostic catheter was then advanced over a standard J-wire into the aortic root.. This catheter was then used to engage the ostium of the left coronary artery and cineangiograms were obtained in multiple projections for complete evaluation of the left coronary system. This catheter was then used to engage the ostium of the right coronary artery and cineangiograms were obtained in multiple projections for complete evaluation of the right coronary system.      Then this catheter was exchanged over the J-wire to several catheters trying to engage the descending aorta which was challenging due to significant aortic arch tortuosity from the brachiocephalic artery and were eventually successful using 5 Romanian  EBU 3.5 guide catheter to engage descending artery and that was exchanged to a long 6 Maltese pigtail catheter which was advanced into the abdominal aorta over a stiff wire to perform the aortobifemoral angiography.  Following catheters were unable to engage the descending thoracic aorta (5Fr Terumo [place], 6Fr JR4, 6Fr AL-1, 6Fr JL 4).    Following completion of angiography, all wires, catheters, and sheaths were removed.  A TR band was placed over the right wrist using the patent hemostasis technique.     HEMODYNAMICS:  Aortic pressure: 116/54 mmHg    CORONARY ANGIOGRAPHY:  The left main coronary artery : normal appearing, large in caliber, trifurcates to LAD, RI and LCx  The left anterior descending coronary artery : large in caliber, large diagonal, normal appearing with mid LAD bridge   The left circumflex coronary artery : large in caliber vessel, gives rise to small OM1 and large OM2/3.  The right coronary artery  : large in caliber dominant vessel, mild LIs .    Aorto-bifemoral angiography:  Minimal PAD in the distal abdominal aorta and iliacs / CFAs, favorable for TAVR    IMPRESSION:  1.  Mild epicardial luminal irregularities, with mid LAD myocardial bridge.  2.  Minimal PAD in the distal abdominal aorta and iliacs, favorable for TAVR    RECOMMENDATIONS:  Ongoing care per TAVR team  Ongoing ASCVD prevention    NOTIFICATION:  The patient's friend was called and notified of the results.    Referring Cardiologist - Dr. Sellers - was notified.    Ruddy Teran MD, MPH [facility]  Interventional Cardiologist  [facility]   of Clinical Internal Medicine - [facility] [place]

## 2024-06-24 NOTE — PROGRESS NOTES
1315  Monitor alarmed for 6 beats of V-tach. Provider notified.     1340 2 cc air removed from TR band. TR band removed. No s/s of bleeding or hematoma. Occlusive dressing applied. Educated pt to post-arterial puncture precautions. Pt and friend verbalized understanding.

## 2024-06-24 NOTE — DISCHARGE INSTRUCTIONS
Angiogram, Care After  This sheet gives you information about how to care for yourself after your procedure. Your doctor may also give you more specific instructions. If you have problems or questions, contact your doctor.  What can I expect after the procedure?  After the procedure, it is common to have these problems at the point where the catheter was inserted:  Bruising.  Tenderness.  A collection of blood (hematoma). This may feel like a small lump under the skin at the insertion site.  Follow these instructions at home:  Insertion site care    Follow instructions from your doctor about how to take care of the area where the catheter was inserted. Make sure you:  Wash your hands with soap and water before you change your bandage (dressing). If you cannot use soap and water, use hand .  Change your bandage as told by your doctor.  Do not take baths, swim, or use a hot tub until your doctor says it is okay.  You may shower 24-48 hours after the procedure, or as told by your doctor. To clean the area:  Gently wash the area with plain soap and water.  Pat the area dry with a clean towel.  Do not rub the area. This may cause bleeding.  Check your insertion area every day for signs of infection. Check for:  Redness, swelling, or pain.  Fluid or blood.  Warmth.  Pus or a bad smell.  Do not apply powder or lotion to the area. Keep the area clean and dry.  Activity  Do not drive for 24 hours if you were given a medicine to help you relax (sedative).  Rest as told by your doctor, usually for 1-2 days.  Do not lift anything that is heavier than 10 lbs. (4.5 kg) or as told by your doctor.  If your insertion site was in your leg, try to avoid stairs for a few days.  Return to your normal activities as told by your doctor. Ask your doctor what activities are safe for you.  General instructions    If the insertion area starts to bleed, lie flat and put pressure on the area. If the bleeding does not stop, get help  "right away. This is an emergency.  Take over-the-counter and prescription medicines only as told by your doctor.  Drink enough fluid to keep your pee (urine) pale yellow.  Keep all follow-up visits as told by your doctor. This is important.  Contact a doctor if:  You have a fever.  You have chills.  You have redness, swelling, or pain around your insertion area.  You have fluid or blood coming from your insertion area.  The insertion area feels warm to the touch.  You have pus or a bad smell coming from your insertion area.  You have more bruising around the insertion area.  Get help right away if:  You have a lot of pain in the insertion area.  The insertion area swells very fast.  The insertion area is bleeding, and the bleeding does not stop after you hold steady pressure on the area.  The area around the insertion area becomes pale, cool, tingly, or numb.  You have chest pain.  You have trouble breathing.  You have a rash.  You have any signs of a stroke. \"BE FAST\" is an easy way to remember the main warning signs:  B - Balance. Signs are dizziness, sudden trouble walking, or loss of balance.  E - Eyes. Signs are trouble seeing or a change in how you see.  F - Face. Signs are sudden weakness or loss of feeling of the face, or the face or eyelid drooping on one side.  A - Arms. Signs are weakness or loss of feeling in an arm. This happens suddenly and usually on one side of the body.  S - Speech. Signs are sudden trouble speaking, slurred speech, or trouble understanding what people say.  T - Time. Time to call emergency services. Write down what time symptoms started.  You have other signs of a stroke, such as:  A sudden, very bad headache with no known cause.  Feeling like you may vomit (nausea).  Vomiting.  A seizure.  These symptoms may be an emergency. Do not wait to see if the symptoms will go away. Get medical help right away. Call your local emergency services (911 in the [place] Do not drive yourself to " the hospital.  Summary  After the procedure, it is common to have bruising and tenderness at the insertion area.  Do not take baths, swim, or use a hot tub until your doctor says it is okay to do so. You may shower 24-48 hours after the procedure or as told by your doctor.  It is important to rest and drink plenty of fluids.  If the insertion area starts to bleed, lie flat and put pressure on the area. If the bleeding does not stop, get help right away. This is an emergency.  This information is not intended to replace advice given to you by your health care provider. Make sure you discuss any questions you have with your health care provider.  Document Revised: 10/21/2020 Document Reviewed: 10/21/2020  [employer] Patient Education © 2023 [employer]  Radial Site Care  The following information offers guidance on how to care for yourself after your procedure. Your health care provider may also give you more specific instructions. If you have problems or questions, contact your health care provider.  What can I expect after the procedure?  After the procedure, it is common to have bruising and tenderness in the incision area.  Follow these instructions at home:  Incision site care  \  Follow instructions from your health care provider about how to take care of your incision site. Make sure you:  Wash your hands with soap and water for at least 20 seconds before and after you change your bandage (dressing). If soap and water are not available, use hand .  Change or remove your dressing as told by your health care provider.  Leave stitches (sutures), skin glue, or adhesive strips in place. These skin closures may need to stay in place for 2 weeks or longer. If adhesive strip edges start to loosen and curl up, you may trim the loose edges. Do not remove adhesive strips completely unless your health care provider tells you to do that.  Do not take baths, swim, or use a hot tub until your health care provider  approves.  You may shower 24-48 hours after the procedure or as told by your health care provider.  Remove the dressing and gently wash the incision area with plain soap and water.  Pat the area dry with a clean towel.  Do not rub the site. That could cause bleeding.  Do not apply powder or lotion to the site.  Check your incision site every day for signs of infection. Check for:  Redness, swelling, or pain.  Fluid or blood.  Warmth.  Pus or a bad smell.  Activity  For 24 hours after the procedure, or as directed by your health care provider:  Do not flex or bend the affected arm.  Do not push or pull heavy objects with the affected arm.  Do not operate machinery or power tools.  Do not drive. You should not drive yourself home from the hospital or clinic if you go home during that time period. You may drive 24 hours after the procedure unless your health care provider tells you not to.  Do not lift anything that is heavier than 10 lb (4.5 kg), or the limit that you are told, until your health care provider says that it is safe.  Return to your normal activities as told by your health care provider. Ask your health care provider what activities are safe for you and when you can return to work.  If you were given a sedative during the procedure, it can affect you for several hours. Do not drive or operate machinery until your health care provider says that it is safe.  General instructions  Take over-the-counter and prescription medicines only as told by your health care provider.  If you will be going home right after the procedure, plan to have a responsible adult care for you for the time you are told. This is important.  Keep all follow-up visits. This is important.  Contact a health care provider if:  You have a fever or chills.  You have any of these signs of infection at your incision site:  Redness, swelling, or pain.  Fluid or blood.  Warmth.  Pus or a bad smell.  Get help right away if:  The incision area  swells very fast.  The incision area is bleeding, and the bleeding does not stop when you hold steady pressure on the area.  Your arm or hand becomes pale, cool, tingly, or numb.  These symptoms may represent a serious problem that is an emergency. Do not wait to see if the symptoms will go away. Get medical help right away. Call your local emergency services (911 in the [place] Do not drive yourself to the hospital.  Summary  After the procedure, it is common to have bruising and tenderness at the incision site.  Follow instructions from your health care provider about how to take care of your radial site incision. Check the incision every day for signs of infection.  Do not lift anything that is heavier than 10 lb (4.5 kg), or the limit that you are told, until your health care provider says that it is safe.  Get help right away if the incision area swells very fast, you have bleeding at the incision site that will not stop, or your arm or hand becomes pale, cool, or numb.  This information is not intended to replace advice given to you by your health care provider. Make sure you discuss any questions you have with your health care provider.  Document Revised: 02/06/2022 Document Reviewed: 02/06/2022  [employer] Patient Education © 2023 [employer]

## 2024-06-24 NOTE — PROGRESS NOTES
1018 - Patient arrival to PPU after [facility]. Pt awake and alert. Assessed site with SUDHAKAR OROPEZA. Dressing to CDI is clean, dry, intact. Site is soft. VSS. Denies pain at this time. Educated patient on bedrest instructions.     1020 - belongings returned to patient at bedside  1025 - Tolerating oral intake.    1140 - updated pts  FRIEND MONICA    1235 - FRIEND RUTH- Discharge instructions given; discussed diet, activity, medications, dressing care, follow up care, and worsening symptoms. Pt and FRIEND MONICA verbalized understanding and questions answered.  ATTEMPT TO REMOVED AIR FROM TR BAND, REMOVED 2ML AIR NO ISSUES AND 15MINUTES LATER REMOVED 3 ML AND BLEEDING STARTED, AIR PUT BACK IN AND STARTED AGAIN AT 12, BP LOW DURING AIR REMOVAL, FLUID GIVEN WITH IMPROVEMENT,  REMOVED AIR EVERY 15MIN BETWEEN 1-2ML AIR AT A TIME, 1340 TR BAND REMOVED AND DRESSING APPLIED,   14 PT GOT DRESSED AND D/C AT 1409  - Pt's VSS; denies N/V; patient up to edge of bed, denies discomfort. Dressing CDI to RIGHT WRIST. D/c orders received. All lines and monitors discontinued. IV dc'd. Patient states ready to d/c home. No prescriptions given. Pt taken down via W/C with CNA, in stable condition to meet DISCHARGE CRITERIA with all belongings present.

## 2024-06-25 ENCOUNTER — TELEPHONE (OUTPATIENT)
Dept: CARDIOLOGY | Facility: MEDICAL CENTER | Age: 89
End: 2024-06-25
Payer: MEDICARE

## 2024-06-25 DIAGNOSIS — K22.89 ESOPHAGEAL THICKENING: ICD-10-CM

## 2024-06-25 NOTE — TELEPHONE ENCOUNTER
----- Message -----  From: Ruddy Teran M.D.  Sent: 6/24/2024   9:24 AM PDT  To: Mary Dillard R.N.  Subject: plz call them                                    She had questions on what meds to take and not to take pre TAVR, not sure when she should stop xarelto etc. Plz call her    She also wants us to call her son and arrange his FMLA paperwork for him to be with her for the TAVR. Here is his number to coordinate:[employer] [phone]    Thank you   ______________________________________________________________________________________    Called and spoke to patient. All questions answered and clarification provided. Patient verbalizes understanding and is aware she will receive call tomorrow to further review check-in details.     Advised to have her son drop off FMLA paperwork to our office ASAP for completion.

## 2024-06-26 ENCOUNTER — TELEPHONE (OUTPATIENT)
Dept: CARDIOLOGY | Facility: MEDICAL CENTER | Age: 89
End: 2024-06-26
Payer: MEDICARE

## 2024-06-26 NOTE — TELEPHONE ENCOUNTER
Valve Conference Plan of Care: 6/26/2024 for TAVR 7/1/2024           TAVR Candidate: Yes  Access: right femoral  Valve Size: 23mm  General Anesthesia or MAC: MAC  Unit: Telemetry  Incidental findings to be discussed with PCP: Mild wall thickening of the esophagus. Multiple sigmoid diverticula. Bowel wall thickening of the descending and sigmoid colon, could be underdistention or mild chronic inflammation.   Clearance needed prior to procedure: No    Check in time of 1200 7/1/2024.     Pre-op appointment completed: Yes    NPO after midnight. Hold all vitamins and supplements 7 days prior, hold Xarelto 48 hours prior to procedure.

## 2024-06-27 ENCOUNTER — TELEPHONE (OUTPATIENT)
Dept: CARDIOLOGY | Facility: MEDICAL CENTER | Age: 89
End: 2024-06-27
Payer: MEDICARE

## 2024-06-27 NOTE — TELEPHONE ENCOUNTER
Lisa stopped by and dropped of some FMLA paperwork for  to sign for her son's employer. Walk-in form and FMLA form was scanned into . Lisa will come pick it up once they are ready.

## 2024-06-28 ENCOUNTER — TELEPHONE (OUTPATIENT)
Dept: CARDIOLOGY | Facility: MEDICAL CENTER | Age: 89
End: 2024-06-28
Payer: MEDICARE

## 2024-06-28 ENCOUNTER — DOCUMENTATION (OUTPATIENT)
Dept: CARDIOLOGY | Facility: MEDICAL CENTER | Age: 89
End: 2024-06-28
Payer: MEDICARE

## 2024-06-28 DIAGNOSIS — I35.0 NONRHEUMATIC AORTIC VALVE STENOSIS: ICD-10-CM

## 2024-06-28 NOTE — TELEPHONE ENCOUNTER
FMLA paperwork completed and scanned into Epic.    Called and spoke to patient. Advised paperwork had been completed and is ready to be picked up. Informed patient to ask for RN upon arrival. Patient verbalizes understanding.

## 2024-06-28 NOTE — PROGRESS NOTES
Thank you for the opportunity to participate in your patient's care.     Your patient is scheduled for transcatheter aortic valve replacement (TAVR) on Monday, July 1, 2024.    Sincerely,    [facility]    BROWN Rocha, RN, Structural Heart Program Nurse Coordinator ([phone])  BROWN Nunn, RN, Structural Heart Program Nurse Coordinator ([phone])

## 2024-06-28 NOTE — TELEPHONE ENCOUNTER
Received call from patient wanting to confirm check in time for TAVR 7/1/2024. All questions answered and clarification provided.

## 2024-07-01 ENCOUNTER — HOSPITAL ENCOUNTER (INPATIENT)
Facility: MEDICAL CENTER | Age: 89
LOS: 1 days | DRG: 267 | End: 2024-07-02
Attending: INTERNAL MEDICINE | Admitting: THORACIC SURGERY (CARDIOTHORACIC VASCULAR SURGERY)
Payer: MEDICARE

## 2024-07-01 ENCOUNTER — APPOINTMENT (OUTPATIENT)
Dept: RADIOLOGY | Facility: MEDICAL CENTER | Age: 89
DRG: 267 | End: 2024-07-01
Payer: MEDICARE

## 2024-07-01 ENCOUNTER — ANESTHESIA (OUTPATIENT)
Dept: SURGERY | Facility: MEDICAL CENTER | Age: 89
DRG: 267 | End: 2024-07-01
Payer: MEDICARE

## 2024-07-01 ENCOUNTER — APPOINTMENT (OUTPATIENT)
Dept: CARDIOLOGY | Facility: MEDICAL CENTER | Age: 89
DRG: 267 | End: 2024-07-01
Attending: ANESTHESIOLOGY
Payer: MEDICARE

## 2024-07-01 ENCOUNTER — ANESTHESIA EVENT (OUTPATIENT)
Dept: SURGERY | Facility: MEDICAL CENTER | Age: 89
DRG: 267 | End: 2024-07-01
Payer: MEDICARE

## 2024-07-01 DIAGNOSIS — Z95.2 S/P TAVR (TRANSCATHETER AORTIC VALVE REPLACEMENT): ICD-10-CM

## 2024-07-01 DIAGNOSIS — I31.39 PERICARDIAL EFFUSION: ICD-10-CM

## 2024-07-01 DIAGNOSIS — I44.0 FIRST DEGREE HEART BLOCK: ICD-10-CM

## 2024-07-01 DIAGNOSIS — I44.7 LBBB (LEFT BUNDLE BRANCH BLOCK): ICD-10-CM

## 2024-07-01 LAB
ABO + RH BLD: NORMAL
ACT BLD: 256 SEC (ref 74–137)
ALBUMIN SERPL BCP-MCNC: 3.4 G/DL (ref 3.2–4.9)
ALBUMIN/GLOB SERPL: 1.2 G/DL
ALP SERPL-CCNC: 50 U/L (ref 30–99)
ALT SERPL-CCNC: 9 U/L (ref 2–50)
ANION GAP SERPL CALC-SCNC: 11 MMOL/L (ref 7–16)
AST SERPL-CCNC: 23 U/L (ref 12–45)
BILIRUB SERPL-MCNC: 0.6 MG/DL (ref 0.1–1.5)
BUN SERPL-MCNC: 21 MG/DL (ref 8–22)
CALCIUM ALBUM COR SERPL-MCNC: 9.9 MG/DL (ref 8.5–10.5)
CALCIUM SERPL-MCNC: 9.4 MG/DL (ref 8.5–10.5)
CHLORIDE SERPL-SCNC: 102 MMOL/L (ref 96–112)
CO2 SERPL-SCNC: 23 MMOL/L (ref 20–33)
CREAT SERPL-MCNC: 0.44 MG/DL (ref 0.5–1.4)
EKG IMPRESSION: NORMAL
ERYTHROCYTE [DISTWIDTH] IN BLOOD BY AUTOMATED COUNT: 48.8 FL (ref 35.9–50)
GFR SERPLBLD CREATININE-BSD FMLA CKD-EPI: 93 ML/MIN/1.73 M 2
GLOBULIN SER CALC-MCNC: 2.8 G/DL (ref 1.9–3.5)
GLUCOSE SERPL-MCNC: 114 MG/DL (ref 65–99)
HCT VFR BLD AUTO: 37.1 % (ref 37–47)
HGB BLD-MCNC: 12.3 G/DL (ref 12–16)
MCH RBC QN AUTO: 33.4 PG (ref 27–33)
MCHC RBC AUTO-ENTMCNC: 33.2 G/DL (ref 32.2–35.5)
MCV RBC AUTO: 100.8 FL (ref 81.4–97.8)
NT-PROBNP SERPL IA-MCNC: 254 PG/ML (ref 0–125)
PLATELET # BLD AUTO: 155 K/UL (ref 164–446)
PMV BLD AUTO: 10.9 FL (ref 9–12.9)
POTASSIUM SERPL-SCNC: 4.2 MMOL/L (ref 3.6–5.5)
PROT SERPL-MCNC: 6.2 G/DL (ref 6–8.2)
RBC # BLD AUTO: 3.68 M/UL (ref 4.2–5.4)
SODIUM SERPL-SCNC: 136 MMOL/L (ref 135–145)
WBC # BLD AUTO: 6.9 K/UL (ref 4.8–10.8)

## 2024-07-01 PROCEDURE — 700105 HCHG RX REV CODE 258: Performed by: INTERNAL MEDICINE

## 2024-07-01 PROCEDURE — 770020 HCHG ROOM/CARE - TELE (206)

## 2024-07-01 PROCEDURE — 02RF38Z REPLACEMENT OF AORTIC VALVE WITH ZOOPLASTIC TISSUE, PERCUTANEOUS APPROACH: ICD-10-PCS | Performed by: INTERNAL MEDICINE

## 2024-07-01 PROCEDURE — A9270 NON-COVERED ITEM OR SERVICE: HCPCS

## 2024-07-01 PROCEDURE — 160031 HCHG SURGERY MINUTES - 1ST 30 MINS LEVEL 5: Performed by: INTERNAL MEDICINE

## 2024-07-01 PROCEDURE — 93005 ELECTROCARDIOGRAM TRACING: CPT

## 2024-07-01 PROCEDURE — 33361 REPLACE AORTIC VALVE PERQ: CPT | Mod: 62,Q0 | Performed by: INTERNAL MEDICINE

## 2024-07-01 PROCEDURE — 700105 HCHG RX REV CODE 258

## 2024-07-01 PROCEDURE — 160048 HCHG OR STATISTICAL LEVEL 1-5: Performed by: INTERNAL MEDICINE

## 2024-07-01 PROCEDURE — 503001 HCHG PERFUSION: Performed by: INTERNAL MEDICINE

## 2024-07-01 PROCEDURE — 160035 HCHG PACU - 1ST 60 MINS PHASE I: Performed by: INTERNAL MEDICINE

## 2024-07-01 PROCEDURE — C1883 ADAPT/EXT, PACING/NEURO LEAD: HCPCS | Performed by: INTERNAL MEDICINE

## 2024-07-01 PROCEDURE — 160009 HCHG ANES TIME/MIN: Performed by: INTERNAL MEDICINE

## 2024-07-01 PROCEDURE — 85027 COMPLETE CBC AUTOMATED: CPT

## 2024-07-01 PROCEDURE — 700102 HCHG RX REV CODE 250 W/ 637 OVERRIDE(OP)

## 2024-07-01 PROCEDURE — 700101 HCHG RX REV CODE 250: Performed by: INTERNAL MEDICINE

## 2024-07-01 PROCEDURE — 33361 REPLACE AORTIC VALVE PERQ: CPT | Mod: 62,Q0 | Performed by: THORACIC SURGERY (CARDIOTHORACIC VASCULAR SURGERY)

## 2024-07-01 PROCEDURE — 85347 COAGULATION TIME ACTIVATED: CPT

## 2024-07-01 PROCEDURE — C1894 INTRO/SHEATH, NON-LASER: HCPCS | Performed by: INTERNAL MEDICINE

## 2024-07-01 PROCEDURE — 93010 ELECTROCARDIOGRAM REPORT: CPT | Performed by: INTERNAL MEDICINE

## 2024-07-01 PROCEDURE — 700111 HCHG RX REV CODE 636 W/ 250 OVERRIDE (IP): Performed by: ANESTHESIOLOGY

## 2024-07-01 PROCEDURE — C1760 CLOSURE DEV, VASC: HCPCS | Performed by: INTERNAL MEDICINE

## 2024-07-01 PROCEDURE — C1725 CATH, TRANSLUMIN NON-LASER: HCPCS | Performed by: INTERNAL MEDICINE

## 2024-07-01 PROCEDURE — 110372 HCHG SHELL REV 278: Performed by: INTERNAL MEDICINE

## 2024-07-01 PROCEDURE — 160002 HCHG RECOVERY MINUTES (STAT): Performed by: INTERNAL MEDICINE

## 2024-07-01 PROCEDURE — 83880 ASSAY OF NATRIURETIC PEPTIDE: CPT

## 2024-07-01 PROCEDURE — C1769 GUIDE WIRE: HCPCS | Performed by: INTERNAL MEDICINE

## 2024-07-01 PROCEDURE — 160042 HCHG SURGERY MINUTES - EA ADDL 1 MIN LEVEL 5: Performed by: INTERNAL MEDICINE

## 2024-07-01 PROCEDURE — 80053 COMPREHEN METABOLIC PANEL: CPT

## 2024-07-01 PROCEDURE — 71045 X-RAY EXAM CHEST 1 VIEW: CPT

## 2024-07-01 PROCEDURE — 700101 HCHG RX REV CODE 250: Performed by: ANESTHESIOLOGY

## 2024-07-01 PROCEDURE — C1887 CATHETER, GUIDING: HCPCS | Performed by: INTERNAL MEDICINE

## 2024-07-01 PROCEDURE — 700111 HCHG RX REV CODE 636 W/ 250 OVERRIDE (IP): Performed by: INTERNAL MEDICINE

## 2024-07-01 PROCEDURE — 700117 HCHG RX CONTRAST REV CODE 255: Performed by: INTERNAL MEDICINE

## 2024-07-01 PROCEDURE — C1751 CATH, INF, PER/CENT/MIDLINE: HCPCS | Performed by: INTERNAL MEDICINE

## 2024-07-01 PROCEDURE — 93319 3D ECHO IMG CGEN CAR ANOMAL: CPT

## 2024-07-01 PROCEDURE — 4A023N7 MEASUREMENT OF CARDIAC SAMPLING AND PRESSURE, LEFT HEART, PERCUTANEOUS APPROACH: ICD-10-PCS | Performed by: INTERNAL MEDICINE

## 2024-07-01 DEVICE — IMPLANTABLE DEVICE: Type: IMPLANTABLE DEVICE | Site: GROIN | Status: FUNCTIONAL

## 2024-07-01 RX ORDER — BUPIVACAINE HYDROCHLORIDE 2.5 MG/ML
INJECTION, SOLUTION EPIDURAL; INFILTRATION; INTRACAUDAL
Status: DISCONTINUED | OUTPATIENT
Start: 2024-07-01 | End: 2024-07-01 | Stop reason: HOSPADM

## 2024-07-01 RX ORDER — METOPROLOL TARTRATE 50 MG/1
50 TABLET, FILM COATED ORAL 2 TIMES DAILY
Status: DISCONTINUED | OUTPATIENT
Start: 2024-07-01 | End: 2024-07-02 | Stop reason: HOSPADM

## 2024-07-01 RX ORDER — MIDAZOLAM HYDROCHLORIDE 1 MG/ML
INJECTION INTRAMUSCULAR; INTRAVENOUS PRN
Status: DISCONTINUED | OUTPATIENT
Start: 2024-07-01 | End: 2024-07-02 | Stop reason: SURG

## 2024-07-01 RX ORDER — METOCLOPRAMIDE HYDROCHLORIDE 5 MG/ML
INJECTION INTRAMUSCULAR; INTRAVENOUS PRN
Status: DISCONTINUED | OUTPATIENT
Start: 2024-07-01 | End: 2024-07-02 | Stop reason: SURG

## 2024-07-01 RX ORDER — SODIUM CHLORIDE 9 MG/ML
INJECTION, SOLUTION INTRAVENOUS CONTINUOUS
Status: ACTIVE | OUTPATIENT
Start: 2024-07-01 | End: 2024-07-01

## 2024-07-01 RX ORDER — PHENYLEPHRINE HYDROCHLORIDE 10 MG/ML
INJECTION, SOLUTION INTRAMUSCULAR; INTRAVENOUS; SUBCUTANEOUS PRN
Status: DISCONTINUED | OUTPATIENT
Start: 2024-07-01 | End: 2024-07-02 | Stop reason: SURG

## 2024-07-01 RX ORDER — SIMVASTATIN 40 MG
40 TABLET ORAL EVERY EVENING
Status: DISCONTINUED | OUTPATIENT
Start: 2024-07-01 | End: 2024-07-02 | Stop reason: HOSPADM

## 2024-07-01 RX ORDER — LIDOCAINE HYDROCHLORIDE 20 MG/ML
INJECTION, SOLUTION INFILTRATION; PERINEURAL
Status: DISCONTINUED | OUTPATIENT
Start: 2024-07-01 | End: 2024-07-01 | Stop reason: HOSPADM

## 2024-07-01 RX ORDER — POLYETHYLENE GLYCOL 3350 17 G/17G
1 POWDER, FOR SOLUTION ORAL 2 TIMES DAILY PRN
Status: DISCONTINUED | OUTPATIENT
Start: 2024-07-01 | End: 2024-07-02 | Stop reason: HOSPADM

## 2024-07-01 RX ORDER — DOCUSATE SODIUM 100 MG/1
100 CAPSULE, LIQUID FILLED ORAL 2 TIMES DAILY
Status: DISCONTINUED | OUTPATIENT
Start: 2024-07-01 | End: 2024-07-02 | Stop reason: HOSPADM

## 2024-07-01 RX ORDER — DIPHENHYDRAMINE HCL 25 MG
25 TABLET ORAL NIGHTLY PRN
Status: DISCONTINUED | OUTPATIENT
Start: 2024-07-01 | End: 2024-07-02 | Stop reason: HOSPADM

## 2024-07-01 RX ORDER — DEXAMETHASONE SODIUM PHOSPHATE 4 MG/ML
INJECTION, SOLUTION INTRA-ARTICULAR; INTRALESIONAL; INTRAMUSCULAR; INTRAVENOUS; SOFT TISSUE PRN
Status: DISCONTINUED | OUTPATIENT
Start: 2024-07-01 | End: 2024-07-02 | Stop reason: SURG

## 2024-07-01 RX ORDER — AMOXICILLIN 250 MG
1 CAPSULE ORAL NIGHTLY
Status: DISCONTINUED | OUTPATIENT
Start: 2024-07-01 | End: 2024-07-02 | Stop reason: HOSPADM

## 2024-07-01 RX ORDER — CEFAZOLIN SODIUM 1 G/3ML
INJECTION, POWDER, FOR SOLUTION INTRAMUSCULAR; INTRAVENOUS PRN
Status: DISCONTINUED | OUTPATIENT
Start: 2024-07-01 | End: 2024-07-02 | Stop reason: SURG

## 2024-07-01 RX ORDER — SODIUM CHLORIDE, SODIUM LACTATE, POTASSIUM CHLORIDE, CALCIUM CHLORIDE 600; 310; 30; 20 MG/100ML; MG/100ML; MG/100ML; MG/100ML
INJECTION, SOLUTION INTRAVENOUS CONTINUOUS
Status: ACTIVE | OUTPATIENT
Start: 2024-07-01 | End: 2024-07-01

## 2024-07-01 RX ORDER — HYDRALAZINE HYDROCHLORIDE 20 MG/ML
10 INJECTION INTRAMUSCULAR; INTRAVENOUS
Status: DISCONTINUED | OUTPATIENT
Start: 2024-07-01 | End: 2024-07-02 | Stop reason: HOSPADM

## 2024-07-01 RX ORDER — AMOXICILLIN 250 MG
1 CAPSULE ORAL
Status: DISCONTINUED | OUTPATIENT
Start: 2024-07-01 | End: 2024-07-02 | Stop reason: HOSPADM

## 2024-07-01 RX ORDER — ACETAMINOPHEN 325 MG/1
650 TABLET ORAL EVERY 6 HOURS PRN
Status: DISCONTINUED | OUTPATIENT
Start: 2024-07-01 | End: 2024-07-02 | Stop reason: HOSPADM

## 2024-07-01 RX ORDER — ACETAMINOPHEN 500 MG
500 TABLET ORAL EVERY 6 HOURS PRN
COMMUNITY

## 2024-07-01 RX ORDER — ENEMA 19; 7 G/133ML; G/133ML
1 ENEMA RECTAL
Status: DISCONTINUED | OUTPATIENT
Start: 2024-07-01 | End: 2024-07-02 | Stop reason: HOSPADM

## 2024-07-01 RX ORDER — DIPHENHYDRAMINE HYDROCHLORIDE 50 MG/ML
25 INJECTION INTRAMUSCULAR; INTRAVENOUS EVERY 6 HOURS PRN
Status: DISCONTINUED | OUTPATIENT
Start: 2024-07-01 | End: 2024-07-02 | Stop reason: HOSPADM

## 2024-07-01 RX ORDER — LIDOCAINE HYDROCHLORIDE 20 MG/ML
INJECTION, SOLUTION EPIDURAL; INFILTRATION; INTRACAUDAL; PERINEURAL PRN
Status: DISCONTINUED | OUTPATIENT
Start: 2024-07-01 | End: 2024-07-02 | Stop reason: SURG

## 2024-07-01 RX ORDER — ONDANSETRON 2 MG/ML
INJECTION INTRAMUSCULAR; INTRAVENOUS PRN
Status: DISCONTINUED | OUTPATIENT
Start: 2024-07-01 | End: 2024-07-02 | Stop reason: SURG

## 2024-07-01 RX ORDER — BISACODYL 10 MG
10 SUPPOSITORY, RECTAL RECTAL
Status: DISCONTINUED | OUTPATIENT
Start: 2024-07-01 | End: 2024-07-02 | Stop reason: HOSPADM

## 2024-07-01 RX ORDER — ONDANSETRON 2 MG/ML
4 INJECTION INTRAMUSCULAR; INTRAVENOUS EVERY 4 HOURS PRN
Status: DISCONTINUED | OUTPATIENT
Start: 2024-07-01 | End: 2024-07-02 | Stop reason: HOSPADM

## 2024-07-01 RX ADMIN — SIMVASTATIN 40 MG: 40 TABLET, FILM COATED ORAL at 18:05

## 2024-07-01 RX ADMIN — ONDANSETRON 4 MG: 2 INJECTION INTRAMUSCULAR; INTRAVENOUS at 13:40

## 2024-07-01 RX ADMIN — SODIUM CHLORIDE: 9 INJECTION, SOLUTION INTRAVENOUS at 18:00

## 2024-07-01 RX ADMIN — PROPOFOL 130 MG: 10 INJECTION, EMULSION INTRAVENOUS at 13:40

## 2024-07-01 RX ADMIN — RIVAROXABAN 20 MG: 20 TABLET, FILM COATED ORAL at 18:05

## 2024-07-01 RX ADMIN — SODIUM CHLORIDE, POTASSIUM CHLORIDE, SODIUM LACTATE AND CALCIUM CHLORIDE: 600; 310; 30; 20 INJECTION, SOLUTION INTRAVENOUS at 13:33

## 2024-07-01 RX ADMIN — CEFAZOLIN 2 G: 1 INJECTION, POWDER, FOR SOLUTION INTRAMUSCULAR; INTRAVENOUS at 13:41

## 2024-07-01 RX ADMIN — MIDAZOLAM HYDROCHLORIDE 2 MG: 2 INJECTION, SOLUTION INTRAMUSCULAR; INTRAVENOUS at 13:41

## 2024-07-01 RX ADMIN — PHENYLEPHRINE HYDROCHLORIDE 100 MCG: 10 INJECTION INTRAVENOUS at 13:48

## 2024-07-01 RX ADMIN — METOCLOPRAMIDE 10 MG: 5 INJECTION, SOLUTION INTRAMUSCULAR; INTRAVENOUS at 13:40

## 2024-07-01 RX ADMIN — DEXAMETHASONE SODIUM PHOSPHATE 4 MG: 4 INJECTION INTRA-ARTICULAR; INTRALESIONAL; INTRAMUSCULAR; INTRAVENOUS; SOFT TISSUE at 13:40

## 2024-07-01 RX ADMIN — LIDOCAINE HYDROCHLORIDE 100 MG: 20 INJECTION, SOLUTION EPIDURAL; INFILTRATION; INTRACAUDAL at 13:40

## 2024-07-01 RX ADMIN — PHENYLEPHRINE HYDROCHLORIDE 100 MCG: 10 INJECTION INTRAVENOUS at 13:46

## 2024-07-01 RX ADMIN — METOPROLOL TARTRATE 50 MG: 50 TABLET, FILM COATED ORAL at 18:05

## 2024-07-01 SDOH — ECONOMIC STABILITY: TRANSPORTATION INSECURITY
IN THE PAST 12 MONTHS, HAS LACK OF RELIABLE TRANSPORTATION KEPT YOU FROM MEDICAL APPOINTMENTS, MEETINGS, WORK OR FROM GETTING THINGS NEEDED FOR DAILY LIVING?: NO

## 2024-07-01 SDOH — ECONOMIC STABILITY: TRANSPORTATION INSECURITY
IN THE PAST 12 MONTHS, HAS THE LACK OF TRANSPORTATION KEPT YOU FROM MEDICAL APPOINTMENTS OR FROM GETTING MEDICATIONS?: NO

## 2024-07-01 ASSESSMENT — PAIN DESCRIPTION - PAIN TYPE
TYPE: SURGICAL PAIN

## 2024-07-01 ASSESSMENT — SOCIAL DETERMINANTS OF HEALTH (SDOH)
WITHIN THE LAST YEAR, HAVE YOU BEEN HUMILIATED OR EMOTIONALLY ABUSED IN OTHER WAYS BY YOUR PARTNER OR EX-PARTNER?: NO
WITHIN THE LAST YEAR, HAVE YOU BEEN KICKED, HIT, SLAPPED, OR OTHERWISE PHYSICALLY HURT BY YOUR PARTNER OR EX-PARTNER?: NO
IN THE PAST 12 MONTHS, HAS THE ELECTRIC, GAS, OIL, OR WATER COMPANY THREATENED TO SHUT OFF SERVICE IN YOUR HOME?: NO
WITHIN THE LAST YEAR, HAVE YOU BEEN AFRAID OF YOUR PARTNER OR EX-PARTNER?: NO
WITHIN THE LAST YEAR, HAVE TO BEEN RAPED OR FORCED TO HAVE ANY KIND OF SEXUAL ACTIVITY BY YOUR PARTNER OR EX-PARTNER?: NO

## 2024-07-01 ASSESSMENT — LIFESTYLE VARIABLES
CONSUMPTION TOTAL: NEGATIVE
HOW MANY TIMES IN THE PAST YEAR HAVE YOU HAD 5 OR MORE DRINKS IN A DAY: 0
ALCOHOL_USE: YES
AVERAGE NUMBER OF DAYS PER WEEK YOU HAVE A DRINK CONTAINING ALCOHOL: 7
HAVE YOU EVER FELT YOU SHOULD CUT DOWN ON YOUR DRINKING: NO
EVER FELT BAD OR GUILTY ABOUT YOUR DRINKING: NO
ON A TYPICAL DAY WHEN YOU DRINK ALCOHOL HOW MANY DRINKS DO YOU HAVE: 1
HAVE PEOPLE ANNOYED YOU BY CRITICIZING YOUR DRINKING: NO
TOTAL SCORE: 0
DOES PATIENT WANT TO STOP DRINKING: NO
TOTAL SCORE: 0
TOTAL SCORE: 0
EVER HAD A DRINK FIRST THING IN THE MORNING TO STEADY YOUR NERVES TO GET RID OF A HANGOVER: NO

## 2024-07-01 ASSESSMENT — COGNITIVE AND FUNCTIONAL STATUS - GENERAL
SUGGESTED CMS G CODE MODIFIER MOBILITY: CH
DAILY ACTIVITIY SCORE: 24
MOBILITY SCORE: 24
SUGGESTED CMS G CODE MODIFIER DAILY ACTIVITY: CH

## 2024-07-01 ASSESSMENT — FIBROSIS 4 INDEX: FIB4 SCORE: 2.37

## 2024-07-02 ENCOUNTER — PHARMACY VISIT (OUTPATIENT)
Dept: PHARMACY | Facility: MEDICAL CENTER | Age: 89
End: 2024-07-02
Payer: COMMERCIAL

## 2024-07-02 ENCOUNTER — APPOINTMENT (OUTPATIENT)
Dept: CARDIOLOGY | Facility: MEDICAL CENTER | Age: 89
DRG: 267 | End: 2024-07-02
Payer: MEDICARE

## 2024-07-02 ENCOUNTER — APPOINTMENT (OUTPATIENT)
Dept: RADIOLOGY | Facility: MEDICAL CENTER | Age: 89
DRG: 267 | End: 2024-07-02
Payer: MEDICARE

## 2024-07-02 VITALS
WEIGHT: 134.26 LBS | HEIGHT: 63 IN | TEMPERATURE: 98.1 F | BODY MASS INDEX: 23.79 KG/M2 | SYSTOLIC BLOOD PRESSURE: 117 MMHG | OXYGEN SATURATION: 93 % | DIASTOLIC BLOOD PRESSURE: 54 MMHG | HEART RATE: 75 BPM | RESPIRATION RATE: 18 BRPM

## 2024-07-02 PROBLEM — I44.0 FIRST DEGREE HEART BLOCK: Status: ACTIVE | Noted: 2024-07-02

## 2024-07-02 PROBLEM — I35.0 AORTIC STENOSIS: Status: RESOLVED | Noted: 2024-02-15 | Resolved: 2024-07-02

## 2024-07-02 PROBLEM — I44.7 LBBB (LEFT BUNDLE BRANCH BLOCK): Status: ACTIVE | Noted: 2024-07-02

## 2024-07-02 LAB
ALBUMIN SERPL BCP-MCNC: 3.6 G/DL (ref 3.2–4.9)
ALBUMIN/GLOB SERPL: 1.2 G/DL
ALP SERPL-CCNC: 55 U/L (ref 30–99)
ALT SERPL-CCNC: 17 U/L (ref 2–50)
ANION GAP SERPL CALC-SCNC: 10 MMOL/L (ref 7–16)
AST SERPL-CCNC: 37 U/L (ref 12–45)
BILIRUB SERPL-MCNC: 0.4 MG/DL (ref 0.1–1.5)
BUN SERPL-MCNC: 19 MG/DL (ref 8–22)
CALCIUM ALBUM COR SERPL-MCNC: 9.8 MG/DL (ref 8.5–10.5)
CALCIUM SERPL-MCNC: 9.5 MG/DL (ref 8.5–10.5)
CHLORIDE SERPL-SCNC: 101 MMOL/L (ref 96–112)
CO2 SERPL-SCNC: 24 MMOL/L (ref 20–33)
CREAT SERPL-MCNC: 0.52 MG/DL (ref 0.5–1.4)
EKG IMPRESSION: NORMAL
ERYTHROCYTE [DISTWIDTH] IN BLOOD BY AUTOMATED COUNT: 48.2 FL (ref 35.9–50)
GFR SERPLBLD CREATININE-BSD FMLA CKD-EPI: 89 ML/MIN/1.73 M 2
GLOBULIN SER CALC-MCNC: 3 G/DL (ref 1.9–3.5)
GLUCOSE SERPL-MCNC: 138 MG/DL (ref 65–99)
HCT VFR BLD AUTO: 38.2 % (ref 37–47)
HGB BLD-MCNC: 12.8 G/DL (ref 12–16)
LV EJECT FRACT  99904: 65
LV EJECT FRACT MOD 2C 99903: 78.19
LV EJECT FRACT MOD 4C 99902: 72.69
LV EJECT FRACT MOD BP 99901: 75.14
MCH RBC QN AUTO: 34.4 PG (ref 27–33)
MCHC RBC AUTO-ENTMCNC: 33.5 G/DL (ref 32.2–35.5)
MCV RBC AUTO: 102.7 FL (ref 81.4–97.8)
NT-PROBNP SERPL IA-MCNC: 269 PG/ML (ref 0–125)
PLATELET # BLD AUTO: 176 K/UL (ref 164–446)
PMV BLD AUTO: 11.1 FL (ref 9–12.9)
POTASSIUM SERPL-SCNC: 5 MMOL/L (ref 3.6–5.5)
PROT SERPL-MCNC: 6.6 G/DL (ref 6–8.2)
RBC # BLD AUTO: 3.72 M/UL (ref 4.2–5.4)
SODIUM SERPL-SCNC: 135 MMOL/L (ref 135–145)
WBC # BLD AUTO: 8.3 K/UL (ref 4.8–10.8)

## 2024-07-02 PROCEDURE — RXMED WILLOW AMBULATORY MEDICATION CHARGE

## 2024-07-02 PROCEDURE — 97163 PT EVAL HIGH COMPLEX 45 MIN: CPT

## 2024-07-02 PROCEDURE — 93325 DOPPLER ECHO COLOR FLOW MAPG: CPT | Mod: 26 | Performed by: INTERNAL MEDICINE

## 2024-07-02 PROCEDURE — A9270 NON-COVERED ITEM OR SERVICE: HCPCS

## 2024-07-02 PROCEDURE — 93325 DOPPLER ECHO COLOR FLOW MAPG: CPT

## 2024-07-02 PROCEDURE — 85027 COMPLETE CBC AUTOMATED: CPT

## 2024-07-02 PROCEDURE — 93005 ELECTROCARDIOGRAM TRACING: CPT

## 2024-07-02 PROCEDURE — 700102 HCHG RX REV CODE 250 W/ 637 OVERRIDE(OP)

## 2024-07-02 PROCEDURE — 93005 ELECTROCARDIOGRAM TRACING: CPT | Performed by: INTERNAL MEDICINE

## 2024-07-02 PROCEDURE — 80053 COMPREHEN METABOLIC PANEL: CPT

## 2024-07-02 PROCEDURE — 700111 HCHG RX REV CODE 636 W/ 250 OVERRIDE (IP): Performed by: INTERNAL MEDICINE

## 2024-07-02 PROCEDURE — 99239 HOSP IP/OBS DSCHRG MGMT >30: CPT | Mod: FS

## 2024-07-02 PROCEDURE — 93010 ELECTROCARDIOGRAM REPORT: CPT | Performed by: INTERNAL MEDICINE

## 2024-07-02 PROCEDURE — 36415 COLL VENOUS BLD VENIPUNCTURE: CPT

## 2024-07-02 PROCEDURE — 83880 ASSAY OF NATRIURETIC PEPTIDE: CPT

## 2024-07-02 PROCEDURE — 93308 TTE F-UP OR LMTD: CPT | Mod: 26 | Performed by: INTERNAL MEDICINE

## 2024-07-02 PROCEDURE — 71045 X-RAY EXAM CHEST 1 VIEW: CPT

## 2024-07-02 PROCEDURE — 97535 SELF CARE MNGMENT TRAINING: CPT

## 2024-07-02 RX ORDER — KETOROLAC TROMETHAMINE 10 MG/1
10 TABLET, FILM COATED ORAL EVERY 6 HOURS PRN
Status: DISCONTINUED | OUTPATIENT
Start: 2024-07-02 | End: 2024-07-02 | Stop reason: HOSPADM

## 2024-07-02 RX ORDER — IBUPROFEN 600 MG/1
600 TABLET ORAL EVERY 8 HOURS
Qty: 21 TABLET | Refills: 0 | Status: SHIPPED | OUTPATIENT
Start: 2024-07-02 | End: 2024-07-09

## 2024-07-02 RX ORDER — MORPHINE SULFATE 4 MG/ML
2 INJECTION INTRAVENOUS ONCE
Status: COMPLETED | OUTPATIENT
Start: 2024-07-02 | End: 2024-07-02

## 2024-07-02 RX ORDER — COLCHICINE 0.6 MG/1
0.6 TABLET ORAL 2 TIMES DAILY
Qty: 180 TABLET | Refills: 0 | Status: SHIPPED | OUTPATIENT
Start: 2024-07-02 | End: 2024-07-15 | Stop reason: SINTOL

## 2024-07-02 RX ADMIN — MORPHINE SULFATE 2 MG: 4 INJECTION INTRAVENOUS at 03:41

## 2024-07-02 RX ADMIN — ACETAMINOPHEN 650 MG: 325 TABLET, FILM COATED ORAL at 02:45

## 2024-07-02 RX ADMIN — KETOROLAC TROMETHAMINE 10 MG: 10 TABLET, FILM COATED ORAL at 12:59

## 2024-07-02 RX ADMIN — METOPROLOL TARTRATE 50 MG: 50 TABLET, FILM COATED ORAL at 05:49

## 2024-07-02 ASSESSMENT — COGNITIVE AND FUNCTIONAL STATUS - GENERAL
MOBILITY SCORE: 18
STANDING UP FROM CHAIR USING ARMS: A LITTLE
CLIMB 3 TO 5 STEPS WITH RAILING: A LITTLE
WALKING IN HOSPITAL ROOM: A LITTLE
SUGGESTED CMS G CODE MODIFIER MOBILITY: CK
MOVING FROM LYING ON BACK TO SITTING ON SIDE OF FLAT BED: A LITTLE
TURNING FROM BACK TO SIDE WHILE IN FLAT BAD: A LITTLE
MOVING TO AND FROM BED TO CHAIR: A LITTLE

## 2024-07-02 ASSESSMENT — FIBROSIS 4 INDEX: FIB4 SCORE: 4.49

## 2024-07-02 ASSESSMENT — PAIN DESCRIPTION - PAIN TYPE
TYPE: ACUTE PAIN

## 2024-07-02 ASSESSMENT — GAIT ASSESSMENTS
DISTANCE (FEET): 200
GAIT LEVEL OF ASSIST: STANDBY ASSIST
DEVIATION: BRADYKINETIC

## 2024-07-03 LAB — EKG IMPRESSION: NORMAL

## 2024-07-03 PROCEDURE — 93010 ELECTROCARDIOGRAM REPORT: CPT | Performed by: STUDENT IN AN ORGANIZED HEALTH CARE EDUCATION/TRAINING PROGRAM

## 2024-07-05 ENCOUNTER — TELEPHONE (OUTPATIENT)
Dept: CARDIOLOGY | Facility: MEDICAL CENTER | Age: 89
End: 2024-07-05
Payer: MEDICARE

## 2024-07-09 ENCOUNTER — APPOINTMENT (OUTPATIENT)
Dept: CARDIOLOGY | Facility: MEDICAL CENTER | Age: 89
End: 2024-07-09
Payer: MEDICARE

## 2024-07-10 ENCOUNTER — DOCUMENTATION (OUTPATIENT)
Dept: CARDIOLOGY | Facility: MEDICAL CENTER | Age: 89
End: 2024-07-10
Payer: MEDICARE

## 2024-07-15 ENCOUNTER — APPOINTMENT (OUTPATIENT)
Dept: RADIOLOGY | Facility: MEDICAL CENTER | Age: 89
DRG: 314 | End: 2024-07-15
Attending: EMERGENCY MEDICINE
Payer: MEDICARE

## 2024-07-15 ENCOUNTER — TELEPHONE (OUTPATIENT)
Dept: CARDIOLOGY | Facility: MEDICAL CENTER | Age: 89
End: 2024-07-15
Payer: MEDICARE

## 2024-07-15 ENCOUNTER — HOSPITAL ENCOUNTER (INPATIENT)
Facility: MEDICAL CENTER | Age: 89
LOS: 3 days | DRG: 314 | End: 2024-07-18
Attending: EMERGENCY MEDICINE | Admitting: INTERNAL MEDICINE
Payer: MEDICARE

## 2024-07-15 DIAGNOSIS — D72.829 LEUKOCYTOSIS, UNSPECIFIED TYPE: ICD-10-CM

## 2024-07-15 DIAGNOSIS — J90 PLEURAL EFFUSION: ICD-10-CM

## 2024-07-15 DIAGNOSIS — I31.39 PERICARDIAL EFFUSION: ICD-10-CM

## 2024-07-15 DIAGNOSIS — I48.91 ATRIAL FIBRILLATION WITH RVR (HCC): ICD-10-CM

## 2024-07-15 PROBLEM — R07.9 CHEST PAIN: Status: RESOLVED | Noted: 2022-08-10 | Resolved: 2024-07-15

## 2024-07-15 LAB
ALBUMIN SERPL BCP-MCNC: 3.2 G/DL (ref 3.2–4.9)
ALBUMIN/GLOB SERPL: 1 G/DL
ALP SERPL-CCNC: 198 U/L (ref 30–99)
ALT SERPL-CCNC: 47 U/L (ref 2–50)
ANION GAP SERPL CALC-SCNC: 14 MMOL/L (ref 7–16)
AST SERPL-CCNC: 55 U/L (ref 12–45)
BASOPHILS # BLD AUTO: 0.5 % (ref 0–1.8)
BASOPHILS # BLD: 0.06 K/UL (ref 0–0.12)
BILIRUB SERPL-MCNC: 0.8 MG/DL (ref 0.1–1.5)
BUN SERPL-MCNC: 21 MG/DL (ref 8–22)
CALCIUM ALBUM COR SERPL-MCNC: 10.3 MG/DL (ref 8.5–10.5)
CALCIUM SERPL-MCNC: 9.7 MG/DL (ref 8.5–10.5)
CHLORIDE SERPL-SCNC: 99 MMOL/L (ref 96–112)
CO2 SERPL-SCNC: 21 MMOL/L (ref 20–33)
CREAT SERPL-MCNC: 0.63 MG/DL (ref 0.5–1.4)
EKG IMPRESSION: NORMAL
EKG IMPRESSION: NORMAL
EOSINOPHIL # BLD AUTO: 0.03 K/UL (ref 0–0.51)
EOSINOPHIL NFR BLD: 0.2 % (ref 0–6.9)
ERYTHROCYTE [DISTWIDTH] IN BLOOD BY AUTOMATED COUNT: 50.6 FL (ref 35.9–50)
GFR SERPLBLD CREATININE-BSD FMLA CKD-EPI: 85 ML/MIN/1.73 M 2
GLOBULIN SER CALC-MCNC: 3.2 G/DL (ref 1.9–3.5)
GLUCOSE SERPL-MCNC: 122 MG/DL (ref 65–99)
HCT VFR BLD AUTO: 35.5 % (ref 37–47)
HGB BLD-MCNC: 11.7 G/DL (ref 12–16)
IMM GRANULOCYTES # BLD AUTO: 0.06 K/UL (ref 0–0.11)
IMM GRANULOCYTES NFR BLD AUTO: 0.5 % (ref 0–0.9)
LACTATE SERPL-SCNC: 1.2 MMOL/L (ref 0.5–2)
LYMPHOCYTES # BLD AUTO: 1.04 K/UL (ref 1–4.8)
LYMPHOCYTES NFR BLD: 7.9 % (ref 22–41)
MCH RBC QN AUTO: 34.1 PG (ref 27–33)
MCHC RBC AUTO-ENTMCNC: 33 G/DL (ref 32.2–35.5)
MCV RBC AUTO: 103.5 FL (ref 81.4–97.8)
MONOCYTES # BLD AUTO: 1.22 K/UL (ref 0–0.85)
MONOCYTES NFR BLD AUTO: 9.3 % (ref 0–13.4)
NEUTROPHILS # BLD AUTO: 10.69 K/UL (ref 1.82–7.42)
NEUTROPHILS NFR BLD: 81.6 % (ref 44–72)
NRBC # BLD AUTO: 0 K/UL
NRBC BLD-RTO: 0 /100 WBC (ref 0–0.2)
NT-PROBNP SERPL IA-MCNC: 2491 PG/ML (ref 0–125)
PLATELET # BLD AUTO: 203 K/UL (ref 164–446)
PMV BLD AUTO: 10.9 FL (ref 9–12.9)
POTASSIUM SERPL-SCNC: 4.7 MMOL/L (ref 3.6–5.5)
PROCALCITONIN SERPL-MCNC: 0.09 NG/ML
PROT SERPL-MCNC: 6.4 G/DL (ref 6–8.2)
RBC # BLD AUTO: 3.43 M/UL (ref 4.2–5.4)
SODIUM SERPL-SCNC: 134 MMOL/L (ref 135–145)
TROPONIN T SERPL-MCNC: 29 NG/L (ref 6–19)
WBC # BLD AUTO: 13.1 K/UL (ref 4.8–10.8)

## 2024-07-15 PROCEDURE — 87077 CULTURE AEROBIC IDENTIFY: CPT | Mod: 91

## 2024-07-15 PROCEDURE — 85025 COMPLETE CBC W/AUTO DIFF WBC: CPT

## 2024-07-15 PROCEDURE — 87040 BLOOD CULTURE FOR BACTERIA: CPT | Mod: 91

## 2024-07-15 PROCEDURE — 700102 HCHG RX REV CODE 250 W/ 637 OVERRIDE(OP): Performed by: INTERNAL MEDICINE

## 2024-07-15 PROCEDURE — 87150 DNA/RNA AMPLIFIED PROBE: CPT

## 2024-07-15 PROCEDURE — A9270 NON-COVERED ITEM OR SERVICE: HCPCS | Performed by: INTERNAL MEDICINE

## 2024-07-15 PROCEDURE — 71045 X-RAY EXAM CHEST 1 VIEW: CPT

## 2024-07-15 PROCEDURE — 83880 ASSAY OF NATRIURETIC PEPTIDE: CPT

## 2024-07-15 PROCEDURE — 99285 EMERGENCY DEPT VISIT HI MDM: CPT

## 2024-07-15 PROCEDURE — 700111 HCHG RX REV CODE 636 W/ 250 OVERRIDE (IP): Mod: JZ | Performed by: INTERNAL MEDICINE

## 2024-07-15 PROCEDURE — 36415 COLL VENOUS BLD VENIPUNCTURE: CPT

## 2024-07-15 PROCEDURE — 93005 ELECTROCARDIOGRAM TRACING: CPT

## 2024-07-15 PROCEDURE — 84484 ASSAY OF TROPONIN QUANT: CPT

## 2024-07-15 PROCEDURE — 93005 ELECTROCARDIOGRAM TRACING: CPT | Performed by: INTERNAL MEDICINE

## 2024-07-15 PROCEDURE — 93010 ELECTROCARDIOGRAM REPORT: CPT | Performed by: INTERNAL MEDICINE

## 2024-07-15 PROCEDURE — 93005 ELECTROCARDIOGRAM TRACING: CPT | Performed by: EMERGENCY MEDICINE

## 2024-07-15 PROCEDURE — 99223 1ST HOSP IP/OBS HIGH 75: CPT | Mod: AI | Performed by: INTERNAL MEDICINE

## 2024-07-15 PROCEDURE — 302127 SENSORMAT,BED: Performed by: INTERNAL MEDICINE

## 2024-07-15 PROCEDURE — 80053 COMPREHEN METABOLIC PANEL: CPT

## 2024-07-15 PROCEDURE — 96374 THER/PROPH/DIAG INJ IV PUSH: CPT

## 2024-07-15 PROCEDURE — 83605 ASSAY OF LACTIC ACID: CPT

## 2024-07-15 PROCEDURE — 770020 HCHG ROOM/CARE - TELE (206)

## 2024-07-15 PROCEDURE — 84145 PROCALCITONIN (PCT): CPT

## 2024-07-15 RX ORDER — ACETAMINOPHEN 325 MG/1
650 TABLET ORAL EVERY 6 HOURS PRN
Status: DISCONTINUED | OUTPATIENT
Start: 2024-07-15 | End: 2024-07-18 | Stop reason: HOSPADM

## 2024-07-15 RX ORDER — HYDRALAZINE HYDROCHLORIDE 20 MG/ML
10 INJECTION INTRAMUSCULAR; INTRAVENOUS EVERY 4 HOURS PRN
Status: DISCONTINUED | OUTPATIENT
Start: 2024-07-15 | End: 2024-07-18 | Stop reason: HOSPADM

## 2024-07-15 RX ORDER — FUROSEMIDE 10 MG/ML
20 INJECTION INTRAMUSCULAR; INTRAVENOUS
Status: DISCONTINUED | OUTPATIENT
Start: 2024-07-15 | End: 2024-07-16

## 2024-07-15 RX ORDER — IBUPROFEN 200 MG
950 CAPSULE ORAL DAILY
COMMUNITY

## 2024-07-15 RX ORDER — M-VIT,TX,IRON,MINS/CALC/FOLIC 27MG-0.4MG
1 TABLET ORAL DAILY
COMMUNITY

## 2024-07-15 RX ORDER — SIMVASTATIN 20 MG
40 TABLET ORAL EVERY EVENING
Status: DISCONTINUED | OUTPATIENT
Start: 2024-07-15 | End: 2024-07-18 | Stop reason: HOSPADM

## 2024-07-15 RX ORDER — METOPROLOL TARTRATE 25 MG/1
50 TABLET, FILM COATED ORAL 2 TIMES DAILY
Status: DISCONTINUED | OUTPATIENT
Start: 2024-07-15 | End: 2024-07-16

## 2024-07-15 RX ORDER — VITAMIN B COMPLEX
1000 TABLET ORAL DAILY
COMMUNITY

## 2024-07-15 RX ORDER — LORATADINE 10 MG/1
10 TABLET ORAL DAILY
Status: DISCONTINUED | OUTPATIENT
Start: 2024-07-16 | End: 2024-07-15

## 2024-07-15 RX ADMIN — METOPROLOL TARTRATE 50 MG: 50 TABLET, FILM COATED ORAL at 19:16

## 2024-07-15 RX ADMIN — FUROSEMIDE 20 MG: 10 INJECTION, SOLUTION INTRAVENOUS at 19:16

## 2024-07-15 RX ADMIN — SIMVASTATIN 40 MG: 40 TABLET, FILM COATED ORAL at 19:16

## 2024-07-15 ASSESSMENT — ENCOUNTER SYMPTOMS
CHILLS: 0
COUGH: 1
TINGLING: 0
FEVER: 0
ABDOMINAL PAIN: 0
BLURRED VISION: 0
DIARRHEA: 0
MYALGIAS: 0
NAUSEA: 0
SPEECH CHANGE: 0
FOCAL WEAKNESS: 0
BACK PAIN: 0
HEADACHES: 0
DIZZINESS: 0
SHORTNESS OF BREATH: 1
CONSTIPATION: 0
WEIGHT LOSS: 0
VOMITING: 0
DOUBLE VISION: 0
ORTHOPNEA: 0
PHOTOPHOBIA: 0
SENSORY CHANGE: 0
TREMORS: 0
EYE PAIN: 0
SPUTUM PRODUCTION: 0
NECK PAIN: 0
PALPITATIONS: 0
HALLUCINATIONS: 0

## 2024-07-15 ASSESSMENT — PATIENT HEALTH QUESTIONNAIRE - PHQ9
3. TROUBLE FALLING OR STAYING ASLEEP OR SLEEPING TOO MUCH: NOT AT ALL
7. TROUBLE CONCENTRATING ON THINGS, SUCH AS READING THE NEWSPAPER OR WATCHING TELEVISION: NOT AT ALL
1. LITTLE INTEREST OR PLEASURE IN DOING THINGS: SEVERAL DAYS
8. MOVING OR SPEAKING SO SLOWLY THAT OTHER PEOPLE COULD HAVE NOTICED. OR THE OPPOSITE, BEING SO FIGETY OR RESTLESS THAT YOU HAVE BEEN MOVING AROUND A LOT MORE THAN USUAL: NOT AT ALL
5. POOR APPETITE OR OVEREATING: NOT AT ALL
4. FEELING TIRED OR HAVING LITTLE ENERGY: NOT AT ALL
SUM OF ALL RESPONSES TO PHQ9 QUESTIONS 1 AND 2: 2
2. FEELING DOWN, DEPRESSED, IRRITABLE, OR HOPELESS: SEVERAL DAYS
SUM OF ALL RESPONSES TO PHQ QUESTIONS 1-9: 2
9. THOUGHTS THAT YOU WOULD BE BETTER OFF DEAD, OR OF HURTING YOURSELF: NOT AT ALL
6. FEELING BAD ABOUT YOURSELF - OR THAT YOU ARE A FAILURE OR HAVE LET YOURSELF OR YOUR FAMILY DOWN: NOT AL ALL

## 2024-07-15 ASSESSMENT — COGNITIVE AND FUNCTIONAL STATUS - GENERAL
DRESSING REGULAR UPPER BODY CLOTHING: A LITTLE
TURNING FROM BACK TO SIDE WHILE IN FLAT BAD: A LITTLE
SUGGESTED CMS G CODE MODIFIER MOBILITY: CK
WALKING IN HOSPITAL ROOM: A LITTLE
DAILY ACTIVITIY SCORE: 21
CLIMB 3 TO 5 STEPS WITH RAILING: A LOT
TOILETING: A LITTLE
SUGGESTED CMS G CODE MODIFIER DAILY ACTIVITY: CJ
MOVING FROM LYING ON BACK TO SITTING ON SIDE OF FLAT BED: A LITTLE
MOBILITY SCORE: 18
MOVING TO AND FROM BED TO CHAIR: A LITTLE
HELP NEEDED FOR BATHING: A LITTLE

## 2024-07-15 ASSESSMENT — PAIN DESCRIPTION - PAIN TYPE: TYPE: ACUTE PAIN

## 2024-07-15 ASSESSMENT — LIFESTYLE VARIABLES
HAVE PEOPLE ANNOYED YOU BY CRITICIZING YOUR DRINKING: NO
SUBSTANCE_ABUSE: 0
TOTAL SCORE: 0
HOW MANY TIMES IN THE PAST YEAR HAVE YOU HAD 5 OR MORE DRINKS IN A DAY: 0
AVERAGE NUMBER OF DAYS PER WEEK YOU HAVE A DRINK CONTAINING ALCOHOL: 1
ON A TYPICAL DAY WHEN YOU DRINK ALCOHOL HOW MANY DRINKS DO YOU HAVE: 1
EVER HAD A DRINK FIRST THING IN THE MORNING TO STEADY YOUR NERVES TO GET RID OF A HANGOVER: NO
DOES PATIENT WANT TO STOP DRINKING: NO
CONSUMPTION TOTAL: NEGATIVE
TOTAL SCORE: 0
TOTAL SCORE: 0
EVER FELT BAD OR GUILTY ABOUT YOUR DRINKING: NO
HAVE YOU EVER FELT YOU SHOULD CUT DOWN ON YOUR DRINKING: NO
ALCOHOL_USE: YES

## 2024-07-15 ASSESSMENT — FIBROSIS 4 INDEX
FIB4 SCORE: 3.48
FIB4 SCORE: 4.49

## 2024-07-15 NOTE — ED PROVIDER NOTES
ED Provider Note    Primary care provider: Kathy Garcia M.D.    CHIEF COMPLAINT  Chief Complaint   Patient presents with    Sent by MD     Had an aortic valve replacement on July 1st. Sent by MD for moist cough, nonproductive, SOB, and night sweats.     Cough         HPI  Lisa Garza is a 88 y.o. female who presents to the Emergency Department having felt poorly since her TAVR done July 1.  During her hospital stay she reports feeling chest pain, they did a stat echo, told her she had some fluid around the heart and after that she was started on colchicine.  She has been taking the colchicine but as result of that she had copious diarrhea and was unable to make it to her follow-up appointment on 8 July.  She rescheduled for this week.  The clinic okayed her to stop the colchicine and it took several days but she is now able to have regular bowel movements and tolerate p.o.    Since the TAVR she has had some decreased energy, she has had some intermittent chest tightness and a cough as well as some night sweats.      External Record Review: Patient had a successful TAVR, July 1, she did have some pleurisy and chest discomfort, limited echo was done that did not show any friction rub, there was a small pericardial effusion without tamponade.    REVIEW OF SYSTEMS  See HPI.     PAST MEDICAL HISTORY   has a past medical history of Arrhythmia, Arthritis, Breath shortness, Bronchitis, Cancer (HCC), Diarrhea (04/09/2024), Functional diarrhea (12/12/2022), Heart murmur, High cholesterol, Nonrheumatic aortic valve stenosis (01/25/2024), Pneumonia, Prediabetes (08/16/2023), Unspecified cataract, Unspecified urinary incontinence, and Vitamin D deficiency (06/15/2023).    SURGICAL HISTORY   has a past surgical history that includes other (Right, 01/01/2006); hip arthroplasty total (Left, 07/06/2015); appendectomy laparoscopic (10/10/2018); cataract extraction with iol (Bilateral); other (Right); replace aortic valve perq  "femoral (Bilateral, 2024); and echo ravin tavr tmvr laac w wo cont (N/A, 2024).    SOCIAL HISTORY  Social History     Tobacco Use    Smoking status: Former     Current packs/day: 0.00     Average packs/day: 1 pack/day for 33.0 years (33.0 ttl pk-yrs)     Types: Cigarettes     Start date: 1958     Quit date: 1991     Years since quittin.5    Smokeless tobacco: Never    Tobacco comments:     Started smoking at age 23   Vaping Use    Vaping status: Never Used   Substance Use Topics    Alcohol use: Yes     Alcohol/week: 4.2 oz     Types: 7 Glasses of wine per week     Comment: 1 glass wine/day    Drug use: No      Social History     Substance and Sexual Activity   Drug Use No       FAMILY HISTORY  Family History   Problem Relation Age of Onset    Cancer Father         GI    Heart Disease Father     Lung Disease Father     Stroke Sister     No Known Problems Son     No Known Problems Son     No Known Problems Maternal Grandmother     No Known Problems Maternal Grandfather     No Known Problems Paternal Grandmother     No Known Problems Paternal Grandfather     Hypertension Sister     Hyperlipidemia Sister        CURRENT MEDICATIONS  Reviewed.  See Encounter Summary.     ALLERGIES  Allergies   Allergen Reactions    Owen Unspecified     Itchy welts      Clindamycin Unspecified     Pt unsure of reaction    Oatmeal Diarrhea     Gas      Scallops Vomiting       PHYSICAL EXAM  VITAL SIGNS: /67   Pulse 87   Temp 37.1 °C (98.8 °F) (Temporal)   Resp 16   Ht 1.651 m (5' 5\")   Wt 61.6 kg (135 lb 12.9 oz)   SpO2 94%   BMI 22.60 kg/m²   Constitutional: Awake, alert in no apparent distress.  HENT: Normocephalic, Bilateral external ears normal. Nose normal.   Eyes: Conjunctiva normal, non-icteric, EOMI.    Thorax & Lungs: Easy unlabored respirations, Clear to ascultation bilaterally.  Cardiovascular: Regular rate, Regular rhythm, No murmurs, rubs or gallops. Bilateral pulses symmetrical.   Abdomen:  " Soft, nontender, nondistended, normal active bowel sounds.   :    Skin: Visualized skin is  Dry, No erythema, No rash.   Musculoskeletal:   No cyanosis, clubbing or edema. No leg asymmetry.   Neurologic: Alert, Grossly non-focal.   Psychiatric: Normal affect, Normal mood  Lymphatic:      EKG   12 lead Interpreted by me  Rhythm: Atrial fibrillation  Rate: 86  Axis: normal  Ectopy: none  Conduction: LBBB  ST Segments: no acute change  T Waves: no acute change  Clinical Impression: Atrial fibrillation with left bundle branch block, otherwise unremarkable EKG without acute ischemic changes.        RADIOLOGY  I have independently interpreted the diagnostic imaging associated with this visit and am waiting the final reading from the radiologist.   My preliminary interpretation is as follows: Significant left pleural effusion.    Radiologist interpretation:   DX-CHEST-PORTABLE (1 VIEW)   Final Result      1.  Moderate left effusion with associated compressive atelectasis and/or consolidation. Underlying infection is possible.   2.  Stable enlargement of the cardiomediastinal silhouette.      EC-ECHOCARDIOGRAM COMPLETE W/O CONT    (Results Pending)       COURSE & MEDICAL DECISION MAKING  Pertinent Labs & Imaging studies reviewed. (See chart for details)    COURSE & MEDICAL DECISION MAKING  Pertinent Labs & Imaging studies reviewed. (See chart for details)    Differential diagnoses include but are not limited to: Sepsis, pericardial effusion, ACS, electrolyte abnormalities    3:56 PM - Nursing notes reviewed, patient seen and examined at bedside.      Discussion of management with other medical personnel: Discussed briefly with cardiology, they state that 2D echo is reasonable but they do not need to be officially consulted.  Discussed with Dr. Jimenez will evaluate the patient for admission.    Escalation of care considered, and ultimately not performed: Considered antibiotics but the patient does not appear to be  septic.      Decision tools and prescription drugs considered including, but not limited to: Heart score 3    Decision Making:  This is a pleasant 88 y.o. year old female who presents with generalized malaise, cough after a TAVR procedure.  The patient presents with borderline oxygenation, chest x-ray shows a new fairly significant left pleural effusion.  proBNP is much more elevated than it was status post TAVR, troponin at baseline.  She also has a leukocytosis of unclear etiology.  Procalcitonin not elevated today, do not suspect sepsis.    Given the new development of heart failure after her valve replacement, I think she could benefit from an urgent 2D echo and some diuresis to figure out the source of the pleural effusion.  For this reason she should be admitted, discussed with the hospitalist, cardiology who are in agreement.          FINAL IMPRESSION  1. Pleural effusion    2. Leukocytosis, unspecified type

## 2024-07-15 NOTE — ED TRIAGE NOTES
"Chief Complaint   Patient presents with    Sent by MD     Had an aortic valve replacement on July 1st. Sent by MD for moist cough, nonproductive, SOB, and night sweats.     Cough       89 yo F to triage for above complaint. Denies chest pain. SOB protocol ordered.      Pt placed in lobby. Pt educated on triage process. Pt encouraged to alert staff for any changes.     Patient and staff wearing appropriate PPE    /63   Pulse 62   Temp 37 °C (98.6 °F) (Temporal)   Resp 16   Ht 1.651 m (5' 5\")   Wt 62.6 kg (138 lb 0.1 oz)   SpO2 94%   BMI 22.97 kg/m²     "

## 2024-07-16 ENCOUNTER — APPOINTMENT (OUTPATIENT)
Dept: RADIOLOGY | Facility: MEDICAL CENTER | Age: 89
DRG: 314 | End: 2024-07-16
Payer: MEDICARE

## 2024-07-16 ENCOUNTER — APPOINTMENT (OUTPATIENT)
Dept: CARDIOLOGY | Facility: MEDICAL CENTER | Age: 89
DRG: 314 | End: 2024-07-16
Payer: MEDICARE

## 2024-07-16 ENCOUNTER — APPOINTMENT (OUTPATIENT)
Dept: CARDIOLOGY | Facility: MEDICAL CENTER | Age: 89
DRG: 314 | End: 2024-07-16
Attending: INTERNAL MEDICINE
Payer: MEDICARE

## 2024-07-16 ENCOUNTER — TELEPHONE (OUTPATIENT)
Dept: CARDIOLOGY | Facility: MEDICAL CENTER | Age: 89
End: 2024-07-16

## 2024-07-16 PROBLEM — I31.39 PERICARDIAL EFFUSION: Status: ACTIVE | Noted: 2024-07-16

## 2024-07-16 LAB
ALBUMIN FLD-MCNC: 2.5 G/DL
ALBUMIN SERPL BCP-MCNC: 3 G/DL (ref 3.2–4.9)
ALBUMIN/GLOB SERPL: 1.1 G/DL
ALP SERPL-CCNC: 150 U/L (ref 30–99)
ALT SERPL-CCNC: 39 U/L (ref 2–50)
ANION GAP SERPL CALC-SCNC: 8 MMOL/L (ref 7–16)
APPEARANCE FLD: NORMAL
AST SERPL-CCNC: 40 U/L (ref 12–45)
BASOPHILS # BLD AUTO: 0.6 % (ref 0–1.8)
BASOPHILS # BLD: 0.06 K/UL (ref 0–0.12)
BILIRUB SERPL-MCNC: 0.6 MG/DL (ref 0.1–1.5)
BODY FLD TYPE: NORMAL
BODY FLD TYPE: NORMAL
BUN SERPL-MCNC: 24 MG/DL (ref 8–22)
CALCIUM ALBUM COR SERPL-MCNC: 9.7 MG/DL (ref 8.5–10.5)
CALCIUM SERPL-MCNC: 8.9 MG/DL (ref 8.5–10.5)
CHLORIDE SERPL-SCNC: 98 MMOL/L (ref 96–112)
CO2 SERPL-SCNC: 28 MMOL/L (ref 20–33)
COLOR FLD: NORMAL
CREAT SERPL-MCNC: 0.62 MG/DL (ref 0.5–1.4)
CSF COMMENTS 1658: NORMAL
CYTOLOGY REG CYTOL: NORMAL
EOSINOPHIL # BLD AUTO: 0.17 K/UL (ref 0–0.51)
EOSINOPHIL NFR BLD: 1.7 % (ref 0–6.9)
ERYTHROCYTE [DISTWIDTH] IN BLOOD BY AUTOMATED COUNT: 50 FL (ref 35.9–50)
FLUAV RNA SPEC QL NAA+PROBE: NEGATIVE
FLUBV RNA SPEC QL NAA+PROBE: NEGATIVE
GFR SERPLBLD CREATININE-BSD FMLA CKD-EPI: 85 ML/MIN/1.73 M 2
GLOBULIN SER CALC-MCNC: 2.7 G/DL (ref 1.9–3.5)
GLUCOSE SERPL-MCNC: 101 MG/DL (ref 65–99)
HCT VFR BLD AUTO: 30.2 % (ref 37–47)
HGB BLD-MCNC: 9.9 G/DL (ref 12–16)
IMM GRANULOCYTES # BLD AUTO: 0.04 K/UL (ref 0–0.11)
IMM GRANULOCYTES NFR BLD AUTO: 0.4 % (ref 0–0.9)
LYMPHOCYTES # BLD AUTO: 1.3 K/UL (ref 1–4.8)
LYMPHOCYTES NFR BLD: 13.3 % (ref 22–41)
LYMPHOCYTES NFR FLD: 36 %
MAGNESIUM SERPL-MCNC: 1.9 MG/DL (ref 1.5–2.5)
MCH RBC QN AUTO: 33.6 PG (ref 27–33)
MCHC RBC AUTO-ENTMCNC: 32.8 G/DL (ref 32.2–35.5)
MCV RBC AUTO: 102.4 FL (ref 81.4–97.8)
MONOCYTES # BLD AUTO: 1.17 K/UL (ref 0–0.85)
MONOCYTES NFR BLD AUTO: 12 % (ref 0–13.4)
MONOS+MACROS NFR FLD MANUAL: 12 %
NEUTROPHILS # BLD AUTO: 7.03 K/UL (ref 1.82–7.42)
NEUTROPHILS NFR BLD: 72 % (ref 44–72)
NEUTROPHILS NFR FLD: 52 %
NRBC # BLD AUTO: 0 K/UL
NRBC BLD-RTO: 0 /100 WBC (ref 0–0.2)
NUC CELL # FLD: NORMAL CELLS/UL
PLATELET # BLD AUTO: 172 K/UL (ref 164–446)
PMV BLD AUTO: 10.8 FL (ref 9–12.9)
POTASSIUM SERPL-SCNC: 4.1 MMOL/L (ref 3.6–5.5)
PROT FLD-MCNC: 4.3 G/DL
PROT SERPL-MCNC: 5.7 G/DL (ref 6–8.2)
RBC # BLD AUTO: 2.95 M/UL (ref 4.2–5.4)
RBC # FLD: NORMAL CELLS/UL
RSV RNA SPEC QL NAA+PROBE: NEGATIVE
SARS-COV-2 RNA RESP QL NAA+PROBE: NOTDETECTED
SODIUM SERPL-SCNC: 134 MMOL/L (ref 135–145)
SPECIMEN SOURCE: NORMAL
WBC # BLD AUTO: 9.8 K/UL (ref 4.8–10.8)

## 2024-07-16 PROCEDURE — 770000 HCHG ROOM/CARE - INTERMEDIATE ICU *

## 2024-07-16 PROCEDURE — 83735 ASSAY OF MAGNESIUM: CPT

## 2024-07-16 PROCEDURE — 700102 HCHG RX REV CODE 250 W/ 637 OVERRIDE(OP)

## 2024-07-16 PROCEDURE — 93926 LOWER EXTREMITY STUDY: CPT | Mod: RT

## 2024-07-16 PROCEDURE — 93308 TTE F-UP OR LMTD: CPT | Mod: 26 | Performed by: INTERNAL MEDICINE

## 2024-07-16 PROCEDURE — 84157 ASSAY OF PROTEIN OTHER: CPT

## 2024-07-16 PROCEDURE — 160002 HCHG RECOVERY MINUTES (STAT)

## 2024-07-16 PROCEDURE — 160035 HCHG PACU - 1ST 60 MINS PHASE I

## 2024-07-16 PROCEDURE — 82042 OTHER SOURCE ALBUMIN QUAN EA: CPT

## 2024-07-16 PROCEDURE — 700111 HCHG RX REV CODE 636 W/ 250 OVERRIDE (IP): Mod: JZ | Performed by: INTERNAL MEDICINE

## 2024-07-16 PROCEDURE — 88112 CYTOPATH CELL ENHANCE TECH: CPT

## 2024-07-16 PROCEDURE — 0W9D30Z DRAINAGE OF PERICARDIAL CAVITY WITH DRAINAGE DEVICE, PERCUTANEOUS APPROACH: ICD-10-PCS | Performed by: INTERNAL MEDICINE

## 2024-07-16 PROCEDURE — 0241U HCHG SARS-COV-2 COVID-19 NFCT DS RESP RNA 4 TRGT MIC: CPT

## 2024-07-16 PROCEDURE — 33017 PRCRD DRG 6YR+ W/O CGEN CAR: CPT | Performed by: INTERNAL MEDICINE

## 2024-07-16 PROCEDURE — 700101 HCHG RX REV CODE 250

## 2024-07-16 PROCEDURE — 85025 COMPLETE CBC W/AUTO DIFF WBC: CPT

## 2024-07-16 PROCEDURE — 93321 DOPPLER ECHO F-UP/LMTD STD: CPT | Mod: 26 | Performed by: INTERNAL MEDICINE

## 2024-07-16 PROCEDURE — A9270 NON-COVERED ITEM OR SERVICE: HCPCS | Performed by: INTERNAL MEDICINE

## 2024-07-16 PROCEDURE — A9270 NON-COVERED ITEM OR SERVICE: HCPCS

## 2024-07-16 PROCEDURE — 700111 HCHG RX REV CODE 636 W/ 250 OVERRIDE (IP)

## 2024-07-16 PROCEDURE — 93308 TTE F-UP OR LMTD: CPT

## 2024-07-16 PROCEDURE — 88305 TISSUE EXAM BY PATHOLOGIST: CPT

## 2024-07-16 PROCEDURE — 80053 COMPREHEN METABOLIC PANEL: CPT

## 2024-07-16 PROCEDURE — C1894 INTRO/SHEATH, NON-LASER: HCPCS

## 2024-07-16 PROCEDURE — 99233 SBSQ HOSP IP/OBS HIGH 50: CPT | Mod: GC | Performed by: INTERNAL MEDICINE

## 2024-07-16 PROCEDURE — 99223 1ST HOSP IP/OBS HIGH 75: CPT | Mod: 25,GC | Performed by: INTERNAL MEDICINE

## 2024-07-16 PROCEDURE — 700102 HCHG RX REV CODE 250 W/ 637 OVERRIDE(OP): Performed by: INTERNAL MEDICINE

## 2024-07-16 PROCEDURE — 93325 DOPPLER ECHO COLOR FLOW MAPG: CPT

## 2024-07-16 PROCEDURE — 89051 BODY FLUID CELL COUNT: CPT

## 2024-07-16 RX ORDER — MIDAZOLAM HYDROCHLORIDE 1 MG/ML
INJECTION INTRAMUSCULAR; INTRAVENOUS
Status: COMPLETED
Start: 2024-07-16 | End: 2024-07-16

## 2024-07-16 RX ORDER — ASPIRIN 81 MG/1
81 TABLET ORAL DAILY
Status: DISCONTINUED | OUTPATIENT
Start: 2024-07-16 | End: 2024-07-18 | Stop reason: ALTCHOICE

## 2024-07-16 RX ORDER — METOPROLOL TARTRATE 25 MG/1
25 TABLET, FILM COATED ORAL 2 TIMES DAILY
Status: DISCONTINUED | OUTPATIENT
Start: 2024-07-16 | End: 2024-07-18 | Stop reason: HOSPADM

## 2024-07-16 RX ORDER — LIDOCAINE HYDROCHLORIDE 20 MG/ML
INJECTION, SOLUTION INFILTRATION; PERINEURAL
Status: COMPLETED
Start: 2024-07-16 | End: 2024-07-16

## 2024-07-16 RX ORDER — FUROSEMIDE 10 MG/ML
40 INJECTION INTRAMUSCULAR; INTRAVENOUS ONCE
Status: DISCONTINUED | OUTPATIENT
Start: 2024-07-16 | End: 2024-07-16

## 2024-07-16 RX ORDER — HEPARIN SODIUM 1000 [USP'U]/ML
INJECTION, SOLUTION INTRAVENOUS; SUBCUTANEOUS
Status: COMPLETED
Start: 2024-07-16 | End: 2024-07-16

## 2024-07-16 RX ADMIN — FENTANYL CITRATE 50 MCG: 50 INJECTION, SOLUTION INTRAMUSCULAR; INTRAVENOUS at 17:45

## 2024-07-16 RX ADMIN — HEPARIN SODIUM: 1000 INJECTION, SOLUTION INTRAVENOUS; SUBCUTANEOUS at 17:45

## 2024-07-16 RX ADMIN — MIDAZOLAM HYDROCHLORIDE 0.5 MG: 1 INJECTION, SOLUTION INTRAMUSCULAR; INTRAVENOUS at 17:45

## 2024-07-16 RX ADMIN — LIDOCAINE HYDROCHLORIDE: 20 INJECTION, SOLUTION INFILTRATION; PERINEURAL at 17:45

## 2024-07-16 RX ADMIN — ASPIRIN 81 MG: 81 TABLET, COATED ORAL at 16:47

## 2024-07-16 RX ADMIN — FUROSEMIDE 20 MG: 10 INJECTION, SOLUTION INTRAVENOUS at 06:15

## 2024-07-16 RX ADMIN — METOPROLOL TARTRATE 50 MG: 50 TABLET, FILM COATED ORAL at 05:31

## 2024-07-16 ASSESSMENT — CHA2DS2 SCORE
VASCULAR DISEASE: NO
DIABETES: NO
CHA2DS2 VASC SCORE: 3
PRIOR STROKE OR TIA OR THROMBOEMBOLISM: NO
CHF OR LEFT VENTRICULAR DYSFUNCTION: NO
SEX: FEMALE
AGE 75 OR GREATER: YES
AGE 65 TO 74: NO
HYPERTENSION: NO

## 2024-07-16 ASSESSMENT — ENCOUNTER SYMPTOMS
BLURRED VISION: 0
FLANK PAIN: 0
SHORTNESS OF BREATH: 1
CHILLS: 0
ABDOMINAL PAIN: 0
FEVER: 0
LOSS OF CONSCIOUSNESS: 0
BLOOD IN STOOL: 0
COUGH: 1
PALPITATIONS: 0
SEIZURES: 0
DEPRESSION: 0

## 2024-07-16 ASSESSMENT — PAIN DESCRIPTION - PAIN TYPE
TYPE: ACUTE PAIN
TYPE: ACUTE PAIN;SURGICAL PAIN
TYPE: ACUTE PAIN

## 2024-07-16 ASSESSMENT — FIBROSIS 4 INDEX
FIB4 SCORE: 3.28
FIB4 SCORE: 3.28

## 2024-07-16 NOTE — CARE PLAN
The patient is Stable - Low risk of patient condition declining or worsening         Progress made toward(s) clinical / shift goals:        Problem: Fall Risk  Goal: Patient will remain free from falls  Outcome: Progressing  Note: Patient remained free from falls. All fall precautions in place. Patient educated the need to call when needed assistance, patient verbalized understanding. Call light and personal belongings within reach.       Problem: Hemodynamics  Goal: Patient's hemodynamics, fluid balance and neurologic status will be stable or improve  Outcome: Progressing  Note: Patient's mental status remains intact, vital signs within normal limits. On oxygen at 2 LPM via nasal cannula, tolerating well. Monitor I and O, assess for daily weight.      Problem: Respiratory  Goal: Patient will achieve/maintain optimum respiratory ventilation and gas exchange  Flowsheets (Taken 7/16/2024 0203)  O2 Delivery Device: Silicone Nasal Cannula  Deep Breathe and Cough: Performs Correctly  Note: On Oxygen at 2 LPM via nasal cannula, tolerating well. Mental status remain intact.        Patient is not progressing towards the following goals:

## 2024-07-16 NOTE — PROGRESS NOTES
4 Eyes Skin Assessment Completed by Adenike RN and , RN.    Head WDL  Ears WDL  Nose WDL  Mouth WDL  Neck WDL  Breast/Chest WDL  Shoulder Blades WDL  Spine WDL  (R) Arm/Elbow/Hand Redness and Blanching  (L) Arm/Elbow/Hand Redness and Blanching  Abdomen WDL  Groin Bruising and Incision  Scrotum/Coccyx/Buttocks Redness and Blanching  (R) Leg WDL  (L) Leg WDL  (R) Heel/Foot/Toe Boggy  (L) Heel/Foot/Toe Redness and Boggy          Devices In Places Tele Box and Pulse Ox      Interventions In Place Pillows and Barrier Cream    Possible Skin Injury No    Pictures Uploaded Into Epic Yes  Wound Consult Placed N/A  RN Wound Prevention Protocol Ordered Yes

## 2024-07-16 NOTE — CARE PLAN
The patient is Watcher - Medium risk of patient condition declining or worsening    Shift Goals  Clinical Goals: diuresis, monitor respiratory status, pulmonary hygeine, monitor lab trends  Patient Goals: rest, feel better  Family Goals: support POC    Progress made toward(s) clinical / shift goals:      Problem: Knowledge Deficit - Standard  Goal: Patient and family/care givers will demonstrate understanding of plan of care, disease process/condition, diagnostic tests and medications  Description: Target End Date:  1-3 days or as soon as patient condition allows    Document in Patient Education    1.  Patient and family/caregiver oriented to unit, equipment, visitation policy and means for communicating concern  2.  Complete/review Learning Assessment  3.  Assess knowledge level of disease process/condition, treatment plan, diagnostic tests and medications  4.  Explain disease process/condition, treatment plan, diagnostic tests and medications  Outcome: Progressing  Note: POC discussed with patient  Pt denies questions or concerns at this time  Patient verbalizes understanding of POC       Problem: Fall Risk  Goal: Patient will remain free from falls  Description: Target End Date:  Prior to discharge or change in level of care    Document interventions on the Claudia Black Fall Risk Assessment    1.  Assess for fall risk factors  2.  Implement fall precautions  Outcome: Progressing  Note: Fall risk assessment complete  Fall precautions in place per protocol; see flowsheet  Pt remains free from falls     Problem: Respiratory  Goal: Patient will achieve/maintain optimum respiratory ventilation and gas exchange  Description: Target End Date:  Prior to discharge or change in level of care    Document on Assessment flowsheet    1.  Assess and monitor rate, rhythm, depth and effort of respiration  2.  Breath sounds assessed qshift and/or as needed  3.  Assess O2 saturation, administer/titrate oxygen as ordered  4.   Position patient for maximum ventilatory efficiency  5.  Turn, cough, and deep breath with splinting to improve effectiveness  6.  Collaborate with RT to administer medication/treatments per order  7.  Encourage use of incentive spirometer and encourage patient to cough after use and utilize splinting techniques if applicable  8.  Airway suctioning  9.  Monitor sputum production for changes in color, consistency and frequency  10. Perform frequent oral hygiene  11. Alternate physical activity with rest periods  Outcome: Progressing  Note: Assess and monitor breath sounds and respiratory status  Elevate HOB  Pt given IS and encourages to use it regularly       Patient is not progressing towards the following goals:

## 2024-07-16 NOTE — ED NOTES
Med rec updated and complete. Allergies reviewed.    Pt confirmed name and date of birth.    Pt stated she stopped taking the colchicine.    Last dose of rivaroxaban 07/14/24.    No outpatient antibiotic use in last 30 days.        Home pharmacy  [facility] [phone]

## 2024-07-16 NOTE — PROGRESS NOTES
Telemetry Report:      Rhythm:   SR     Heart Rate: 76-88   Ectopy:      None   With ST elevation upon transferring on the floor, EKG done, Dr. Rodriguez made aware.       IA:  0.27         QRS:       0.12  QT:   0.36        Per Telestrip from Monitor Room

## 2024-07-16 NOTE — ED NOTES
Bedside report received from John OROPEZA. Patient connected to cardiac monitor, pulse ox, and automatic BP. Fall precautions in place. Call light within reach. No supplemental O2 use at this time.     Pending floor RN

## 2024-07-16 NOTE — ASSESSMENT & PLAN NOTE
History of paroxysmal atrial fibrillation previously on Xarelto.  Also on beta-blockade with metoprolol outpatient.    Continue to monitor electrolytes  Continue monitor closely in telemetry  Holding Xarelto.

## 2024-07-16 NOTE — DISCHARGE PLANNING
HTH/SCP TCN chart review completed. Collaborated with CM prior to meeting with the pt. The most current review of medical record, knowledge of pt's PLOF and social support, LACE+ score of 67, 6 clicks scores of 18 mobility were considered.      Pt seen at bedside. Introduced TCN program. Provided education regarding post acute levels of care. Education provided regarding case management policy for blanket SNF referrals. Discussed HTH/SCP plan benefits. Pt verbalizes understanding.     Patient reports that she lives alone, owns FWW, and has son who lives about 10 minutes away that can help as needed.  Patient is also on RA at baseline.  Choice obtained for HH secondary to 6 clicks and per patient request.  Noted per kardex patient is ambulating 2x15 feet with HHA.  Patient also agreeable to DME (O2) choice, as patient is currently utilizing supplemental O2.      Choice proactively obtained for HH and DME (O2) and faxed to [facility].  Current discharge considerations are home with home health, and DME (O2) if needed at time of discharge.. TCN will continue to follow and collaborate with discharge planning team as additional post acute needs arise. Thank you.     Completed today:  Choice obtained: HH, DME (O2-[facility])  SCP with [facility] PCP. Has PCP f/u on 8/15

## 2024-07-16 NOTE — CONSULTS
CARDIOLOGY CONSULTATION NOTE      Reason of Consult: Hold off anticoagulation for thoracentesis    Consulting Physician: Anil Figueroa MD    HPI:  Lisa Heath is an 88 years old female with past medical history significant for aortic stenosis s/p TAVR on July 1, 2024, paroxysmal A-fib on Xarelto who sent by MD to the ED on 7/15/2024 for concern of cough, shortness of breath, night sweats since 7/7/24.    Patient reports that she developed diarrhea after discharged from the hospital on colchicine. She called cardiology on 7/5/24 who recommended to stop colchicine. Unfortunately, patient misunderstood it and stopped taken all medications including xarelto and metoprolol. Reports she felt low energy since TAVR procedure. States she started to have cough and SOB on exertion on 7/7/24 that worsened 3 days later. Also states has had low energy and night sweats but denied fever, chest pain, lightheadedness/dizziness or palpitations.  3 days priro to admission patient started to take medications b/c  she has concern that she could get clot.     In the ED  she was hypoxic with oxygen sat dropped to 87%, elevated NT pro-BNP x10 of baseline. Procal NL. CXR showed moderate left pleural effusion. Started on oxygen, diuretics, BB 50mg BID, xarelto 20mg,     EKG (7/15/24):  I have personally reviewed the EKG this visit and discussed with the patient. It shows SR, first degree AV block, LBBB old, Afib no longer present.    TTE (7/2/24):  Normal LV chamber size. The ejection fraction is measured to be  75% Normal regional wall motion  Reduced right ventricular systolic function.Moderate mitral stenosis  Known TAVR aortic valve that is functioning normally with normal transvalvular gradients  Mild tricuspid regurgitation with estimated RV systolic pressure of 46 mmHg +RAP    Past Medical History:   Diagnosis Date    Arrhythmia     a fib    Arthritis     osteo, hip    Breath shortness     Bronchitis     years ago    Cancer  (HCC)     skin on left shoulder    Diarrhea 2024    resolved    Functional diarrhea 2022    Heart murmur     High cholesterol     Nonrheumatic aortic valve stenosis 2024    Pneumonia     years ago    Prediabetes 2023    Unspecified cataract     tony IOL    Unspecified urinary incontinence     Vitamin D deficiency 06/15/2023       Social History     Socioeconomic History    Marital status:      Spouse name: Not on file    Number of children: Not on file    Years of education: Not on file    Highest education level: Not on file   Occupational History    Not on file   Tobacco Use    Smoking status: Former     Current packs/day: 0.00     Average packs/day: 1 pack/day for 33.0 years (33.0 ttl pk-yrs)     Types: Cigarettes     Start date: 1958     Quit date: 1991     Years since quittin.5    Smokeless tobacco: Never    Tobacco comments:     Started smoking at age 23   Vaping Use    Vaping status: Never Used   Substance and Sexual Activity    Alcohol use: Yes     Alcohol/week: 4.2 oz     Types: 7 Glasses of wine per week     Comment: 1 glass wine/day    Drug use: No    Sexual activity: Never     Partners: Male   Other Topics Concern    Not on file   Social History Narrative    She lost her   to Alzheimer's. She lives alone in her own home, 1 story. Has a son who lives nearby. She does all ADLs and IADLs w/o difficulty though recently hired a  to do her outside work. 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 She enjoys reading, learning to play the piano.      Social Determinants of Health     Financial Resource Strain: Not on file   Food Insecurity: Not on file   Transportation Needs: No Transportation Needs (2024)    PRAPARE - Transportation     Lack of Transportation (Medical): No     Lack of Transportation (Non-Medical): No   Physical Activity: Not on file   Stress: Not on file   Social Connections: Feeling Socially Integrated  (10/24/2023)    OASIS : Social Isolation     Frequency of experiencing loneliness or isolation: Never   Intimate Partner Violence: Not At Risk (7/15/2024)    Humiliation, Afraid, Rape, and Kick questionnaire     Fear of Current or Ex-Partner: No     Emotionally Abused: No     Physically Abused: No     Sexually Abused: No   Housing Stability: Unknown (7/1/2024)    Housing Stability Vital Sign     Unable to Pay for Housing in the Last Year: No     Number of Places Lived in the Last Year: Not on file     Unstable Housing in the Last Year: No       No current facility-administered medications on file prior to encounter.     Current Outpatient Medications on File Prior to Encounter   Medication Sig Dispense Refill    calcium citrate (CALCITRATE) 950 (200 Ca) MG Tab Take 950 mg by mouth every day.      vitamin D3 (CHOLECALCIFEROL) 1000 Unit (25 mcg) Tab Take 1,000 Units by mouth every day.      therapeutic multivitamin-minerals (THERAGRAN-M) Tab Take 1 Tablet by mouth every day.      acetaminophen (TYLENOL) 500 MG Tab Take 500 mg by mouth every 6 hours as needed for Moderate Pain.      loratadine (CLARITIN) 10 MG Tab Take 10 mg by mouth every day.      metoprolol tartrate (LOPRESSOR) 25 MG Tab TAKE TWO TABLETS BY MOUTH TWICE DAILY (Patient taking differently: Take 50 mg by mouth 2 times a day. 2 tablets = 50mg) 100 Tablet 3    simvastatin (ZOCOR) 40 MG Tab TAKE ONE TABLET BY MOUTH IN THE EVENING 100 Tablet 3    rivaroxaban (XARELTO) 20 MG Tab tablet TAKE ONE TABLET BY MOUTH ONE TIME DAILY WITH DINNER 100 Tablet 2       Current Facility-Administered Medications   Medication Dose Frequency Provider Last Rate Last Admin    acetaminophen (Tylenol) tablet 650 mg  650 mg Q6HRS PRN Yair Jimenez M.D.        hydrALAZINE (Apresoline) injection 10 mg  10 mg Q4HRS PRN Yair Jimenez M.D.        metoprolol tartrate (Lopressor) tablet 50 mg  50 mg BID Yair Jimenez M.D.   50 mg at 07/16/24 0531     "rivaroxaban (Xarelto) tablet 20 mg  20 mg PM MEAL Anil STANLEY Figueroa D.O.        simvastatin (Zocor) tablet 40 mg  40 mg Q EVENING Yair Jimenez M.D.   40 mg at 07/15/24 1916    furosemide (Lasix) injection 20 mg  20 mg BID DIURETIC Yair Jimenez M.D.   20 mg at 07/16/24 0615   Last reviewed on 7/15/2024  5:55 PM by Jacque Santos PhT     Citrus, Clindamycin, Oatmeal, and Scallops    Family History   Problem Relation Age of Onset    Cancer Father         GI    Heart Disease Father     Lung Disease Father     Stroke Sister     No Known Problems Son     No Known Problems Son     No Known Problems Maternal Grandmother     No Known Problems Maternal Grandfather     No Known Problems Paternal Grandmother     No Known Problems Paternal Grandfather     Hypertension Sister     Hyperlipidemia Sister        ROS: As per HPI all other systems reviewed and negative     Physical Exam   Blood pressure (!) 89/57, pulse 89, temperature 36.3 °C (97.3 °F), temperature source Temporal, resp. rate 15, height 1.651 m (5' 5\"), weight 61.8 kg (136 lb 3.9 oz), SpO2 95%, not currently breastfeeding.    Constitutional: Mildly dyspneic, NAD.   HENT: Normocephalic and atraumatic. No scleral icterus.   Neck: No JVD present.   Cardiovascular: tachycardic, irregular. Exam reveals no gallop and no friction rub. + 3/6 systolic murmur heard on left upper sternal border.   Pulmonary/Chest: minimal use of accessory muscles, Adequate air entery B/L,  coarse crackles and bronchial breathing on left lower lung  Abdominal: S/NT/ND BS+   Musculoskeletal:  Pulses present. No atrophy. Strength normal.  Extremities: Exhibits no edema. No clubbing or cyanosis.   Skin: Skin is warm and dry.   Neuro: Non-focal, CN 2-12 intact grossly      Intake/Output Summary (Last 24 hours) at 7/16/2024 1459  Last data filed at 7/16/2024 1221  Gross per 24 hour   Intake 350 ml   Output 1760 ml   Net -1410 ml       Recent Labs     07/15/24  1447 07/16/24  0230 "   WBC 13.1* 9.8   RBC 3.43* 2.95*   HEMOGLOBIN 11.7* 9.9*   HEMATOCRIT 35.5* 30.2*   .5* 102.4*   MCH 34.1* 33.6*   MCHC 33.0 32.8   RDW 50.6* 50.0   PLATELETCT 203 172   MPV 10.9 10.8     Recent Labs     07/15/24  1447 07/16/24  0230   SODIUM 134* 134*   POTASSIUM 4.7 4.1   CHLORIDE 99 98   CO2 21 28   GLUCOSE 122* 101*   BUN 21 24*   CREATININE 0.63 0.62   CALCIUM 9.7 8.9                         Imaging reviewed    Impressions:  #Acute congestive heart failure  #Pleural effusion, left  #paroxysmal Afib  #Aortic Stenosis s/p TAVR on 7/1/24  #Moderate MS    Recommendations:  -IV lasix 40mg once  -Repeat CXR in AM  -Hold off xarleto until tomorrow, reassess for need of thoracentesis in AM  -Decreased metoprolol to 25mg BID given low BP in setting of MS  -Repeat Echo urgently  -Start aspirin 81mg while off xarelto      We will follow up. Please contact me with any questions.     Discussed with the referring physician.    Please note that this dictation was created using voice recognition software. There may be errors I did not discover before finalizing the note.

## 2024-07-16 NOTE — ASSESSMENT & PLAN NOTE
Likely secondary to cardiogenic pleural effusion and large pericardial effusion with hemodynamic compromise.  Currently she is requiring 2 L of oxygen to maintain O2 saturation   Continue to monitor.

## 2024-07-16 NOTE — PROGRESS NOTES
Patient transferred from ED via gurney. Patient alert and oriented x 4, on RA. Admission assessment and 4 eyes completed with SANDIP Cr. VS stable, tele monitor on. All fall precautions in place. Bed locked and on lowest position. Call light and personal belongings within reach.     2010H;Attached Tele monitor, with ST elevation noted as per monitor tech, vital signs within normal limits, RR- 22-25 bpm  while at rest, on oxygen support at 2 LPM via nasal cannula. Asymptomatic, but with occasionally  bright light peripherally. Stat EKG ordered, Dr. Jean Carlos Rodriguez made.EKG result seen by Dr. Rodriguez. No new orders made.     Patient and son Jaydon updated regarding plan of care.

## 2024-07-16 NOTE — ASSESSMENT & PLAN NOTE
Patient found to have moderate-sized left sided pleural effusion on chest x-ray.  Was initiated on IV Lasix.  Cardiology was consulted with urgent echocardiogram obtained, demonstrating concomitant large pericardial effusion with hemodynamic compromise.  Likely cardiogenic pleural effusion.    Patient found to significant pleural effusion on the left lung.  I personally reviewed and interpreted x-ray on my interpretation she is found to have significant left pleural effusion.  I requested ultrasound guided thoracentesis   I reviewed x-ray with cardiologist Dr. Martinez

## 2024-07-16 NOTE — ASSESSMENT & PLAN NOTE
Patient underwent TAVR on July 1, 2024 recently.  At the time was noted to have trivial pericardial effusion and discharged on colchicine (did not complete due to diarrhea), however on repeat echocardiogram this stay now with large pericardial effusion and evidence of hemodynamic compromise.    -Cardiology following, appreciate support  S/p pericardial drain placement on July 16, 2024 and removal of pericardial drain on July 17, 2024.

## 2024-07-16 NOTE — H&P
Hospital Medicine History & Physical Note    Date of Service  7/15/2024    Primary Care Physician  Kathy Garcia M.D.    Consultants  None    Specialist Names: N/A    Code Status  Full Code    Chief Complaint  Chief Complaint   Patient presents with    Sent by MD     Had an aortic valve replacement on July 1st. Sent by MD for moist cough, nonproductive, SOB, and night sweats.     Cough       History of Presenting Illness    Lisa Garza is a 88 y.o. female with past medical history of aortic stenosis s/p TAVR on July 1, 2024 who presented to the hospital on 7/15/2024 with shortness of breath and cough.  Patient reported she has been having this cough for the last few days.  She expressed that after she had a TAVR procedure she was started on new medications that caused diarrhea and she called the cardiology office and they recommended to stop taking that medications.  She misunderstood and she stopped taking all of her medication related she resume her medications and now she has been taking.  She reported she has a history of allergies and she stopped taking her allergy medication as well that developed runny nose and hayfever.  She denies complaint of pain, nausea, vomiting, diarrhea and constipation she called the cardiology office today and they recommended to go to the ER for evaluation.  There is no specific aggravating or elevating factors.  There is no other associated symptoms.      ER course: Patient found to have left pleural effusion.  I started her on Lasix.    I discussed about this admission with ER physician Dr. Plummer.      I discussed the plan of care with patient.    Review of Systems  Review of Systems   Constitutional:  Negative for chills, fever and weight loss.   HENT:  Negative for hearing loss and tinnitus.    Eyes:  Negative for blurred vision, double vision, photophobia and pain.   Respiratory:  Positive for cough and shortness of breath. Negative for sputum production.     Cardiovascular:  Negative for chest pain, palpitations, orthopnea and leg swelling.   Gastrointestinal:  Negative for abdominal pain, constipation, diarrhea, nausea and vomiting.   Genitourinary:  Negative for dysuria, frequency and urgency.   Musculoskeletal:  Negative for back pain, joint pain, myalgias and neck pain.   Skin:  Negative for rash.   Neurological:  Negative for dizziness, tingling, tremors, sensory change, speech change, focal weakness and headaches.   Psychiatric/Behavioral:  Negative for hallucinations and substance abuse.    All other systems reviewed and are negative.      Past Medical History   has a past medical history of Arrhythmia, Arthritis, Breath shortness, Bronchitis, Cancer (HCC), Diarrhea (04/09/2024), Functional diarrhea (12/12/2022), Heart murmur, High cholesterol, Nonrheumatic aortic valve stenosis (01/25/2024), Pneumonia, Prediabetes (08/16/2023), Unspecified cataract, Unspecified urinary incontinence, and Vitamin D deficiency (06/15/2023).    Surgical History   has a past surgical history that includes other (Right, 01/01/2006); hip arthroplasty total (Left, 07/06/2015); appendectomy laparoscopic (10/10/2018); cataract extraction with iol (Bilateral); other (Right); pr replace aortic valve perq femoral (Bilateral, 7/1/2024); and pr echo ravin tavr tmvr laac w wo cont (N/A, 7/1/2024).     Family History  family history includes Cancer in her father; Heart Disease in her father; Hyperlipidemia in her sister; Hypertension in her sister; Lung Disease in her father; No Known Problems in her maternal grandfather, maternal grandmother, paternal grandfather, paternal grandmother, son, and son; Stroke in her sister.   Family history reviewed with patient. There is no family history that is pertinent to the chief complaint.     Social History   reports that she quit smoking about 33 years ago. Her smoking use included cigarettes. She started smoking about 66 years ago. She has a 33  pack-year smoking history. She has never used smokeless tobacco. She reports current alcohol use of about 4.2 oz of alcohol per week. She reports that she does not use drugs.    Allergies  Allergies   Allergen Reactions    Florence Unspecified     Itchy welts      Clindamycin Unspecified     Pt unsure of reaction    Oatmeal Diarrhea     Gas      Scallops Vomiting       Medications  Prior to Admission Medications   Prescriptions Last Dose Informant Patient Reported? Taking?   acetaminophen (TYLENOL) 500 MG Tab 7/15/2024 at 1030 Patient Yes Yes   Sig: Take 500 mg by mouth every 6 hours as needed for Moderate Pain.   calcium citrate (CALCITRATE) 950 (200 Ca) MG Tab 7/15/2024 at 0900 Patient Yes Yes   Sig: Take 950 mg by mouth every day.   loratadine (CLARITIN) 10 MG Tab 7/15/2024 at 0900 Patient Yes Yes   Sig: Take 10 mg by mouth every day.   metoprolol tartrate (LOPRESSOR) 25 MG Tab 7/15/2024 at 0900 Patient No Yes   Sig: TAKE TWO TABLETS BY MOUTH TWICE DAILY   Patient taking differently: Take 50 mg by mouth 2 times a day. 2 tablets = 50mg   rivaroxaban (XARELTO) 20 MG Tab tablet 7/14/2024 at 1800 Patient No Yes   Sig: TAKE ONE TABLET BY MOUTH ONE TIME DAILY WITH DINNER   simvastatin (ZOCOR) 40 MG Tab 7/14/2024 at 1800 Patient No Yes   Sig: TAKE ONE TABLET BY MOUTH IN THE EVENING   therapeutic multivitamin-minerals (THERAGRAN-M) Tab 7/15/2024 at 0900 Patient Yes Yes   Sig: Take 1 Tablet by mouth every day.   vitamin D3 (CHOLECALCIFEROL) 1000 Unit (25 mcg) Tab 7/15/2024 at 0900 Patient Yes Yes   Sig: Take 1,000 Units by mouth every day.      Facility-Administered Medications: None       Physical Exam  Temp:  [37 °C (98.6 °F)] 37 °C (98.6 °F)  Pulse:  [62-96] 90  Resp:  [16-18] 17  BP: (114-129)/(63-76) 129/76  SpO2:  [87 %-94 %] 91 %  Blood Pressure : 129/76   Temperature: 37 °C (98.6 °F)   Pulse: 90   Respiration: 17   Pulse Oximetry: 91 %       Physical Exam  Vitals reviewed.   Constitutional:       General: She is not  in acute distress.     Appearance: Normal appearance. She is not ill-appearing.   HENT:      Head: Normocephalic and atraumatic.      Nose: No congestion.      Comments: Oxygen nasal cannula  Eyes:      General:         Right eye: No discharge.         Left eye: No discharge.      Pupils: Pupils are equal, round, and reactive to light.   Cardiovascular:      Rate and Rhythm: Normal rate and regular rhythm.      Pulses: Normal pulses.      Heart sounds: Normal heart sounds. No murmur heard.  Pulmonary:      Effort: No respiratory distress.      Breath sounds: No stridor.      Comments: Decreased breath sounds on left lung  Abdominal:      General: Bowel sounds are normal. There is no distension.      Palpations: Abdomen is soft.      Tenderness: There is no abdominal tenderness.   Musculoskeletal:         General: No swelling or tenderness. Normal range of motion.      Cervical back: Normal range of motion. No rigidity.   Skin:     General: Skin is warm.      Capillary Refill: Capillary refill takes less than 2 seconds.      Coloration: Skin is not jaundiced or pale.      Findings: No bruising.   Neurological:      General: No focal deficit present.      Mental Status: She is alert and oriented to person, place, and time.      Cranial Nerves: No cranial nerve deficit.   Psychiatric:         Mood and Affect: Mood normal.         Behavior: Behavior normal.         Laboratory:  Recent Labs     07/15/24  1447   WBC 13.1*   RBC 3.43*   HEMOGLOBIN 11.7*   HEMATOCRIT 35.5*   .5*   MCH 34.1*   MCHC 33.0   RDW 50.6*   PLATELETCT 203   MPV 10.9     Recent Labs     07/15/24  1447   SODIUM 134*   POTASSIUM 4.7   CHLORIDE 99   CO2 21   GLUCOSE 122*   BUN 21   CREATININE 0.63   CALCIUM 9.7     Recent Labs     07/15/24  1447   ALTSGPT 47   ASTSGOT 55*   ALKPHOSPHAT 198*   TBILIRUBIN 0.8   GLUCOSE 122*         Recent Labs     07/15/24  1447   NTPROBNP 2491*         Recent Labs     07/15/24  1447   TROPONINT 29*        Imaging:  DX-CHEST-PORTABLE (1 VIEW)   Final Result      1.  Moderate left effusion with associated compressive atelectasis and/or consolidation. Underlying infection is possible.   2.  Stable enlargement of the cardiomediastinal silhouette.      EC-ECHOCARDIOGRAM COMPLETE W/O CONT    (Results Pending)       X-Ray:  My impression is: Chest x-ray on my interpretation showed moderate-sized left pleural effusion.    Assessment/Plan:  Justification for Admission Status  I anticipate this patient will require at least two midnights for appropriate medical management, necessitating inpatient admission because due to moderate-sized pleural effusion on left and acute respiratory failure with hypoxia    Patient will need a Telemetry bed on MEDICAL service .  The need is secondary to pleural effusion and hypoxia.    * Pleural effusion- (present on admission)  Assessment & Plan  Patient found to have moderate-sized pleural effusion on chest x-ray.  I started her on IV Lasix 40 mg twice daily.  Continue to monitor input and output closely  I ordered echocardiogram although she had a recent echocardiogram but this is a new findings to evaluate further.  Admit to telemetry.    S/P TAVR (transcatheter aortic valve replacement)- (present on admission)  Assessment & Plan  Patient underwent TAVR on July 1, 2024.  Her echocardiogram to evaluate aortic valve function.  For any abnormality on echo patient may need cardiology consultation.    PAF (paroxysmal atrial fibrillation) (HCC)- (present on admission)  Assessment & Plan  I am continuing outpatient Xarelto.    Dyslipidemia- (present on admission)  Assessment & Plan  I am continuing outpatient simvastatin.    Full code status- (present on admission)  Assessment & Plan  I discussed CODE STATUS with patient she would like to be full code.  As per patient wishes I placed full code orders.    Acute respiratory failure with hypoxia (HCC)- (present on admission)  Assessment &  Plan  Patient does not use oxygen at home.  She found to have hypoxia with 87% of oxygen saturation.  Continue to titrate down oxygen as tolerated  Started on IV Lasix   continue to monitor input and output  Continue to monitor electrolytes.  Ordered echocardiogram      I discussed about this admission with ER physician Dr. Plummer    I reviewed OP note from July 1, 2024 Dr. Sellers and Dr. Gaviria  VTE prophylaxis: therapeutic anticoagulation with Xarelto

## 2024-07-16 NOTE — ASSESSMENT & PLAN NOTE
I discussed CODE STATUS with patient she would like to be full code.  As per patient wishes I placed full code orders.

## 2024-07-17 ENCOUNTER — APPOINTMENT (OUTPATIENT)
Dept: RADIOLOGY | Facility: MEDICAL CENTER | Age: 89
DRG: 314 | End: 2024-07-17
Attending: INTERNAL MEDICINE
Payer: MEDICARE

## 2024-07-17 ENCOUNTER — APPOINTMENT (OUTPATIENT)
Dept: RADIOLOGY | Facility: MEDICAL CENTER | Age: 89
DRG: 314 | End: 2024-07-17
Payer: MEDICARE

## 2024-07-17 ENCOUNTER — APPOINTMENT (OUTPATIENT)
Dept: CARDIOLOGY | Facility: MEDICAL CENTER | Age: 89
DRG: 314 | End: 2024-07-17
Attending: INTERNAL MEDICINE
Payer: MEDICARE

## 2024-07-17 LAB
ALBUMIN SERPL BCP-MCNC: 3.1 G/DL (ref 3.2–4.9)
ALBUMIN/GLOB SERPL: 1.1 G/DL
ALP SERPL-CCNC: 147 U/L (ref 30–99)
ALT SERPL-CCNC: 35 U/L (ref 2–50)
AMYLASE FLD-CCNC: 16 U/L
ANION GAP SERPL CALC-SCNC: 10 MMOL/L (ref 7–16)
APPEARANCE FLD: NORMAL
AST SERPL-CCNC: 36 U/L (ref 12–45)
BACTERIA BLD CULT: ABNORMAL
BACTERIA BLD CULT: ABNORMAL
BASOPHILS # BLD AUTO: 0.4 % (ref 0–1.8)
BASOPHILS # BLD: 0.04 K/UL (ref 0–0.12)
BILIRUB SERPL-MCNC: 0.6 MG/DL (ref 0.1–1.5)
BODY FLD TYPE: NORMAL
BUN SERPL-MCNC: 24 MG/DL (ref 8–22)
CALCIUM ALBUM COR SERPL-MCNC: 10 MG/DL (ref 8.5–10.5)
CALCIUM SERPL-MCNC: 9.3 MG/DL (ref 8.5–10.5)
CELLS FLD: 1
CHLORIDE SERPL-SCNC: 99 MMOL/L (ref 96–112)
CO2 SERPL-SCNC: 27 MMOL/L (ref 20–33)
COLOR FLD: YELLOW
CREAT SERPL-MCNC: 0.58 MG/DL (ref 0.5–1.4)
EOSINOPHIL # BLD AUTO: 0.22 K/UL (ref 0–0.51)
EOSINOPHIL NFR BLD: 2.1 % (ref 0–6.9)
ERYTHROCYTE [DISTWIDTH] IN BLOOD BY AUTOMATED COUNT: 50 FL (ref 35.9–50)
GFR SERPLBLD CREATININE-BSD FMLA CKD-EPI: 87 ML/MIN/1.73 M 2
GLOBULIN SER CALC-MCNC: 2.8 G/DL (ref 1.9–3.5)
GLUCOSE FLD-MCNC: 110 MG/DL
GLUCOSE SERPL-MCNC: 102 MG/DL (ref 65–99)
HCT VFR BLD AUTO: 38.4 % (ref 37–47)
HGB BLD-MCNC: 12.3 G/DL (ref 12–16)
IMM GRANULOCYTES # BLD AUTO: 0.04 K/UL (ref 0–0.11)
IMM GRANULOCYTES NFR BLD AUTO: 0.4 % (ref 0–0.9)
LDH FLD L TO P-CCNC: 233 U/L
LV EJECT FRACT MOD 2C 99903: 63.27
LV EJECT FRACT MOD 4C 99902: 58.63
LV EJECT FRACT MOD BP 99901: 62.35
LYMPHOCYTES # BLD AUTO: 1.51 K/UL (ref 1–4.8)
LYMPHOCYTES NFR BLD: 14.5 % (ref 22–41)
LYMPHOCYTES NFR FLD: 38 %
MCH RBC QN AUTO: 32.9 PG (ref 27–33)
MCHC RBC AUTO-ENTMCNC: 32 G/DL (ref 32.2–35.5)
MCV RBC AUTO: 102.7 FL (ref 81.4–97.8)
MONOCYTES # BLD AUTO: 1.21 K/UL (ref 0–0.85)
MONOCYTES NFR BLD AUTO: 11.6 % (ref 0–13.4)
MONOS+MACROS NFR FLD MANUAL: 12 %
NEUTROPHILS # BLD AUTO: 7.38 K/UL (ref 1.82–7.42)
NEUTROPHILS NFR BLD: 71 % (ref 44–72)
NEUTROPHILS NFR FLD: 49 %
NRBC # BLD AUTO: 0 K/UL
NRBC BLD-RTO: 0 /100 WBC (ref 0–0.2)
NUC CELL # FLD: 277 CELLS/UL
PH FLD: 8 [PH]
PLATELET # BLD AUTO: 198 K/UL (ref 164–446)
PMV BLD AUTO: 10.4 FL (ref 9–12.9)
POTASSIUM SERPL-SCNC: 4.5 MMOL/L (ref 3.6–5.5)
PROT FLD-MCNC: 3.1 G/DL
PROT SERPL-MCNC: 5.9 G/DL (ref 6–8.2)
RBC # BLD AUTO: 3.74 M/UL (ref 4.2–5.4)
RBC # FLD: <2000 CELLS/UL
SIGNIFICANT IND 70042: ABNORMAL
SITE SITE: ABNORMAL
SODIUM SERPL-SCNC: 136 MMOL/L (ref 135–145)
SOURCE SOURCE: ABNORMAL
WBC # BLD AUTO: 10.4 K/UL (ref 4.8–10.8)

## 2024-07-17 PROCEDURE — 85025 COMPLETE CBC W/AUTO DIFF WBC: CPT

## 2024-07-17 PROCEDURE — 88305 TISSUE EXAM BY PATHOLOGIST: CPT

## 2024-07-17 PROCEDURE — 4410569 US-THORACENTESIS PUNCTURE

## 2024-07-17 PROCEDURE — 87102 FUNGUS ISOLATION CULTURE: CPT

## 2024-07-17 PROCEDURE — 71046 X-RAY EXAM CHEST 2 VIEWS: CPT

## 2024-07-17 PROCEDURE — A9270 NON-COVERED ITEM OR SERVICE: HCPCS | Performed by: INTERNAL MEDICINE

## 2024-07-17 PROCEDURE — 71045 X-RAY EXAM CHEST 1 VIEW: CPT

## 2024-07-17 PROCEDURE — 83615 LACTATE (LD) (LDH) ENZYME: CPT

## 2024-07-17 PROCEDURE — A9270 NON-COVERED ITEM OR SERVICE: HCPCS

## 2024-07-17 PROCEDURE — 93325 DOPPLER ECHO COLOR FLOW MAPG: CPT | Mod: 26 | Performed by: INTERNAL MEDICINE

## 2024-07-17 PROCEDURE — 770000 HCHG ROOM/CARE - INTERMEDIATE ICU *

## 2024-07-17 PROCEDURE — 82945 GLUCOSE OTHER FLUID: CPT

## 2024-07-17 PROCEDURE — 700102 HCHG RX REV CODE 250 W/ 637 OVERRIDE(OP): Performed by: INTERNAL MEDICINE

## 2024-07-17 PROCEDURE — 82150 ASSAY OF AMYLASE: CPT

## 2024-07-17 PROCEDURE — 93308 TTE F-UP OR LMTD: CPT | Mod: 26 | Performed by: INTERNAL MEDICINE

## 2024-07-17 PROCEDURE — 83986 ASSAY PH BODY FLUID NOS: CPT

## 2024-07-17 PROCEDURE — 84157 ASSAY OF PROTEIN OTHER: CPT

## 2024-07-17 PROCEDURE — 87116 MYCOBACTERIA CULTURE: CPT

## 2024-07-17 PROCEDURE — 87206 SMEAR FLUORESCENT/ACID STAI: CPT

## 2024-07-17 PROCEDURE — 88112 CYTOPATH CELL ENHANCE TECH: CPT

## 2024-07-17 PROCEDURE — 87015 SPECIMEN INFECT AGNT CONCNTJ: CPT | Mod: 91

## 2024-07-17 PROCEDURE — 99233 SBSQ HOSP IP/OBS HIGH 50: CPT | Performed by: INTERNAL MEDICINE

## 2024-07-17 PROCEDURE — 93325 DOPPLER ECHO COLOR FLOW MAPG: CPT

## 2024-07-17 PROCEDURE — 0W9B3ZX DRAINAGE OF LEFT PLEURAL CAVITY, PERCUTANEOUS APPROACH, DIAGNOSTIC: ICD-10-PCS | Performed by: NURSE PRACTITIONER

## 2024-07-17 PROCEDURE — 87070 CULTURE OTHR SPECIMN AEROBIC: CPT

## 2024-07-17 PROCEDURE — 89051 BODY FLUID CELL COUNT: CPT

## 2024-07-17 PROCEDURE — 87205 SMEAR GRAM STAIN: CPT | Mod: 91

## 2024-07-17 PROCEDURE — 80053 COMPREHEN METABOLIC PANEL: CPT

## 2024-07-17 PROCEDURE — 32554 ASPIRATE PLEURA W/O IMAGING: CPT

## 2024-07-17 PROCEDURE — 700102 HCHG RX REV CODE 250 W/ 637 OVERRIDE(OP)

## 2024-07-17 RX ORDER — COLCHICINE 0.6 MG/1
0.6 TABLET ORAL DAILY
Status: DISCONTINUED | OUTPATIENT
Start: 2024-07-17 | End: 2024-07-18

## 2024-07-17 RX ADMIN — ASPIRIN 81 MG: 81 TABLET, COATED ORAL at 05:12

## 2024-07-17 RX ADMIN — SIMVASTATIN 40 MG: 40 TABLET, FILM COATED ORAL at 17:34

## 2024-07-17 RX ADMIN — METOPROLOL TARTRATE 25 MG: 25 TABLET, FILM COATED ORAL at 17:42

## 2024-07-17 RX ADMIN — COLCHICINE 0.6 MG: 0.6 TABLET, FILM COATED ORAL at 11:09

## 2024-07-17 ASSESSMENT — COGNITIVE AND FUNCTIONAL STATUS - GENERAL
MOVING FROM LYING ON BACK TO SITTING ON SIDE OF FLAT BED: A LITTLE
DAILY ACTIVITIY SCORE: 18
DRESSING REGULAR LOWER BODY CLOTHING: A LITTLE
DRESSING REGULAR UPPER BODY CLOTHING: A LITTLE
TURNING FROM BACK TO SIDE WHILE IN FLAT BAD: A LITTLE
MOVING TO AND FROM BED TO CHAIR: A LITTLE
TOILETING: A LOT
HELP NEEDED FOR BATHING: A LOT
WALKING IN HOSPITAL ROOM: A LITTLE
SUGGESTED CMS G CODE MODIFIER MOBILITY: CK
SUGGESTED CMS G CODE MODIFIER DAILY ACTIVITY: CK
STANDING UP FROM CHAIR USING ARMS: A LITTLE
CLIMB 3 TO 5 STEPS WITH RAILING: A LOT
MOBILITY SCORE: 17

## 2024-07-17 ASSESSMENT — ENCOUNTER SYMPTOMS
WEAKNESS: 1
WEIGHT LOSS: 0
FOCAL WEAKNESS: 0
NAUSEA: 0
DOUBLE VISION: 0
TREMORS: 0
PHOTOPHOBIA: 0
HALLUCINATIONS: 0
SPUTUM PRODUCTION: 0
MYALGIAS: 0
ABDOMINAL PAIN: 0
BACK PAIN: 0
NECK PAIN: 0
SPEECH CHANGE: 0
FEVER: 0
CONSTIPATION: 0
DIZZINESS: 0
TINGLING: 0
SENSORY CHANGE: 0
DIARRHEA: 0
ORTHOPNEA: 0
HEADACHES: 0
PALPITATIONS: 0
VOMITING: 0
COUGH: 0
EYE PAIN: 0
SHORTNESS OF BREATH: 1
BLURRED VISION: 0
CHILLS: 0

## 2024-07-17 ASSESSMENT — PAIN DESCRIPTION - PAIN TYPE
TYPE: ACUTE PAIN

## 2024-07-17 ASSESSMENT — FIBROSIS 4 INDEX: FIB4 SCORE: 2.7

## 2024-07-17 ASSESSMENT — LIFESTYLE VARIABLES: SUBSTANCE_ABUSE: 0

## 2024-07-17 NOTE — CARE PLAN
The patient is Watcher - Medium risk of patient condition declining or worsening    Shift Goals  Clinical Goals: monitor IRENA drain, hemodynamic stability  Patient Goals: Get better  Family Goals: SHELLY    Progress made toward(s) clinical / shift goals:    Problem: Knowledge Deficit - Standard  Goal: Patient and family/care givers will demonstrate understanding of plan of care, disease process/condition, diagnostic tests and medications  Outcome: Progressing     Problem: Fall Risk  Goal: Patient will remain free from falls  Outcome: Progressing     Problem: Hemodynamics  Goal: Patient's hemodynamics, fluid balance and neurologic status will be stable or improve  Outcome: Progressing     Problem: Respiratory  Goal: Patient will achieve/maintain optimum respiratory ventilation and gas exchange  Outcome: Progressing     Problem: Risk for Aspiration  Goal: Patient's risk for aspiration will be absent or decrease  Outcome: Progressing     Problem: Pain - Standard  Goal: Alleviation of pain or a reduction in pain to the patient’s comfort goal  Outcome: Progressing

## 2024-07-17 NOTE — PROGRESS NOTES
[facility] Internal Medicine Daily Progress Note    Date of Service  7/16/2024    [facility] Team: [facility] IM Green Team   Attending: Bronwyn Carson M.d.  Senior Resident: Dr. Figueroa  Intern:  Dr. Craig  Contact Number: [phone]    Chief Complaint  Lisa Garza is a 88 y.o. female admitted 7/15/2024 with cough and dyspnea exertion.      Hospital Course  Patient is an 88 y.o. female with history of aortic stenosis s/p TAVR (July 1, 2024), paroxysmal atrial fibrillation on Xarelto admitted 7/15/2024 with cough and dyspnea exertion.  Patient recently had TAVR beginning of July and was initiated on colchicine postprocedure.  However developed diarrhea and was recommended to stop colchicine on 7/5/2024.  However patient unfortunately discontinued all of her medications including her metoprolol and Xarelto.  Has had persistent cough and dyspnea on exertion prompting her to come in to the hospital.  In ED noted to be hypoxic requiring 2 L NC supplementation, with hemodynamics otherwise stable.  On CXR was noted to have moderate-sized left-sided pleural effusion, initiated on diuresis with IV Lasix.      Interval Problem Update  No acute events overnight, vital signs stable with patient saturating adequate on 2 L NC supplementation.  Reports continued YOST with cough.  Cardiology was consulted, with echocardiogram prioritized.  Noted to have large pericardial effusion with evidence of hemodynamic compromise.  Metoprolol, Xarelto, and Lasix were held the patient taken to Cath Lab emergently for pericardiocentesis.  Will be transferred to [facility] following procedure.    I have discussed this patient's plan of care and discharge plan at IDT rounds today with Case Management, Nursing, Nursing leadership, and other members of the IDT team.    Consultants/Specialty  cardiology    Code Status  Full Code    Disposition  The patient is not medically cleared for discharge to home or a post-acute facility.      I have placed the appropriate  orders for post-discharge needs.    Review of Systems  Review of Systems   Constitutional:  Negative for chills and fever.   HENT:  Negative for hearing loss.    Eyes:  Negative for blurred vision.   Respiratory:  Positive for cough and shortness of breath.    Cardiovascular:  Negative for chest pain, palpitations and leg swelling.   Gastrointestinal:  Negative for abdominal pain, blood in stool and melena.   Genitourinary:  Negative for flank pain.   Neurological:  Negative for seizures and loss of consciousness.   Psychiatric/Behavioral:  Negative for depression.         Physical Exam  Temp:  [36.3 °C (97.3 °F)-37.1 °C (98.8 °F)] 36.6 °C (97.9 °F)  Pulse:  [74-96] 83  Resp:  [15-18] 15  BP: ()/(56-72) 105/56  SpO2:  [92 %-95 %] 95 %    Physical Exam  Constitutional:       General: She is not in acute distress.     Appearance: She is ill-appearing.   HENT:      Head: Normocephalic and atraumatic.      Nose: Nose normal.      Mouth/Throat:      Mouth: Mucous membranes are moist.   Eyes:      Conjunctiva/sclera: Conjunctivae normal.   Cardiovascular:      Rate and Rhythm: Normal rate and regular rhythm.      Heart sounds: No murmur heard.     No friction rub. No gallop.   Pulmonary:      Effort: Pulmonary effort is normal.      Breath sounds: Examination of the left-middle field reveals decreased breath sounds. Examination of the left-lower field reveals decreased breath sounds. Decreased breath sounds present.      Comments: Positive for egophony  Abdominal:      General: Bowel sounds are normal.      Palpations: Abdomen is soft.      Tenderness: There is no abdominal tenderness. There is no guarding or rebound.   Musculoskeletal:      Right lower leg: No edema.      Left lower leg: No edema.   Skin:     General: Skin is warm.      Capillary Refill: Capillary refill takes less than 2 seconds.   Neurological:      General: No focal deficit present.      Mental Status: She is oriented to person, place, and time.    Psychiatric:         Mood and Affect: Mood normal.         Behavior: Behavior normal.         Fluids    Intake/Output Summary (Last 24 hours) at 7/16/2024 1735  Last data filed at 7/16/2024 1221  Gross per 24 hour   Intake 350 ml   Output 1760 ml   Net -1410 ml       Laboratory  Recent Labs     07/15/24  1447 07/16/24  0230   WBC 13.1* 9.8   RBC 3.43* 2.95*   HEMOGLOBIN 11.7* 9.9*   HEMATOCRIT 35.5* 30.2*   .5* 102.4*   MCH 34.1* 33.6*   MCHC 33.0 32.8   RDW 50.6* 50.0   PLATELETCT 203 172   MPV 10.9 10.8     Recent Labs     07/15/24  1447 07/16/24  0230   SODIUM 134* 134*   POTASSIUM 4.7 4.1   CHLORIDE 99 98   CO2 21 28   GLUCOSE 122* 101*   BUN 21 24*   CREATININE 0.63 0.62   CALCIUM 9.7 8.9                   Imaging  EC-ECHOCARDIOGRAM LTD W/O CONT   Final Result      DX-CHEST-PORTABLE (1 VIEW)   Final Result      1.  Moderate left effusion with associated compressive atelectasis and/or consolidation. Underlying infection is possible.   2.  Stable enlargement of the cardiomediastinal silhouette.      CL-PERICARDIOCENTESIS W/DRAIN > 6YR OLD    (Results Pending)   US-EXTREMITY ARTERY LOWER UNILAT RIGHT    (Results Pending)   EC-ECHOCARDIOGRAM LTD W/O CONT    (Results Pending)        Assessment/Plan  Problem Representation:    * Pericardial effusion  Assessment & Plan  Large pericardial effusion with hemodynamic compromise noted on urgent echocardiogram.  Cardiology following, appreciate support.    -Hold Xarelto, metoprolol, Lasix  -Plan for emergent pericardiocentesis  -Monitor hemodynamics  -Transferred to [facility] following procedure      Pleural effusion- (present on admission)  Assessment & Plan  Patient found to have moderate-sized left sided pleural effusion on chest x-ray.  Was initiated on IV Lasix.  Cardiology was consulted with urgent echocardiogram obtained, demonstrating concomitant large pericardial effusion with hemodynamic compromise.  Likely cardiogenic pleural effusion.    -Currently be taken  for emergent pericardiocentesis  -Repeat CXR in a.m.  -Monitor for need for possible thoracentesis    S/P TAVR (transcatheter aortic valve replacement)- (present on admission)  Assessment & Plan  Patient underwent TAVR on July 1, 2024 recently.  At the time was noted to have trivial pericardial effusion and discharged on colchicine (did not complete due to diarrhea), however on repeat echocardiogram this stay now with large pericardial effusion and evidence of hemodynamic compromise.    -Cardiology following, appreciate support  -Emergent pericardiocentesis to be performed, followed by transferred to [facility]    Acute respiratory failure with hypoxia (HCC)- (present on admission)  Assessment & Plan  Likely secondary to cardiogenic pleural effusion and large pericardial effusion with hemodynamic compromise.  Saturating adequate on 2 L NC sedation currently.    -Continuous pulse oximetry  -Titrate oxygen as appropriate with goal of O2 saturation greater than 92%  -Monitor for worsening hypoxia    PAF (paroxysmal atrial fibrillation) (HCC)- (present on admission)  Assessment & Plan  History of paroxysmal atrial fibrillation previously on Xarelto.  Also on beta-blockade with metoprolol outpatient.    -Telemetry monitoring  -Holding Xarelto and metoprolol for emergent pericardiocentesis    Dyslipidemia- (present on admission)  Assessment & Plan  Continue simvastatin         VTE prophylaxis: SCDs/TEDs    I have performed a physical exam and reviewed and updated ROS and Plan today (7/16/2024). In review of yesterday's note (7/15/2024), there are no changes except as documented above.

## 2024-07-17 NOTE — HOSPITAL COURSE
Patient is an 88 y.o. female with history of aortic stenosis s/p TAVR (July 1, 2024), paroxysmal atrial fibrillation on Xarelto admitted 7/15/2024 with cough and dyspnea exertion.  Patient recently had TAVR beginning of July and was initiated on colchicine postprocedure.  However developed diarrhea and was recommended to stop colchicine on 7/5/2024.  However patient unfortunately discontinued all of her medications including her metoprolol and Xarelto.  Has had persistent cough and dyspnea on exertion prompting her to come in to the hospital.  In ED noted to be hypoxic requiring 2 L NC supplementation, with hemodynamics otherwise stable.  On CXR was noted to have moderate-sized left-sided pleural effusion, initiated on diuresis with IV Lasix.

## 2024-07-17 NOTE — PROGRESS NOTES
POC discussed with pt. Pt belongings packed up and sent with pt to cath lab. Monitor tech notified. Pt off unit with cath lab Rns via hospital bed.

## 2024-07-17 NOTE — PROGRESS NOTES
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 She had diarrhea with BID dosing early July.    We will follow. Discussed with MD and RN.    SUBJECTIVE/INTERIM EVENTS:  - pericardiocentesis 500ml  - 35ml drain output over night  - 700ml UO  - still with SOB and cough, slightly better  - No low BP   - feels tired  - tele: A fib 110's    EXAM:  BP 94/54   Pulse 100   Temp 36.4 °C (97.6 °F) (Temporal)   Resp (!) 29   Ht 1.651 m (5' 5\")   Wt 64.9 kg (143 lb 1.3 oz)   SpO2 93%   BMI 23.81 kg/m²   Gen: NAD  HEENT: Symmetric face.   NECK: mild JVD  CARDIAC: Normal PMI, irregular, normal S1, S2. with a systolic murmur    VASCULATURE: Normal carotid amplitude without bruit.   RESP: decreased left lower breath sounds  EXT: No edema, no clubbing or cyanosis  SKIN: Warm and dry  NEURO: No gross deficits   PSYCH: Appropriate affect, participates in conversation    Studies  Lab Results   Component Value Date/Time    SODIUM 136 07/17/2024 04:20 AM    POTASSIUM 4.5 07/17/2024 04:20 AM    CHLORIDE 99 07/17/2024 04:20 AM    CO2 27 07/17/2024 04:20 AM    GLUCOSE 102 (H) 07/17/2024 04:20 AM    BUN 24 (H) 07/17/2024 04:20 AM    CREATININE 0.58 07/17/2024 04:20 AM       Medical records reviewed for this encounter      Current Facility-Administered Medications:     aspirin EC tablet 81 mg, 81 mg, Oral, DAILY, Anil Figueroa D.O., 81 mg at " 07/17/24 0512    [Held by provider] metoprolol tartrate (Lopressor) tablet 25 mg, 25 mg, Oral, BID, EROS JeffersOMariza    acetaminophen (Tylenol) tablet 650 mg, 650 mg, Oral, Q6HRS PRN, Yair Jimenez M.D.    hydrALAZINE (Apresoline) injection 10 mg, 10 mg, Intravenous, Q4HRS PRN, Yair Jimenez M.D.    [Held by provider] rivaroxaban (Xarelto) tablet 20 mg, 20 mg, Oral, PM MEAL, EROS JeffersOMariza    simvastatin (Zocor) tablet 40 mg, 40 mg, Oral, Q EVENING, Yair Jimenez M.D., 40 mg at 07/15/24 1916      Ruddy Teran MD, MPH [facility]  Interventional Cardiologist  [facility]   of Clinical Internal Medicine - [facility] Jj WOODWARD

## 2024-07-17 NOTE — PROGRESS NOTES
4 Eyes Skin Assessment Completed by Carina, RN and Walter, RN.    Head WDL  Ears WDL  Nose WDL  Mouth WDL  Neck WDL  Breast/Chest Redness and Incision maninder pericardial drain to suction   Shoulder Blades Redness and Blanching  Spine Redness and Blanching  (R) Arm/Elbow/Hand WDL  (L) Arm/Elbow/Hand WDL  Abdomen WDL  Groin WDL  Scrotum/Coccyx/Buttocks Redness and Blanching  (R) Leg WDL  (L) Leg WDL  (R) Heel/Foot/Toe Redness, Blanching, and Boggy  (L) Heel/Foot/Toe Redness, Blanching, and Boggy          Devices In Places ECG, Blood Pressure Cuff, Pulse Ox, and Nasal Cannula      Interventions In Place Gray Ear Foams, Sacral Mepilex, Pillows, Q2 Turns, and Low Air Loss Mattress    Possible Skin Injury No    Pictures Uploaded Into Epic N/A  Wound Consult Placed N/A  RN Wound Prevention Protocol Ordered No

## 2024-07-17 NOTE — PROGRESS NOTES
[facility] Medicine Daily Progress Note    Date of Service  7/17/2024    Chief Complaint  Lisa Garza is a 88 y.o. female admitted 7/15/2024 with shortness of breath    Hospital Course    Lisa Garza is a 88 y.o. female with past medical history of aortic stenosis s/p TAVR on July 1, 2024 who presented to the hospital on 7/15/2024 with shortness of breath and cough.  Patient reported she has been having this cough for the last few days.  She expressed that after she had a TAVR procedure she was started on new medications that caused diarrhea and she called the cardiology office and they recommended to stop taking that medications.  She misunderstood and she stopped taking all of her medication related she resume her medications and now she has been taking.  She reported she has a history of allergies and she stopped taking her allergy medication as well that developed runny nose and hayfever.  She denies complaint of pain, nausea, vomiting, diarrhea and constipation she called the cardiology office today and they recommended to go to the ER for evaluation.  There is no specific aggravating or elevating factors.  There is no other associated symptoms.       Patient found to have pericardial effusion and she underwent pericardial drain placement on July 16, 2024.  Cardiology has been following her.  Due to low output and no recurrence of pericardial fluid pericardial drain was removed on July 17, 2024.    Interval Problem Update    07/17/24    I evaluated and examined her at the bedside  She reported that she feels tired and she was unable to sleep at night.  I discussed plan of care with her regarding her pericardial drain.  She had echocardiogram repeated this morning.  We also discussed finding of pleural effusion and I discussed that with cardiologist and plan is to perform thoracentesis pain  I ordered ultrasound-guided thoracentesis and I ordered lab workup.  Later today patient underwent removal of  pericardial drain per cardiology.  I discussed plan of care with her and answered all her questions.      I have discussed this patient's plan of care and discharge plan at IDT rounds today with Case Management, Nursing, Nursing leadership, and other members of the IDT team.    Consultants/Specialty  cardiology    Code Status  Full Code    Disposition  The patient is not medically cleared for discharge to home or a post-acute facility.      I have placed the appropriate orders for post-discharge needs.    Review of Systems  Review of Systems   Constitutional:  Positive for malaise/fatigue. Negative for chills, fever and weight loss.   HENT:  Negative for hearing loss and tinnitus.    Eyes:  Negative for blurred vision, double vision, photophobia and pain.   Respiratory:  Positive for shortness of breath. Negative for cough and sputum production.    Cardiovascular:  Negative for chest pain, palpitations, orthopnea and leg swelling.   Gastrointestinal:  Negative for abdominal pain, constipation, diarrhea, nausea and vomiting.   Genitourinary:  Negative for dysuria, frequency and urgency.   Musculoskeletal:  Negative for back pain, joint pain, myalgias and neck pain.   Skin:  Negative for rash.   Neurological:  Positive for weakness. Negative for dizziness, tingling, tremors, sensory change, speech change, focal weakness and headaches.   Psychiatric/Behavioral:  Negative for hallucinations and substance abuse.    All other systems reviewed and are negative.       Physical Exam  Temp:  [36.4 °C (97.6 °F)-36.9 °C (98.5 °F)] 36.4 °C (97.6 °F)  Pulse:  [] 88  Resp:  [7-43] 18  BP: ()/(51-65) 94/51  SpO2:  [90 %-97 %] 94 %    Physical Exam  Vitals reviewed.   Constitutional:       General: She is not in acute distress.     Appearance: Normal appearance. She is not ill-appearing.   HENT:      Head: Normocephalic and atraumatic.      Nose: No congestion.      Comments: Oxygen nasal cannula  Eyes:      General:          Right eye: No discharge.         Left eye: No discharge.      Pupils: Pupils are equal, round, and reactive to light.   Cardiovascular:      Rate and Rhythm: Normal rate. Rhythm irregular.      Pulses: Normal pulses.      Heart sounds: Normal heart sounds. No murmur heard.     Comments: Pericardial drain  Pulmonary:      Effort: Pulmonary effort is normal. No respiratory distress.      Breath sounds: No stridor.      Comments: Significant decreased breath sounds on left lower lung.  Abdominal:      General: Bowel sounds are normal. There is no distension.      Palpations: Abdomen is soft.      Tenderness: There is no abdominal tenderness.   Musculoskeletal:         General: No swelling or tenderness. Normal range of motion.      Cervical back: Normal range of motion. No rigidity.   Skin:     General: Skin is warm.      Capillary Refill: Capillary refill takes less than 2 seconds.      Coloration: Skin is not jaundiced or pale.      Findings: No bruising.   Neurological:      General: No focal deficit present.      Mental Status: She is alert and oriented to person, place, and time.      Cranial Nerves: No cranial nerve deficit.      Comments: She is following commands and moving all her extremities   Psychiatric:         Mood and Affect: Mood normal.         Behavior: Behavior normal.         Fluids    Intake/Output Summary (Last 24 hours) at 7/17/2024 1606  Last data filed at 7/17/2024 0805  Gross per 24 hour   Intake --   Output 35 ml   Net -35 ml       Laboratory  Recent Labs     07/15/24  1447 07/16/24  0230 07/17/24  0420   WBC 13.1* 9.8 10.4   RBC 3.43* 2.95* 3.74*   HEMOGLOBIN 11.7* 9.9* 12.3   HEMATOCRIT 35.5* 30.2* 38.4   .5* 102.4* 102.7*   MCH 34.1* 33.6* 32.9   MCHC 33.0 32.8 32.0*   RDW 50.6* 50.0 50.0   PLATELETCT 203 172 198   MPV 10.9 10.8 10.4     Recent Labs     07/15/24  1447 07/16/24  0230 07/17/24  0420   SODIUM 134* 134* 136   POTASSIUM 4.7 4.1 4.5   CHLORIDE 99 98 99   CO2 21 28 27    GLUCOSE 122* 101* 102*   BUN 21 24* 24*   CREATININE 0.63 0.62 0.58   CALCIUM 9.7 8.9 9.3                   Imaging  EC-ECHOCARDIOGRAM LTD W/O CONT   Final Result      DX-CHEST-2 VIEWS   Final Result      Similar moderate left pleural effusion.      There is a catheter that projects over the left chest/heart.      No pneumothorax.      EC-ECHOCARDIOGRAM LTD W/O CONT   Final Result      US-EXTREMITY ARTERY LOWER UNILAT RIGHT   Final Result      EC-ECHOCARDIOGRAM LTD W/O CONT   Final Result      DX-CHEST-PORTABLE (1 VIEW)   Final Result      1.  Moderate left effusion with associated compressive atelectasis and/or consolidation. Underlying infection is possible.   2.  Stable enlargement of the cardiomediastinal silhouette.      CL-PERICARDIOCENTESIS W/DRAIN > 6YR OLD    (Results Pending)   US-THORACENTESIS PUNCTURE LEFT    (Results Pending)        Assessment/Plan  * Pericardial effusion  Assessment & Plan  Large pericardial effusion with hemodynamic compromise noted on urgent echocardiogram.  Cardiology following, appreciate support.    -Hold Xarelto, metoprolol, Lasix  S/p pericardial drain placement on July 16, 2024 and removal of drain on July 17, 2024.      Pleural effusion- (present on admission)  Assessment & Plan  Patient found to have moderate-sized left sided pleural effusion on chest x-ray.  Was initiated on IV Lasix.  Cardiology was consulted with urgent echocardiogram obtained, demonstrating concomitant large pericardial effusion with hemodynamic compromise.  Likely cardiogenic pleural effusion.    Patient found to significant pleural effusion on the left lung.  I personally reviewed and interpreted x-ray on my interpretation she is found to have significant left pleural effusion.  I requested ultrasound guided thoracentesis   I reviewed x-ray with cardiologist Dr. Martinez      S/P TAVR (transcatheter aortic valve replacement)- (present on admission)  Assessment & Plan  Patient underwent TAVR on July 1,  2024 recently.  At the time was noted to have trivial pericardial effusion and discharged on colchicine (did not complete due to diarrhea), however on repeat echocardiogram this stay now with large pericardial effusion and evidence of hemodynamic compromise.    -Cardiology following, appreciate support  S/p pericardial drain placement on July 16, 2024 and removal of pericardial drain on July 17, 2024.    PAF (paroxysmal atrial fibrillation) (HCC)- (present on admission)  Assessment & Plan  History of paroxysmal atrial fibrillation previously on Xarelto.  Also on beta-blockade with metoprolol outpatient.    Continue to monitor electrolytes  Continue monitor closely in telemetry  Holding Xarelto.    Dyslipidemia- (present on admission)  Assessment & Plan  I am continuing simvastatin    Acute respiratory failure with hypoxia (HCC)- (present on admission)  Assessment & Plan  Likely secondary to cardiogenic pleural effusion and large pericardial effusion with hemodynamic compromise.  Currently she is requiring 2 L of oxygen to maintain O2 saturation   Continue to monitor.      I discussed plan of care with cardiologist Dr. Martinez regarding Ms. Garza current medical condition and plan of care.    I discussed plan of care during multidisciplinary rounds regarding patient's current medical condition and plan of care.        VTE prophylaxis:   SCDs/TEDs      I have performed a physical exam and reviewed and updated ROS and Plan today (7/17/2024). In review of yesterday's note (7/16/2024), there are no changes except as documented above.

## 2024-07-17 NOTE — CARE PLAN
The patient is Watcher - Medium risk of patient condition declining or worsening    Shift Goals  Clinical Goals: diuresis, monitor respiratory status, pulmonary hygeine, monitor lab trends  Patient Goals: rest, feel better  Family Goals: support POC    Progress made toward(s) clinical / shift goals:        Problem: Knowledge Deficit - Standard  Goal: Patient and family/care givers will demonstrate understanding of plan of care, disease process/condition, diagnostic tests and medications  Outcome: Progressing     Problem: Fall Risk  Goal: Patient will remain free from falls  Outcome: Progressing     Problem: Hemodynamics  Goal: Patient's hemodynamics, fluid balance and neurologic status will be stable or improve  Outcome: Progressing     Problem: Respiratory  Goal: Patient will achieve/maintain optimum respiratory ventilation and gas exchange  Outcome: Progressing     Problem: Risk for Aspiration  Goal: Patient's risk for aspiration will be absent or decrease  Outcome: Progressing       Patient is not progressing towards the following goals:

## 2024-07-17 NOTE — ASSESSMENT & PLAN NOTE
Large pericardial effusion with hemodynamic compromise noted on urgent echocardiogram.  Cardiology following, appreciate support.    -Hold Xarelto, metoprolol, Lasix  S/p pericardial drain placement on July 16, 2024 and removal of drain on July 17, 2024.

## 2024-07-17 NOTE — PROGRESS NOTES
Report received from SANDIP Hernandez. Pt brought to T631 from PACU. Pt is A&OX4. Denies chest pain and rates it 0/10. Pericardial drain site looks CDI. Pericardial drian to bulb suction. Fall precautions in place. Oriented pt to new room and POC. All questions answered.

## 2024-07-17 NOTE — PROCEDURES
Procedure Performed: Ultrasound guided pericardiocentesis and placement of indwelling pericardial drainage    Conscious sedation was supervised by myself and administered by trained personnel using fentanyl and versed between 1726 and 1741. The patient tolerated sedation without complication.     Procedure physician: Marlo  Assistant: None    Pre-procedure diagnosis: Pericardial tamponade  Post-procedure diagnosis: Same    Procedure description: After echocardiogram survey the subxiphoid was selected for pericardiocentesis. The area was prepped and draped in standard sterile fashion. The area was infiltrated with a 1% lidocaine solution. A micropuncture needle was advanced toward the pericardium until fluid return. Position was confirmed by Echo, fluoroscopy, and bubble study. The fluid was serosanguinous. The tract was dilated and subsequently an 8F drainage catheter was secured in pericardial space. 500 cc fluid was removed from the pericardial space.      Specimens: 100 cc pericardial fluid sent to the lab for cell count, Culture, protein, LDH  EBL: None  Complications: None    Impression  1. Successful ultrasound assisted pericardiocentesis and positioning of pericardial drain    Recommendations  1. Maintain pericardial drain to bulb suction   2. Echocardiogram in AM

## 2024-07-17 NOTE — OR NURSING
1756: Pt arrived from OR, handoff received from anesthesiologist and RN. Patient awake, oriented x4, MARTE. Vitals table. Appears afib on monitor, rate 90-100s.   Patient awake, oriented x4, moving all extremities. Pericardial drain to left chest, dressing C/D/I.     1815: Report given to imcu RN, Elma.     1819: Patient's son updated on status and room assignment change, per telephone message.     1825: Patient continues to deny presence of pain, dressing remains C/D/I, no bruising or swelling noted to procedure site.     1827: Patient transported to T631, in stable condition. On monitor, oxygen tank full. Belongings with patient (bags x2).

## 2024-07-17 NOTE — PROGRESS NOTES
MD Jimenez notified of 1 set of (+) blood cultures with possible staph infection. Per lab, final result will occur today.

## 2024-07-18 ENCOUNTER — APPOINTMENT (OUTPATIENT)
Dept: CARDIOLOGY | Facility: MEDICAL CENTER | Age: 89
End: 2024-07-18
Attending: NURSE PRACTITIONER
Payer: MEDICARE

## 2024-07-18 ENCOUNTER — HOME HEALTH ADMISSION (OUTPATIENT)
Dept: HOME HEALTH SERVICES | Facility: HOME HEALTHCARE | Age: 89
End: 2024-07-18
Payer: MEDICARE

## 2024-07-18 ENCOUNTER — APPOINTMENT (OUTPATIENT)
Dept: RADIOLOGY | Facility: MEDICAL CENTER | Age: 89
DRG: 314 | End: 2024-07-18
Attending: INTERNAL MEDICINE
Payer: MEDICARE

## 2024-07-18 VITALS
HEIGHT: 65 IN | DIASTOLIC BLOOD PRESSURE: 58 MMHG | HEART RATE: 73 BPM | OXYGEN SATURATION: 93 % | WEIGHT: 136.91 LBS | SYSTOLIC BLOOD PRESSURE: 106 MMHG | TEMPERATURE: 98.5 F | RESPIRATION RATE: 16 BRPM | BODY MASS INDEX: 22.81 KG/M2

## 2024-07-18 DIAGNOSIS — I70.0 ABDOMINAL AORTIC ATHEROSCLEROSIS (HCC): ICD-10-CM

## 2024-07-18 DIAGNOSIS — Z95.2 S/P TAVR (TRANSCATHETER AORTIC VALVE REPLACEMENT): ICD-10-CM

## 2024-07-18 DIAGNOSIS — I25.10 CORONARY ARTERY CALCIFICATION SEEN ON CAT SCAN: ICD-10-CM

## 2024-07-18 DIAGNOSIS — I34.0 MILD MITRAL REGURGITATION: ICD-10-CM

## 2024-07-18 DIAGNOSIS — I10 ESSENTIAL HYPERTENSION: ICD-10-CM

## 2024-07-18 DIAGNOSIS — I48.0 HYPERCOAGULABLE STATE DUE TO PAROXYSMAL ATRIAL FIBRILLATION (HCC): ICD-10-CM

## 2024-07-18 DIAGNOSIS — Z79.01 CHRONIC ANTICOAGULATION: ICD-10-CM

## 2024-07-18 DIAGNOSIS — I44.7 LBBB (LEFT BUNDLE BRANCH BLOCK): ICD-10-CM

## 2024-07-18 DIAGNOSIS — D68.69 HYPERCOAGULABLE STATE DUE TO PAROXYSMAL ATRIAL FIBRILLATION (HCC): ICD-10-CM

## 2024-07-18 DIAGNOSIS — I44.0 FIRST DEGREE HEART BLOCK: ICD-10-CM

## 2024-07-18 DIAGNOSIS — I73.9 PERIPHERAL VASCULAR DISEASE, UNSPECIFIED (HCC): ICD-10-CM

## 2024-07-18 DIAGNOSIS — E78.5 DYSLIPIDEMIA: ICD-10-CM

## 2024-07-18 DIAGNOSIS — I05.0 MITRAL VALVE STENOSIS, UNSPECIFIED ETIOLOGY: ICD-10-CM

## 2024-07-18 DIAGNOSIS — I48.0 PAF (PAROXYSMAL ATRIAL FIBRILLATION) (HCC): ICD-10-CM

## 2024-07-18 PROBLEM — I31.39 PERICARDIAL EFFUSION: Status: RESOLVED | Noted: 2024-07-16 | Resolved: 2024-07-18

## 2024-07-18 PROBLEM — J90 PLEURAL EFFUSION: Status: RESOLVED | Noted: 2024-07-15 | Resolved: 2024-07-18

## 2024-07-18 LAB
ALBUMIN SERPL BCP-MCNC: 3 G/DL (ref 3.2–4.9)
ALBUMIN/GLOB SERPL: 0.9 G/DL
ALP SERPL-CCNC: 130 U/L (ref 30–99)
ALT SERPL-CCNC: 30 U/L (ref 2–50)
ANION GAP SERPL CALC-SCNC: 11 MMOL/L (ref 7–16)
AST SERPL-CCNC: 33 U/L (ref 12–45)
BILIRUB SERPL-MCNC: 0.6 MG/DL (ref 0.1–1.5)
BUN SERPL-MCNC: 15 MG/DL (ref 8–22)
CALCIUM ALBUM COR SERPL-MCNC: 9.8 MG/DL (ref 8.5–10.5)
CALCIUM SERPL-MCNC: 9 MG/DL (ref 8.5–10.5)
CHLORIDE SERPL-SCNC: 99 MMOL/L (ref 96–112)
CO2 SERPL-SCNC: 25 MMOL/L (ref 20–33)
CREAT SERPL-MCNC: 0.41 MG/DL (ref 0.5–1.4)
CYTOLOGY REG CYTOL: NORMAL
EKG IMPRESSION: NORMAL
ERYTHROCYTE [DISTWIDTH] IN BLOOD BY AUTOMATED COUNT: 50.1 FL (ref 35.9–50)
FUNGUS SPEC FUNGUS STN: NORMAL
GFR SERPLBLD CREATININE-BSD FMLA CKD-EPI: 94 ML/MIN/1.73 M 2
GLOBULIN SER CALC-MCNC: 3.2 G/DL (ref 1.9–3.5)
GLUCOSE SERPL-MCNC: 141 MG/DL (ref 65–99)
GRAM STN SPEC: NORMAL
HCT VFR BLD AUTO: 36.7 % (ref 37–47)
HGB BLD-MCNC: 11.4 G/DL (ref 12–16)
MAGNESIUM SERPL-MCNC: 2 MG/DL (ref 1.5–2.5)
MCH RBC QN AUTO: 32.6 PG (ref 27–33)
MCHC RBC AUTO-ENTMCNC: 31.1 G/DL (ref 32.2–35.5)
MCV RBC AUTO: 104.9 FL (ref 81.4–97.8)
MYCOBACTERIUM SPEC CULT: NORMAL
PHOSPHATE SERPL-MCNC: 2.4 MG/DL (ref 2.5–4.5)
PLATELET # BLD AUTO: 181 K/UL (ref 164–446)
PMV BLD AUTO: 10.3 FL (ref 9–12.9)
POTASSIUM SERPL-SCNC: 4.5 MMOL/L (ref 3.6–5.5)
PROT SERPL-MCNC: 6.2 G/DL (ref 6–8.2)
RBC # BLD AUTO: 3.5 M/UL (ref 4.2–5.4)
RHODAMINE-AURAMINE STN SPEC: NORMAL
RHODAMINE-AURAMINE STN SPEC: NORMAL
SIGNIFICANT IND 70042: NORMAL
SITE SITE: NORMAL
SODIUM SERPL-SCNC: 135 MMOL/L (ref 135–145)
SOURCE SOURCE: NORMAL
WBC # BLD AUTO: 8.3 K/UL (ref 4.8–10.8)

## 2024-07-18 PROCEDURE — 85027 COMPLETE CBC AUTOMATED: CPT

## 2024-07-18 PROCEDURE — 83735 ASSAY OF MAGNESIUM: CPT

## 2024-07-18 PROCEDURE — 99239 HOSP IP/OBS DSCHRG MGMT >30: CPT | Performed by: INTERNAL MEDICINE

## 2024-07-18 PROCEDURE — A9270 NON-COVERED ITEM OR SERVICE: HCPCS | Performed by: INTERNAL MEDICINE

## 2024-07-18 PROCEDURE — 93005 ELECTROCARDIOGRAM TRACING: CPT | Performed by: INTERNAL MEDICINE

## 2024-07-18 PROCEDURE — 700102 HCHG RX REV CODE 250 W/ 637 OVERRIDE(OP)

## 2024-07-18 PROCEDURE — 80053 COMPREHEN METABOLIC PANEL: CPT

## 2024-07-18 PROCEDURE — A9270 NON-COVERED ITEM OR SERVICE: HCPCS

## 2024-07-18 PROCEDURE — 700102 HCHG RX REV CODE 250 W/ 637 OVERRIDE(OP): Performed by: INTERNAL MEDICINE

## 2024-07-18 PROCEDURE — 84100 ASSAY OF PHOSPHORUS: CPT

## 2024-07-18 PROCEDURE — 99232 SBSQ HOSP IP/OBS MODERATE 35: CPT | Mod: GC | Performed by: INTERNAL MEDICINE

## 2024-07-18 PROCEDURE — 97163 PT EVAL HIGH COMPLEX 45 MIN: CPT

## 2024-07-18 PROCEDURE — 97535 SELF CARE MNGMENT TRAINING: CPT

## 2024-07-18 PROCEDURE — 93926 LOWER EXTREMITY STUDY: CPT | Mod: RT

## 2024-07-18 PROCEDURE — 93010 ELECTROCARDIOGRAM REPORT: CPT | Performed by: STUDENT IN AN ORGANIZED HEALTH CARE EDUCATION/TRAINING PROGRAM

## 2024-07-18 RX ORDER — COLCHICINE 0.6 MG/1
0.6 TABLET ORAL DAILY
Qty: 11 TABLET | Refills: 0 | Status: SHIPPED | OUTPATIENT
Start: 2024-07-19 | End: 2024-07-22 | Stop reason: SINTOL

## 2024-07-18 RX ORDER — COLCHICINE 0.6 MG/1
0.6 TABLET ORAL DAILY
Status: DISCONTINUED | OUTPATIENT
Start: 2024-07-19 | End: 2024-07-18 | Stop reason: HOSPADM

## 2024-07-18 RX ORDER — METOPROLOL TARTRATE 25 MG/1
25 TABLET, FILM COATED ORAL 2 TIMES DAILY
Qty: 100 TABLET | Refills: 3 | Status: SHIPPED | OUTPATIENT
Start: 2024-07-18

## 2024-07-18 RX ADMIN — METOPROLOL TARTRATE 25 MG: 25 TABLET, FILM COATED ORAL at 05:28

## 2024-07-18 RX ADMIN — COLCHICINE 0.6 MG: 0.6 TABLET, FILM COATED ORAL at 05:28

## 2024-07-18 RX ADMIN — ASPIRIN 81 MG: 81 TABLET, COATED ORAL at 05:28

## 2024-07-18 ASSESSMENT — SOCIAL DETERMINANTS OF HEALTH (SDOH)
IN THE PAST 12 MONTHS, HAS THE ELECTRIC, GAS, OIL, OR WATER COMPANY THREATENED TO SHUT OFF SERVICE IN YOUR HOME?: NO
WITHIN THE PAST 12 MONTHS, THE FOOD YOU BOUGHT JUST DIDN'T LAST AND YOU DIDN'T HAVE MONEY TO GET MORE: NEVER TRUE
WITHIN THE PAST 12 MONTHS, YOU WORRIED THAT YOUR FOOD WOULD RUN OUT BEFORE YOU GOT THE MONEY TO BUY MORE: NEVER TRUE

## 2024-07-18 ASSESSMENT — COGNITIVE AND FUNCTIONAL STATUS - GENERAL
MOBILITY SCORE: 21
WALKING IN HOSPITAL ROOM: A LITTLE
MOVING TO AND FROM BED TO CHAIR: A LITTLE
SUGGESTED CMS G CODE MODIFIER MOBILITY: CJ
CLIMB 3 TO 5 STEPS WITH RAILING: A LITTLE

## 2024-07-18 ASSESSMENT — PAIN DESCRIPTION - PAIN TYPE
TYPE: ACUTE PAIN

## 2024-07-18 ASSESSMENT — GAIT ASSESSMENTS
ASSISTIVE DEVICE: FRONT WHEEL WALKER
GAIT LEVEL OF ASSIST: SUPERVISED
DISTANCE (FEET): 500
DEVIATION: BRADYKINETIC

## 2024-07-18 ASSESSMENT — FIBROSIS 4 INDEX
FIB4 SCORE: 2.7
FIB4 SCORE: 2.93

## 2024-07-18 NOTE — DISCHARGE PLANNING
@1230  Received Choice form at 1150  Agency/Facility Name: [facility]  Referral sent per Choice form @  hard faxed the referral to [facility] at 1230      Received Choice form at 1145  Agency/Facility Name:  [facility]  Referral sent per Choice form @ 1230      Submitted DME referral to [facility] for FWW.    Scanned O2, HH and FWW choice forms in media.       @1310  Called [facility] and spoke with Shaina and was advised they received the referral and should process the order and deliver the oxygen within 2 hr window.    @1445- Spoke with Win at [facility] pt oxygen order has been processed and due to several discharges dispatch should deliver oxygen at bedside later today no ETA yet.

## 2024-07-18 NOTE — CARE PLAN
The patient is Watcher - Medium risk of patient condition declining or worsening    Shift Goals  Clinical Goals: hemodynamic stability  Patient Goals: Get better  Family Goals: SHELLY    Progress made toward(s) clinical / shift goals:    Problem: Hemodynamics  Goal: Patient's hemodynamics, fluid balance and neurologic status will be stable or improve  Outcome: Progressing     Problem: Respiratory  Goal: Patient will achieve/maintain optimum respiratory ventilation and gas exchange  Outcome: Progressing     Problem: Pain - Standard  Goal: Alleviation of pain or a reduction in pain to the patient’s comfort goal  Outcome: Progressing       Patient is not progressing towards the following goals:

## 2024-07-18 NOTE — CARE PLAN
The patient is Stable - Low risk of patient condition declining or worsening    Shift Goals  Clinical Goals: hemodynamic stability  Patient Goals: Go home  Family Goals: SHELLY    Progress made toward(s) clinical / shift goals:    Problem: Knowledge Deficit - Standard  Goal: Patient and family/care givers will demonstrate understanding of plan of care, disease process/condition, diagnostic tests and medications  Outcome: Met     Problem: Fall Risk  Goal: Patient will remain free from falls  Outcome: Met     Problem: Hemodynamics  Goal: Patient's hemodynamics, fluid balance and neurologic status will be stable or improve  Outcome: Met     Problem: Respiratory  Goal: Patient will achieve/maintain optimum respiratory ventilation and gas exchange  Outcome: Met     Problem: Risk for Aspiration  Goal: Patient's risk for aspiration will be absent or decrease  Outcome: Met     Problem: Pain - Standard  Goal: Alleviation of pain or a reduction in pain to the patient’s comfort goal  Outcome: Met

## 2024-07-18 NOTE — DISCHARGE INSTRUCTIONS
Rivaroxaban Tablets  What is this medication?  RIVAROXABAN (ri va SRUTHI a ban) prevents or treats blood clots. It is also used to lower the risk of stroke in people with AFib (atrial fibrillation). It can be used to lower the risk of heart attack or stroke in people with heart or peripheral artery disease. It belongs to a group of medications called blood thinners.  This medicine may be used for other purposes; ask your health care provider or pharmacist if you have questions.  COMMON BRAND NAME(S): Xarelto, Xarelto Starter Pack  What should I tell my care team before I take this medication?  They need to know if you have any of these conditions:  Antiphospholipid antibody syndrome  Bleeding disorders  Bleeding in the brain  Blood clots  Kidney disease  Liver disease  Prosthetic heart valve  Recent or planned spinal or epidural procedure  Stomach bleeding  Take medications that treat or prevent blood clots  An unusual or allergic reaction to rivaroxaban, other medications, foods, dyes, or preservatives  Pregnant or trying to get pregnant  Breast-feeding  How should I use this medication?  Take this medication by mouth. For your therapy to work as well as possible, take each dose exactly as prescribed on the prescription label. Do not skip doses. Skipping doses or stopping this medication can increase your risk of a blood clot or stroke. Keep taking this medication unless your care team tells you to stop.  If you are taking this medication after hip or knee replacement surgery, take it with or without food. If you are taking this medication for atrial fibrillation, take it with your evening meal. If you are taking this medication to treat blood clots, take it with food at the same time each day. If you are taking this medication for coronary artery disease or peripheral artery disease, take it with or without food at the same time every day. If you are unable to swallow your tablet, you may crush the tablet and mix it  in applesauce. Then, immediately eat the applesauce. You should eat more food right after you eat the applesauce containing the crushed tablet.  A special MedGuide will be given to you by the pharmacist with each prescription and refill. Be sure to read this information carefully each time.  Talk to your care team about the use of this medication in children. While it may be prescribed for children as young as newborns for selected conditions, precautions do apply.  Overdosage: If you think you have taken too much of this medicine contact a poison control center or emergency room at once.  NOTE: This medicine is only for you. Do not share this medicine with others.  What if I miss a dose?  If you take your medication once a day and miss a dose, take it as soon as you can. If it is almost time for your next dose, take only that dose. Do not take double or extra doses.  If you are taking this medication twice a day to treat blood clots and miss a dose, take the missed dose as soon as you remember. In this instance, 2 tablets may be taken at the same time. The next day you should take 1 tablet twice a day.  If you are taking this medication twice a day for coronary artery disease or peripheral artery disease and miss a dose, skip it. Take your next dose at the normal time. Do not take extra or 2 doses at the same time to make up for the missed dose.  What may interact with this medication?  Do not take this medication with any of the following:  Defibrotide  This medication may also interact with the following:  Aspirin and aspirin-like medications  Certain antibiotics like erythromycin and clarithromycin  Certain medications for fungal infections like ketoconazole and itraconazole  Certain medications for seizures like carbamazepine, phenytoin  Certain medications that treat or prevent blood clots like warfarin, enoxaparin, dalteparin, apixaban, dabigatran, and edoxaban  Conivaptan  Indinavir  Lopinavir;  ritonavir  NSAIDS, medications for pain and inflammation, like ibuprofen or naproxen  Rifampin  Ritonavir  SNRIs, medications for depression, like desvenlafaxine, duloxetine, levomilnacipran, venlafaxine  SSRIs, medications for depression, like citalopram, escitalopram, fluoxetine, fluvoxamine, paroxetine, sertraline  Willits's wort  This list may not describe all possible interactions. Give your health care provider a list of all the medicines, herbs, non-prescription drugs, or dietary supplements you use. Also tell them if you smoke, drink alcohol, or use illegal drugs. Some items may interact with your medicine.  What should I watch for while using this medication?  Visit your care team for regular checks on your progress. You may need blood work done while you are taking this medication. Your condition will be monitored carefully while you are receiving this medication. It is important not to miss any appointments.  Avoid sports and activities that might cause injury while you are using this medication. Severe falls or injuries can cause unseen bleeding. Be careful when using sharp tools or knives. Consider using an electric razor. Take special care brushing or flossing your teeth. Report any injuries, bruising, or red spots on the skin to your care team.  Wear a medical ID bracelet or chain. Carry a card that describes your condition. List the medications and doses you take on the card.  Tell your dentist and dental surgeon that you are taking this medication. You should not have major dental surgery while on this medication. See your dentist to have a dental exam and fix any dental problems before starting this medication. Take good care of your teeth while on this medication. Make sure you see your dentist for regular follow-up appointments.  If you are going to need surgery or other procedure, tell your care team that you are using this medication.  Do not become pregnant while taking this medication. Women  should inform their care team if they wish to become pregnant or think they might be pregnant. There is potential for serious harm to an unborn child. Talk to your care team for more information.  What side effects may I notice from receiving this medication?  Side effects that you should report to your care team as soon as possible:  Allergic reactions--skin rash, itching, hives, swelling of the face, lips, tongue, or throat  Bleeding--bloody or black, tar-like stools, vomiting blood or brown material that looks like coffee grounds, red or dark brown urine, small red or purple spots on skin, unusual bruising or bleeding  Bleeding in the brain--severe headache, stiff neck, confusion, dizziness, change in vision, numbness or weakness of the face, arm, or leg, trouble speaking, trouble walking, vomiting  Heavy periods  This list may not describe all possible side effects. Call your doctor for medical advice about side effects. You may report side effects to FDA at 3-437-CJR-9860.  Where should I keep my medication?  Keep out of the reach of children and pets.  Store at room temperature between 20 and 25 degrees C (68 and 77 degrees F). Get rid of any unused medication after the expiration date.  To get rid of medications that are no longer needed or have :  Take the medication to a medication take-back program. Check your pharmacy or law enforcement to find a location.  If you cannot return the medication, check the label or package insert to see if the medication should be thrown out in the garbage or flushed down the toilet. If you are not sure, ask your care team. If it is safe to put it in the trash, empty the medication out of the container. Mix the medication with cat litter, dirt, coffee grounds, or other unwanted substance. Seal the mixture in a bag or container. Put it in the trash.  NOTE: This sheet is a summary. It may not cover all possible information. If you have questions about this medicine,  talk to your doctor, pharmacist, or health care provider.  © 2023 [employer]/Gold Standard (2021-12-14 00:00:00)

## 2024-07-18 NOTE — PROGRESS NOTES
"CARDIOLOGY INPATIENT FOLLOW UP NOTE:    Impressions:  #Tamponade with large pericardial effusion, pericardiocentesis 7/16/24 500ml, Pericardial drain removed 7/17/2024   # recent TAVR 7/1/24 with post TAVR pericarditis  # acute hypoxic resp failure  # moderate left pleural effusion  # Moderate mitral stenosis  # pAF on chronic AC  #Moderate PAH    Recommendations:  -Thoracentesis on 7/17/24, work up per primary team  -continue colchicine 0.6 mg daily  -continue metoprolol 25mg BID  -Hold on STEVIE  DCCV, as now on sinus rhythm with HR in 70s -80s  -Resume rivaroxaban for thoracentesis  -Discontinued aspirin        SUBJECTIVE/INTERIM EVENTS:  -Pericardial drain removed 7/17/24  -Repeat TTE w/o showed small pericardial effusion with out evidence of hemodynamic compromise, RV systolic pressure of 53 suggestive of moderate PA HTN 7/17/24  -US arterial of right femoral doesn't show aneurysm or fistula    EXAM:  /59   Pulse 78   Temp 36.6 °C (97.9 °F) (Temporal)   Resp 17   Ht 1.651 m (5' 5\")   Wt 62.1 kg (136 lb 14.5 oz)   SpO2 96%   BMI 22.78 kg/m²   Gen: NAD,  appearing well  HEENT: Symmetric face. Anicteric sclerae. Moist mucus membranes  NECK: No JVD. No lymphadenopathy  CARDIAC: Normal PMI, regular, normal S1, S2. with a systolic murmur heard on the upper left sternal border and apix   VASCULATURE: Normal carotid amplitude without bruit.   RESP: Clear to auscultation bilaterally, no wheezes or crackles  ABD: Soft, non-tender, non-distended  EXT: No edema, no clubbing or cyanosis  SKIN: Warm and dry  NEURO: No gross deficits. A&OX4  PSYCH: Appropriate affect, participates in conversation    Studies  Lab Results   Component Value Date/Time    SODIUM 136 07/17/2024 04:20 AM    POTASSIUM 4.5 07/17/2024 04:20 AM    CHLORIDE 99 07/17/2024 04:20 AM    CO2 27 07/17/2024 04:20 AM    GLUCOSE 102 (H) 07/17/2024 04:20 AM    BUN 24 (H) 07/17/2024 04:20 AM    CREATININE 0.58 07/17/2024 04:20 AM       Medical records " reviewed for this encounter    For this encounter I directly reviewed ECG: SR, prolonged MI interval, LBBB  Echo:  small pericardial effusion with out evidence of hemodynamic compromise, RV systolic pressure of 53 suggestive of moderate PA HTN 7/17/24  Chest X-ray: moderate left pleural effusion slightly decreased in size      Current Facility-Administered Medications:     colchicine (Colcrys) tablet 0.6 mg, 0.6 mg, Oral, DAILY, Ruddy Teran M.D., 0.6 mg at 07/18/24 0528    aspirin EC tablet 81 mg, 81 mg, Oral, DAILY, Anil Figueroa DMarizaOMariza, 81 mg at 07/18/24 0528    metoprolol tartrate (Lopressor) tablet 25 mg, 25 mg, Oral, BID, Ruddy Teran M.D., 25 mg at 07/18/24 0528    acetaminophen (Tylenol) tablet 650 mg, 650 mg, Oral, Q6HRS PRN, Yair Jimenez M.D.    hydrALAZINE (Apresoline) injection 10 mg, 10 mg, Intravenous, Q4HRS PRN, Yair Jimenez M.D.    [Held by provider] rivaroxaban (Xarelto) tablet 20 mg, 20 mg, Oral, PM MEAL, Anil Figueroa D.O.    simvastatin (Zocor) tablet 40 mg, 40 mg, Oral, Q EVENING, Yair Jimenez M.D., 40 mg at 07/17/24 7815

## 2024-07-18 NOTE — DISCHARGE SUMMARY
Discharge Summary    CHIEF COMPLAINT ON ADMISSION  Chief Complaint   Patient presents with    Sent by MD     Had an aortic valve replacement on July 1st. Sent by MD for moist cough, nonproductive, SOB, and night sweats.     Cough       Reason for Admission  Cough/SOB     Admission Date  7/15/2024    CODE STATUS  Full Code    HPI & HOSPITAL COURSE    Patient is an 88 y.o. female with history of aortic stenosis s/p TAVR (July 1, 2024), paroxysmal atrial fibrillation on Xarelto admitted 7/15/2024 with cough and dyspnea exertion.  Patient recently had TAVR beginning of July and was initiated on colchicine postprocedure.  However developed diarrhea and was recommended to stop colchicine on 7/5/2024.  However patient unfortunately discontinued all of her medications including her metoprolol and Xarelto.  Has had persistent cough and dyspnea on exertion prompting her to come in to the hospital.  In ED noted to be hypoxic requiring 2 L NC supplementation, with hemodynamics otherwise stable.  On CXR was noted to have moderate-sized left-sided pleural effusion, initiated on diuresis with IV Lasix.      Patient underwent echocardiogram and she found to have pericardial effusion.  She underwent pericardiocentesis and pericardial drain placement by cardiology on July 16, 2024.    She also found to have pleural effusion on the left side that did not resolve with IV fluid.  I ordered ultrasound-guided thoracentesis and she underwent thoracentesis on July 18, 2024 and 460 mL of fluid was removed from left lung.    Today I routed and examined her at the bedside.  Now she converted to sinus rhythm.  She is doing significantly better and denies any acute complaints.  She expressed that overall she is feeling better now the fluid around her heart and lung have removed.    I discussed plan of care with cardiology Dr. Martinez and he recommended to resume her anticoagulation and reduce the dose of her metoprolol from 50 mg twice daily to 25  mg twice daily.  Also recommended colchicine for total of 2 weeks 0.6 mg daily.    Patient underwent home oxygen evaluation and she found to have hypoxia on room air.  I prescribed her oxygen, home health and walker.    Today on the day of discharge she denies any acute complaints and she feels ready to be discharged.    Therefore, she is discharged in fair and stable condition to home with organized home healthcare and close outpatient follow-up.    The patient met 2-midnight criteria for an inpatient stay at the time of discharge.    Discharge Date  07/18/24      FOLLOW UP ITEMS POST DISCHARGE  Primary care provider  Cardiology    DISCHARGE DIAGNOSES  Principal Problem (Resolved):    Pericardial effusion (POA: Unknown)  Active Problems:    Acute respiratory failure with hypoxia (HCC) (POA: Yes)    Full code status (POA: Yes)      Overview: Full code per d/w patient 04/29/21          Dyslipidemia (POA: Yes)    PAF (paroxysmal atrial fibrillation) (HCC) (POA: Yes)    S/P TAVR (transcatheter aortic valve replacement) (POA: Yes)      Overview: Successful implantation of a 23 Mckenzie Anastacia S3 Ultra Resilia deployed       at nominal volume via the transfemoral approach on 7/1/2024 under general       anestheia  Resolved Problems:    Pleural effusion (POA: Yes)      FOLLOW UP  Future Appointments   Date Time Provider Department Center   7/25/2024  9:15 AM MATTHEW Coleman None   8/8/2024  2:15 PM V EXAM 9 [facility]   8/15/2024  8:15 AM MATTHEW Coleman None   8/15/2024 10:50 AM NATE Tena   12/12/2024 11:00 AM Jaydon Sutherland M.D. [facility] None   7/1/2025 10:15 AM V EXAM 12 [facility]     No follow-up provider specified.    MEDICATIONS ON DISCHARGE     Medication List        START taking these medications        Instructions   colchicine 0.6 MG Tabs  Start taking on: July 19, 2024  Commonly known as: Colcrys   Take 1 Tablet  by mouth every day for 11 days.  Dose: 0.6 mg            CHANGE how you take these medications        Instructions   metoprolol tartrate 25 MG Tabs  What changed:   how much to take  when to take this  Commonly known as: Lopressor   Take 1 Tablet by mouth 2 times a day.  Dose: 25 mg            CONTINUE taking these medications        Instructions   acetaminophen 500 MG Tabs  Commonly known as: Tylenol   Take 500 mg by mouth every 6 hours as needed for Moderate Pain.  Dose: 500 mg     calcium citrate 950 (200 Ca) MG Tabs  Commonly known as: Calcitrate   Take 950 mg by mouth every day.  Dose: 950 mg     loratadine 10 MG Tabs  Commonly known as: Claritin   Take 10 mg by mouth every day.  Dose: 10 mg     simvastatin 40 MG Tabs  Commonly known as: Zocor   TAKE ONE TABLET BY MOUTH IN THE EVENING     therapeutic multivitamin-minerals Tabs   Take 1 Tablet by mouth every day.  Dose: 1 Tablet     vitamin D3 1000 Unit (25 mcg) Tabs  Commonly known as: Cholecalciferol   Take 1,000 Units by mouth every day.  Dose: 1,000 Units     Xarelto 20 MG Tabs tablet  Generic drug: rivaroxaban   TAKE ONE TABLET BY MOUTH ONE TIME DAILY WITH DINNER              Allergies  Allergies   Allergen Reactions    Terry Unspecified     Itchy welts      Clindamycin Unspecified     Pt unsure of reaction    Oatmeal Diarrhea     Gas      Scallops Vomiting       DIET  Orders Placed This Encounter   Procedures    Diet Order Diet: Cardiac     Standing Status:   Standing     Number of Occurrences:   1     Order Specific Question:   Diet:     Answer:   Cardiac [6]       ACTIVITY  As tolerated.  Weight bearing as tolerated    CONSULTATIONS  Cardiology    PROCEDURES  Thoracentesis  Pericardial drain placement    LABORATORY  Lab Results   Component Value Date    SODIUM 135 07/18/2024    POTASSIUM 4.5 07/18/2024    CHLORIDE 99 07/18/2024    CO2 25 07/18/2024    GLUCOSE 141 (H) 07/18/2024    BUN 15 07/18/2024    CREATININE 0.41 (L) 07/18/2024        Lab Results    Component Value Date    WBC 8.3 07/18/2024    HEMOGLOBIN 11.4 (L) 07/18/2024    HEMATOCRIT 36.7 (L) 07/18/2024    PLATELETCT 181 07/18/2024        US-EXTREMITY ARTERY LOWER UNILAT RIGHT   Final Result      DX-CHEST-PORTABLE (1 VIEW)   Final Result      1.  No pneumothorax visualized following left thoracentesis.   2.  Slight decrease in size of left pleural effusion.   3.  Continued left lower lobe airspace disease could represent atelectasis or pneumonitis.      US-THORACENTESIS PUNCTURE LEFT   Final Result      1. Ultrasound guided left sided diagnostic thoracentesis.      2. 460 mL of fluid withdrawn.      EC-ECHOCARDIOGRAM LTD W/O CONT   Final Result      DX-CHEST-2 VIEWS   Final Result      Similar moderate left pleural effusion.      There is a catheter that projects over the left chest/heart.      No pneumothorax.      EC-ECHOCARDIOGRAM LTD W/O CONT   Final Result      US-EXTREMITY ARTERY LOWER UNILAT RIGHT   Final Result      EC-ECHOCARDIOGRAM LTD W/O CONT   Final Result      DX-CHEST-PORTABLE (1 VIEW)   Final Result      1.  Moderate left effusion with associated compressive atelectasis and/or consolidation. Underlying infection is possible.   2.  Stable enlargement of the cardiomediastinal silhouette.      CL-PERICARDIOCENTESIS W/DRAIN > 6YR OLD    (Results Pending)         Total time of the discharge process exceeds 32 minutes.

## 2024-07-18 NOTE — FACE TO FACE
"Face to Face Note  -  Durable Medical Equipment    Yair Jimenez M.D. - NPI: [id number]  I certify that this patient is under my care and that they have had a durable medical equipment(DME)face to face encounter by myself that meets the physician DME face-to-face encounter requirements with this patient on:    Date of encounter:   Patient:                    MRN:                       YOB: 2024  Lisa Garza  [id number]  1935     The encounter with the patient was in whole, or in part, for the following medical condition, which is the primary reason for durable medical equipment:  Cardiac Disease    I certify that, based on my findings, the following durable medical equipment is medically necessary:  Oxygen   HOME O2 Saturation Measurements:(Values must be present for Home Oxygen orders)  Room air sat at rest: 87  Room air sat with amb: 82  With liters of O2: 2, O2 sat at rest with O2: 92  With Liters of O2: 2, O2 sat with amb with O2 : 93  Is the patient mobile?: Yes  If patient feels more short of breath, they can go up to 6 liters per minute and contact healthcare provider.    Supporting Symptoms: The patient requires supplemental oxygen, as the following interventions have been tried with limited or no improvement: \"Incentive spirometry.        ------------------------------------------------------------------------------------------------------------------    Face to Face Supporting Documentation - Home Health    The encounter with this patient was in whole or in part the primary reason for home health admission.    Date of encounter:   Patient:                    MRN:                       YOB: 2024  Lisa Garza  [id number]  1935     Home health to see patient for:  Skilled Nursing care for assessment, interventions & education, Physical Therapy evaluation and treatment, and Occupational therapy evaluation and treatment    Skilled need " for:  New Onset Medical Diagnosis Pericardial effusion    Skilled nursing interventions to include:  Comment: PT and OT    Homebound evidenced status by:  Need the aid of supportive devices such as crutches, canes, wheelchairs or walkers. Leaving home must require a considerable and taxing effort. There must exist a normal inability to leave the home.    Community Physician to provide follow up care: Kathy Garcia M.D.     Optional Interventions    Wound information & treatment:    Home Infusion Therapy orders:    Line/Drain/Airway:    I certify the face to face encounter for this home care referral meets the CMS requirements and the encounter/clinical assessment with the patient was, in whole, or in part, for the medical condition(s) listed above, which is the primary reason for home health care. Based on my clinical findings: the service(s) are medically necessary, support the need for home health care, and the homebound criteria are met.  I certify that this patient has had a face to face encounter by myself.  Yair Jimenez M.D. - NPI: [id number]    *Debility, frailty and advanced age in the absence of an acute deterioration or exacerbation of a condition do not qualify a patient for home health.

## 2024-07-18 NOTE — THERAPY
"Physical Therapy   Initial Evaluation     Patient Name: Lisa Garza  Age:  88 y.o., Sex:  female  Medical Record #: [id number]  Today's Date: 7/18/2024     Precautions  Precautions: Fall Risk    Assessment  Patient is 88 y.o. female admitted with SOB and cough, now s/p pericardial drain placement 7/16 (since removed) and thoracentesis 7/17; admitted with acute respiratory failure with hypoxia, pleural effusion. 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 Pt performed all mobility with SPV with FWW including ambulating in hallway, initially requiring Min HHA without walker. Pt ultimately agreeable to FWW and getting one with wheels at home. Recommend home with HHPT and FWW. Patient will not be actively followed for physical therapy services at this time, however may be seen if requested by physician for 1 more visit within 30 days to address any discharge or equipment needs.    Plan    Physical Therapy Initial Treatment Plan   Duration: Discharge Needs Only    DC Equipment Recommendations: Front-Wheel Walker  Discharge Recommendations: Recommend home health for continued physical therapy services       Subjective    \"I have a walker without wheels. Sometimes I just carry it around.\"      Objective     07/18/24 1028   Time In/Time Out   Therapy Start Time 1000   Therapy End Time 1028   Total Therapy Time 28   Initial Contact Note    Initial Contact Note Order Received and Verified, Evaluation Only - Patient Does Not Require Further Acute Physical Therapy at this Time.  However, May Benefit from Post Acute Therapy for Higher Level Functional Deficits.   Precautions   Precautions Fall Risk   Vitals   O2 (LPM) 1   O2 Delivery Device Silicone Nasal Cannula   Vitals Comments new O2 needs, on RA at baseline. CNA performing home O2 test, reports pt requiring 2L with ambulation   Pain 0 - 10 Group   Therapist Pain Assessment Post Activity Pain Same as Prior to Activity;0;Nurse Notified   Prior " Living Situation   Housing / Facility 1 Story House   Steps Into Home 2   Steps In Home 0   Rail Both Rail (Steps into Home)   Equipment Owned   (standard walker no wheels)   Lives with - Patient's Self Care Capacity Alone and Able to Care For Self;Adult Children   Comments Reports son in area able to assist if needed   Prior Level of Functional Mobility   Bed Mobility Independent   Transfer Status Independent   Ambulation Independent   Ambulation Distance   (community)   Assistive Devices Used None   Stairs Independent   History of Falls   History of Falls Yes   Date of Last Fall   (1 fall last year, tripped)   Cognition    Cognition / Consciousness X   Level of Consciousness Alert   Safety Awareness Impaired   Comments Pleasant and cooperative. slightly impaired insight into balance deficits, endorses furniture walking and sometimes carrying her standard walker. denied need for FWW, however, after discussion and edu pt agreeable   Active ROM Upper Body   Active ROM Upper Body  WDL   Strength Upper Body   Upper Body Strength  WDL   Active ROM Lower Body    Active ROM Lower Body  WDL   Strength Lower Body   Lower Body Strength  WDL   Comments baseline strength   Balance Assessment   Sitting Balance (Static) Fair +   Sitting Balance (Dynamic) Fair   Standing Balance (Static) Fair   Standing Balance (Dynamic) Fair   Weight Shift Sitting Fair   Weight Shift Standing Fair   Comments w/ FWW   Bed Mobility    Supine to Sit Supervised   Sit to Supine Supervised   Scooting Supervised   Rolling Supervised   Gait Analysis   Gait Level Of Assist Supervised   Assistive Device Front Wheel Walker   Distance (Feet) 500   # of Times Distance was Traveled 1   Deviation Bradykinetic   # of Stairs Climbed 2  (simulated steps with BUE support (pt has rails at home))   Level of Assist with Stairs Supervised   Weight Bearing Status no restrictions   Comments denied lightheadedness, dizziness, SOB with ambulation   Functional Mobility    Sit to Stand Supervised   Bed, Chair, Wheelchair Transfer Supervised   Transfer Method Stand Step   Mobility improved balance with FWW, inititally requiring HHA vs furniture walking   6 Clicks Assessment - How much HELP from from another person do you currently need... (If the patient hasn't done an activity recently, how much help from another person do you think he/she would need if he/she tried?)   Turning from your back to your side while in a flat bed without using bedrails? 4   Moving from lying on your back to sitting on the side of a flat bed without using bedrails? 4   Moving to and from a bed to a chair (including a wheelchair)? 3   Standing up from a chair using your arms (e.g., wheelchair, or bedside chair)? 4   Walking in hospital room? 3   Climbing 3-5 steps with a railing? 3   6 clicks Mobility Score 21   Activity Tolerance   Sitting Edge of Bed 3 min   Standing 10 min   Education Group   Education Provided Role of Physical Therapist;Gait Training;Use of Assistive Device   Role of Physical Therapist Patient Response Patient;Acceptance;Explanation;Verbal Demonstration   Gait Training Patient Response Patient;Acceptance;Explanation;Action Demonstration   Use of Assistive Device Patient Response Patient;Acceptance;Explanation;Action Demonstration   Additional Comments receptive to self management and compensatory strategies, safety, FWW management   Physical Therapy Initial Treatment Plan    Duration Discharge Needs Only   Problem List    Problems Impaired Ambulation;Impaired Balance;Safety Awareness Deficits / Cognition;Impaired Transfers   Anticipated Discharge Equipment and Recommendations   DC Equipment Recommendations Front-Wheel Walker   Discharge Recommendations Recommend home health for continued physical therapy services   Interdisciplinary Plan of Care Collaboration   IDT Collaboration with  Nursing;Certified Nursing Assistant  (CNA performing home O2 test during PT eval/ambulation)   Patient  Position at End of Therapy In Bed;Call Light within Reach;Tray Table within Reach;Phone within Reach  (as found)   Collaboration Comments RN updated   Session Information   Date / Session Number  7/18: yosef only, d/c needs

## 2024-07-18 NOTE — PROGRESS NOTES
Pt dc'd home. IV and monitor removed; monitor room notified. Pt left unit via wheelchair with son and CNA. Personal belongings with pt when leaving unit. Pt given medications from [id number], reviewed with pt. Pt and son given discharge instructions prior to leaving unit including when to follow-up; verbalizes understanding. Copy of discharge instructions with pt and in the chart.

## 2024-07-18 NOTE — DISCHARGE PLANNING
ATTN: Case Management  RE: Referral for Home Health    As of 7/18/24, we have accepted the Home Health referral for the patient listed above.    A [facility] clinician will be out to see the patient within 48 hours. If you have any questions or concerns regarding the patient’s transition to Home Health, please do not hesitate to contact us at [phone].      We look forward to collaborating with you,  [facility] Team

## 2024-07-18 NOTE — DISCHARGE PLANNING
Case Management Discharge Planning    Admission Date: 7/15/2024  GMLOS: 5.1  ALOS: 3    6-Clicks ADL Score: 18  6-Clicks Mobility Score: 17  PT and/or OT Eval ordered: Yes  Post-acute Referrals Ordered: No  Post-acute Choice Obtained: Yes  Has referral(s) been sent to post-acute provider:  No      Anticipated Discharge Dispo: Discharge Disposition: Discharged to home/self care (01)  Discharge Address: [facility] [place] [address]    DME Needed: Yes (Walker and Home O2)    DME Ordered: No    Action(s) Taken: DC Assessment Complete (See below) and Choice obtained    LMSW met with pt at bedside to complete DC assessment. LMSW Introduced self and department roles. Pt A&Ox4 and able to verify the information on the face sheet. Pt lives alone in a single-story house. Pt address verified as [facility] [place] [address] [address]. Pt verified PCP as Kathy Garcia MD. Prior to this hospitalization pt was independent at home with ADLs and most IADLs. Pt does not use any DME at baseline. Pt reported that her son is a good support for her. Pt denies any SA or MH concerns. Pt does have an advance directive.     LMSW discussed the pt DC plan with the pt at bedside. LMSW received choice for HH, 1) [facility]. Choice for Home O2, 1) [facility] and choice for walker 1)[facility]. Choce forms faxed to DPA pending HH, Home O2, and walker orders to be put in.     Escalations Completed: None    Medically Clear: No    Next Steps: f/u with pt and medical team to discuss dc needs and barriers.     Barriers to Discharge: Medical clearance, DME, and Outpatient referrals pending        Care Transition Team Assessment    Information Source  Orientation Level: Oriented X4  Information Given By: Patient  Who is responsible for making decisions for patient? : Patient    Readmission Evaluation  Is this a readmission?: Yes - unplanned readmission    Elopement Risk  Legal Hold: No  Ambulatory or Self Mobile in Wheelchair: Yes  Disoriented:  No  Psychiatric Symptoms: None  History of Wandering: No  Elopement this Admit: No  Vocalizing Wanting to Leave: No  Displays Behaviors, Body Language Wanting to Leave: No-Not at Risk for Elopement  Elopement Risk: Not at Risk for Elopement    Interdisciplinary Discharge Planning  Lives with - Patient's Self Care Capacity: Alone and Able to Care For Self, Adult Children  Patient or legal guardian wants to designate a caregiver: Yes  Caregiver name: viviana  Support Systems: Children  Housing / Facility: [address] [facility]    Discharge Preparedness  What is your plan after discharge?: Home with help, Home health care  What are your discharge supports?: Child  Prior Functional Level: Ambulatory, Independent with Activities of Daily Living, Independent with Medication Management  Difficulity with ADLs: None  Difficulity with IADLs: None    Functional Assesment  Prior Functional Level: Ambulatory, Independent with Activities of Daily Living, Independent with Medication Management    Finances  Financial Barriers to Discharge: No  Prescription Coverage: Yes    Vision / Hearing Impairment  Vision Impairment : Yes  Right Eye Vision: Impaired, Wears Glasses  Left Eye Vision: Impaired, Wears Glasses  Hearing Impairment : Yes  Hearing Impairment: Both Ears, Hearing Device(s) Available  Does Pt Need Special Equipment for the Hearing Impaired?: No    Advance Directive  Advance Directive?: DPOA for Health Care    Domestic Abuse  Have you ever been the victim of abuse or violence?: No  Possible Abuse/Neglect Reported to:: Not Applicable    Psychological Assessment  History of Substance Abuse: None  History of Psychiatric Problems: No  Non-compliant with Treatment: No    Anticipated Discharge Information  Discharge Disposition: Discharged to home/self care (01)  Discharge Address: [address] [place] [address] [address] [id number]

## 2024-07-19 ENCOUNTER — TELEPHONE (OUTPATIENT)
Dept: CARDIOLOGY | Facility: MEDICAL CENTER | Age: 89
End: 2024-07-19
Payer: MEDICARE

## 2024-07-19 ENCOUNTER — PATIENT OUTREACH (OUTPATIENT)
Dept: MEDICAL GROUP | Facility: PHYSICIAN GROUP | Age: 89
End: 2024-07-19
Payer: MEDICARE

## 2024-07-19 DIAGNOSIS — I44.0 FIRST DEGREE HEART BLOCK: ICD-10-CM

## 2024-07-19 DIAGNOSIS — I44.7 LBBB (LEFT BUNDLE BRANCH BLOCK): ICD-10-CM

## 2024-07-19 NOTE — DOCUMENTATION QUERY
[facility]                                                                       Query Response Note      PATIENT:               ADALID REDDY  ACCT #:                  [id number]  MRN:                     [id number]  :                      1935  ADMIT DATE:       7/15/2024 3:29 PM  DISCH DATE:        2024 4:47 PM  RESPONDING  PROVIDER #:        [id number]           QUERY TEXT:    Patient s/p TAVR on 2024 admitted for moderate-sized pleural effusion on left and acute respiratory failure with hypoxia and later found to have pericardial effusion.  Treated with pericardiocentesis and thoracentesis.    Please specify the relationship between the TAVR and pleural effusion/pericardial effusion.    Documentation indicators that raised basis for question:   7/15 CXR - Moderate left effusion   Echo - Large pericardial effusion with evidence of hemodynamic compromise    H&P -  s/p TAVR on 2024... 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    Treatment - Pericardiocentesis; Thoracentesis    Risk factors - recent TAVR    Thank you,  Kathy Diaz BSN  Clinical   Connect via [employer]  Options provided:   -- Condition pleural effusion and pericardia effusion is unexpected and a complication of the TAVR   -- Condition pleural effusion and pericardia effusion is not a complication due to or associated with the TAVR   -- Condition pleural effusion and pericardia effusion is routinely expected and integral/inherent to the TAVR   -- Condition pleural effusion and pericardia effusion is related to another etiology, (please document underlying etiology)   -- Condition pleural effusion and pericardia effusion is without clinical significance   -- Other explanation -, (Please specify other explanation)   -- Unable to determine      Query created by: Emily  Kathy on 7/18/2024 4:10 PM    RESPONSE TEXT:    Unable to determine          Electronically signed by:  DELFINA NEWSOME MD 7/18/2024 6:23 PM

## 2024-07-19 NOTE — TELEPHONE ENCOUNTER
Called patient to assist with scheduling a new patient appointment with diabetes management Left a message    Need appt, virtually, or face to face

## 2024-07-20 LAB
BACTERIA BLD CULT: NORMAL
BACTERIA FLD AEROBE CULT: NORMAL
GRAM STN SPEC: NORMAL
SIGNIFICANT IND 70042: NORMAL
SIGNIFICANT IND 70042: NORMAL
SITE SITE: NORMAL
SITE SITE: NORMAL
SOURCE SOURCE: NORMAL
SOURCE SOURCE: NORMAL

## 2024-07-22 ENCOUNTER — HOME CARE VISIT (OUTPATIENT)
Dept: HOME HEALTH SERVICES | Facility: HOME HEALTHCARE | Age: 89
End: 2024-07-22
Payer: MEDICARE

## 2024-07-22 ENCOUNTER — TELEPHONE (OUTPATIENT)
Dept: CARDIOLOGY | Facility: MEDICAL CENTER | Age: 89
End: 2024-07-22
Payer: MEDICARE

## 2024-07-22 VITALS
HEART RATE: 76 BPM | BODY MASS INDEX: 23.04 KG/M2 | HEIGHT: 63 IN | DIASTOLIC BLOOD PRESSURE: 72 MMHG | RESPIRATION RATE: 18 BRPM | SYSTOLIC BLOOD PRESSURE: 134 MMHG | TEMPERATURE: 97.9 F | OXYGEN SATURATION: 97 % | WEIGHT: 130 LBS

## 2024-07-22 PROCEDURE — G0299 HHS/HOSPICE OF RN EA 15 MIN: HCPCS

## 2024-07-22 PROCEDURE — 665005 NO-PAY RAP - HOME HEALTH

## 2024-07-22 SDOH — ECONOMIC STABILITY: HOUSING INSECURITY: EVIDENCE OF SMOKING MATERIAL: 0

## 2024-07-22 ASSESSMENT — FIBROSIS 4 INDEX: FIB4 SCORE: 2.93

## 2024-07-22 ASSESSMENT — ENCOUNTER SYMPTOMS
DENIES PAIN: 1
DYSPNEA ACTIVITY LEVEL: AFTER AMBULATING 10 - 20 FT
SHORTNESS OF BREATH: 1
COUGH: 1
PERSON REPORTING PAIN: PATIENT
DYSPNEA ON EXERTION: 1
FATIGUE: 1
FATIGUES EASILY: 1

## 2024-07-23 ENCOUNTER — DOCUMENTATION (OUTPATIENT)
Dept: CARDIOLOGY | Facility: MEDICAL CENTER | Age: 89
End: 2024-07-23
Payer: MEDICARE

## 2024-07-23 LAB
FUNGUS SPEC CULT: NORMAL
FUNGUS SPEC FUNGUS STN: NORMAL
SIGNIFICANT IND 70042: NORMAL
SITE SITE: NORMAL
SOURCE SOURCE: NORMAL

## 2024-07-24 PROCEDURE — G0180 MD CERTIFICATION HHA PATIENT: HCPCS | Performed by: INTERNAL MEDICINE

## 2024-07-24 ASSESSMENT — ACTIVITIES OF DAILY LIVING (ADL): OASIS_M1830: 03

## 2024-07-25 ENCOUNTER — HOME CARE VISIT (OUTPATIENT)
Dept: HOME HEALTH SERVICES | Facility: HOME HEALTHCARE | Age: 89
End: 2024-07-25
Payer: MEDICARE

## 2024-07-25 ENCOUNTER — DOCUMENTATION (OUTPATIENT)
Dept: CARDIOLOGY | Facility: MEDICAL CENTER | Age: 89
End: 2024-07-25

## 2024-07-25 ENCOUNTER — OFFICE VISIT (OUTPATIENT)
Dept: CARDIOLOGY | Facility: MEDICAL CENTER | Age: 89
End: 2024-07-25
Payer: MEDICARE

## 2024-07-25 VITALS
SYSTOLIC BLOOD PRESSURE: 110 MMHG | OXYGEN SATURATION: 92 % | RESPIRATION RATE: 16 BRPM | HEIGHT: 65 IN | BODY MASS INDEX: 21.94 KG/M2 | HEART RATE: 87 BPM | DIASTOLIC BLOOD PRESSURE: 68 MMHG | WEIGHT: 131.7 LBS

## 2024-07-25 DIAGNOSIS — I10 ESSENTIAL HYPERTENSION: ICD-10-CM

## 2024-07-25 DIAGNOSIS — Z79.01 CHRONIC ANTICOAGULATION: ICD-10-CM

## 2024-07-25 DIAGNOSIS — I48.0 PAF (PAROXYSMAL ATRIAL FIBRILLATION) (HCC): ICD-10-CM

## 2024-07-25 DIAGNOSIS — E78.5 DYSLIPIDEMIA: ICD-10-CM

## 2024-07-25 DIAGNOSIS — I70.0 ABDOMINAL AORTIC ATHEROSCLEROSIS (HCC): ICD-10-CM

## 2024-07-25 DIAGNOSIS — I25.10 CORONARY ARTERY CALCIFICATION SEEN ON CAT SCAN: ICD-10-CM

## 2024-07-25 DIAGNOSIS — I48.0 HYPERCOAGULABLE STATE DUE TO PAROXYSMAL ATRIAL FIBRILLATION (HCC): ICD-10-CM

## 2024-07-25 DIAGNOSIS — I30.9 ACUTE PERICARDITIS, UNSPECIFIED TYPE: ICD-10-CM

## 2024-07-25 DIAGNOSIS — D68.69 HYPERCOAGULABLE STATE DUE TO PAROXYSMAL ATRIAL FIBRILLATION (HCC): ICD-10-CM

## 2024-07-25 DIAGNOSIS — Z95.2 S/P TAVR (TRANSCATHETER AORTIC VALVE REPLACEMENT): ICD-10-CM

## 2024-07-25 DIAGNOSIS — J90 PLEURAL EFFUSION: ICD-10-CM

## 2024-07-25 LAB — EKG IMPRESSION: NORMAL

## 2024-07-25 PROCEDURE — 3078F DIAST BP <80 MM HG: CPT

## 2024-07-25 PROCEDURE — 93005 ELECTROCARDIOGRAM TRACING: CPT

## 2024-07-25 PROCEDURE — 3074F SYST BP LT 130 MM HG: CPT

## 2024-07-25 PROCEDURE — 99212 OFFICE O/P EST SF 10 MIN: CPT

## 2024-07-25 PROCEDURE — 93010 ELECTROCARDIOGRAM REPORT: CPT | Performed by: INTERNAL MEDICINE

## 2024-07-25 PROCEDURE — 99214 OFFICE O/P EST MOD 30 MIN: CPT

## 2024-07-25 ASSESSMENT — ENCOUNTER SYMPTOMS
PND: 0
EYES NEGATIVE: 1
NEUROLOGICAL NEGATIVE: 1
PALPITATIONS: 0
CONSTITUTIONAL NEGATIVE: 1
SHORTNESS OF BREATH: 0
NERVOUS/ANXIOUS: 0
GASTROINTESTINAL NEGATIVE: 1
ORTHOPNEA: 0
MUSCULOSKELETAL NEGATIVE: 1
DEPRESSION: 0

## 2024-07-25 ASSESSMENT — FIBROSIS 4 INDEX: FIB4 SCORE: 2.93

## 2024-07-29 ENCOUNTER — HOME CARE VISIT (OUTPATIENT)
Dept: HOME HEALTH SERVICES | Facility: HOME HEALTHCARE | Age: 89
End: 2024-07-29
Payer: MEDICARE

## 2024-07-29 PROCEDURE — G0151 HHCP-SERV OF PT,EA 15 MIN: HCPCS

## 2024-07-30 ENCOUNTER — TELEPHONE (OUTPATIENT)
Dept: CARDIOLOGY | Facility: MEDICAL CENTER | Age: 89
End: 2024-07-30
Payer: MEDICARE

## 2024-08-01 ENCOUNTER — APPOINTMENT (OUTPATIENT)
Dept: MEDICAL GROUP | Facility: PHYSICIAN GROUP | Age: 89
End: 2024-08-01
Payer: MEDICARE

## 2024-08-01 VITALS
RESPIRATION RATE: 16 BRPM | DIASTOLIC BLOOD PRESSURE: 60 MMHG | HEART RATE: 68 BPM | TEMPERATURE: 98.6 F | BODY MASS INDEX: 21.92 KG/M2 | SYSTOLIC BLOOD PRESSURE: 120 MMHG | OXYGEN SATURATION: 92 % | HEIGHT: 65 IN | WEIGHT: 131.6 LBS

## 2024-08-01 VITALS
RESPIRATION RATE: 17 BRPM | SYSTOLIC BLOOD PRESSURE: 113 MMHG | TEMPERATURE: 98 F | HEART RATE: 84 BPM | OXYGEN SATURATION: 96 % | DIASTOLIC BLOOD PRESSURE: 68 MMHG

## 2024-08-01 DIAGNOSIS — J90 PLEURAL EFFUSION: ICD-10-CM

## 2024-08-01 DIAGNOSIS — I31.39 PERICARDIAL EFFUSION: ICD-10-CM

## 2024-08-01 DIAGNOSIS — Z95.2 S/P TAVR (TRANSCATHETER AORTIC VALVE REPLACEMENT): ICD-10-CM

## 2024-08-01 ASSESSMENT — ENCOUNTER SYMPTOMS
MUSCLE WEAKNESS: 1
PERSON REPORTING PAIN: PATIENT
DENIES PAIN: 1

## 2024-08-01 ASSESSMENT — FIBROSIS 4 INDEX: FIB4 SCORE: 2.93

## 2024-08-01 ASSESSMENT — ACTIVITIES OF DAILY LIVING (ADL)
AMBULATION_DISTANCE/DURATION_TOLERATED: 100 FT
AMBULATION ASSISTANCE ON FLAT SURFACES: 1

## 2024-08-02 ENCOUNTER — APPOINTMENT (OUTPATIENT)
Dept: RADIOLOGY | Facility: MEDICAL CENTER | Age: 89
End: 2024-08-02
Payer: MEDICARE

## 2024-08-02 DIAGNOSIS — J90 PLEURAL EFFUSION: ICD-10-CM

## 2024-08-02 PROCEDURE — 71046 X-RAY EXAM CHEST 2 VIEWS: CPT

## 2024-08-07 ENCOUNTER — HOME CARE VISIT (OUTPATIENT)
Dept: HOME HEALTH SERVICES | Facility: HOME HEALTHCARE | Age: 89
End: 2024-08-07
Payer: MEDICARE

## 2024-08-07 ENCOUNTER — TELEPHONE (OUTPATIENT)
Dept: CARDIOLOGY | Facility: MEDICAL CENTER | Age: 89
End: 2024-08-07
Payer: MEDICARE

## 2024-08-07 PROCEDURE — G0151 HHCP-SERV OF PT,EA 15 MIN: HCPCS

## 2024-08-07 NOTE — TELEPHONE ENCOUNTER
"  Phone Number Called: [phone]    Call outcome: Spoke to patient regarding message below.    Message: Called to relay BE recommendations. \"Delighted to hear I can Drive\". She asked about echo results after imaging tomorrow and advised she will hear from us prior to her follow up in September. Answered all questions and concerns, appreciative of call.       "

## 2024-08-07 NOTE — TELEPHONE ENCOUNTER
Received voicemail from [facility] structural coordinator.     Patient stated she saw physical therapist and he did not see a reason she cannot drive. Off oxygen, only uses it at night and she is going to try going without that. Her son is worried she will be sued if she has not been cleared by our office to drive and gets in an accident. She wants to know if there is a release form from a legal aspect from our office that she can drive. Echocardiogram scheduled tomorrow she was going to drive to.     BE (in  absence): Patient had TAVR on 07/01/24. Please advise on driving clearance. Thanks!

## 2024-08-08 ENCOUNTER — HOSPITAL ENCOUNTER (OUTPATIENT)
Dept: CARDIOLOGY | Facility: MEDICAL CENTER | Age: 89
End: 2024-08-08
Attending: INTERNAL MEDICINE
Payer: MEDICARE

## 2024-08-08 DIAGNOSIS — I35.0 NONRHEUMATIC AORTIC VALVE STENOSIS: ICD-10-CM

## 2024-08-08 LAB
LV EJECT FRACT  99904: 59
LV EJECT FRACT MOD 2C 99903: 65.44
LV EJECT FRACT MOD 4C 99902: 54.49
LV EJECT FRACT MOD BP 99901: 59.38

## 2024-08-08 PROCEDURE — 93306 TTE W/DOPPLER COMPLETE: CPT | Mod: 26 | Performed by: INTERNAL MEDICINE

## 2024-08-08 PROCEDURE — 93306 TTE W/DOPPLER COMPLETE: CPT

## 2024-08-09 VITALS
HEART RATE: 81 BPM | SYSTOLIC BLOOD PRESSURE: 118 MMHG | TEMPERATURE: 98 F | RESPIRATION RATE: 16 BRPM | OXYGEN SATURATION: 94 % | DIASTOLIC BLOOD PRESSURE: 70 MMHG

## 2024-08-09 ASSESSMENT — ENCOUNTER SYMPTOMS
DENIES PAIN: 1
PERSON REPORTING PAIN: PATIENT

## 2024-08-10 ENCOUNTER — HOSPITAL ENCOUNTER (OUTPATIENT)
Dept: LAB | Facility: MEDICAL CENTER | Age: 89
End: 2024-08-10
Attending: INTERNAL MEDICINE
Payer: MEDICARE

## 2024-08-10 DIAGNOSIS — E55.9 VITAMIN D DEFICIENCY: ICD-10-CM

## 2024-08-10 DIAGNOSIS — R73.03 PREDIABETES: ICD-10-CM

## 2024-08-10 DIAGNOSIS — K59.1 FUNCTIONAL DIARRHEA: ICD-10-CM

## 2024-08-10 DIAGNOSIS — E78.5 DYSLIPIDEMIA: ICD-10-CM

## 2024-08-10 LAB
25(OH)D3 SERPL-MCNC: 49 NG/ML (ref 30–100)
ALBUMIN SERPL BCP-MCNC: 3.9 G/DL (ref 3.2–4.9)
ALBUMIN/GLOB SERPL: 1.1 G/DL
ALP SERPL-CCNC: 81 U/L (ref 30–99)
ALT SERPL-CCNC: 15 U/L (ref 2–50)
ANION GAP SERPL CALC-SCNC: 10 MMOL/L (ref 7–16)
AST SERPL-CCNC: 27 U/L (ref 12–45)
BASOPHILS # BLD AUTO: 0.6 % (ref 0–1.8)
BASOPHILS # BLD: 0.05 K/UL (ref 0–0.12)
BILIRUB SERPL-MCNC: 0.6 MG/DL (ref 0.1–1.5)
BUN SERPL-MCNC: 16 MG/DL (ref 8–22)
CALCIUM ALBUM COR SERPL-MCNC: 9.9 MG/DL (ref 8.5–10.5)
CALCIUM SERPL-MCNC: 9.8 MG/DL (ref 8.4–10.2)
CHLORIDE SERPL-SCNC: 102 MMOL/L (ref 96–112)
CHOLEST SERPL-MCNC: 153 MG/DL (ref 100–199)
CO2 SERPL-SCNC: 27 MMOL/L (ref 20–33)
CREAT SERPL-MCNC: 0.62 MG/DL (ref 0.5–1.4)
EOSINOPHIL # BLD AUTO: 0.21 K/UL (ref 0–0.51)
EOSINOPHIL NFR BLD: 2.7 % (ref 0–6.9)
ERYTHROCYTE [DISTWIDTH] IN BLOOD BY AUTOMATED COUNT: 50.7 FL (ref 35.9–50)
EST. AVERAGE GLUCOSE BLD GHB EST-MCNC: 114 MG/DL
FASTING STATUS PATIENT QL REPORTED: NORMAL
GFR SERPLBLD CREATININE-BSD FMLA CKD-EPI: 85 ML/MIN/1.73 M 2
GLOBULIN SER CALC-MCNC: 3.5 G/DL (ref 1.9–3.5)
GLUCOSE SERPL-MCNC: 96 MG/DL (ref 65–99)
HBA1C MFR BLD: 5.6 % (ref 4–5.6)
HCT VFR BLD AUTO: 39.8 % (ref 37–47)
HDLC SERPL-MCNC: 58 MG/DL
HGB BLD-MCNC: 12.8 G/DL (ref 12–16)
IMM GRANULOCYTES # BLD AUTO: 0.02 K/UL (ref 0–0.11)
IMM GRANULOCYTES NFR BLD AUTO: 0.3 % (ref 0–0.9)
LDLC SERPL CALC-MCNC: 82 MG/DL
LYMPHOCYTES # BLD AUTO: 1.11 K/UL (ref 1–4.8)
LYMPHOCYTES NFR BLD: 14.3 % (ref 22–41)
MCH RBC QN AUTO: 32.8 PG (ref 27–33)
MCHC RBC AUTO-ENTMCNC: 32.2 G/DL (ref 32.2–35.5)
MCV RBC AUTO: 102.1 FL (ref 81.4–97.8)
MONOCYTES # BLD AUTO: 0.76 K/UL (ref 0–0.85)
MONOCYTES NFR BLD AUTO: 9.8 % (ref 0–13.4)
NEUTROPHILS # BLD AUTO: 5.59 K/UL (ref 1.82–7.42)
NEUTROPHILS NFR BLD: 72.3 % (ref 44–72)
NRBC # BLD AUTO: 0 K/UL
NRBC BLD-RTO: 0 /100 WBC (ref 0–0.2)
PLATELET # BLD AUTO: 162 K/UL (ref 164–446)
PMV BLD AUTO: 11 FL (ref 9–12.9)
POTASSIUM SERPL-SCNC: 4.9 MMOL/L (ref 3.6–5.5)
PROT SERPL-MCNC: 7.4 G/DL (ref 6–8.2)
RBC # BLD AUTO: 3.9 M/UL (ref 4.2–5.4)
SODIUM SERPL-SCNC: 139 MMOL/L (ref 135–145)
TRIGL SERPL-MCNC: 66 MG/DL (ref 0–149)
TSH SERPL DL<=0.005 MIU/L-ACNC: 2.06 UIU/ML (ref 0.38–5.33)
WBC # BLD AUTO: 7.7 K/UL (ref 4.8–10.8)

## 2024-08-10 PROCEDURE — 36415 COLL VENOUS BLD VENIPUNCTURE: CPT

## 2024-08-10 PROCEDURE — 85025 COMPLETE CBC W/AUTO DIFF WBC: CPT

## 2024-08-10 PROCEDURE — 82306 VITAMIN D 25 HYDROXY: CPT

## 2024-08-10 PROCEDURE — 80061 LIPID PANEL: CPT

## 2024-08-10 PROCEDURE — 84443 ASSAY THYROID STIM HORMONE: CPT

## 2024-08-10 PROCEDURE — 83036 HEMOGLOBIN GLYCOSYLATED A1C: CPT

## 2024-08-10 PROCEDURE — 80053 COMPREHEN METABOLIC PANEL: CPT

## 2024-08-12 ENCOUNTER — TELEPHONE (OUTPATIENT)
Dept: CARDIOLOGY | Facility: MEDICAL CENTER | Age: 89
End: 2024-08-12
Payer: MEDICARE

## 2024-08-12 NOTE — TELEPHONE ENCOUNTER
----- Message from Kelsi Starkey R.N. sent at 8/12/2024  2:24 PM PDT -----    ----- Message -----  From: MATTHEW Coleman  Sent: 8/12/2024  10:56 AM PDT  To: Kristen Navarrete R.N.    Pleural effusion resolved

## 2024-08-12 NOTE — TELEPHONE ENCOUNTER
"Phone Number Called: [phone]    Call outcome: Spoke to patient regarding message below.    Message: Called to relay results and LH recommendations. \"That's wonderful news, that's great\". She stated she received another zio heart monitor in the mail and doesn't need it. Advised will reach out to the monitor techs to contact her. Answered all questions and concerns, appreciative of call.     Filomena/Terrie: Can you contact patient about how to return zio, she completed her heart monitor already. Thanks!    "

## 2024-08-13 ENCOUNTER — HOME CARE VISIT (OUTPATIENT)
Dept: HOME HEALTH SERVICES | Facility: HOME HEALTHCARE | Age: 89
End: 2024-08-13
Payer: MEDICARE

## 2024-08-13 NOTE — CASE COMMUNICATION
I agree with these changes.   ----- Message -----  From: Key Ruiz R.N.  Sent: 8/13/2024   8:13 AM PDT  To: Shania Kerr R.N.      Quality Review for SOC OASIS by EROS Ruiz, RN on  August 13, 2024       Edits completed by EROS Ruiz RN:  1.  and  diagnosis coding updated per chart review.  2. Changed  to 7/22/24 per the LSOC order  3. Checked #6 in  per chart review and reported difficulty with  knowing which medications to discontinue after the TAVR  4. Changed  to no surgical wounds per CMS guidance  5.  is 3 per ambulation status  6. Completed F2F information

## 2024-08-13 NOTE — CASE COMMUNICATION
Quality Review for SOC OASIS by EROS Ruiz, RN on  August 13, 2024       Edits completed by EROS Ruiz RN:  1.  and  diagnosis coding updated per chart review.  2. Changed  to 7/22/24 per the LSOC order  3. Checked #6 in  per chart review and reported difficulty with knowing which medications to discontinue after the TAVR  4. Changed  to no surgical wounds per CMS guidance  5.  is 3 per ambulation  status  6. Completed F2F information

## 2024-08-15 ENCOUNTER — HOME CARE VISIT (OUTPATIENT)
Dept: HOME HEALTH SERVICES | Facility: HOME HEALTHCARE | Age: 89
End: 2024-08-15
Payer: MEDICARE

## 2024-08-15 ENCOUNTER — OFFICE VISIT (OUTPATIENT)
Dept: MEDICAL GROUP | Facility: PHYSICIAN GROUP | Age: 89
End: 2024-08-15
Payer: MEDICARE

## 2024-08-15 VITALS
OXYGEN SATURATION: 93 % | DIASTOLIC BLOOD PRESSURE: 72 MMHG | RESPIRATION RATE: 14 BRPM | HEART RATE: 82 BPM | SYSTOLIC BLOOD PRESSURE: 122 MMHG | BODY MASS INDEX: 21.83 KG/M2 | WEIGHT: 131 LBS | HEIGHT: 65 IN | TEMPERATURE: 98.1 F

## 2024-08-15 DIAGNOSIS — I10 ESSENTIAL HYPERTENSION: ICD-10-CM

## 2024-08-15 DIAGNOSIS — Z95.2 S/P TAVR (TRANSCATHETER AORTIC VALVE REPLACEMENT): ICD-10-CM

## 2024-08-15 DIAGNOSIS — D68.69 HYPERCOAGULABLE STATE DUE TO PAROXYSMAL ATRIAL FIBRILLATION (HCC): ICD-10-CM

## 2024-08-15 DIAGNOSIS — I31.39 PERICARDIAL EFFUSION: ICD-10-CM

## 2024-08-15 DIAGNOSIS — I48.0 HYPERCOAGULABLE STATE DUE TO PAROXYSMAL ATRIAL FIBRILLATION (HCC): ICD-10-CM

## 2024-08-15 PROCEDURE — 3074F SYST BP LT 130 MM HG: CPT | Performed by: INTERNAL MEDICINE

## 2024-08-15 PROCEDURE — 3078F DIAST BP <80 MM HG: CPT | Performed by: INTERNAL MEDICINE

## 2024-08-15 PROCEDURE — 99214 OFFICE O/P EST MOD 30 MIN: CPT | Performed by: INTERNAL MEDICINE

## 2024-08-15 ASSESSMENT — FIBROSIS 4 INDEX: FIB4 SCORE: 3.79

## 2024-08-15 NOTE — PROGRESS NOTES
CC: Follow-up lab work, pericardial effusion    HPI:  Lisa presents with the following    1. Pericardial effusion  8/1/2024: Patient with PMH of , severe aortic stenosis status post TAVR procedure on July 1.  Patient presented to the emergency room on July 15 with a cough and YOST.  Patient diagnosed with pleural effusion, status postthoracentesis.  Echocardiogram also showed a pericardial effusion and received a pericardial drain.  Patient did not tolerate colchicine due to severe diarrhea.  Patient was discharged with home oxygen, using 2 L nasal cannula at bedtime.  O2 saturations have been within the low 90s.  Patient is undergoing physical therapy with home health, incentive spirometry and ambulation exercises.  Patient has a follow-up echocardiogram and chest x-ray on August 2.  Follow-up with cardiology within the next week.  Patient otherwise feels like she is improving every day.  Sleeping well.  Patient denies chest pain shortness of breath palpitations or edema.    8/15/2024:.  Follow-up echocardiogram showed resolution of pericardial effusion.  Patient denies any chest pain shortness of breath or fatigue.  Patient is currently off of her oxygen.  Pulse ox shows average readings of 93% on room air.    2. S/P TAVR (transcatheter aortic valve replacement)  Close followed by cardiology.  Patient has an appointment on September 10.  Follow-up echo showed TAVR aortic valve that is functioning normally with normal transvalvular gradients.    3. Hypercoagulable state due to paroxysmal atrial fibrillation (HCC)  Chronic.  Stable.  Patient taking Xarelto 20 mg daily.  No issues.    4. Essential hypertension  Patient's metoprolol was decreased to 25 mg twice daily.  Blood pressure well-controlled.  Asymptomatic.    Labs: CBC, CMP, lipid panel within normal limits.  Vitamin D3, TSH within normal limits.      Patient Active Problem List    Diagnosis Date Noted    Acute pericarditis 07/25/2024    Pericardial  effusion 07/16/2024    Pleural effusion 07/15/2024    LBBB (left bundle branch block) 07/02/2024    First degree heart block 07/02/2024    S/P TAVR (transcatheter aortic valve replacement) 07/01/2024    PAF (paroxysmal atrial fibrillation) ([facility]) 02/15/2024    Prediabetes 08/16/2023    Dyslipidemia 08/03/2023    Diplopia 07/11/2023    Vitamin D deficiency 06/15/2023    Upper back pain on left side 12/12/2022    Functional diarrhea 12/12/2022    BMI 23.0-23.9, adult 12/05/2022    Peripheral vascular disease, unspecified ([facility]) 12/05/2022    Mitral stenosis 01/17/2022    Osteopenia 11/03/2021    Low back pain without sciatica 04/29/2021    Abdominal aortic atherosclerosis ([facility]) 04/29/2021    History of melanoma 04/29/2021    Hypercoagulability due to atrial fibrillation ([facility]) 04/29/2021    Full code status 04/29/2021    Urge incontinence 04/29/2021    Essential hypertension 11/15/2019    Mild mitral regurgitation 11/15/2019    Coronary artery calcification seen on CAT scan 09/16/2019    Chronic anticoagulation 08/14/2018    Acute respiratory failure with hypoxia ([facility]) 03/28/2018    Low hemoglobin 03/27/2018    Chronic seasonal allergic rhinitis due to pollen 11/16/2017    History of total left hip arthroplasty 05/21/2015    Osteoarthritis, multiple sites 05/21/2015       Current Outpatient Medications   Medication Sig Dispense Refill    Probiotic Product (PROBIOTIC GUMMIES PO) Take  by mouth.      metoprolol tartrate (LOPRESSOR) 25 MG Tab Take 1 Tablet by mouth 2 times a day. (Patient taking differently: Take 25 mg by mouth every day. Indications: Atrial Fibrillation) 100 Tablet 3    calcium citrate (CALCITRATE) 950 (200 Ca) MG Tab Take 950 mg by mouth every day. Indications: supplement      vitamin D3 (CHOLECALCIFEROL) 1000 Unit (25 mcg) Tab Take 1,000 Units by mouth every day. Indications: supplement      therapeutic multivitamin-minerals (THERAGRAN-M) Tab Take 1 Tablet by mouth every day. Indications: supplement       acetaminophen (TYLENOL) 500 MG Tab Take 500 mg by mouth every 6 hours as needed for Moderate Pain. Indications: Pain      loratadine (CLARITIN) 10 MG Tab Take 10 mg by mouth every day. Indications: Hayfever      simvastatin (ZOCOR) 40 MG Tab TAKE ONE TABLET BY MOUTH IN THE EVENING 100 Tablet 3    rivaroxaban (XARELTO) 20 MG Tab tablet TAKE ONE TABLET BY MOUTH ONE TIME DAILY WITH DINNER 100 Tablet 2     No current facility-administered medications for this visit.         Allergies as of 08/15/2024 - Reviewed 08/15/2024   Allergen Reaction Noted    Colchicine  2024    [place] Unspecified 08/10/2022    Clindamycin Unspecified 2023    Oatmeal Diarrhea 08/10/2022    Scallops Vomiting 08/10/2022        Social History     Socioeconomic History    Marital status:      Spouse name: Not on file    Number of children: Not on file    Years of education: Not on file    Highest education level: Not on file   Occupational History    Not on file   Tobacco Use    Smoking status: Former     Current packs/day: 0.00     Average packs/day: 1 pack/day for 33.0 years (33.0 ttl pk-yrs)     Types: Cigarettes     Start date: 1958     Quit date: 1991     Years since quittin.6    Smokeless tobacco: Never    Tobacco comments:     Started smoking at age 23   Vaping Use    Vaping status: Never Used   Substance and Sexual Activity    Alcohol use: Yes     Alcohol/week: 4.2 oz     Types: 7 Glasses of wine per week     Comment: 1 glass wine/day    Drug use: No    Sexual activity: Never     Partners: Male   Other Topics Concern    Not on file   Social History Narrative    She lost her   to Alzheimer's. She lives alone in her own home, 1 story. Has a son who lives nearby. She does all ADLs and IADLs w/o difficulty though recently hired a  to do her outside work. 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 She enjoys reading, learning to play the piano.      Social Determinants of  Health     Financial Resource Strain: Not on file   Food Insecurity: No Food Insecurity (7/18/2024)    Hunger Vital Sign     Worried About Running Out of Food in the Last Year: Never true     Ran Out of Food in the Last Year: Never true   Transportation Needs: No Transportation Needs (7/18/2024)    PRAPARE - Transportation     Lack of Transportation (Medical): No     Lack of Transportation (Non-Medical): No   Physical Activity: Not on file   Stress: Not on file   Social Connections: Feeling Socially Integrated (7/22/2024)    OASIS : Social Isolation     Frequency of experiencing loneliness or isolation: Rarely   Intimate Partner Violence: Not At Risk (7/15/2024)    Humiliation, Afraid, Rape, and Kick questionnaire     Fear of Current or Ex-Partner: No     Emotionally Abused: No     Physically Abused: No     Sexually Abused: No   Housing Stability: Low Risk  (7/18/2024)    Housing Stability Vital Sign     Unable to Pay for Housing in the Last Year: No     Number of Places Lived in the Last Year: 1     Unstable Housing in the Last Year: No       Family History   Problem Relation Age of Onset    Cancer Father         GI    Heart Disease Father     Lung Disease Father     Stroke Sister     No Known Problems Son     No Known Problems Son     No Known Problems Maternal Grandmother     No Known Problems Maternal Grandfather     No Known Problems Paternal Grandmother     No Known Problems Paternal Grandfather     Hypertension Sister     Hyperlipidemia Sister        Past Surgical History:   Procedure Laterality Date    IA REPLACE AORTIC VALVE PERQ FEMORAL Bilateral 7/1/2024    Procedure: TRANSCATHETER AORTIC VALVE REPLACEMENT;  Surgeon: Jared Sellers M.D.;  Location: SURGERY [facility];  Service: Cardiac    IA ECHO STEVIE TAVR TMVR LAAC W WO CONT N/A 7/1/2024    Procedure: ECHOCARDIOGRAM, TRANSESOPHAGEAL, INTRAOPERATIVE;  Surgeon: Jared Sellers M.D.;  Location: SURGERY [facility];  Service: Cardiac    APPENDECTOMY  "LAPAROSCOPIC  10/10/2018    Procedure: APPENDECTOMY LAPAROSCOPIC;  Surgeon: Sushil Dalton M.D.;  Location: SURGERY [facility];  Service: General    HIP ARTHROPLASTY TOTAL Left 07/06/2015    Procedure: HIP ARTHROPLASTY TOTAL;  Surgeon: Navid Howard M.D.;  Location: SURGERY [facility];  Service:     OTHER Right 01/01/2006    cyst & bone spur on right hand    CATARACT EXTRACTION WITH IOL Bilateral     OTHER Right     melanoma removed from right forearm       ROS:  Denies any Headache,Chest pain,  Shortness of breath,  Abdominal pain, Changes of bowel or bladder, Lower ext edema, Fevers, Nights sweats, Weight Changes, Focal weakness or numbness.  All other systems are negative.    /72 (BP Location: Left arm, Patient Position: Sitting)   Pulse 82   Temp 36.7 °C (98.1 °F) (Temporal)   Resp 14   Ht 1.651 m (5' 5\")   Wt 59.4 kg (131 lb)   SpO2 93%   BMI 21.80 kg/m²      Physical Exam:  Gen:         Alert and oriented, No apparent distress.  HEENT:   Perrla, TM clear,  Oralpharynx no erythema or exudates.  Neck:       No Jugular venous distension, Lymphadenopathy, Thyromegaly, Bruits.  Lungs:     Clear to auscultation bilaterally, no wheezing rhonchi or crackles.  CV:          Regular rate and rhythm. No murmurs, rubs or gallops.  Abd:         Soft non tender, non distended. Normal active bowel sounds.             Ext:          No clubbing, cyanosis, edema.      Assessment and Plan.   88 y.o. female presenting with the following.     1. Pericardial effusion  Resolved.  Monitor.    2. S/P TAVR (transcatheter aortic valve replacement)  Keep follow-up appointment with cardiology.    3. Hypercoagulable state due to paroxysmal atrial fibrillation (HCC)  Continue Xarelto.    4. Essential hypertension  Continue Toprol 25 mg twice daily.    "

## 2024-08-21 ENCOUNTER — HOME CARE VISIT (OUTPATIENT)
Dept: HOME HEALTH SERVICES | Facility: HOME HEALTHCARE | Age: 89
End: 2024-08-21
Payer: MEDICARE

## 2024-08-21 PROCEDURE — G0151 HHCP-SERV OF PT,EA 15 MIN: HCPCS

## 2024-08-26 VITALS
OXYGEN SATURATION: 95 % | HEART RATE: 80 BPM | TEMPERATURE: 98 F | DIASTOLIC BLOOD PRESSURE: 73 MMHG | RESPIRATION RATE: 16 BRPM | SYSTOLIC BLOOD PRESSURE: 120 MMHG

## 2024-08-26 ASSESSMENT — ACTIVITIES OF DAILY LIVING (ADL)
OASIS_M1830: 00
HOME_HEALTH_OASIS: 00

## 2024-08-26 ASSESSMENT — ENCOUNTER SYMPTOMS
DENIES PAIN: 1
PERSON REPORTING PAIN: PATIENT

## 2024-08-26 ASSESSMENT — PATIENT HEALTH QUESTIONNAIRE - PHQ9: CLINICAL INTERPRETATION OF PHQ2 SCORE: 0

## 2024-08-27 ENCOUNTER — HOME CARE VISIT (OUTPATIENT)
Dept: HOME HEALTH SERVICES | Facility: HOME HEALTHCARE | Age: 89
End: 2024-08-27
Payer: MEDICARE

## 2024-08-27 NOTE — CASE COMMUNICATION
Quality Review for 8.21.24 SD OASIS performed on by GAEL Hernandez RN on 8.27.2024:     Edits completed by GAEL Hernandez RN:  1. Changed  D to yes per POC

## 2024-08-27 NOTE — Clinical Note
I agree with these changes    ----- Message -----  From: Kendy Hernandez R.N.  Sent: 8/27/2024   6:21 AM PDT  To: Tera Sims, ANGELINA      Quality Review for 8.21.24 DC OASIS performed on by GAEL Hernandez RN on 8.27.2024:     Edits completed by GAEL Hernandez RN:  1. Changed  D to yes per POC

## 2024-08-29 LAB
MYCOBACTERIUM SPEC CULT: NORMAL
RHODAMINE-AURAMINE STN SPEC: NORMAL
SIGNIFICANT IND 70042: NORMAL
SITE SITE: NORMAL
SOURCE SOURCE: NORMAL

## 2024-09-10 ENCOUNTER — APPOINTMENT (OUTPATIENT)
Dept: CARDIOLOGY | Facility: MEDICAL CENTER | Age: 89
End: 2024-09-10
Payer: MEDICARE

## 2024-09-16 ENCOUNTER — PATIENT OUTREACH (OUTPATIENT)
Dept: HEALTH INFORMATION MANAGEMENT | Facility: OTHER | Age: 89
End: 2024-09-16
Payer: MEDICARE

## 2024-09-16 DIAGNOSIS — I10 ESSENTIAL HYPERTENSION: ICD-10-CM

## 2024-09-16 DIAGNOSIS — E78.5 DYSLIPIDEMIA: ICD-10-CM

## 2024-09-16 NOTE — PROGRESS NOTES
9/16/24 9:45 am: Nurse CM outreach call to pt for follow-up on CCM services.  Pt has general risk score of 8.  Pt was discharged from home care on 8/21/24.  Pt has history of hospitalization 7/15/24-7/18/24 for shortness of breath, pericardial effusion, acute respiratory failure.  Pt has history of TAVR on 7/1/24, atrial fibrillation, dyslipidemia, and HTN.  Nurse discussed with pt Personal Care Management services and nurse CM role.  Pt is agreeable to participate in services.  Pt reports scheduled for intake assessment on 9/17 at 3:00 pm.  Pt is questioning if PCP recommends she get COVID 19 vaccine, flu vaccine and RSV vaccine. Pt states she would like to get all three vaccines if PCP recommends. Nurse CM will follow-up with PCP regarding recommendation.     9/17/24 3:00 pm: Nurse [facility] outreach call to pt to complete intake assessment for CCM services.  Pt answered telephone.  Nurse spoke to PCP earlier today.  Per PCP pt received RSV vaccine last year so pt doesn't need another.  Pt can get Covid-19 vaccine and Flu shot.  PCP recommends pt not get both vaccines at the same time.  Pt reports she will schedule vaccines at her pharmacy.  Pt reports since her TAVR surgery in July she continues to not have much energy.  Pt states she was hoping she would have increased energy levels at this time. Denies feeling short of breath. Pt scheduled to see cardiology this week on 9/19 and will discuss energy levels.  Pt reports she lives alone in a one level house.  Has good family support from her son that lives nearby. Pt reports she volunteers one day a week.  Pt reports she has friends that she gets together with once a month.     INITIAL CARE MANAGEMENT CARE PLAN/ASSESSMENT     Medication Self-Management Goals:  Continue to take medication as directed.     Reviewed medications listed below with patient.      Current Outpatient Medications:     Probiotic Product (PROBIOTIC GUMMIES PO), Take  by mouth., Disp: , Rfl:      metoprolol tartrate (LOPRESSOR) 25 MG Tab, Take 1 Tablet by mouth 2 times a day. (Patient taking differently: Take 25 mg by mouth every day. Indications: Atrial Fibrillation), Disp: 100 Tablet, Rfl: 3    calcium citrate (CALCITRATE) 950 (200 Ca) MG Tab, Take 950 mg by mouth every day. Indications: supplement, Disp: , Rfl:     vitamin D3 (CHOLECALCIFEROL) 1000 Unit (25 mcg) Tab, Take 1,000 Units by mouth every day. Indications: supplement, Disp: , Rfl:     therapeutic multivitamin-minerals (THERAGRAN-M) Tab, Take 1 Tablet by mouth every day. Indications: supplement, Disp: , Rfl:     acetaminophen (TYLENOL) 500 MG Tab, Take 500 mg by mouth every 6 hours as needed for Moderate Pain. Indications: Pain, Disp: , Rfl:     loratadine (CLARITIN) 10 MG Tab, Take 10 mg by mouth every day. Indications: Hayfever, Disp: , Rfl:     simvastatin (ZOCOR) 40 MG Tab, TAKE ONE TABLET BY MOUTH IN THE EVENING, Disp: 100 Tablet, Rfl: 3    rivaroxaban (XARELTO) 20 MG Tab tablet, TAKE ONE TABLET BY MOUTH ONE TIME DAILY WITH DINNER, Disp: 100 Tablet, Rfl: 2             Physical/Functional/Environmental Status:     Activities of Daily Living:  Bathing: independent   Dressing: independent  Grooming: independent  Mouth Care: independent  Toileting: independent  Climbing a Flight of Stairs: independent    Independent Activities of Daily Living:  Shopping: independent  Cooking: independent  Managing Medications: independent  Using the phone and looking up numbers: independent  Driving or using public transportation: independent  Managing Finances: independent        9/17/2024     3:05 PM 9/17/2024     3:15 PM   STEADI Fall Risk   STEADI Risk for Falling Score  5   One or more falls in the last year Yes Yes   Advised to use a cane or walker to get around safely  Yes   Feels unsteady when walking  No   Steadies self on furniture while walking at home  No   Worried about falling  No   Needs to push with hands when rising from a chair  No   Has  trouble stepping up onto a curb / using stairs  No   Often has to rush to the toilet  Yes   Has lost some feeling in feet  No   Takes medicine that makes him/her feel lightheaded or more tired than usual  No   Takes medicine to sleep or improve mood  No   Often feels sad or depressed  No         Goal:  Remain safe and free of injury    Social Determinants of Health       Financial Status:      Financial Resource Strain: Not on file         Referred to CHW/SW:  NA       Transportation Status:      Transportation Needs: No Transportation Needs (7/18/2024)    PRAPARE - Transportation     Lack of Transportation (Medical): No     Lack of Transportation (Non-Medical): No        Referred to CHW/SW:  NA      Food Insecurity:      Food Insecurity: No Food Insecurity (7/18/2024)    Hunger Vital Sign     Worried About Running Out of Food in the Last Year: Never true     Ran Out of Food in the Last Year: Never true        Referred to CHW/SW:  NA       Housing Status:     Housing Stability: Low Risk  (7/18/2024)    Housing Stability Vital Sign     Unable to Pay for Housing in the Last Year: No     Number of Places Lived in the Last Year: 1     Unstable Housing in the Last Year: No        Referred to CHW/SW:  NA      Social Connections:     Social Connections: Feeling Socially Integrated (8/21/2024)    OASIS : Social Isolation     Frequency of experiencing loneliness or isolation: Never        Referred to CHW/SW:  NA      Mental/Behavioral/Psychosocial Status:        7/1/2024     8:00 PM 7/15/2024     8:48 PM 8/21/2024    11:00 AM   Depression Screen (PHQ-2/PHQ-9)   PHQ-2 Total Score 0 2    PHQ-2 Total Score   0   PHQ-9 Total Score  2        Interpretation of PHQ-9 Total Score   Score Severity   1-4 No Depression   5-9 Mild Depression   10-14 Moderate Depression   15-19 Moderately Severe Depression   20-27 Severe Depression       Goal:  Reports no depression    Review of Benefits    Do you have any questions about your  benefits?  No     Phone Number and Website provided for questions:    [facility]:  [phone]   [email or web address]      Chronic Care Management Care Plan         Active Plans       HP Fall Risk Care Plan - Low Risk       At risk for falls - low risk    Reduce falls - low risk  0 of 3                      Goal:  Pt would like to have more energy over the next 3-6 months  Barriers:  Chronic health conditions  Interventions: Increase activity as tolerated, follow-up with specialists, continue to work on heart healthy meal plan.     Start Date:  9/17/24  End Date:              Next Scheduled patient outreach: One month  Nurse Care Coordinator:  Zahida Angelo  [phone]

## 2024-09-17 SDOH — HEALTH STABILITY: PHYSICAL HEALTH: ON AVERAGE, HOW MANY DAYS PER WEEK DO YOU ENGAGE IN MODERATE TO STRENUOUS EXERCISE (LIKE A BRISK WALK)?: 0 DAYS

## 2024-09-17 SDOH — HEALTH STABILITY: PHYSICAL HEALTH: ON AVERAGE, HOW MANY MINUTES DO YOU ENGAGE IN EXERCISE AT THIS LEVEL?: 0 MIN

## 2024-09-18 ENCOUNTER — TELEPHONE (OUTPATIENT)
Dept: CARDIOLOGY | Facility: MEDICAL CENTER | Age: 89
End: 2024-09-18
Payer: MEDICARE

## 2024-09-18 DIAGNOSIS — I48.91 ATRIAL FIBRILLATION WITH RVR (HCC): ICD-10-CM

## 2024-09-19 ENCOUNTER — OFFICE VISIT (OUTPATIENT)
Dept: CARDIOLOGY | Facility: MEDICAL CENTER | Age: 89
End: 2024-09-19
Attending: INTERNAL MEDICINE
Payer: MEDICARE

## 2024-09-19 VITALS
WEIGHT: 132 LBS | DIASTOLIC BLOOD PRESSURE: 60 MMHG | RESPIRATION RATE: 18 BRPM | SYSTOLIC BLOOD PRESSURE: 106 MMHG | BODY MASS INDEX: 21.99 KG/M2 | OXYGEN SATURATION: 92 % | HEART RATE: 81 BPM | HEIGHT: 65 IN

## 2024-09-19 DIAGNOSIS — Z95.2 S/P TAVR (TRANSCATHETER AORTIC VALVE REPLACEMENT): ICD-10-CM

## 2024-09-19 DIAGNOSIS — I48.0 PAF (PAROXYSMAL ATRIAL FIBRILLATION) (HCC): ICD-10-CM

## 2024-09-19 DIAGNOSIS — E78.5 DYSLIPIDEMIA: ICD-10-CM

## 2024-09-19 DIAGNOSIS — I10 ESSENTIAL HYPERTENSION: ICD-10-CM

## 2024-09-19 DIAGNOSIS — Z79.01 CHRONIC ANTICOAGULATION: ICD-10-CM

## 2024-09-19 PROCEDURE — 99214 OFFICE O/P EST MOD 30 MIN: CPT

## 2024-09-19 PROCEDURE — 3074F SYST BP LT 130 MM HG: CPT

## 2024-09-19 PROCEDURE — 3078F DIAST BP <80 MM HG: CPT

## 2024-09-19 RX ORDER — RIVAROXABAN 20 MG/1
20 TABLET, FILM COATED ORAL
Qty: 100 TABLET | Refills: 3 | Status: SHIPPED | OUTPATIENT
Start: 2024-09-19 | End: 2024-09-25

## 2024-09-19 ASSESSMENT — ENCOUNTER SYMPTOMS
NEUROLOGICAL NEGATIVE: 1
PND: 0
PALPITATIONS: 0
ORTHOPNEA: 0
NERVOUS/ANXIOUS: 0
SHORTNESS OF BREATH: 1
GASTROINTESTINAL NEGATIVE: 1
DEPRESSION: 0
EYES NEGATIVE: 1
MUSCULOSKELETAL NEGATIVE: 1

## 2024-09-19 ASSESSMENT — FIBROSIS 4 INDEX: FIB4 SCORE: 3.79

## 2024-09-19 NOTE — PROGRESS NOTES
"Chief Complaint   Patient presents with    Atrial Fibrillation    Aortic Stenosis       Subjective     Lisa Garza is a 88 y.o. female who presents today for follow up.  She has a history of severe aortic stenosis status post TAVR on 7/1/2024.      They are status post TAVR with  #23 Mckenzie Anastacia S3 Ultra Resilia deployed at nominal volume via the transfemoral approach on 7/1/2024 under general anestheia  with Dr. Sellers.  She did have evidence of pericarditis during this hospitalization but with a \"trivial pericardial effusion\" on her postop day 1 echocardiogram, patient was discharged home with prescription for colchicine and ibuprofen.     She was readmitted on 7/15/2024 with pericardial effusion and tamponade, pericardiocentesis with drain placed on the 16th.  Additionally he underwent thoracentesis on the 18th for pleural effusion.  She was discharged on July 18 with a 2-week course of colchicine which she did not tolerate.     At visit with myself on 7/25/2024 since their discharge they have been feeling overall much better, no chest pain or shortness of breath. Currently on 2 L home O2 and wanting to wean off.  NYHA class II symptoms, she is able to do all of her normal activities but has not been going on walks yet, she is seeing PT on Monday.  I ordered repeat echo, chest x-ray, and lipid panel.  Chest x-ray showed resolved pleural effusion, echo showed normal TAVR valve function with normal transvalvular gradients    Today 9/19/2024 she has persistent low energy and YOST, she is having some visual floaters. She is exercising 20 minutes a day and has added her weights back in.  Overall she feels like she is continuing to improve    Past Medical History:   Diagnosis Date    Arrhythmia     a fib    Arthritis     osteo, hip    Breath shortness     Bronchitis     years ago    Cancer (HCC)     skin on left shoulder    Diarrhea 04/09/2024    resolved    Functional diarrhea 12/12/2022    Heart murmur     High " cholesterol     Nonrheumatic aortic valve stenosis 01/25/2024    Pneumonia     years ago    Prediabetes 08/16/2023    Unspecified cataract     tony IOL    Unspecified urinary incontinence     Vitamin D deficiency 06/15/2023     Past Surgical History:   Procedure Laterality Date    MT REPLACE AORTIC VALVE PERQ FEMORAL Bilateral 7/1/2024    Procedure: TRANSCATHETER AORTIC VALVE REPLACEMENT;  Surgeon: Jared Sellers M.D.;  Location: SURGERY [facility];  Service: Cardiac    MT ECHO STEVIE TAVR TMVR LAAC W WO CONT N/A 7/1/2024    Procedure: ECHOCARDIOGRAM, TRANSESOPHAGEAL, INTRAOPERATIVE;  Surgeon: Jared Sellers M.D.;  Location: SURGERY [facility];  Service: Cardiac    APPENDECTOMY LAPAROSCOPIC  10/10/2018    Procedure: APPENDECTOMY LAPAROSCOPIC;  Surgeon: Sushil Dalton M.D.;  Location: SURGERY [facility];  Service: General    HIP ARTHROPLASTY TOTAL Left 07/06/2015    Procedure: HIP ARTHROPLASTY TOTAL;  Surgeon: Navid Howard M.D.;  Location: SURGERY [facility];  Service:     OTHER Right 01/01/2006    cyst & bone spur on right hand    CATARACT EXTRACTION WITH IOL Bilateral     OTHER Right     melanoma removed from right forearm     Family History   Problem Relation Age of Onset    Cancer Father         GI    Heart Disease Father     Lung Disease Father     Stroke Sister     No Known Problems Son     No Known Problems Son     No Known Problems Maternal Grandmother     No Known Problems Maternal Grandfather     No Known Problems Paternal Grandmother     No Known Problems Paternal Grandfather     Hypertension Sister     Hyperlipidemia Sister      Social History     Socioeconomic History    Marital status:      Spouse name: Not on file    Number of children: Not on file    Years of education: Not on file    Highest education level: Not on file   Occupational History    Not on file   Tobacco Use    Smoking status: Former     Current packs/day: 0.00     Average packs/day: 1 pack/day for 33.0 years (33.0  ttl pk-yrs)     Types: Cigarettes     Start date: 1958     Quit date: 1991     Years since quittin.7    Smokeless tobacco: Never    Tobacco comments:     Started smoking at age 23   Vaping Use    Vaping status: Never Used   Substance and Sexual Activity    Alcohol use: Yes     Alcohol/week: 4.2 oz     Types: 7 Glasses of wine per week     Comment: 1 glass wine/day    Drug use: No    Sexual activity: Never     Partners: Male   Other Topics Concern    Not on file   Social History Narrative    She lost her   to Alzheimer's. She lives alone in her own home, 1 story. Has a son who lives nearby. She does all ADLs and IADLs w/o difficulty though recently hired a  to do her outside work. 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 She enjoys reading, learning to play the piano.      Social Determinants of Health     Financial Resource Strain: Not on file   Food Insecurity: No Food Insecurity (2024)    Hunger Vital Sign     Worried About Running Out of Food in the Last Year: Never true     Ran Out of Food in the Last Year: Never true   Transportation Needs: No Transportation Needs (2024)    PRAPARE - Transportation     Lack of Transportation (Medical): No     Lack of Transportation (Non-Medical): No   Physical Activity: Inactive (2024)    Exercise Vital Sign     Days of Exercise per Week: 0 days     Minutes of Exercise per Session: 0 min   Stress: Not on file   Social Connections: Feeling Socially Integrated (2024)    OASIS : Social Isolation     Frequency of experiencing loneliness or isolation: Never   Intimate Partner Violence: Not At Risk (7/15/2024)    Humiliation, Afraid, Rape, and Kick questionnaire     Fear of Current or Ex-Partner: No     Emotionally Abused: No     Physically Abused: No     Sexually Abused: No   Housing Stability: Low Risk  (2024)    Housing Stability Vital Sign     Unable to Pay for Housing in the Last Year: No      Number of Places Lived in the Last Year: 1     Unstable Housing in the Last Year: No     Allergies   Allergen Reactions    Colchicine      diarrhea    Citrus Unspecified     Itchy welts      Clindamycin Unspecified     Pt unsure of reaction    Oatmeal Diarrhea     Gas      Scallops Vomiting     Outpatient Encounter Medications as of 9/19/2024   Medication Sig Dispense Refill    Probiotic Product (PROBIOTIC GUMMIES PO) Take  by mouth.      metoprolol tartrate (LOPRESSOR) 25 MG Tab Take 1 Tablet by mouth 2 times a day. (Patient taking differently: Take 25 mg by mouth every day. Indications: Atrial Fibrillation) 100 Tablet 3    calcium citrate (CALCITRATE) 950 (200 Ca) MG Tab Take 950 mg by mouth every day. Indications: supplement      vitamin D3 (CHOLECALCIFEROL) 1000 Unit (25 mcg) Tab Take 1,000 Units by mouth every day. Indications: supplement      therapeutic multivitamin-minerals (THERAGRAN-M) Tab Take 1 Tablet by mouth every day. Indications: supplement      acetaminophen (TYLENOL) 500 MG Tab Take 500 mg by mouth every 6 hours as needed for Moderate Pain. Indications: Pain      loratadine (CLARITIN) 10 MG Tab Take 10 mg by mouth every day. Indications: Hayfever      simvastatin (ZOCOR) 40 MG Tab TAKE ONE TABLET BY MOUTH IN THE EVENING 100 Tablet 3    rivaroxaban (XARELTO) 20 MG Tab tablet TAKE ONE TABLET BY MOUTH ONE TIME DAILY WITH DINNER 100 Tablet 2     No facility-administered encounter medications on file as of 9/19/2024.     Review of Systems   Constitutional:  Positive for malaise/fatigue.   HENT: Negative.     Eyes: Negative.         Floaters   Respiratory:  Positive for shortness of breath.    Cardiovascular:  Negative for chest pain, palpitations, orthopnea, leg swelling and PND.   Gastrointestinal: Negative.    Genitourinary: Negative.    Musculoskeletal: Negative.    Skin: Negative.    Neurological: Negative.    Endo/Heme/Allergies: Negative.    Psychiatric/Behavioral:  Negative for depression. The  "patient is not nervous/anxious.               Objective     /60 (BP Location: Left arm, Patient Position: Sitting, BP Cuff Size: Adult)   Pulse 81   Resp 18   Ht 1.651 m (5' 5\")   Wt 59.9 kg (132 lb)   SpO2 92%   BMI 21.97 kg/m²     Physical Exam  Constitutional:       Appearance: Normal appearance.   HENT:      Head: Normocephalic and atraumatic.   Neck:      Vascular: No JVD.   Cardiovascular:      Rate and Rhythm: Normal rate and regular rhythm.      Pulses: Normal pulses.      Heart sounds: Normal heart sounds. No murmur heard.     No friction rub.   Pulmonary:      Effort: Pulmonary effort is normal. No respiratory distress.      Breath sounds: Normal breath sounds.   Abdominal:      General: There is no distension.      Palpations: Abdomen is soft.   Musculoskeletal:      Right lower leg: No edema.      Left lower leg: No edema.   Skin:     General: Skin is warm and dry.   Neurological:      General: No focal deficit present.      Mental Status: She is alert and oriented to person, place, and time.   Psychiatric:         Mood and Affect: Mood normal.         Behavior: Behavior normal.            Lab Results   Component Value Date/Time    CHOLSTRLTOT 153 08/10/2024 10:22 AM    LDL 82 08/10/2024 10:22 AM    HDL 58 08/10/2024 10:22 AM    TRIGLYCERIDE 66 08/10/2024 10:22 AM       Lab Results   Component Value Date/Time    SODIUM 139 08/10/2024 10:22 AM    POTASSIUM 4.9 08/10/2024 10:22 AM    CHLORIDE 102 08/10/2024 10:22 AM    CO2 27 08/10/2024 10:22 AM    GLUCOSE 96 08/10/2024 10:22 AM    BUN 16 08/10/2024 10:22 AM    CREATININE 0.62 08/10/2024 10:22 AM     Lab Results   Component Value Date/Time    ALKPHOSPHAT 81 08/10/2024 10:22 AM    ASTSGOT 27 08/10/2024 10:22 AM    ALTSGPT 15 08/10/2024 10:22 AM    TBILIRUBIN 0.6 08/10/2024 10:22 AM      Lab Results   Component Value Date/Time    WBC 7.7 08/10/2024 10:22 AM    RBC 3.90 (L) 08/10/2024 10:22 AM    HEMOGLOBIN 12.8 08/10/2024 10:22 AM    HEMATOCRIT " 39.8 08/10/2024 10:22 AM    .1 (H) 08/10/2024 10:22 AM    MCH 32.8 08/10/2024 10:22 AM    MCHC 32.2 08/10/2024 10:22 AM    MPV 11.0 08/10/2024 10:22 AM    NEUTSPOLYS 72.30 (H) 08/10/2024 10:22 AM    LYMPHOCYTES 14.30 (L) 08/10/2024 10:22 AM    MONOCYTES 9.80 08/10/2024 10:22 AM    EOSINOPHILS 2.70 08/10/2024 10:22 AM    BASOPHILS 0.60 08/10/2024 10:22 AM      Echocardiogram 8/8/2024  CONCLUSIONS  Normal left ventricular size and systolic function.  Known TAVR aortic valve that is functioning normally with normal   transvalvular gradients.  AV peak velocity 1.9 m/s, AV mean gradient 8.5 mmHg    Assessment & Plan     1. PAF (paroxysmal atrial fibrillation) (formerly [facility])        2. S/P TAVR (transcatheter aortic valve replacement)        3. Chronic anticoagulation        4. Dyslipidemia        5. Essential hypertension            Medical Decision Making: Today's Assessment/Status/Plan:        S/P TAVR, NYHA II  -Postoperative course complicated by pericarditis/pericardial effusion now status post pericardiocentesis; pleural effusion status postthoracentesis  -Echo showed normal transvalvular gradients, chest x-ray showed resolved pleural effusion  -groins CDI   -SBE prophylaxis understands  -cardiac rehab referral placed, she will consider attending     PAF  -Continue Xarelto and metoprolol  -Monitor showed burden of 52%  -We did discuss EP referral, she would like to think about it for now     Coronary Artery Disease  We addressed the management of atherosclerotic artery disease.  She is on proper antiplatelet, cholesterol management and beta-blockers as appropriate.  We addressed the potential side effects and laboratory follow-up for these medications.     Hypertension  - good control  - continue current regimen  - goal < 130/80     Hyperlipidemia  -Most recent LDL 82  -She would like to continue simvastatin for now  -Goal of less than 70  -Check lipid panel      Follow-up with Dr. Morel as scheduled in  December     This note was dictated using Dragon speech recognition software.

## 2024-09-21 DIAGNOSIS — I48.91 ATRIAL FIBRILLATION WITH RVR (HCC): ICD-10-CM

## 2024-09-23 RX ORDER — RIVAROXABAN 20 MG/1
20 TABLET, FILM COATED ORAL
Qty: 100 TABLET | Refills: 0 | OUTPATIENT
Start: 2024-09-23

## 2024-09-23 NOTE — TELEPHONE ENCOUNTER
Caller: Lisa Garza    Topic/issue: Pt is calling because she only has 2 days supply left and need this released to the pharmacy.      Callback Number: [phone]    Thank you,   Becca WADSWORTH

## 2024-09-24 ENCOUNTER — TELEPHONE (OUTPATIENT)
Dept: CARDIOLOGY | Facility: MEDICAL CENTER | Age: 89
End: 2024-09-24
Payer: MEDICARE

## 2024-09-24 DIAGNOSIS — I48.91 ATRIAL FIBRILLATION WITH RVR (HCC): ICD-10-CM

## 2024-09-24 NOTE — TELEPHONE ENCOUNTER
Caller: [facility]    Name and Department:     [facility]: [facility]  P: [phone]  F: [phone]    Topic/Issue: MEDICATION MANAGEMENT     Per [facility];    Prescription for XARELTO that was sent back on 9/19 was not received and the pharmacy is requesting a new one. Please advise.    Thank you,  Raul CUEVAS    Callback Number or Extension: [phone]

## 2024-09-24 NOTE — TELEPHONE ENCOUNTER
Attempted to contact patient at [phone] to discuss [facility] and services/benefits offered. No answer, left voicemail.      Pt will go into gap. Copay for 30 days $47 for 90 days $388

## 2024-09-25 ENCOUNTER — TELEPHONE (OUTPATIENT)
Dept: CARDIOLOGY | Facility: MEDICAL CENTER | Age: 89
End: 2024-09-25
Payer: MEDICARE

## 2024-09-25 PROCEDURE — RXMED WILLOW AMBULATORY MEDICATION CHARGE

## 2024-09-25 RX ORDER — RIVAROXABAN 20 MG/1
20 TABLET, FILM COATED ORAL
Qty: 100 TABLET | Refills: 3 | Status: SHIPPED | OUTPATIENT
Start: 2024-09-25

## 2024-09-25 NOTE — TELEPHONE ENCOUNTER
Caller: Lisa Garza      Topic/issue: Patient was calling to follow up on her Xarelto medication and what was going on with it and how long, if it wasn't filled in time, could she be off of it.Please advise      Callback Number: [phone]      Thank you    -Praful FRANCES

## 2024-09-25 NOTE — TELEPHONE ENCOUNTER
Reached out to Rx coordinators to have Xarelto released to pharmacy. They are reaching out to patient to discuss cost.

## 2024-09-26 ENCOUNTER — PHARMACY VISIT (OUTPATIENT)
Dept: PHARMACY | Facility: MEDICAL CENTER | Age: 89
End: 2024-09-26
Payer: COMMERCIAL

## 2024-09-27 DIAGNOSIS — I48.91 ATRIAL FIBRILLATION WITH RVR (HCC): ICD-10-CM

## 2024-09-27 RX ORDER — RIVAROXABAN 20 MG/1
20 TABLET, FILM COATED ORAL
Qty: 100 TABLET | Refills: 0 | OUTPATIENT
Start: 2024-09-27

## 2024-10-10 ENCOUNTER — TELEPHONE (OUTPATIENT)
Dept: CARDIOLOGY | Facility: MEDICAL CENTER | Age: 89
End: 2024-10-10
Payer: MEDICARE

## 2024-10-10 ENCOUNTER — PATIENT OUTREACH (OUTPATIENT)
Dept: HEALTH INFORMATION MANAGEMENT | Facility: OTHER | Age: 89
End: 2024-10-10
Payer: MEDICARE

## 2024-10-10 DIAGNOSIS — E78.5 DYSLIPIDEMIA: ICD-10-CM

## 2024-10-10 DIAGNOSIS — I10 ESSENTIAL HYPERTENSION: ICD-10-CM

## 2024-10-28 DIAGNOSIS — E78.5 DYSLIPIDEMIA: ICD-10-CM

## 2024-10-28 PROCEDURE — RXMED WILLOW AMBULATORY MEDICATION CHARGE

## 2024-10-29 ENCOUNTER — PHARMACY VISIT (OUTPATIENT)
Dept: PHARMACY | Facility: MEDICAL CENTER | Age: 89
End: 2024-10-29
Payer: COMMERCIAL

## 2024-10-29 RX ORDER — SIMVASTATIN 40 MG
40 TABLET ORAL EVERY EVENING
Qty: 100 TABLET | Refills: 3 | Status: SHIPPED | OUTPATIENT
Start: 2024-10-29

## 2024-11-15 ENCOUNTER — PATIENT OUTREACH (OUTPATIENT)
Dept: HEALTH INFORMATION MANAGEMENT | Facility: OTHER | Age: 89
End: 2024-11-15
Payer: MEDICARE

## 2024-11-15 DIAGNOSIS — E78.5 DYSLIPIDEMIA: ICD-10-CM

## 2024-11-15 DIAGNOSIS — I10 ESSENTIAL HYPERTENSION: ICD-10-CM

## 2024-11-16 NOTE — PROGRESS NOTES
Nurse CM outreach call to pt for monthly CCM assessment. Pt answered telephone.    Assessment    Pt reports she is doing okay. Reports she is scheduled to start cardiac rehab on 12/9/24. Pt states she is hoping to increase her energy levels.  Reports she feels tired at times. Pt states she does still get short of breath with exertion. Pt reports no other concerns today. Nurse reviewed medication list.  Pt reports she wasn't sure she wast supposed to be taking metoprolol once a day or twice a day.  Nurse reviewed with pt that metoprolol was 25 mg two times a day.  Pt will clarify dose with cardiology. Pt states she thinks she has only been taking one tablet of metoprolol for past 3 months. Pt has history of HTN. Last blood pressure at clinic was in good range 106/60. Pt has history of dyslipidemia.  Last lipid panel in normal range.     Education and Self-Management of Care    Continue to follow with specialists. Monitor blood pressure readings at home.  Increase activity as tolerated.     Plan of Care and Goals    Reviewed care plan and goals    Goal:  Pt would like to have more energy over the next 3-6 months  Barriers:  Chronic health conditions  Interventions: Increase activity as tolerated, follow-up with specialists, continue to work on heart healthy meal plan.     Start Date:  9/17/24  End Date:      Barriers:    fatigue    Progress:    Continue to work on progress    Next outreach: One month

## 2024-11-16 NOTE — CARE PLAN
Problem: Patient Stated Problem: Lack of energy and stamina  Goal: Patient Stated Goal: Pt would like to have increased energy over the next 3-6 months/short term goal  Outcome: Progressing

## 2024-11-21 ENCOUNTER — APPOINTMENT (RX ONLY)
Dept: URBAN - METROPOLITAN AREA CLINIC 4 | Facility: CLINIC | Age: 89
Setting detail: DERMATOLOGY
End: 2024-11-21

## 2024-11-21 ENCOUNTER — OFFICE VISIT (OUTPATIENT)
Dept: DERMATOLOGY | Facility: IMAGING CENTER | Age: 89
End: 2024-11-21
Payer: MEDICARE

## 2024-11-21 DIAGNOSIS — D18.01 CHERRY ANGIOMA: ICD-10-CM

## 2024-11-21 DIAGNOSIS — L73.8 SEBACEOUS HYPERPLASIA: ICD-10-CM

## 2024-11-21 DIAGNOSIS — L57.0 ACTINIC KERATOSIS: ICD-10-CM

## 2024-11-21 DIAGNOSIS — L82.1 SEBORRHEIC KERATOSES: ICD-10-CM

## 2024-11-21 PROCEDURE — 99203 OFFICE O/P NEW LOW 30 MIN: CPT | Mod: 25 | Performed by: STUDENT IN AN ORGANIZED HEALTH CARE EDUCATION/TRAINING PROGRAM

## 2024-11-21 PROCEDURE — 17003 DESTRUCT PREMALG LES 2-14: CPT | Performed by: STUDENT IN AN ORGANIZED HEALTH CARE EDUCATION/TRAINING PROGRAM

## 2024-11-21 PROCEDURE — 17000 DESTRUCT PREMALG LESION: CPT | Performed by: STUDENT IN AN ORGANIZED HEALTH CARE EDUCATION/TRAINING PROGRAM

## 2024-11-21 NOTE — PROGRESS NOTES
[facility] DERMATOLOGY CLINIC NOTE    Chief Complaint   Patient presents with    [facility] Care     peyton        HPI:    Lisa Garza is a 89 y.o. female here for evaluation of above. Requests PEYTON. Has following skin lesions of concern:     #1- scaling pink bump on nasal dorsum where her glasses rest   #2- pink scaly spots on right forearm, hand   #3- few brown thickened bumps on stomach, legs      No other symptomatic (itching, painful, burning) or changing lesions.       Dermatology History:      - Right distal forearm MIS 2018 s/p [facility]       Review of Systems: No fevers, chill. Pertinent positives and negatives above.       Medications, Medical History, Surgical History, Family History & Allergies:  Reviewed in the chart, relevant history noted above.         PHYSICAL EXAM, ASSESSMENT, & PLAN (per problem):   A total body skin exam was performed including the following areas: head (including face), neck, chest, abdomen, groin/buttocks (excluding genitals), back, bilateral upper extremities, and bilateral lower extremities with the following pertinent findings in assessment/plan.      Actinic Keratosis  Exam: Scaly gritty pink papules on nasal dorsum, right forearm, right hand    - discussed pre-cancerous nature of lesions, potential for progression to cancer if left untreated over time, marker of sun damage.    - proceed with LN2 destructive treatment today of largest/thickest lesions, see procedure note.     Cryotherapy procedure note:  Risks (including, but not limited to: hypo or hyperpigmentation, redness, blister, scar, recurrence) and benefits of cryotherapy discussed.   Patient verbally agreed to proceed with treatment. 2 cryotherapy cycles x 10 sec applied to 3 lesion/s in location as specified in physical exam.   Aftercare instructions: to apply vaseline 2x daily to lesions until healed.    If areas do not heal or continue to grow, crust, bleed, etc, please call office for re-evaluation.        Cherry  Angiomas   Exam: scattered 1-3mm bright red macules and thin papules on trunk and extremities   - benign, reassurance       Seborrheic keratosis   Exam: scattered tan to brown verrucous papules and plaques on back, trunk, arms   - benign, reassurance       Sebaceous Hyperplasia  Ex: Forehead, cheeks with scattered yellowish/red papules with telangiectasias and central dell  - benign, reassurance         History of melanoma in situ   Exam: Well healed scar on right forearm, no nodularity or pigment recurrence.   - denies appetite loss/change, fatigue, LN enlargement, abdominal pain, shortness of breath   - advise regular sun protection/sunscreen use, SPF 30 or greater with broad spectrum coverage need for reapplication every  minutes.   - recommend broad brimmed hats, UPF sun protective clothing when outdoors for extended periods of time   - recommend self checks at home,  ABCDEs of melanoma discussed           Follow up: in 6 months for PEYTON, sooner as needed      Patricia Govea MD   [facility]

## 2024-12-08 ENCOUNTER — PHARMACY VISIT (OUTPATIENT)
Dept: PHARMACY | Facility: MEDICAL CENTER | Age: 89
End: 2024-12-08
Payer: COMMERCIAL

## 2024-12-08 PROCEDURE — RXMED WILLOW AMBULATORY MEDICATION CHARGE

## 2024-12-08 PROCEDURE — RXMED WILLOW AMBULATORY MEDICATION CHARGE: Performed by: INTERNAL MEDICINE

## 2024-12-09 ENCOUNTER — NON-PROVIDER VISIT (OUTPATIENT)
Dept: CARDIOLOGY | Facility: MEDICAL CENTER | Age: 89
End: 2024-12-09
Payer: MEDICARE

## 2024-12-09 DIAGNOSIS — Z95.2 HISTORY OF TRANSCATHETER AORTIC VALVE REPLACEMENT (TAVR): Primary | ICD-10-CM

## 2024-12-09 PROCEDURE — G0423 INTENS CARDIAC REHAB NO EXER: HCPCS | Mod: 59 | Performed by: INTERNAL MEDICINE

## 2024-12-09 PROCEDURE — G0422 INTENS CARDIAC REHAB W/EXERC: HCPCS | Performed by: INTERNAL MEDICINE

## 2024-12-09 NOTE — PROGRESS NOTES
Patient arrived for initial 1:1 Intensive Cardiac Rehabilitation Consultation and Education session with the Registered Nurse.  A total of 60 minutes was spent with the patient during which time a focused cardiovascular assessment was completed and musculoskeletal limitations were addressed in preparation to safely start the exercise portion of the program.  Education regarding the program was explained including exercise goals, precautions, and target heart rate during exercise.  Nutrition goals were reviewed and patient was introduced to the [facility] Program.  Stress management goals were discussed and patient concerns and questions were answered at this time. Patient arrived for education at 0930 and visit was concluded at 1030. Exercise time was from 1030 to 1130.

## 2024-12-10 ENCOUNTER — NON-PROVIDER VISIT (OUTPATIENT)
Dept: CARDIOLOGY | Facility: MEDICAL CENTER | Age: 89
End: 2024-12-10
Payer: MEDICARE

## 2024-12-10 DIAGNOSIS — Z95.2 HISTORY OF TRANSCATHETER AORTIC VALVE REPLACEMENT (TAVR): ICD-10-CM

## 2024-12-10 PROCEDURE — G0422 INTENS CARDIAC REHAB W/EXERC: HCPCS | Performed by: INTERNAL MEDICINE

## 2024-12-10 PROCEDURE — G0423 INTENS CARDIAC REHAB NO EXER: HCPCS | Mod: 59 | Performed by: INTERNAL MEDICINE

## 2024-12-10 NOTE — PROGRESS NOTES
Lisa Garza attended Intensive Cardiac Rehab today from 1000 to 1200. During his time he exercised and attended class.   His education today was a WORKSHOP titled: EXERCISE BASICS. Patient received handouts and class discussion pertaining to the topic.

## 2024-12-11 ENCOUNTER — NON-PROVIDER VISIT (OUTPATIENT)
Dept: CARDIOLOGY | Facility: MEDICAL CENTER | Age: 89
End: 2024-12-11
Payer: MEDICARE

## 2024-12-11 DIAGNOSIS — Z95.2 HISTORY OF TRANSCATHETER AORTIC VALVE REPLACEMENT (TAVR): ICD-10-CM

## 2024-12-11 PROCEDURE — G0422 INTENS CARDIAC REHAB W/EXERC: HCPCS | Performed by: INTERNAL MEDICINE

## 2024-12-11 PROCEDURE — G0423 INTENS CARDIAC REHAB NO EXER: HCPCS | Mod: 59 | Performed by: INTERNAL MEDICINE

## 2024-12-11 NOTE — PROGRESS NOTES
Lisa Garza attended Intensive Cardiac Rehab today from 1000 to 1200. During her time she exercised and attended class. Her education today was a cooking school titled: Adding Flavor Sodium Free. Patient received handouts and class discussion pertaining to the topic.

## 2024-12-12 ENCOUNTER — NON-PROVIDER VISIT (OUTPATIENT)
Dept: CARDIOLOGY | Facility: MEDICAL CENTER | Age: 89
End: 2024-12-12
Payer: MEDICARE

## 2024-12-12 ENCOUNTER — OFFICE VISIT (OUTPATIENT)
Dept: CARDIOLOGY | Facility: MEDICAL CENTER | Age: 89
End: 2024-12-12
Attending: INTERNAL MEDICINE
Payer: MEDICARE

## 2024-12-12 VITALS
SYSTOLIC BLOOD PRESSURE: 100 MMHG | HEIGHT: 65 IN | DIASTOLIC BLOOD PRESSURE: 62 MMHG | OXYGEN SATURATION: 95 % | WEIGHT: 131 LBS | HEART RATE: 72 BPM | BODY MASS INDEX: 21.83 KG/M2

## 2024-12-12 DIAGNOSIS — E78.5 DYSLIPIDEMIA: ICD-10-CM

## 2024-12-12 DIAGNOSIS — I25.10 CORONARY ARTERY CALCIFICATION SEEN ON CAT SCAN: ICD-10-CM

## 2024-12-12 DIAGNOSIS — I10 ESSENTIAL HYPERTENSION: ICD-10-CM

## 2024-12-12 DIAGNOSIS — Z79.01 CHRONIC ANTICOAGULATION: ICD-10-CM

## 2024-12-12 DIAGNOSIS — I48.0 PAF (PAROXYSMAL ATRIAL FIBRILLATION) (HCC): ICD-10-CM

## 2024-12-12 DIAGNOSIS — Z95.2 S/P TAVR (TRANSCATHETER AORTIC VALVE REPLACEMENT): ICD-10-CM

## 2024-12-12 DIAGNOSIS — D68.69 SECONDARY HYPERCOAGULABLE STATE (HCC): ICD-10-CM

## 2024-12-12 PROCEDURE — 3074F SYST BP LT 130 MM HG: CPT | Performed by: INTERNAL MEDICINE

## 2024-12-12 PROCEDURE — 99214 OFFICE O/P EST MOD 30 MIN: CPT | Performed by: INTERNAL MEDICINE

## 2024-12-12 PROCEDURE — G0423 INTENS CARDIAC REHAB NO EXER: HCPCS | Mod: 59 | Performed by: INTERNAL MEDICINE

## 2024-12-12 PROCEDURE — G0422 INTENS CARDIAC REHAB W/EXERC: HCPCS | Performed by: INTERNAL MEDICINE

## 2024-12-12 PROCEDURE — 3078F DIAST BP <80 MM HG: CPT | Performed by: INTERNAL MEDICINE

## 2024-12-12 PROCEDURE — 99212 OFFICE O/P EST SF 10 MIN: CPT | Performed by: INTERNAL MEDICINE

## 2024-12-12 ASSESSMENT — ENCOUNTER SYMPTOMS
LOSS OF CONSCIOUSNESS: 0
PALPITATIONS: 0
SHORTNESS OF BREATH: 0
BRUISES/BLEEDS EASILY: 0
COUGH: 0
DIZZINESS: 0
MYALGIAS: 0

## 2024-12-12 ASSESSMENT — FIBROSIS 4 INDEX: FIB4 SCORE: 3.83

## 2024-12-12 NOTE — PROGRESS NOTES
Lisa Garza attended Intensive Cardiac Rehab today from 1400 to 1600. During her time she exercised and attended class. Her education today was a video titled: Body Composition. Patient received handouts and class discussion pertaining to the topic.

## 2024-12-12 NOTE — PROGRESS NOTES
Chief Complaint   Patient presents with    Atrial Fibrillation    Hypertension    Dyslipidemia       Subjective     Lisa Garza is a 89 y.o. female who presents today for follow-up care.    The patient has PAF, NOAC with rivaroxaban, aortic stenosis, mitral stenosis, coronary calcification, dyslipidemia and hypertension.    Last seen 6/7/2024.  Since last appointment the patient successfully underwent TAVR 7/1/2024 and required rehospitalization 7/15/2024-7/18/2024 for pericardial effusion requiring pericardiocentesis.  Now and cardiac rehab doing well though is making slower progress than she anticipated.  She has had no cardiac symptoms including chest pain, palpitations, shortness of breath.    Hospitalized at [facility] 9/26/2023-9/28/2023 for acute hypoxic respiratory failure, COVID-19 pneumonia treated with Decadron with postinfectious cough x 4-5 weeks improved with codeine.  Since she has had no cardiac symptoms including chest pain, palpitations, exertional shortness of breath, lightheadedness, dizziness or syncope.    Hospitalized at [facility] 7/11/2023-7/12/2023 for frontal headache, diplopia with elevated blood pressure 166/72.  Head negative brain MRI and head CTA.  After discharge saw ophthalmologist Dr. Real who did not find any ophthalmologic abnormalities.  She has had no recurrent symptoms since.  The month before took a week trip to [place].  She denies any cardiac symptoms of shortness of breath, PND or LE edema.  No angina pectoris.     Hospitalized [facility] 8/10/2022 -for scapular and chest pain, normal troponins, MPI normal, echocardiogram with mild aortic stenosis moderate mitral stenosis, discharged on same medications.      Hospitalized rom 3/26/2018-3/31/2018 for pneumonia and atrial fibrillation. She had a new onset of atrial fibrillation she was started on Xarelto for anticoagulation. Her atrial fibrillation reverted back to normal rhythm. Echocardiogram  showed diastolic dysfunction grade 2 and ejection fraction 70%.       The patient previously smoked but quit 20 years ago.  History of alcohol abuse.     Past Medical History:   Diagnosis Date    Arrhythmia     a fib    Arthritis     osteo, hip    Breath shortness     Bronchitis     years ago    Cancer (HCC)     skin on left shoulder    Diarrhea 04/09/2024    resolved    Functional diarrhea 12/12/2022    Heart murmur     High cholesterol     Nonrheumatic aortic valve stenosis 01/25/2024    Pneumonia     years ago    Prediabetes 08/16/2023    Unspecified cataract     tony IOL    Unspecified urinary incontinence     Vitamin D deficiency 06/15/2023     Past Surgical History:   Procedure Laterality Date    AR REPLACE AORTIC VALVE PERQ FEMORAL Bilateral 7/1/2024    Procedure: TRANSCATHETER AORTIC VALVE REPLACEMENT;  Surgeon: Jared Sellers M.D.;  Location: SURGERY [facility];  Service: Cardiac    AR ECHO STEVIE TAVR TMVR LAAC W WO CONT N/A 7/1/2024    Procedure: ECHOCARDIOGRAM, TRANSESOPHAGEAL, INTRAOPERATIVE;  Surgeon: Jared Sellers M.D.;  Location: SURGERY [facility];  Service: Cardiac    APPENDECTOMY LAPAROSCOPIC  10/10/2018    Procedure: APPENDECTOMY LAPAROSCOPIC;  Surgeon: Sushil Dalton M.D.;  Location: SURGERY [facility];  Service: General    HIP ARTHROPLASTY TOTAL Left 07/06/2015    Procedure: HIP ARTHROPLASTY TOTAL;  Surgeon: Navid Howard M.D.;  Location: SURGERY [facility];  Service:     OTHER Right 01/01/2006    cyst & bone spur on right hand    CATARACT EXTRACTION WITH IOL Bilateral     OTHER Right     melanoma removed from right forearm     Family History   Problem Relation Age of Onset    Cancer Father         GI    Heart Disease Father     Lung Disease Father     Stroke Sister     No Known Problems Son     No Known Problems Son     No Known Problems Maternal Grandmother     No Known Problems Maternal Grandfather     No Known Problems Paternal Grandmother     No Known Problems Paternal  Grandfather     Hypertension Sister     Hyperlipidemia Sister      Social History     Socioeconomic History    Marital status:      Spouse name: Not on file    Number of children: Not on file    Years of education: Not on file    Highest education level: Not on file   Occupational History    Not on file   Tobacco Use    Smoking status: Former     Current packs/day: 0.00     Average packs/day: 1 pack/day for 33.0 years (33.0 ttl pk-yrs)     Types: Cigarettes     Start date: 1958     Quit date: 1991     Years since quittin.9    Smokeless tobacco: Never    Tobacco comments:     Started smoking at age 23   Vaping Use    Vaping status: Never Used   Substance and Sexual Activity    Alcohol use: Yes     Alcohol/week: 4.2 oz     Types: 7 Glasses of wine per week     Comment: 1 glass wine/day    Drug use: No    Sexual activity: Never     Partners: Male   Other Topics Concern    Not on file   Social History Narrative    She lost her   to Alzheimer's. She lives alone in her own home, 1 story. Has a son who lives nearby. She does all ADLs and IADLs w/o difficulty though recently hired a  to do her outside work. 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 She enjoys reading, learning to play the piano.      Social Drivers of Health     Financial Resource Strain: Not on file   Food Insecurity: No Food Insecurity (2024)    Hunger Vital Sign     Worried About Running Out of Food in the Last Year: Never true     Ran Out of Food in the Last Year: Never true   Transportation Needs: No Transportation Needs (2024)    PRAPARE - Transportation     Lack of Transportation (Medical): No     Lack of Transportation (Non-Medical): No   Physical Activity: Inactive (2024)    Exercise Vital Sign     Days of Exercise per Week: 0 days     Minutes of Exercise per Session: 0 min   Stress: Not on file   Social Connections: Feeling Socially Integrated (2024)    OASIS :  Social Isolation     Frequency of experiencing loneliness or isolation: Never   Intimate Partner Violence: Not At Risk (7/15/2024)    Humiliation, Afraid, Rape, and Kick questionnaire     Fear of Current or Ex-Partner: No     Emotionally Abused: No     Physically Abused: No     Sexually Abused: No   Housing Stability: Low Risk  (7/18/2024)    Housing Stability Vital Sign     Unable to Pay for Housing in the Last Year: No     Number of Places Lived in the Last Year: 1     Unstable Housing in the Last Year: No     Allergies   Allergen Reactions    Colchicine      diarrhea    Citrus Unspecified     Itchy welts      Clindamycin Unspecified     Pt unsure of reaction    Oatmeal Diarrhea     Gas      Scallops Vomiting     Outpatient Encounter Medications as of 12/12/2024   Medication Sig Dispense Refill    Probiotic Product (MISC INTESTINAL AKOSUA REGULAT) Cap Take  by mouth. 1 cap everyday      simvastatin (ZOCOR) 40 MG Tab TAKE ONE TABLET BY MOUTH IN THE EVENING 100 Tablet 3    rivaroxaban (XARELTO) 20 MG Tab tablet TAKE ONE TABLET BY MOUTH EVERY DAY WITH DINNER 100 Tablet 3    Probiotic Product (PROBIOTIC GUMMIES PO) Take  by mouth.      metoprolol tartrate (LOPRESSOR) 25 MG Tab Take 1 Tablet by mouth 2 times a day. (Patient taking differently: Take 25 mg by mouth every day. Indications: Atrial Fibrillation) 100 Tablet 3    calcium citrate (CALCITRATE) 950 (200 Ca) MG Tab Take 950 mg by mouth every day. Indications: supplement      vitamin D3 (CHOLECALCIFEROL) 1000 Unit (25 mcg) Tab Take 1,000 Units by mouth every day. Indications: supplement      therapeutic multivitamin-minerals (THERAGRAN-M) Tab Take 1 Tablet by mouth every day. Indications: supplement      acetaminophen (TYLENOL) 500 MG Tab Take 500 mg by mouth every 6 hours as needed for Moderate Pain. Indications: Pain      loratadine (CLARITIN) 10 MG Tab Take 10 mg by mouth every day. Indications: Hayfever       No facility-administered encounter medications on  "file as of 12/12/2024.     Review of Systems   Constitutional:  Positive for malaise/fatigue.   HENT:  Negative for nosebleeds.    Respiratory:  Negative for cough and shortness of breath.    Cardiovascular:  Negative for chest pain and palpitations.   Musculoskeletal:  Negative for myalgias.   Neurological:  Negative for dizziness and loss of consciousness.   Endo/Heme/Allergies:  Does not bruise/bleed easily.              Objective     /62 (BP Location: Left arm, Patient Position: Sitting, BP Cuff Size: Adult)   Pulse 72   Ht 1.651 m (5' 5\")   Wt 59.4 kg (131 lb)   SpO2 95%   BMI 21.80 kg/m²     Physical Exam  Constitutional:       General: She is not in acute distress.     Appearance: She is well-developed.   Neck:      Vascular: No JVD.   Cardiovascular:      Rate and Rhythm: Normal rate. Rhythm irregularly irregular.      Pulses: Normal pulses.      Heart sounds: Murmur heard.      No friction rub. No gallop.      Comments: Faint short systolic murmur A2 present  Pulmonary:      Effort: Pulmonary effort is normal. No accessory muscle usage or respiratory distress.      Breath sounds: Normal breath sounds. No wheezing or rales.   Musculoskeletal:      Right lower leg: No edema.      Left lower leg: No edema.   Skin:     General: Skin is warm and dry.      Findings: No rash.      Nails: There is no clubbing.   Neurological:      Mental Status: She is alert and oriented to person, place, and time.   Psychiatric:         Behavior: Behavior normal.            ECHOCARDIOGRAM 3/28/2018  Left ventricular ejection fraction is visually estimated to be 70%.  Mild concentric left ventricular hypertrophy.  Grade II diastolic dysfunction.  Moderately dilated left atrium.  Mild mitral regurgitation.    Mild aortic stenosis.     ECHOCARDIOGRAM 1/13/2022  The left ventricular ejection fraction is visually estimated to be 75%,   borderline hyperdynamic.  Mild concentric left ventricular hypertrophy.  Biatrial " enlargement.  Aortic valve leaflets not well visualized, appear heavily calcified with moderate aortic stenosis:V-max 3.9 m/s, MG 38 mmHg, KONG 1 cm2, DI 0.32.  Calcification of the mitral valve leaflets, consider rheumatic heart disease.  Mild to moderate mitral stenosis: MG 4-5.5 mmHg, HR 58 bpm.  Mild mitral regurgitation, multiple jets present.  Estimated right ventricular systolic pressure is 35 mmHg.  Normal estimated right atrial pressure.    ECHOCARDIOGRAM 8/10/2022  Normal left ventricular size, thickness, systolic function.  Moderate mitral stenosis with restriction in the anterior mitral valve leaflet.  Mild calcification of the aortic valve leaflets and apparently   elongated LVOT with subvalvular flow acceleration (LVOT VTI 38 cm)  Mild aortic stenosis/LVOT obstruction - mean gradient of 21 mmHg.     ECHOCARDIOGRAM 11/16/2023  Hyperdynamic left ventricular systolic function.  The ejection fraction is measured to be 77 % by Laws's biplane.  Moderate to severe aortic valve stenosis. Peak:  68 mmHg, Mean: 40    mmHg. Vmax is 4.1 m/s. DI 0.33  Mild to moderate mitral stenosis.  Normal right ventricular size and systolic function.  Comparisons were made to the prior studies on  1/13/2022 and 8/10/2022   and current study similar to 1/13/2022 study; 8/10/2022 study possibly   underestimated aortic valve gradients; left ventricular function   remains hyperdynamic with increased LVOT flow acceleration (LVOT VTI   36); mitral stenosis unchanged.    ECHOCARDIOGRAM 7/2/2024  Normal left ventricular chamber size. The ejection fraction is measured   to be  75% by Laws's biplane. Normal regional wall motion  Reduced right ventricular systolic function.  Moderate mitral stenosis  Known TAVR aortic valve that is functioning normally with normal   transvalvular gradients  Moderate mitral stenosis.Mild tricuspid regurgitation with estimated RV systolic pressure of 46   mmHg +RAP    ECHOCARDIOGRAM 8/8/2024  Normal  left ventricular size and systolic function.  Known TAVR aortic valve that is functioning normally with normal   transvalvular gradients.  Thickened mitral valve leaflets. Mild mitral annular calcification.   Mild mitral regurgitation. No mitral stenosis.      MPI 8/10/2022   No evidence of significant jeopardized viable myocardium or prior myocardial    infarction.   Significantly limited gated portion of the examination. Correlation with    echocardiogram is recommended for EF evaluation.    ECG INTERPRETATION   Negative stress ECG for ischemia.    CARDIAC CATHETERIZATION 6/20/2024  IMPRESSION:  1.  Mild epicardial luminal irregularities, with mid LAD myocardial bridge.  2.  Minimal PAD in the distal abdominal aorta and iliacs, favorable for TAVR    Assessment & Plan     1. S/P TAVR (transcatheter aortic valve replacement)  Exercise With/Without ECG      2. PAF (paroxysmal atrial fibrillation) ([facility])        3. Secondary hypercoagulable state ([facility])        4. Chronic anticoagulation        5. Coronary artery calcification seen on CAT scan        6. Dyslipidemia        7. 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  CAD, mild by [facility] 6/20/2024  LAD myocardial bridge  Anticoagulation, rivaroxaban  Mitral stenosis   Coronary calcification by CT scan.  Hypertension.  Dyslipidemia.  History of excessive alcohol use.  COVID infection 10/2023     Recommendation Discussion  S/P TAVR, clinically stable, continue with structural heart clinic and echocardiogram surveillance  PAF, clinically stable, no clinical recurrence, continue rivaroxaban, metoprolol.  Coronary calcification, asymptomatic, prior normal MPI, minimal disease by [facility], continue simvastatin, rivaroxaban, metoprolol.  Hypertension, BP normal on outpatient blood pressure log, on metoprolol  Dyslipidemia, LDL 82 on simvastatin  Continue cardiac rehab  RTC 6 months

## 2024-12-13 ENCOUNTER — TELEPHONE (OUTPATIENT)
Dept: CARDIOLOGY | Facility: MEDICAL CENTER | Age: 89
End: 2024-12-13
Payer: MEDICARE

## 2024-12-13 NOTE — TELEPHONE ENCOUNTER
Caller:  Lisa Garza    Topic/issue:   Would like appt with Dr. Sellers  (See Recall notes)    Callback Number:   [phone]    Thank you,   Jammie GURROLA

## 2024-12-13 NOTE — TELEPHONE ENCOUNTER
Called and spoke to patient. 1 year post TAVR follow up visit scheduled with Dr. Sellers. All questions answered and clarification provided.

## 2024-12-17 ENCOUNTER — PATIENT OUTREACH (OUTPATIENT)
Dept: HEALTH INFORMATION MANAGEMENT | Facility: OTHER | Age: 89
End: 2024-12-17

## 2024-12-17 ENCOUNTER — OFFICE VISIT (OUTPATIENT)
Dept: MEDICAL GROUP | Facility: PHYSICIAN GROUP | Age: 89
End: 2024-12-17
Payer: MEDICARE

## 2024-12-17 ENCOUNTER — NON-PROVIDER VISIT (OUTPATIENT)
Dept: CARDIOLOGY | Facility: MEDICAL CENTER | Age: 89
End: 2024-12-17
Payer: MEDICARE

## 2024-12-17 VITALS
HEART RATE: 63 BPM | TEMPERATURE: 97.4 F | DIASTOLIC BLOOD PRESSURE: 70 MMHG | HEIGHT: 65 IN | BODY MASS INDEX: 21.96 KG/M2 | OXYGEN SATURATION: 95 % | SYSTOLIC BLOOD PRESSURE: 122 MMHG | RESPIRATION RATE: 12 BRPM | WEIGHT: 131.8 LBS

## 2024-12-17 DIAGNOSIS — I10 ESSENTIAL HYPERTENSION: ICD-10-CM

## 2024-12-17 DIAGNOSIS — Z95.2 S/P TAVR (TRANSCATHETER AORTIC VALVE REPLACEMENT): ICD-10-CM

## 2024-12-17 DIAGNOSIS — I48.0 PAF (PAROXYSMAL ATRIAL FIBRILLATION) (HCC): ICD-10-CM

## 2024-12-17 DIAGNOSIS — H53.9 VISUAL DISTURBANCE: ICD-10-CM

## 2024-12-17 DIAGNOSIS — E78.5 DYSLIPIDEMIA: ICD-10-CM

## 2024-12-17 PROCEDURE — 99214 OFFICE O/P EST MOD 30 MIN: CPT | Performed by: INTERNAL MEDICINE

## 2024-12-17 PROCEDURE — 3078F DIAST BP <80 MM HG: CPT | Performed by: INTERNAL MEDICINE

## 2024-12-17 PROCEDURE — G0422 INTENS CARDIAC REHAB W/EXERC: HCPCS | Performed by: INTERNAL MEDICINE

## 2024-12-17 PROCEDURE — G0423 INTENS CARDIAC REHAB NO EXER: HCPCS | Mod: 59 | Performed by: INTERNAL MEDICINE

## 2024-12-17 PROCEDURE — 3074F SYST BP LT 130 MM HG: CPT | Performed by: INTERNAL MEDICINE

## 2024-12-17 ASSESSMENT — FIBROSIS 4 INDEX: FIB4 SCORE: 3.83

## 2024-12-17 NOTE — PROGRESS NOTES
CC: Side effects from anesthesia    HPI:  Lisa presents with the following    1. S/P TAVR (transcatheter aortic valve replacement)  Patient is status post TAVR procedure in July 2024.  Patient was rehospitalized with pericardial effusion which seems to have resolved.  She is currently undergoing cardiac rehab for the next 3 months, 3 days a week for 2 hours.  Patient has an appointment with Dr. Sellers for follow-up echocardiogram in July.    2. Visual disturbance  Patient continues to have visual side effects, shooting lights in her vision bilaterally.  Patient did follow-up with ophthalmology and showed no concern.  Her symptoms are decreasing in frequency.    3. PAF (paroxysmal atrial fibrillation) ([facility])  Chronic.  Stable.  Patient continued on Xarelto.  Patient also restarted metoprolol at a lower dose of 25 mg twice daily.  Continued on statin.  Asymptomatic      Patient Active Problem List    Diagnosis Date Noted    Acute pericarditis 07/25/2024    Pericardial effusion 07/16/2024    Pleural effusion 07/15/2024    LBBB (left bundle branch block) 07/02/2024    First degree heart block 07/02/2024    S/P TAVR (transcatheter aortic valve replacement) 07/01/2024    Secondary hypercoagulable state ([facility]) 02/15/2024    PAF (paroxysmal atrial fibrillation) ([facility]) 02/15/2024    Prediabetes 08/16/2023    Dyslipidemia 08/03/2023    Diplopia 07/11/2023    Vitamin D deficiency 06/15/2023    Upper back pain on left side 12/12/2022    Functional diarrhea 12/12/2022    BMI 23.0-23.9, adult 12/05/2022    Peripheral vascular disease, unspecified ([facility]) 12/05/2022    Mitral stenosis 01/17/2022    Osteopenia 11/03/2021    Low back pain without sciatica 04/29/2021    Abdominal aortic atherosclerosis ([facility]) 04/29/2021    History of melanoma 04/29/2021    Hypercoagulability due to atrial fibrillation ([facility]) 04/29/2021    Full code status 04/29/2021    Urge incontinence 04/29/2021    Essential hypertension 11/15/2019    Mild mitral  regurgitation 11/15/2019    Coronary artery calcification seen on CAT scan 09/16/2019    Chronic anticoagulation 08/14/2018    Acute respiratory failure with hypoxia (HCC) 03/28/2018    Low hemoglobin 03/27/2018    Chronic seasonal allergic rhinitis due to pollen 11/16/2017    History of total left hip arthroplasty 05/21/2015    Osteoarthritis, multiple sites 05/21/2015       Current Outpatient Medications   Medication Sig Dispense Refill    Probiotic Product (MISC INTESTINAL AKOSUA REGULAT) Cap Take  by mouth. 1 cap everyday      simvastatin (ZOCOR) 40 MG Tab TAKE ONE TABLET BY MOUTH IN THE EVENING 100 Tablet 3    rivaroxaban (XARELTO) 20 MG Tab tablet TAKE ONE TABLET BY MOUTH EVERY DAY WITH DINNER 100 Tablet 3    metoprolol tartrate (LOPRESSOR) 25 MG Tab Take 1 Tablet by mouth 2 times a day. (Patient taking differently: Take 25 mg by mouth every day. Indications: Atrial Fibrillation) 100 Tablet 3    calcium citrate (CALCITRATE) 950 (200 Ca) MG Tab Take 950 mg by mouth every day. Indications: supplement      vitamin D3 (CHOLECALCIFEROL) 1000 Unit (25 mcg) Tab Take 1,000 Units by mouth every day. Indications: supplement      therapeutic multivitamin-minerals (THERAGRAN-M) Tab Take 1 Tablet by mouth every day. Indications: supplement      acetaminophen (TYLENOL) 500 MG Tab Take 500 mg by mouth every 6 hours as needed for Moderate Pain. Indications: Pain      loratadine (CLARITIN) 10 MG Tab Take 10 mg by mouth every day. Indications: Hayfever       No current facility-administered medications for this visit.         Allergies as of 12/17/2024 - Reviewed 12/17/2024   Allergen Reaction Noted    Colchicine  07/22/2024    [place] Unspecified 08/10/2022    Clindamycin Unspecified 09/26/2023    Oatmeal Diarrhea 08/10/2022    Scallops Vomiting 08/10/2022        Social History     Socioeconomic History    Marital status:      Spouse name: Not on file    Number of children: Not on file    Years of education: Not on file     Highest education level: Not on file   Occupational History    Not on file   Tobacco Use    Smoking status: Former     Current packs/day: 0.00     Average packs/day: 1 pack/day for 33.0 years (33.0 ttl pk-yrs)     Types: Cigarettes     Start date: 1958     Quit date: 1991     Years since quittin.9    Smokeless tobacco: Never    Tobacco comments:     Started smoking at age 23   Vaping Use    Vaping status: Never Used   Substance and Sexual Activity    Alcohol use: Yes     Alcohol/week: 4.2 oz     Types: 7 Glasses of wine per week     Comment: 1 glass wine/day    Drug use: No    Sexual activity: Never     Partners: Male   Other Topics Concern    Not on file   Social History Narrative    She lost her   to Alzheimer's. She lives alone in her own home, 1 story. Has a son who lives nearby. She does all ADLs and IADLs w/o difficulty though recently hired a  to do her outside work. 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 She enjoys reading, learning to play the piano.      Social Drivers of Health     Financial Resource Strain: Not on file   Food Insecurity: No Food Insecurity (2024)    Hunger Vital Sign     Worried About Running Out of Food in the Last Year: Never true     Ran Out of Food in the Last Year: Never true   Transportation Needs: No Transportation Needs (2024)    PRAPARE - Transportation     Lack of Transportation (Medical): No     Lack of Transportation (Non-Medical): No   Physical Activity: Inactive (2024)    Exercise Vital Sign     Days of Exercise per Week: 0 days     Minutes of Exercise per Session: 0 min   Stress: Not on file   Social Connections: Feeling Socially Integrated (2024)    OASIS : Social Isolation     Frequency of experiencing loneliness or isolation: Never   Intimate Partner Violence: Not At Risk (7/15/2024)    Humiliation, Afraid, Rape, and Kick questionnaire     Fear of Current or Ex-Partner: No     Emotionally  Abused: No     Physically Abused: No     Sexually Abused: No   Housing Stability: Low Risk  (7/18/2024)    Housing Stability Vital Sign     Unable to Pay for Housing in the Last Year: No     Number of Places Lived in the Last Year: 1     Unstable Housing in the Last Year: No       Family History   Problem Relation Age of Onset    Cancer Father         GI    Heart Disease Father     Lung Disease Father     Stroke Sister     No Known Problems Son     No Known Problems Son     No Known Problems Maternal Grandmother     No Known Problems Maternal Grandfather     No Known Problems Paternal Grandmother     No Known Problems Paternal Grandfather     Hypertension Sister     Hyperlipidemia Sister        Past Surgical History:   Procedure Laterality Date    PA REPLACE AORTIC VALVE PERQ FEMORAL Bilateral 7/1/2024    Procedure: TRANSCATHETER AORTIC VALVE REPLACEMENT;  Surgeon: Jared Sellers M.D.;  Location: SURGERY [facility];  Service: Cardiac    PA ECHO STEVIE TAVR TMVR LAAC W WO CONT N/A 7/1/2024    Procedure: ECHOCARDIOGRAM, TRANSESOPHAGEAL, INTRAOPERATIVE;  Surgeon: Jared Sellers M.D.;  Location: SURGERY [facility];  Service: Cardiac    APPENDECTOMY LAPAROSCOPIC  10/10/2018    Procedure: APPENDECTOMY LAPAROSCOPIC;  Surgeon: Sushil Dalton M.D.;  Location: SURGERY [facility];  Service: General    HIP ARTHROPLASTY TOTAL Left 07/06/2015    Procedure: HIP ARTHROPLASTY TOTAL;  Surgeon: Navid Howard M.D.;  Location: SURGERY [facility];  Service:     OTHER Right 01/01/2006    cyst & bone spur on right hand    CATARACT EXTRACTION WITH IOL Bilateral     OTHER Right     melanoma removed from right forearm       ROS: Positive ROS per HPI.  Denies any Headache,Chest pain,  Shortness of breath,  Abdominal pain, Changes of bowel or bladder, Lower ext edema, Fevers, Nights sweats, Weight Changes, Focal weakness or numbness.  All other systems are negative.    /70 (BP Location: Right arm, Patient Position: Sitting)   " Pulse 63   Temp 36.3 °C (97.4 °F) (Temporal)   Resp 12   Ht 1.651 m (5' 5\")   Wt 59.8 kg (131 lb 12.8 oz)   SpO2 95%   BMI 21.93 kg/m²      Physical Exam:  Gen:         Alert and oriented, No apparent distress.  HEENT:   Perrla, TM clear,  Oralpharynx no erythema or exudates.  Neck:       No Jugular venous distension, Lymphadenopathy, Thyromegaly, Bruits.  Lungs:     Clear to auscultation bilaterally, no wheezing rhonchi or crackles.  CV:          Regular rate and rhythm.  Mild murmur noted.  Abd:         Soft non tender, non distended. Normal active bowel sounds.             Ext:          No clubbing, cyanosis, edema.      Assessment and Plan.   89 y.o. female presenting with the following.     1. S/P TAVR (transcatheter aortic valve replacement)  Stable.  Complete cardiac rehab as prescribed.  Follow-up echocardiogram in July 2025.    2. Visual disturbance  Stable.  Continue to monitor.    3. PAF (paroxysmal atrial fibrillation) (HCC)  Chronic.  Stable.  Continue current plan of care.    "

## 2024-12-17 NOTE — CARE PLAN
Problem: Patient Stated Problem: Lack of energy and stamina  Goal: Patient Stated Goal: Pt would like to have increased energy over the next 3-6 months/short term goal  Outcome: Progressing  Intervention: Second Patient Stated Intervention: Exercise as tolerated  Note: Pt going to cardiac rehab

## 2024-12-17 NOTE — PROGRESS NOTES
Nurse [facility] met with pt briefly before PCP appt today.    Assessment    Pt reports she has started cardiac rehab program.  Pt is hoping to increase her strength and endurance.  Pt reports she did have follow-up with cardiology provider. Pt has history of HTN. Blood pressure 122/70 in clinic today. Pt has history of dyslipidemia. Pt taking simvastatin. Last lipid panel 8/10/24 in normal range.     Education and Self-Management of Care    Continue to follow with specialists as scheduled.  Increase activity as tolerated. Fall precautions.    Plan of Care and Goals    Reviewed care plan    Barriers:    Declining energy levels    Progress:    Continue to work on progress    Next outreach: One month  Nurse Care Coordinator:  Zahida Angelo  [phone]

## 2024-12-18 ENCOUNTER — NON-PROVIDER VISIT (OUTPATIENT)
Dept: CARDIOLOGY | Facility: MEDICAL CENTER | Age: 89
End: 2024-12-18
Payer: MEDICARE

## 2024-12-18 DIAGNOSIS — Z95.2 S/P TAVR (TRANSCATHETER AORTIC VALVE REPLACEMENT): ICD-10-CM

## 2024-12-18 PROCEDURE — G0423 INTENS CARDIAC REHAB NO EXER: HCPCS | Mod: 59 | Performed by: INTERNAL MEDICINE

## 2024-12-18 PROCEDURE — G0422 INTENS CARDIAC REHAB W/EXERC: HCPCS | Performed by: INTERNAL MEDICINE

## 2024-12-18 NOTE — PROGRESS NOTES
Lisa Garza attended Intensive Cardiac Rehab today from 1000 to 1200. During her time she exercised and attended class. Her education today was a [employer] COOKING WORKSHOP titled: FAST AND HEALTHY BREAKFAST. Patient received handouts and class discussion pertaining to the topic.

## 2024-12-18 NOTE — PROGRESS NOTES
Lisa Garza attended Intensive Cardiac Rehab today from 1600 to 1800. During her time she exercised and attended class. Her education today was a video titled: Label Reading. Patient received handouts and class discussion pertaining to the topic.

## 2024-12-19 ENCOUNTER — NON-PROVIDER VISIT (OUTPATIENT)
Dept: CARDIOLOGY | Facility: MEDICAL CENTER | Age: 89
End: 2024-12-19
Payer: MEDICARE

## 2024-12-19 DIAGNOSIS — Z95.2 S/P TAVR (TRANSCATHETER AORTIC VALVE REPLACEMENT): ICD-10-CM

## 2024-12-19 PROCEDURE — G0422 INTENS CARDIAC REHAB W/EXERC: HCPCS | Performed by: INTERNAL MEDICINE

## 2024-12-19 PROCEDURE — G0423 INTENS CARDIAC REHAB NO EXER: HCPCS | Mod: 59 | Performed by: INTERNAL MEDICINE

## 2024-12-19 NOTE — PROGRESS NOTES
Lisa Garza attended Intensive Cardiac Rehab today from 1000 to 1200. During her time she exercised and attended class. Her education today was a workshop titled: Oj. Patient received handouts and class discussion pertaining to the topic.

## 2024-12-24 ENCOUNTER — NON-PROVIDER VISIT (OUTPATIENT)
Dept: CARDIOLOGY | Facility: MEDICAL CENTER | Age: 89
End: 2024-12-24
Payer: MEDICARE

## 2024-12-24 DIAGNOSIS — Z95.2 S/P TAVR (TRANSCATHETER AORTIC VALVE REPLACEMENT): ICD-10-CM

## 2024-12-24 PROCEDURE — G0423 INTENS CARDIAC REHAB NO EXER: HCPCS | Mod: 59 | Performed by: INTERNAL MEDICINE

## 2024-12-24 PROCEDURE — G0422 INTENS CARDIAC REHAB W/EXERC: HCPCS | Performed by: INTERNAL MEDICINE

## 2024-12-24 NOTE — PROGRESS NOTES
Lisa Garza attended Intensive Cardiac Rehab today from 1000 to 1200. During her time she exercised and attended class. Her education today was a video titled: Exercise Action Plan. Patient received handouts and class discussion pertaining to the topic.

## 2024-12-26 ENCOUNTER — NON-PROVIDER VISIT (OUTPATIENT)
Dept: CARDIOLOGY | Facility: MEDICAL CENTER | Age: 89
End: 2024-12-26
Payer: MEDICARE

## 2024-12-26 DIAGNOSIS — Z95.2 S/P TAVR (TRANSCATHETER AORTIC VALVE REPLACEMENT): ICD-10-CM

## 2024-12-26 PROCEDURE — G0422 INTENS CARDIAC REHAB W/EXERC: HCPCS | Performed by: INTERNAL MEDICINE

## 2024-12-26 PROCEDURE — G0423 INTENS CARDIAC REHAB NO EXER: HCPCS | Mod: 59 | Performed by: INTERNAL MEDICINE

## 2024-12-26 NOTE — PROGRESS NOTES
Lisa Garza attended Intensive Cardiac Rehab today from 1000 to 1200. During her time she exercised and attended class. Her education today was a workshop titled: FOCUSED GOALS AND SUSTAINABLE CHANGE. Patient received handouts and class discussion pertaining to the topic.

## 2025-01-02 ENCOUNTER — NON-PROVIDER VISIT (OUTPATIENT)
Dept: CARDIOLOGY | Facility: MEDICAL CENTER | Age: OVER 89
End: 2025-01-02
Payer: MEDICARE

## 2025-01-02 DIAGNOSIS — Z95.2 S/P TAVR (TRANSCATHETER AORTIC VALVE REPLACEMENT): ICD-10-CM

## 2025-01-02 PROCEDURE — G0422 INTENS CARDIAC REHAB W/EXERC: HCPCS | Performed by: INTERNAL MEDICINE

## 2025-01-02 PROCEDURE — G0423 INTENS CARDIAC REHAB NO EXER: HCPCS | Mod: 59 | Performed by: INTERNAL MEDICINE

## 2025-01-02 NOTE — PROGRESS NOTES
Lisa Garza attended Intensive Cardiac Rehab today from 1000 to 1200. During her time she exercised and attended class. Her education today was a WORKSHOP titled: BIOMECHANICAL LIMITATIONS. Patient received handouts and class discussion pertaining to the topic.

## 2025-01-06 PROCEDURE — RXMED WILLOW AMBULATORY MEDICATION CHARGE

## 2025-01-07 ENCOUNTER — PHARMACY VISIT (OUTPATIENT)
Dept: PHARMACY | Facility: MEDICAL CENTER | Age: OVER 89
End: 2025-01-07
Payer: COMMERCIAL

## 2025-01-07 ENCOUNTER — NON-PROVIDER VISIT (OUTPATIENT)
Dept: CARDIOLOGY | Facility: MEDICAL CENTER | Age: OVER 89
End: 2025-01-07
Payer: MEDICARE

## 2025-01-07 DIAGNOSIS — Z95.2 S/P TAVR (TRANSCATHETER AORTIC VALVE REPLACEMENT): ICD-10-CM

## 2025-01-07 PROCEDURE — G0422 INTENS CARDIAC REHAB W/EXERC: HCPCS | Performed by: INTERNAL MEDICINE

## 2025-01-07 PROCEDURE — G0423 INTENS CARDIAC REHAB NO EXER: HCPCS | Mod: 59 | Performed by: INTERNAL MEDICINE

## 2025-01-07 NOTE — PROGRESS NOTES
Lisa Garza attended Intensive Cardiac Rehab today from 1000 to 1200. During her time she exercised and attended class. Her education today was a video titled: Decoding Labs. Patient received handouts and class discussion pertaining to the topic.

## 2025-01-08 ENCOUNTER — NON-PROVIDER VISIT (OUTPATIENT)
Dept: CARDIOLOGY | Facility: MEDICAL CENTER | Age: OVER 89
End: 2025-01-08
Payer: MEDICARE

## 2025-01-08 DIAGNOSIS — Z95.2 S/P TAVR (TRANSCATHETER AORTIC VALVE REPLACEMENT): ICD-10-CM

## 2025-01-08 PROCEDURE — G0422 INTENS CARDIAC REHAB W/EXERC: HCPCS | Performed by: INTERNAL MEDICINE

## 2025-01-08 PROCEDURE — G0423 INTENS CARDIAC REHAB NO EXER: HCPCS | Mod: 59 | Performed by: INTERNAL MEDICINE

## 2025-01-08 NOTE — PROGRESS NOTES
Lisa Garza attended Intensive Cardiac Rehab today from 1000 to 1200. During her time she exercised and attended class. Her education today was a cooking school titled: Simple Sides and Sauces. Patient received handouts and class discussion pertaining to the topic.

## 2025-01-09 ENCOUNTER — NON-PROVIDER VISIT (OUTPATIENT)
Dept: CARDIOLOGY | Facility: MEDICAL CENTER | Age: OVER 89
End: 2025-01-09
Payer: MEDICARE

## 2025-01-09 DIAGNOSIS — Z95.2 S/P TAVR (TRANSCATHETER AORTIC VALVE REPLACEMENT): ICD-10-CM

## 2025-01-09 PROCEDURE — G0422 INTENS CARDIAC REHAB W/EXERC: HCPCS | Performed by: INTERNAL MEDICINE

## 2025-01-09 PROCEDURE — G0423 INTENS CARDIAC REHAB NO EXER: HCPCS | Mod: 59 | Performed by: INTERNAL MEDICINE

## 2025-01-09 NOTE — PROGRESS NOTES
Lisa Garza attended Intensive Cardiac Rehab today from 1000 to 1200. During her time she exercised and attended class. Her education today was a WORKSHOP titled: HEAD TO HEART. Patient received handouts and class discussion pertaining to the topic.

## 2025-01-14 ENCOUNTER — NON-PROVIDER VISIT (OUTPATIENT)
Dept: CARDIOLOGY | Facility: MEDICAL CENTER | Age: OVER 89
End: 2025-01-14
Payer: MEDICARE

## 2025-01-14 DIAGNOSIS — Z95.2 S/P TAVR (TRANSCATHETER AORTIC VALVE REPLACEMENT): ICD-10-CM

## 2025-01-14 PROCEDURE — G0423 INTENS CARDIAC REHAB NO EXER: HCPCS | Mod: 59 | Performed by: INTERNAL MEDICINE

## 2025-01-14 PROCEDURE — G0422 INTENS CARDIAC REHAB W/EXERC: HCPCS | Performed by: INTERNAL MEDICINE

## 2025-01-14 NOTE — PROGRESS NOTES
Lisa Garza attended Intensive Cardiac Rehab today from 1000 to 1200. During her time she exercised and attended class. Her education today was a video titled: How Our Thoughts Heal Our Hearts. Patient received handouts and class discussion pertaining to the topic.

## 2025-01-15 ENCOUNTER — PHARMACY VISIT (OUTPATIENT)
Dept: PHARMACY | Facility: MEDICAL CENTER | Age: OVER 89
End: 2025-01-15
Payer: COMMERCIAL

## 2025-01-15 PROCEDURE — RXMED WILLOW AMBULATORY MEDICATION CHARGE: Performed by: INTERNAL MEDICINE

## 2025-01-16 ENCOUNTER — NON-PROVIDER VISIT (OUTPATIENT)
Dept: CARDIOLOGY | Facility: MEDICAL CENTER | Age: OVER 89
End: 2025-01-16
Payer: MEDICARE

## 2025-01-16 DIAGNOSIS — Z95.2 S/P TAVR (TRANSCATHETER AORTIC VALVE REPLACEMENT): ICD-10-CM

## 2025-01-16 PROCEDURE — G0422 INTENS CARDIAC REHAB W/EXERC: HCPCS | Performed by: INTERNAL MEDICINE

## 2025-01-16 PROCEDURE — G0423 INTENS CARDIAC REHAB NO EXER: HCPCS | Mod: 59 | Performed by: INTERNAL MEDICINE

## 2025-01-16 NOTE — PROGRESS NOTES
Lisa Garza attended Intensive Cardiac Rehab today from 1000 to 1200. During her time she exercised and attended class. Her education today was a NUTRITION WORKSHOP titled: MINDFUL EATING. Patient received handouts and class discussion pertaining to the topic.

## 2025-01-20 ENCOUNTER — PATIENT OUTREACH (OUTPATIENT)
Dept: HEALTH INFORMATION MANAGEMENT | Facility: OTHER | Age: OVER 89
End: 2025-01-20
Payer: MEDICARE

## 2025-01-20 DIAGNOSIS — I10 ESSENTIAL HYPERTENSION: ICD-10-CM

## 2025-01-20 DIAGNOSIS — E78.5 DYSLIPIDEMIA: ICD-10-CM

## 2025-01-20 NOTE — PROGRESS NOTES
1/20/25 11:15 am: Nurse CM outreach call for monthly CCM assessment. Pt reports she is currently busy. Requesting nurse CM call back this afternoon.     1/20/25 2:00 pm: Nurse CM outreach call to pt for monthly CCM assessment.  Pt answered telephone.    Reason for Follow-Up:    Monthly personal care management outreach call.    Current Health Status:    Pt following in personal care management program for history of HTN, dyslipidemia, atrial fibrillation.  Pt reports current health status is good.    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 Pt continues to follow with cardiology. Pt taking Xarelto for history of atrial fibrillation. Pt has history of HTN. Last blood pressure in clinic 122/70. Pt reports readings at cardiac rehab have been in good range. Pt has history of dyslipidemia. Last lipid panel was 8/10/24 in normal range. Pt taking simvastatin for lipid management. Pt reports she has been having some right shoulder pain related to doing exercises with a resistance band. Pt reports she is monitoring and limiting exercises with her right arm. Pt will notify nurse CM if needing sooner appt with PCP for follow-up.    Medication Updates:  Reviewed medications.  Reports no changes.    Symptoms:  Right shoulder pain. Pt monitoring and will notify nurse if needing sooner appt with PCP.    Plan of Care Goals and Progress:    Goal 1. Pt will have increased energy and stamina     Progress:  Pt reports an improvement in energy since starting cardiac rehab      Barriers:  chronic health conditions     Interventions:  Continue to exercise as tolerated. Following with cardiac rehab. Follow low sodium diet     Education:  Continue to work on following healthy meal plan, limiting sodium in diet. Follow up with specialists as scheduled.    Goal 2. Keep blood pressure in normal range     Progress:  Pt reports blood pressure readings have been in a good range. Pt reports she has  been losing some weight since going to cardiac rehab.      Barriers:  Chronic health conditions. Age related problems.     Interventions:  Continue to monitor blood pressure, continue with cardiac rehab.      Education:  Medication review, exercise as tolerated. Continue to work on following heart healthy meal plan      Patient's Concerns and Feedback (Self Management of Care):     Pt reports only concern is her right shoulder pain. Pt will monitor and if no improvement will call for sooner appt with PCP    Next Steps:     Follow-Up Plan:  Outreach call around 4 weeks, February 2025      Appointments:  Reviewed all appts     Contact Information:  Call [phone] with any questions or concerns.

## 2025-01-20 NOTE — CARE PLAN
Problem: Patient Stated Problem: Lack of energy and stamina  Goal: Patient Stated Goal: Pt would like to have increased energy over the next 3-6 months/short term goal  Outcome: Progressing/pt reports having more energy since starting cardiac rehab    Problem: Knowledge deficit of hypertension  Goal: Demonstrate Knowledge of Hypertension/long term goal  Outcome: Progressing    Problem: Knowledge deficit of factors contributing to hypertension  Goal: Demonstrate factors that can contribute to high blood pressure/long term goal  Outcome: Progressing

## 2025-01-21 ENCOUNTER — NON-PROVIDER VISIT (OUTPATIENT)
Dept: CARDIOLOGY | Facility: MEDICAL CENTER | Age: OVER 89
End: 2025-01-21
Payer: MEDICARE

## 2025-01-21 DIAGNOSIS — Z95.2 S/P TAVR (TRANSCATHETER AORTIC VALVE REPLACEMENT): ICD-10-CM

## 2025-01-21 PROCEDURE — G0423 INTENS CARDIAC REHAB NO EXER: HCPCS | Mod: 59 | Performed by: INTERNAL MEDICINE

## 2025-01-21 PROCEDURE — G0422 INTENS CARDIAC REHAB W/EXERC: HCPCS | Performed by: INTERNAL MEDICINE

## 2025-01-21 NOTE — PROGRESS NOTES
Lisa Garza attended Intensive Cardiac Rehab today from 1000 to 1200. During his time he exercised and attended class.   His education today was a VIDEO  titled: METABOLIC SYNDROME.Patient received handouts and class discussion pertaining to the topic.

## 2025-01-22 ENCOUNTER — NON-PROVIDER VISIT (OUTPATIENT)
Dept: CARDIOLOGY | Facility: MEDICAL CENTER | Age: OVER 89
End: 2025-01-22
Payer: MEDICARE

## 2025-01-22 DIAGNOSIS — Z95.2 S/P TAVR (TRANSCATHETER AORTIC VALVE REPLACEMENT): ICD-10-CM

## 2025-01-22 PROCEDURE — G0423 INTENS CARDIAC REHAB NO EXER: HCPCS | Mod: 59 | Performed by: INTERNAL MEDICINE

## 2025-01-22 PROCEDURE — G0422 INTENS CARDIAC REHAB W/EXERC: HCPCS | Performed by: INTERNAL MEDICINE

## 2025-01-22 NOTE — PROGRESS NOTES
Lisa Garza attended Intensive Cardiac Rehab today from 1000 to 1200. During her time she exercised and attended class. Her education today was a cooking school titled: Delicious Desserts. Patient received handouts and class discussion pertaining to the topic.

## 2025-01-23 ENCOUNTER — NON-PROVIDER VISIT (OUTPATIENT)
Dept: CARDIOLOGY | Facility: MEDICAL CENTER | Age: OVER 89
End: 2025-01-23
Payer: MEDICARE

## 2025-01-23 DIAGNOSIS — Z95.2 S/P TAVR (TRANSCATHETER AORTIC VALVE REPLACEMENT): ICD-10-CM

## 2025-01-23 PROCEDURE — G0423 INTENS CARDIAC REHAB NO EXER: HCPCS | Mod: 59 | Performed by: INTERNAL MEDICINE

## 2025-01-23 PROCEDURE — G0422 INTENS CARDIAC REHAB W/EXERC: HCPCS | Performed by: INTERNAL MEDICINE

## 2025-01-23 NOTE — PROGRESS NOTES
Lisa Garza attended Intensive Cardiac Rehab today from 0900 to 1200. During her time she exercised and attended class. Her education today was a EXERCISE WORKSHOP titled: BALANCE TRAINING AND FALL PREVENTION. She also attended 2 additional videos titled: YOGA AND VITAMINS AND MINERALS today. Patient received handouts and class discussion pertaining to the topic.

## 2025-01-28 ENCOUNTER — NON-PROVIDER VISIT (OUTPATIENT)
Dept: CARDIOLOGY | Facility: MEDICAL CENTER | Age: OVER 89
End: 2025-01-28
Payer: MEDICARE

## 2025-01-28 DIAGNOSIS — Z95.2 S/P TAVR (TRANSCATHETER AORTIC VALVE REPLACEMENT): ICD-10-CM

## 2025-01-28 NOTE — PROGRESS NOTES
Lisa Garza attended Intensive Cardiac Rehab today from 1000 to 1200. During her time she exercised and attended class. Her education today was a workshop titled: Healthy Sleep. Patient received handouts and class discussion pertaining to the topic.

## 2025-01-29 ENCOUNTER — NON-PROVIDER VISIT (OUTPATIENT)
Dept: CARDIOLOGY | Facility: MEDICAL CENTER | Age: OVER 89
End: 2025-01-29
Payer: MEDICARE

## 2025-01-29 DIAGNOSIS — Z95.2 S/P TAVR (TRANSCATHETER AORTIC VALVE REPLACEMENT): ICD-10-CM

## 2025-01-29 PROCEDURE — RXMED WILLOW AMBULATORY MEDICATION CHARGE: Performed by: INTERNAL MEDICINE

## 2025-01-29 NOTE — PROGRESS NOTES
Lisa Garza attended Intensive Cardiac Rehab today from 1000 to 1200. During her time she exercised and attended class. Her education today was a COOKING WORKSHOP titled: SAVORY SOUPS. Patient received handouts and class discussion pertaining to the topic.

## 2025-01-30 ENCOUNTER — PHARMACY VISIT (OUTPATIENT)
Dept: PHARMACY | Facility: MEDICAL CENTER | Age: OVER 89
End: 2025-01-30
Payer: COMMERCIAL

## 2025-01-30 ENCOUNTER — NON-PROVIDER VISIT (OUTPATIENT)
Dept: CARDIOLOGY | Facility: MEDICAL CENTER | Age: OVER 89
End: 2025-01-30
Payer: MEDICARE

## 2025-01-30 DIAGNOSIS — Z95.2 S/P TAVR (TRANSCATHETER AORTIC VALVE REPLACEMENT): ICD-10-CM

## 2025-01-30 NOTE — PROGRESS NOTES
Lisa Garza attended Intensive Cardiac Rehab today from 1000 to 1200. During her time she exercised and attended class. Her education today was a video titled: Nutrition Action Plan. Patient received handouts and class discussion pertaining to the topic.

## 2025-02-04 ENCOUNTER — TELEPHONE (OUTPATIENT)
Dept: CARDIOLOGY | Facility: MEDICAL CENTER | Age: OVER 89
End: 2025-02-04

## 2025-02-04 ENCOUNTER — TELEPHONE (OUTPATIENT)
Dept: MEDICAL GROUP | Facility: PHYSICIAN GROUP | Age: OVER 89
End: 2025-02-04

## 2025-02-04 ENCOUNTER — NON-PROVIDER VISIT (OUTPATIENT)
Dept: CARDIOLOGY | Facility: MEDICAL CENTER | Age: OVER 89
End: 2025-02-04
Payer: MEDICARE

## 2025-02-04 DIAGNOSIS — Z95.2 S/P TAVR (TRANSCATHETER AORTIC VALVE REPLACEMENT): ICD-10-CM

## 2025-02-04 PROCEDURE — G0423 INTENS CARDIAC REHAB NO EXER: HCPCS | Mod: 59 | Performed by: INTERNAL MEDICINE

## 2025-02-04 PROCEDURE — G0422 INTENS CARDIAC REHAB W/EXERC: HCPCS | Performed by: INTERNAL MEDICINE

## 2025-02-04 NOTE — TELEPHONE ENCOUNTER
Pt called and left message with MA requesting a return call regarding receiving a cortisone injection for shoulder pain.  Pt currently going to cardiac rehab.  Pt was seen recently by surgical PA who recommended conservative treatment including Voltaren gel, ice and Tylenol.  Referral was also placed by PA for physical therapy. Pt would like clarification from PCP if okay to do cortisone. Nurse spoke to PCP and PCP also recommends no cortisone injection at this time. Nurse notified pt of PCP recommendation. Pt will continue to monitor symptoms and will follow-up with PCP.

## 2025-02-04 NOTE — PROGRESS NOTES
Lisa Garza attended Intensive Cardiac Rehab today from 1000 to 1200. During her time she exercised and attended class. Her education today was a video titled: Diseases of Our Time. Patient received handouts and class discussion pertaining to the topic.

## 2025-02-05 ENCOUNTER — NON-PROVIDER VISIT (OUTPATIENT)
Dept: CARDIOLOGY | Facility: MEDICAL CENTER | Age: OVER 89
End: 2025-02-05
Payer: MEDICARE

## 2025-02-05 ENCOUNTER — TELEPHONE (OUTPATIENT)
Dept: CARDIOLOGY | Facility: MEDICAL CENTER | Age: OVER 89
End: 2025-02-05

## 2025-02-05 DIAGNOSIS — Z95.2 S/P TAVR (TRANSCATHETER AORTIC VALVE REPLACEMENT): ICD-10-CM

## 2025-02-05 PROCEDURE — G0423 INTENS CARDIAC REHAB NO EXER: HCPCS | Mod: 59 | Performed by: INTERNAL MEDICINE

## 2025-02-05 PROCEDURE — G0422 INTENS CARDIAC REHAB W/EXERC: HCPCS | Performed by: INTERNAL MEDICINE

## 2025-02-05 NOTE — TELEPHONE ENCOUNTER
Isaías Anaya Marina,     I am working with Lisa at Intensive Cardiac Rehab. She recently had imaging done on her right shoulder and those results are in her chart. Today she is rating the pain as 7/10 and sharp with abduction movements. She was told she has calcium deposits and was advised to have a cortisone injection. Are there any contraindications to her receiving the cortisone injection due to her recent TAVR or for other reasons?     Thank you for your mutual care of this patient,    SANDIP Hyman

## 2025-02-05 NOTE — PROGRESS NOTES
Lisa Garza attended Intensive Cardiac Rehab today from 1000 to 1200. During her time she exercised and attended class. Her education today was a cooking school titled: Cooking Meals in a Snap. Patient received handouts and class discussion pertaining to the topic.

## 2025-02-06 ENCOUNTER — NON-PROVIDER VISIT (OUTPATIENT)
Dept: CARDIOLOGY | Facility: MEDICAL CENTER | Age: OVER 89
End: 2025-02-06
Payer: MEDICARE

## 2025-02-06 DIAGNOSIS — Z95.2 S/P TAVR (TRANSCATHETER AORTIC VALVE REPLACEMENT): ICD-10-CM

## 2025-02-06 NOTE — PROGRESS NOTES
Mariela Garza attended Intensive Cardiac Rehab today from 1000 to 1200. During his time he exercised and attended class.   His education today WORKSHOP titled: TARGETING NUTRITIONAL PRIORITIES. Patient received handouts and class discussion pertaining to the topic.

## 2025-02-11 ENCOUNTER — NON-PROVIDER VISIT (OUTPATIENT)
Dept: CARDIOLOGY | Facility: MEDICAL CENTER | Age: OVER 89
End: 2025-02-11
Payer: MEDICARE

## 2025-02-11 DIAGNOSIS — Z95.2 S/P TAVR (TRANSCATHETER AORTIC VALVE REPLACEMENT): ICD-10-CM

## 2025-02-11 PROCEDURE — G0422 INTENS CARDIAC REHAB W/EXERC: HCPCS | Performed by: INTERNAL MEDICINE

## 2025-02-11 PROCEDURE — G0423 INTENS CARDIAC REHAB NO EXER: HCPCS | Mod: 59 | Performed by: INTERNAL MEDICINE

## 2025-02-11 NOTE — PROGRESS NOTES
Lisa Garza attended Intensive Cardiac Rehab today from 1000 to 1200. During her time she exercised and attended class. Her education today was a VIDEO titled: HYPERTENSION AND HEART DISEASE. Patient received handouts and class discussion pertaining to the topic.

## 2025-02-12 ENCOUNTER — NON-PROVIDER VISIT (OUTPATIENT)
Dept: CARDIOLOGY | Facility: MEDICAL CENTER | Age: OVER 89
End: 2025-02-12
Payer: MEDICARE

## 2025-02-12 DIAGNOSIS — Z95.2 S/P TAVR (TRANSCATHETER AORTIC VALVE REPLACEMENT): ICD-10-CM

## 2025-02-12 PROCEDURE — G0422 INTENS CARDIAC REHAB W/EXERC: HCPCS | Performed by: INTERNAL MEDICINE

## 2025-02-12 PROCEDURE — G0423 INTENS CARDIAC REHAB NO EXER: HCPCS | Mod: 59 | Performed by: INTERNAL MEDICINE

## 2025-02-12 NOTE — PROGRESS NOTES
Lisa Garza attended Intensive Cardiac Rehab today from 1000 to 1200 and 1300 to 1340. During her time she exercised and attended class. Her education today was a cooking school titled: Tasty Apps and Sides. She also watched an additional video. Patient received handouts and class discussion pertaining to the topic.        No

## 2025-02-14 ENCOUNTER — TELEPHONE (OUTPATIENT)
Dept: HEALTH INFORMATION MANAGEMENT | Facility: OTHER | Age: OVER 89
End: 2025-02-14
Payer: MEDICARE

## 2025-02-18 ENCOUNTER — NON-PROVIDER VISIT (OUTPATIENT)
Dept: CARDIOLOGY | Facility: MEDICAL CENTER | Age: OVER 89
End: 2025-02-18
Payer: MEDICARE

## 2025-02-18 DIAGNOSIS — Z95.2 S/P TAVR (TRANSCATHETER AORTIC VALVE REPLACEMENT): ICD-10-CM

## 2025-02-18 PROCEDURE — G0423 INTENS CARDIAC REHAB NO EXER: HCPCS | Mod: 59 | Performed by: INTERNAL MEDICINE

## 2025-02-18 PROCEDURE — G0422 INTENS CARDIAC REHAB W/EXERC: HCPCS | Performed by: INTERNAL MEDICINE

## 2025-02-18 NOTE — PROGRESS NOTES
Lisa Garza attended Intensive Cardiac Rehab today from 1000 to 1200. During her time she exercised and attended class. Her education today was a WORKSHOP titled: MANAGING HEART DISEASE PART 1. Patient received handouts and class discussion pertaining to the topic.

## 2025-02-19 ENCOUNTER — NON-PROVIDER VISIT (OUTPATIENT)
Dept: CARDIOLOGY | Facility: MEDICAL CENTER | Age: OVER 89
End: 2025-02-19
Payer: MEDICARE

## 2025-02-19 DIAGNOSIS — Z95.2 S/P TAVR (TRANSCATHETER AORTIC VALVE REPLACEMENT): ICD-10-CM

## 2025-02-19 PROCEDURE — G0422 INTENS CARDIAC REHAB W/EXERC: HCPCS | Performed by: INTERNAL MEDICINE

## 2025-02-19 PROCEDURE — G0423 INTENS CARDIAC REHAB NO EXER: HCPCS | Mod: 59 | Performed by: INTERNAL MEDICINE

## 2025-02-19 NOTE — PROGRESS NOTES
Lisa Garza attended Intensive Cardiac Rehab today from 1000 to 1200. During her time she exercised and attended class. Her education today was a cooking school titled: Comforting Weekend Breakfasts. Patient received handouts and class discussion pertaining to the topic.

## 2025-02-20 ENCOUNTER — NON-PROVIDER VISIT (OUTPATIENT)
Dept: CARDIOLOGY | Facility: MEDICAL CENTER | Age: OVER 89
End: 2025-02-20
Payer: MEDICARE

## 2025-02-20 DIAGNOSIS — Z95.2 S/P TAVR (TRANSCATHETER AORTIC VALVE REPLACEMENT): ICD-10-CM

## 2025-02-20 PROCEDURE — G0423 INTENS CARDIAC REHAB NO EXER: HCPCS | Mod: 59 | Performed by: INTERNAL MEDICINE

## 2025-02-20 PROCEDURE — G0422 INTENS CARDIAC REHAB W/EXERC: HCPCS | Performed by: INTERNAL MEDICINE

## 2025-02-20 NOTE — PROGRESS NOTES
Lisa Garza attended Intensive Cardiac Rehab today from 1000 to 1200. During her time she exercised and attended class. Her education today was a workshop titled: Recognizing and Reducing Stress. Patient received handouts and class discussion pertaining to the topic.     She watched an additional video called DINING OUT PART 2

## 2025-02-24 ENCOUNTER — TELEPHONE (OUTPATIENT)
Dept: MEDICAL GROUP | Facility: PHYSICIAN GROUP | Age: OVER 89
End: 2025-02-24
Payer: MEDICARE

## 2025-02-24 ENCOUNTER — PATIENT OUTREACH (OUTPATIENT)
Dept: HEALTH INFORMATION MANAGEMENT | Facility: OTHER | Age: OVER 89
End: 2025-02-24
Payer: MEDICARE

## 2025-02-24 DIAGNOSIS — I10 ESSENTIAL HYPERTENSION: ICD-10-CM

## 2025-02-24 DIAGNOSIS — E78.5 DYSLIPIDEMIA: ICD-10-CM

## 2025-02-24 NOTE — PROGRESS NOTES
2/24 25 9:45 am: Nurse [facility] outreach call to pt for monthly CCM assessment.  VM left requesting a return call to nurse at [phone].    2/24/25 11:25 am: Nurse LA NENA received return call from pt.  Monthly CCM assessment completed.    Reason for Follow-Up:    Monthly CCM assessment.    Current Health Status:    Pt following in personal care management for history of HTN and dyslipidemia.    Medical Updates:  Pt reports she went to [facility] earlier today regarding ongoing problem with pain in her right shoulder. Pt reports she was informed she should get a cortisone injection in the shoulder. Pt is wanting okay from PCP to get cortisone injection. Pt reports her cardiology nurse sent a message to PCP.  Pt reports cardiac team was okay with her getting an injection and pt wants to know if PCP doesn't recommend the injection, why it wouldn't be recommended. Nurse will route message to PCP.     Pt has history of HTN. Reports she continues to follow with cardiac rehab. Reports blood pressure readings have been in a good range.    Medication Updates:  Reports no changes to medications.    Symptoms:  Pain in right shoulder    Plan of Care Goals and Progress:    Goal 1. Maintain blood pressure in good range     Progress:  Pt reports blood pressure readings have been stable      Barriers:  Chronic health conditions     Interventions:  Continue to follow with cardiac rehab. Monitor blood pressure readings.     Education:  Follow heart healthy meal plan and recommendations from cardiac rehab.        Patient's Concerns and Feedback (Self Management of Care):     Pt reports ongoing pain in right shoulder. Requesting okay from PCP to proceed with getting cortisone injection    Next Steps:     Follow-Up Plan:  Follow-up in approximately 4 week, around March 2025.      Appointments:  Reviewed, pt following with cardiac rehab and has schedule of appts.  PCP 4/18/25 at 11:15 am.     Contact Information:  Call [phone] with any questions  or concerns.

## 2025-02-24 NOTE — CARE PLAN
Problem: Knowledge deficit of factors contributing to hypertension  Goal: Demonstrate factors that can contribute to high blood pressure/long term goal  Outcome: Progressing  Note: Patient has verbalized understanding of factors contributing to high blood pressure      Problem: Recognize current health habits that may contribute to hypertension  Goal: Identify which modifiable health habits can be modified/long term goal  Outcome: Progressing     Problem: Knowledge deficit of self-monitoring blood pressure  Goal: Demonstrate the elements of self-monitoring blood pressure/long term goal  Outcome: Progressing  Note:   -Patient has verbalized commitment to self-monitoring blood pressure at least once a week.    -Patient will log home blood pressures and bring the log to medical provider appointments.

## 2025-02-24 NOTE — TELEPHONE ENCOUNTER
PCP requesting clearance form for pt to receive cortisone injection be faxed to PCP office at [phone].  PCP will complete form after receiving document. Message routed to [facility] provider.

## 2025-02-25 ENCOUNTER — NON-PROVIDER VISIT (OUTPATIENT)
Dept: CARDIOLOGY | Facility: MEDICAL CENTER | Age: OVER 89
End: 2025-02-25
Payer: MEDICARE

## 2025-02-25 DIAGNOSIS — Z95.2 S/P TAVR (TRANSCATHETER AORTIC VALVE REPLACEMENT): ICD-10-CM

## 2025-02-25 NOTE — PROGRESS NOTES
Lisa Garza attended Intensive Cardiac Rehab today from 1000 to 1200. During her time she exercised and attended class. Her education today was a video titled: Dining Out. Patient received handouts and class discussion pertaining to the topic.

## 2025-02-26 ENCOUNTER — TELEPHONE (OUTPATIENT)
Dept: MEDICAL GROUP | Facility: PHYSICIAN GROUP | Age: OVER 89
End: 2025-02-26

## 2025-02-26 ENCOUNTER — NON-PROVIDER VISIT (OUTPATIENT)
Dept: CARDIOLOGY | Facility: MEDICAL CENTER | Age: OVER 89
End: 2025-02-26
Payer: MEDICARE

## 2025-02-26 DIAGNOSIS — Z95.2 S/P TAVR (TRANSCATHETER AORTIC VALVE REPLACEMENT): ICD-10-CM

## 2025-02-26 NOTE — TELEPHONE ENCOUNTER
VOICEMAIL  1. Caller Name: Lisa                      Call Back Number: [phone] (home)      2. Message: Patient is inquiring about her cortisone clearance form, if [facility] sent it over.    3. Patient approves office to leave a detailed voicemail/MyChart message: yes

## 2025-02-26 NOTE — TELEPHONE ENCOUNTER
MA for Dr. Garcia reports [facility] sent over a letter requesting PCP send over a clearance for cortisone injection. PCP is out until 3/4/25. Pt notified. Pt states she would like this completed as soon as possible as she is having a lot of pain in her shoulder.

## 2025-02-26 NOTE — PROGRESS NOTES
Lisa Garza attended Intensive Cardiac Rehab today from 1000 to 1200. During her time she exercised and attended class. Her education today was a cooking school titled: One Pot Wonders. Patient received handouts and class discussion pertaining to the topic.

## 2025-02-27 ENCOUNTER — NON-PROVIDER VISIT (OUTPATIENT)
Dept: CARDIOLOGY | Facility: MEDICAL CENTER | Age: OVER 89
End: 2025-02-27
Payer: MEDICARE

## 2025-02-27 DIAGNOSIS — Z95.2 S/P TAVR (TRANSCATHETER AORTIC VALVE REPLACEMENT): ICD-10-CM

## 2025-02-27 NOTE — PROGRESS NOTES
Lisa Garaz attended Intensive Cardiac Rehab today from 1000 to 1200. During her time she exercised and attended class. Her education today was a workshop titled: Dining Out . Patient received handouts and class discussion pertaining to the topic.

## 2025-02-27 NOTE — TELEPHONE ENCOUNTER
Pt was offered appt at another clinic. Pt didn't want to be seen with a different provider for clearance.

## 2025-03-04 ENCOUNTER — APPOINTMENT (OUTPATIENT)
Dept: CARDIOLOGY | Facility: MEDICAL CENTER | Age: OVER 89
End: 2025-03-04
Payer: MEDICARE

## 2025-03-04 DIAGNOSIS — Z95.2 S/P TAVR (TRANSCATHETER AORTIC VALVE REPLACEMENT): ICD-10-CM

## 2025-03-04 PROCEDURE — G0423 INTENS CARDIAC REHAB NO EXER: HCPCS | Mod: 59 | Performed by: INTERNAL MEDICINE

## 2025-03-04 PROCEDURE — G0422 INTENS CARDIAC REHAB W/EXERC: HCPCS | Performed by: INTERNAL MEDICINE

## 2025-03-04 NOTE — PROGRESS NOTES
Lisa Garza attended Intensive Cardiac Rehab today from 1000 to 1200. During her time she exercised and attended class. Her education today was a VIDEO titled: FACTS ON FATS. Patient received handouts and class discussion pertaining to the topic.

## 2025-03-05 ENCOUNTER — NON-PROVIDER VISIT (OUTPATIENT)
Dept: CARDIOLOGY | Facility: MEDICAL CENTER | Age: OVER 89
End: 2025-03-05
Payer: MEDICARE

## 2025-03-05 ENCOUNTER — TELEPHONE (OUTPATIENT)
Dept: MEDICAL GROUP | Facility: PHYSICIAN GROUP | Age: OVER 89
End: 2025-03-05

## 2025-03-05 DIAGNOSIS — Z95.2 S/P TAVR (TRANSCATHETER AORTIC VALVE REPLACEMENT): ICD-10-CM

## 2025-03-05 PROCEDURE — G0422 INTENS CARDIAC REHAB W/EXERC: HCPCS | Performed by: INTERNAL MEDICINE

## 2025-03-05 PROCEDURE — G0423 INTENS CARDIAC REHAB NO EXER: HCPCS | Mod: 59 | Performed by: INTERNAL MEDICINE

## 2025-03-05 NOTE — TELEPHONE ENCOUNTER
Pt reports she is scheduled to receive the cortisone injection in her shoulder on 3/12/25 at [facility].  Pt is questioning how long Dr. Garcia recommends she stay off the Xarelto before procedure. Nurse will route message to PCP.

## 2025-03-05 NOTE — TELEPHONE ENCOUNTER
Nurse spoke to PCP regarding pt scheduled to have cortisone injection in shoulder on 3/12/25. Per PCP recommendation is for pt to hold Xarelto for 24 hours prior to procedure. PCP will send letter to [facility] with recommendation. Pt notified of PCP recommendation.

## 2025-03-05 NOTE — PROGRESS NOTES
Lisa Garza attended Intensive Cardiac Rehab today from 1000 to 1200. During her time she exercised and attended class. Her education today was a cooking school titled: Fast Evening Meals. Patient received handouts and class discussion pertaining to the topic.

## 2025-03-06 ENCOUNTER — NON-PROVIDER VISIT (OUTPATIENT)
Dept: CARDIOLOGY | Facility: MEDICAL CENTER | Age: OVER 89
End: 2025-03-06
Payer: MEDICARE

## 2025-03-06 DIAGNOSIS — Z95.2 S/P TAVR (TRANSCATHETER AORTIC VALVE REPLACEMENT): ICD-10-CM

## 2025-03-06 PROCEDURE — G0422 INTENS CARDIAC REHAB W/EXERC: HCPCS | Performed by: INTERNAL MEDICINE

## 2025-03-06 PROCEDURE — G0423 INTENS CARDIAC REHAB NO EXER: HCPCS | Mod: 59 | Performed by: INTERNAL MEDICINE

## 2025-03-07 NOTE — PROGRESS NOTES
Lisa Garza attended Intensive Cardiac Rehab today from 1000 to 1200. During her time she exercised and attended class. Her education today was a workshop titled: Managing Heart Disease Part 2. Patient received handouts and class discussion pertaining to the topic.

## 2025-03-11 ENCOUNTER — APPOINTMENT (OUTPATIENT)
Dept: CARDIOLOGY | Facility: MEDICAL CENTER | Age: OVER 89
End: 2025-03-11
Payer: MEDICARE

## 2025-03-12 ENCOUNTER — APPOINTMENT (OUTPATIENT)
Dept: CARDIOLOGY | Facility: MEDICAL CENTER | Age: OVER 89
End: 2025-03-12
Payer: MEDICARE

## 2025-03-24 ENCOUNTER — PATIENT OUTREACH (OUTPATIENT)
Dept: HEALTH INFORMATION MANAGEMENT | Facility: OTHER | Age: OVER 89
End: 2025-03-24
Payer: MEDICARE

## 2025-03-24 DIAGNOSIS — E78.5 DYSLIPIDEMIA: ICD-10-CM

## 2025-03-24 DIAGNOSIS — I10 ESSENTIAL HYPERTENSION: ICD-10-CM

## 2025-03-24 NOTE — PROGRESS NOTES
"3/24/25 11:25 am: Nurse CM outreach call to pt for monthly CCM assessment.  VM left requesting a return call to nurse at [phone].    3/24/25 2:50 pm: Pt returned nurse CM call.  Monthly CCM assessment completed.     Reason for Follow-Up:    Monthly CCM assessment    Current Health Status:    Pt following in personal care management for history of HTN, and dyslipidemia.     Medical Updates:  Pt reports she received cortisol injection in her shoulder. Reports she is feeling much better and is now going to physical therapy several days a week.  Pt has history of TAVR. Reports she completed cardiac rehab.  Pt reports \" I have never felt so good\".  Pt reports her energy levels have improved. Reports she is following a healthy meal plan and is exercising. Pt has history of HTN. Reports she is monitoring readings at home. States blood pressure in good control.  Pt reports she did have a fall several weeks ago. Denies hitting head. States no injuries from fall.  Pt reports she does now have Life Alert for emergencies.     Medication Updates:  Reports no changes to medications.     Symptoms:  Reports no active symptoms    Plan of Care Goals and Progress:    Goal 1. Improved energy     Progress:  Pt reports making good progress. Reports cardiac rehab was a life changing experience.       Barriers:  Chronic health conditions     Interventions:  Continue to follow heart healthy meal plan     Education:  Follow-up with specialists as scheduled. Exercise as tolerated    Goal 2. Keep blood pressure in normal range     Progress:  Pt reports readings have been in a good range      Barriers:  Chronic health conditions     Interventions:  Follow-up with cardiology as scheduled. Continue to check blood pressure readings at home     Education:  Follow heart healthy meal plan, exercise as tolerated      Patient's Concerns and Feedback (Self Management of Care):     Pt reports no concerns today    Next Steps:     Follow-Up Plan:  " Follow-up in  8 weeks, around May 2025      Appointments:  4/18/25 at 11:30 with Dr. Garcia     Contact Information:  Call [phone] with any questions or concerns.

## 2025-04-18 ENCOUNTER — OFFICE VISIT (OUTPATIENT)
Dept: MEDICAL GROUP | Facility: PHYSICIAN GROUP | Age: OVER 89
End: 2025-04-18
Payer: MEDICARE

## 2025-04-18 VITALS
DIASTOLIC BLOOD PRESSURE: 70 MMHG | OXYGEN SATURATION: 95 % | HEIGHT: 65 IN | SYSTOLIC BLOOD PRESSURE: 120 MMHG | HEART RATE: 85 BPM | WEIGHT: 122.4 LBS | TEMPERATURE: 99.2 F | RESPIRATION RATE: 16 BRPM | BODY MASS INDEX: 20.39 KG/M2

## 2025-04-18 DIAGNOSIS — R73.03 PREDIABETES: ICD-10-CM

## 2025-04-18 DIAGNOSIS — M54.9 ACUTE UPPER BACK PAIN: ICD-10-CM

## 2025-04-18 DIAGNOSIS — I48.0 HYPERCOAGULABLE STATE DUE TO PAROXYSMAL ATRIAL FIBRILLATION (HCC): ICD-10-CM

## 2025-04-18 DIAGNOSIS — E78.5 DYSLIPIDEMIA: ICD-10-CM

## 2025-04-18 DIAGNOSIS — R29.6 FALLS: ICD-10-CM

## 2025-04-18 DIAGNOSIS — I10 ESSENTIAL HYPERTENSION: ICD-10-CM

## 2025-04-18 DIAGNOSIS — M19.011 LOCALIZED OSTEOARTHRITIS OF RIGHT SHOULDER: ICD-10-CM

## 2025-04-18 DIAGNOSIS — Z95.2 S/P TAVR (TRANSCATHETER AORTIC VALVE REPLACEMENT): ICD-10-CM

## 2025-04-18 DIAGNOSIS — E55.9 VITAMIN D DEFICIENCY: ICD-10-CM

## 2025-04-18 DIAGNOSIS — D68.69 HYPERCOAGULABLE STATE DUE TO PAROXYSMAL ATRIAL FIBRILLATION (HCC): ICD-10-CM

## 2025-04-18 DIAGNOSIS — I48.0 PAF (PAROXYSMAL ATRIAL FIBRILLATION) (HCC): ICD-10-CM

## 2025-04-18 PROBLEM — J96.01 ACUTE RESPIRATORY FAILURE WITH HYPOXIA (HCC): Status: RESOLVED | Noted: 2018-03-28 | Resolved: 2025-04-18

## 2025-04-18 PROCEDURE — 3074F SYST BP LT 130 MM HG: CPT | Performed by: INTERNAL MEDICINE

## 2025-04-18 PROCEDURE — 99214 OFFICE O/P EST MOD 30 MIN: CPT | Performed by: INTERNAL MEDICINE

## 2025-04-18 PROCEDURE — 3078F DIAST BP <80 MM HG: CPT | Performed by: INTERNAL MEDICINE

## 2025-04-18 ASSESSMENT — FIBROSIS 4 INDEX: FIB4 SCORE: 3.83

## 2025-04-18 ASSESSMENT — PATIENT HEALTH QUESTIONNAIRE - PHQ9: CLINICAL INTERPRETATION OF PHQ2 SCORE: 0

## 2025-04-18 NOTE — PROGRESS NOTES
CC: Back pain secondary to falling    HPI:  Lisa presents with the following    1. Hypercoagulable state due to paroxysmal atrial fibrillation (HCC)  Chronic.  Stable.  Patient on Xarelto for paroxysmal atrial fibrillation.    2. PAF (paroxysmal atrial fibrillation) (HCC)  Chronic. Stable. Patient continued on Xarelto. Patient also restarted metoprolol at a lower dose of 25 mg twice daily. Continued on statin. Asymptomatic     3. 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  Patient was rehospitalized with pericardial effusion which seems to have resolved.  She is currently undergoing cardiac rehab for the next 3 months, 3 days a week for 2 hours.  Patient has an appointment with Dr. Sellers for follow-up echocardiogram in July.     4/18/25: Patient completed ICR and feels great.  She benefited from the cardiovascular rehab along with the education.  She has a follow-up appointment with cardiology in July and will complete her echocardiogram prior to her visit.  Asymptomatic.    4. Essential hypertension  Patient keeps a blood pressure log.  Her systolic BP tends to average around 110.  Pulse rate around 70.  Currently being treated with Lopressor 25 mg twice daily.  Asymptomatic.    5. Localized osteoarthritis of right shoulder  Patient is followed by [facility] and received a cortisone injection in March.  This helped tremendously and improved her pain and range of motion.  She is due for a steroid injection again in June.  She was instructed by cardiology to hold her Xarelto 24 hours prior to her cortisone injection.    6. Acute upper back pain  On April 1, patient fell in her kitchen.  No LOC.  Patient did not hit her head.  She fell in a way that was difficult to rise up from the floor.  She had to call her son who helped her.  She continues to have mid upper back pain, worse on certain movements, worst when first getting up out of bed.  Patient  denies lower extremity paresthesia or radiculopathy.  Pain is intermittent sharp and spastic in nature.  Patient is able to sleep throughout the night.  Patient took Tylenol however led to diarrhea.  Unable to take NSAIDs due to Xarelto.  Using heat.  Slowly improving.  Bone density completed in 2021 showed a normal lumbar T-score.  Mild osteopenia femoral T-score -1.8.  Not on a bisphosphonate.    7. Falls  In addition to her falling April, patient also had an episode in February patient lives alone and is concerned about future falls and therefore is wearing a lifeline alert.    Patient is due for preventative lab work.      Patient Active Problem List    Diagnosis Date Noted    Localized osteoarthritis of right shoulder 04/18/2025    Acute pericarditis 07/25/2024    Pericardial effusion 07/16/2024    Pleural effusion 07/15/2024    LBBB (left bundle branch block) 07/02/2024    First degree heart block 07/02/2024    S/P TAVR (transcatheter aortic valve replacement) 07/01/2024    Secondary hypercoagulable state ([facility]) 02/15/2024    PAF (paroxysmal atrial fibrillation) ([facility]) 02/15/2024    Prediabetes 08/16/2023    Dyslipidemia 08/03/2023    Diplopia 07/11/2023    Vitamin D deficiency 06/15/2023    Upper back pain on left side 12/12/2022    Functional diarrhea 12/12/2022    BMI 23.0-23.9, adult 12/05/2022    Peripheral vascular disease, unspecified ([facility]) 12/05/2022    Mitral stenosis 01/17/2022    Osteopenia 11/03/2021    Low back pain without sciatica 04/29/2021    Abdominal aortic atherosclerosis (HCC) 04/29/2021    History of melanoma 04/29/2021    Hypercoagulability due to atrial fibrillation ([facility]) 04/29/2021    Full code status 04/29/2021    Urge incontinence 04/29/2021    Essential hypertension 11/15/2019    Mild mitral regurgitation 11/15/2019    Coronary artery calcification seen on CAT scan 09/16/2019    Chronic anticoagulation 08/14/2018    Low hemoglobin 03/27/2018    Chronic seasonal allergic rhinitis due  to pollen 11/16/2017    History of total left hip arthroplasty 05/21/2015    Osteoarthritis, multiple sites 05/21/2015       Current Outpatient Medications   Medication Sig Dispense Refill    tizanidine (ZANAFLEX) 4 MG Tab Take 1 Tablet by mouth 2 times a day as needed (acute back spasm). 60 Tablet 1    Probiotic Product (MISC INTESTINAL AKOSUA REGULAT) Cap Take  by mouth. 1 cap everyday      simvastatin (ZOCOR) 40 MG Tab TAKE ONE TABLET BY MOUTH IN THE EVENING 100 Tablet 3    rivaroxaban (XARELTO) 20 MG Tab tablet TAKE ONE TABLET BY MOUTH EVERY DAY WITH DINNER 100 Tablet 3    metoprolol tartrate (LOPRESSOR) 25 MG Tab Take 1 Tablet by mouth 2 times a day. (Patient taking differently: Take 25 mg by mouth every day. Indications: Atrial Fibrillation) 100 Tablet 3    calcium citrate (CALCITRATE) 950 (200 Ca) MG Tab Take 950 mg by mouth every day. Indications: supplement      vitamin D3 (CHOLECALCIFEROL) 1000 Unit (25 mcg) Tab Take 1,000 Units by mouth every day. Indications: supplement      therapeutic multivitamin-minerals (THERAGRAN-M) Tab Take 1 Tablet by mouth every day. Indications: supplement      acetaminophen (TYLENOL) 500 MG Tab Take 500 mg by mouth every 6 hours as needed for Moderate Pain. Indications: Pain      loratadine (CLARITIN) 10 MG Tab Take 10 mg by mouth every day. Indications: Hayfever       No current facility-administered medications for this visit.         Allergies as of 04/18/2025 - Reviewed 04/18/2025   Allergen Reaction Noted    Colchicine  07/22/2024    [place] Unspecified 08/10/2022    Clindamycin Unspecified 09/26/2023    Oatmeal Diarrhea 08/10/2022    Scallops Vomiting 08/10/2022        Social History     Socioeconomic History    Marital status:      Spouse name: Not on file    Number of children: Not on file    Years of education: Not on file    Highest education level: Not on file   Occupational History    Not on file   Tobacco Use    Smoking status: Former     Current packs/day:  0.00     Average packs/day: 1 pack/day for 33.0 years (33.0 ttl pk-yrs)     Types: Cigarettes     Start date: 1958     Quit date: 1991     Years since quittin.3    Smokeless tobacco: Never    Tobacco comments:     Started smoking at age 23   Vaping Use    Vaping status: Never Used   Substance and Sexual Activity    Alcohol use: Yes     Alcohol/week: 4.2 oz     Types: 7 Glasses of wine per week     Comment: 1 glass wine/day    Drug use: No    Sexual activity: Never     Partners: Male   Other Topics Concern    Not on file   Social History Narrative    She lost her   to Alzheimer's. She lives alone in her own home, 1 story. Has a son who lives nearby. She does all ADLs and IADLs w/o difficulty though recently hired a  to do her outside work. 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 She enjoys reading, learning to play the piano.      Social Drivers of Health     Financial Resource Strain: Not on file   Food Insecurity: No Food Insecurity (2024)    Hunger Vital Sign     Worried About Running Out of Food in the Last Year: Never true     Ran Out of Food in the Last Year: Never true   Transportation Needs: No Transportation Needs (2024)    PRAPARE - Transportation     Lack of Transportation (Medical): No     Lack of Transportation (Non-Medical): No   Physical Activity: Inactive (2024)    Exercise Vital Sign     Days of Exercise per Week: 0 days     Minutes of Exercise per Session: 0 min   Stress: Not on file   Social Connections: Feeling Socially Integrated (2024)    OASIS : Social Isolation     Frequency of experiencing loneliness or isolation: Never   Intimate Partner Violence: Not At Risk (7/15/2024)    Humiliation, Afraid, Rape, and Kick questionnaire     Fear of Current or Ex-Partner: No     Emotionally Abused: No     Physically Abused: No     Sexually Abused: No   Housing Stability: Low Risk  (2024)    Housing Stability Vital Sign      "Unable to Pay for Housing in the Last Year: No     Number of Places Lived in the Last Year: 1     Unstable Housing in the Last Year: No       Family History   Problem Relation Age of Onset    Cancer Father         GI    Heart Disease Father     Lung Disease Father     Stroke Sister     No Known Problems Son     No Known Problems Son     No Known Problems Maternal Grandmother     No Known Problems Maternal Grandfather     No Known Problems Paternal Grandmother     No Known Problems Paternal Grandfather     Hypertension Sister     Hyperlipidemia Sister        Past Surgical History:   Procedure Laterality Date    VT REPLACE AORTIC VALVE PERQ FEMORAL Bilateral 7/1/2024    Procedure: TRANSCATHETER AORTIC VALVE REPLACEMENT;  Surgeon: Jared Sellers M.D.;  Location: SURGERY [facility];  Service: Cardiac    VT ECHO STEVIE TAVR TMVR LAAC W WO CONT N/A 7/1/2024    Procedure: ECHOCARDIOGRAM, TRANSESOPHAGEAL, INTRAOPERATIVE;  Surgeon: Jared Sellers M.D.;  Location: SURGERY [facility];  Service: Cardiac    APPENDECTOMY LAPAROSCOPIC  10/10/2018    Procedure: APPENDECTOMY LAPAROSCOPIC;  Surgeon: Sushil Dalton M.D.;  Location: SURGERY [facility];  Service: General    HIP ARTHROPLASTY TOTAL Left 07/06/2015    Procedure: HIP ARTHROPLASTY TOTAL;  Surgeon: Navid Howard M.D.;  Location: SURGERY [facility];  Service:     OTHER Right 01/01/2006    cyst & bone spur on right hand    CATARACT EXTRACTION WITH IOL Bilateral     OTHER Right     melanoma removed from right forearm       ROS: Positive ROS per HPI.  Denies any Headache,Chest pain,  Shortness of breath,  Abdominal pain, Changes of bowel or bladder, Lower ext edema, Fevers, Nights sweats, Weight Changes, Focal weakness or numbness.  All other systems are negative.    /70 (BP Location: Left arm, Patient Position: Sitting)   Pulse 85   Temp 37.3 °C (99.2 °F) (Temporal)   Resp 16   Ht 1.651 m (5' 5\")   Wt 55.5 kg (122 lb 6.4 oz)   SpO2 95%   BMI 20.37 kg/m² "      Constitutional: Alert, no distress, well-groomed.  Skin: Warm, dry, good turgor, no rashes in visible areas.  Eye: Equal, round and reactive, conjunctiva clear, lids normal.  ENMT: Lips without lesions, good dentition, moist mucous membranes.  Neck: Trachea midline, no masses, no thyromegaly.  Respiratory: Unlabored respiratory effort, no cough.  Abdomen: Soft, no gross masses.  MSK: Patient had moderate difficulty rising up from a chair.  Slow and steady gait.  Pain on palpation in the right paravertebral muscle group T10-T12.  Neuro: Grossly non-focal. No cranial nerve deficit. Strength and sensation intact.   Psych: Alert and oriented x3, normal affect and mood.        Assessment and Plan.   89 y.o. female presenting with the following.     1. Hypercoagulable state due to paroxysmal atrial fibrillation (HCC)  Chronic.  Stable.  Continue Xarelto.    2. PAF (paroxysmal atrial fibrillation) (HCC)  Chronic.  Stable.  Continue current plan of care per cardiology.    3. S/P TAVR (transcatheter aortic valve replacement)  Patient responded well to ICR.  Patient will complete echocardiogram and follow-up with cardiology in July.    4. Essential hypertension  Continue to monitor blood pressure readings.  At this time continue metoprolol 25 mg twice daily.  Asymptomatic.    5. Localized osteoarthritis of right shoulder  Follow-up with [facility] in June.  Patient will  hold Xarelto for 24 hours prior to injection.    6. Acute upper back pain  Trial with tizanidine 4 mg p.o. twice daily as needed.  Heat/ThermaCare.  Lidocaine patch.  If symptoms do not resolve, patient will follow-up with thoracic x-ray.  - DX-THORACIC SPINE-WITH SWIMMERS VIEW; Future    7. Falls  Continue Lifeline.    8. Dyslipidemia    - TSH WITH REFLEX TO FT4; Future  - Lipid Profile; Future  - CBC WITH DIFFERENTIAL; Future  - Comp Metabolic Panel; Future    9. Prediabetes    - HEMOGLOBIN A1C; Future    10. Vitamin D deficiency    - VITAMIN D,25 HYDROXY  (DEFICIENCY); Future

## 2025-04-21 PROCEDURE — RXMED WILLOW AMBULATORY MEDICATION CHARGE

## 2025-04-21 PROCEDURE — RXMED WILLOW AMBULATORY MEDICATION CHARGE: Performed by: INTERNAL MEDICINE

## 2025-04-22 ENCOUNTER — PHARMACY VISIT (OUTPATIENT)
Dept: PHARMACY | Facility: MEDICAL CENTER | Age: OVER 89
End: 2025-04-22
Payer: COMMERCIAL

## 2025-04-28 DIAGNOSIS — I48.91 ATRIAL FIBRILLATION WITH RVR (HCC): ICD-10-CM

## 2025-04-28 RX ORDER — METOPROLOL TARTRATE 25 MG/1
25 TABLET, FILM COATED ORAL 2 TIMES DAILY
Qty: 100 TABLET | Refills: 3 | Status: SHIPPED | OUTPATIENT
Start: 2025-04-28

## 2025-05-13 PROCEDURE — RXMED WILLOW AMBULATORY MEDICATION CHARGE: Performed by: INTERNAL MEDICINE

## 2025-05-13 PROCEDURE — RXMED WILLOW AMBULATORY MEDICATION CHARGE: Performed by: FAMILY MEDICINE

## 2025-05-14 ENCOUNTER — PHARMACY VISIT (OUTPATIENT)
Dept: PHARMACY | Facility: MEDICAL CENTER | Age: OVER 89
End: 2025-05-14
Payer: COMMERCIAL

## 2025-05-18 NOTE — PROGRESS NOTES
DC IV. Covered with gauze and tape. Patient tolerated well.    Extensive right upper and lower lobe pulmonary embolism seen on 4/28/2025 CTA  2D echocardiogram without evidence of right heart strain  Anticoagulation contraindicated due to recurrent lower GI bleed   She was evaluated by pulmonology who recommended  IVC filter placement.  Temporary IVC filter placed 5/9 by IR

## 2025-05-21 ENCOUNTER — OFFICE VISIT (OUTPATIENT)
Dept: DERMATOLOGY | Facility: IMAGING CENTER | Age: OVER 89
End: 2025-05-21
Payer: MEDICARE

## 2025-05-21 DIAGNOSIS — D18.01 CHERRY ANGIOMA: ICD-10-CM

## 2025-05-21 DIAGNOSIS — Z85.820 HISTORY OF MELANOMA: ICD-10-CM

## 2025-05-21 DIAGNOSIS — L82.1 SEBORRHEIC KERATOSES: ICD-10-CM

## 2025-05-21 DIAGNOSIS — L57.0 ACTINIC KERATOSIS: Primary | ICD-10-CM

## 2025-05-21 DIAGNOSIS — L73.8 SEBACEOUS HYPERPLASIA: ICD-10-CM

## 2025-05-21 PROCEDURE — 99213 OFFICE O/P EST LOW 20 MIN: CPT | Mod: 25 | Performed by: STUDENT IN AN ORGANIZED HEALTH CARE EDUCATION/TRAINING PROGRAM

## 2025-05-21 RX ORDER — FLUOROURACIL 50 MG/G
CREAM TOPICAL
Qty: 40 G | Refills: 0 | Status: SHIPPED | OUTPATIENT
Start: 2025-05-21

## 2025-05-21 NOTE — PROGRESS NOTES
[facility] DERMATOLOGY CLINIC NOTE    Chief Complaint   Patient presents with    Annual Exam     PEYTON 6Mo        HPI:    Lisa Garza is a 89 y.o. female here for evaluation of above. Requests PEYTON. Has following skin lesions of concern:       1) Skin growth on: Rt wrist   Duration: x a few months    Associated symptoms: itching, dry, raised    Prior treatments: None  Patient reports she had has a melanoma removed close by this area     1) Skin growth on: Lt forearm   Duration: x 1 month    Associated symptoms: none   Prior treatments: none    No other symptomatic (itching, painful, burning) or changing lesions.       Dermatology History:      - Right distal forearm MIS 2018 s/p WLE     Review of Systems: No fevers, chill. Pertinent positives and negatives above.       Medications, Medical History, Surgical History, Family History & Allergies:  Reviewed in the chart, relevant history noted above.         PHYSICAL EXAM, ASSESSMENT, & PLAN (per problem):   A total body skin exam was performed including the following areas: head (including face), neck, chest, abdomen, groin/buttocks (excluding genitals), back, bilateral upper extremities, and bilateral lower extremities with the following pertinent findings in assessment/plan.      Actinic Keratosis  Exam: Gritty pink papules on nasal dorsum (persistent after cryo at last visit), right cheek, right forearm     Pre-cancerous nature of lesions and potential for progression to SCC if left untreated over time discussed.  Options for treatment include observation, destructive (liquid nitrogen), chemotherapy creams, and/or PDT light treatment.   Patient elects to proceed with LN2 destructive treatment today of largest/thickest lesions. Risks (including, but not limited to: hypo or hyperpigmentation, redness, blister, scar, recurrence) and benefits of cryotherapy discussed. PROCEDURE NOTE: 2 cryotherapy cycles x 10 seconds applied to 3 lesion/s in location as specified above.  Aftercare instructions discussed.   Start 5-fluorouracil (Efudex) 5% cream and Calcipotriene (Dovonex) 0.005% cream twice daily to locations: nasal dorsum(given recurrent after cryo at last visit) for 4-7 days. Side effects, instructions, and aftercare discussed.   If lesions do not heal or continue to grow, crust, bleed, etc, please call office sooner for re-evaluation.        - recheck nasal dorsum lesion at next visit after efudex, hold biopsy in reserve     Cherry Angiomas   Exam: scattered 1-3mm bright red macules and thin papules on trunk and extremities   - benign, reassurance       Seborrheic keratosis   Exam: scattered tan to brown verrucous papules and plaques on back, trunk, arms   - benign, reassurance       Sebaceous Hyperplasia  Ex: Forehead, cheeks with scattered yellowish/red papules with telangiectasias and central dell  - benign, reassurance       History of melanoma in situ   Exam: Well healed scar on right forearm, no nodularity or pigment recurrence.   - denies appetite loss/change, fatigue, LN enlargement, abdominal pain, shortness of breath   - advise regular sun protection/sunscreen use, SPF 30 or greater with broad spectrum coverage need for reapplication every  minutes.   - recommend broad brimmed hats, UPF sun protective clothing when outdoors for extended periods of time   - recommend self checks at home,  ABCDEs of melanoma discussed           Follow up: Return in about 2 months (around 7/21/2025) for July; spot check on nose .        Patricia Govea MD   [facility]

## 2025-05-27 ENCOUNTER — PATIENT OUTREACH (OUTPATIENT)
Dept: HEALTH INFORMATION MANAGEMENT | Facility: OTHER | Age: OVER 89
End: 2025-05-27
Payer: MEDICARE

## 2025-05-27 DIAGNOSIS — E78.5 DYSLIPIDEMIA: Primary | ICD-10-CM

## 2025-05-27 DIAGNOSIS — I10 ESSENTIAL HYPERTENSION: ICD-10-CM

## 2025-05-27 NOTE — PROGRESS NOTES
Nurse CM outreach call to pt for monthly CCM assessment.  Pt answered telephone.    Reason for Follow-Up:    Monthly CCM assessment    Current Health Status:    Pt following in personal care management for history of HTN, atrial fibrillation, and dyslipidemia.    Medical Updates:  Pt reports she is feeling well.  Pt has history of HTN. Last blood pressure at clinic was 120/70. Pt has history of dyslipidemia. Last lipid panel in normal range. Pt taking simvastatin for lipid management. Pt reports no other updates.  Pt states she doesn't feel she needs the monthly CCM calls any longer and would like to discontinue program.    Medication Updates:  Reports no changes    Symptoms:  No active symptoms reported.    Plan of Care Goals and Progress:    Goal 1. Improved blood pressure      Progress:  Reports blood pressure in normal range      Barriers:  Pt reports no barriers     Interventions:  Continue to follow with PCP and cardiology     Education:  Reviewed follow-up appts, continue to follow with specialists.         Patient's Concerns and Feedback (Self Management of Care):     Pt reports no concerns, pt would like to discontinue  CCM services

## 2025-07-01 ENCOUNTER — RESULTS FOLLOW-UP (OUTPATIENT)
Dept: CARDIOLOGY | Facility: MEDICAL CENTER | Age: OVER 89
End: 2025-07-01

## 2025-07-01 ENCOUNTER — HOSPITAL ENCOUNTER (OUTPATIENT)
Dept: CARDIOLOGY | Facility: MEDICAL CENTER | Age: OVER 89
End: 2025-07-01
Attending: INTERNAL MEDICINE
Payer: MEDICARE

## 2025-07-01 DIAGNOSIS — I35.0 NONRHEUMATIC AORTIC VALVE STENOSIS: ICD-10-CM

## 2025-07-01 LAB
LV EJECT FRACT  99904: 75
LV EJECT FRACT MOD 2C 99903: 70.97
LV EJECT FRACT MOD 4C 99902: 76.05
LV EJECT FRACT MOD BP 99901: 73.54

## 2025-07-01 PROCEDURE — 93306 TTE W/DOPPLER COMPLETE: CPT | Mod: 26 | Performed by: INTERNAL MEDICINE

## 2025-07-01 PROCEDURE — RXMED WILLOW AMBULATORY MEDICATION CHARGE: Performed by: FAMILY MEDICINE

## 2025-07-01 PROCEDURE — 93306 TTE W/DOPPLER COMPLETE: CPT

## 2025-07-02 ENCOUNTER — TELEPHONE (OUTPATIENT)
Dept: CARDIOLOGY | Facility: MEDICAL CENTER | Age: OVER 89
End: 2025-07-02
Payer: MEDICARE

## 2025-07-02 ENCOUNTER — APPOINTMENT (OUTPATIENT)
Dept: CARDIOLOGY | Facility: MEDICAL CENTER | Age: OVER 89
End: 2025-07-02
Attending: INTERNAL MEDICINE
Payer: MEDICARE

## 2025-07-02 ENCOUNTER — PHARMACY VISIT (OUTPATIENT)
Dept: PHARMACY | Facility: MEDICAL CENTER | Age: OVER 89
End: 2025-07-02
Payer: COMMERCIAL

## 2025-07-02 NOTE — TELEPHONE ENCOUNTER
Called and left message for patient's son Jaydon requesting call back to reschedule follow up appointment.

## 2025-07-15 ENCOUNTER — DOCUMENTATION (OUTPATIENT)
Dept: CARDIOLOGY | Facility: MEDICAL CENTER | Age: OVER 89
End: 2025-07-15

## 2025-07-15 ENCOUNTER — OFFICE VISIT (OUTPATIENT)
Dept: CARDIOLOGY | Facility: MEDICAL CENTER | Age: OVER 89
End: 2025-07-15
Attending: INTERNAL MEDICINE
Payer: MEDICARE

## 2025-07-15 ENCOUNTER — TELEPHONE (OUTPATIENT)
Dept: CARDIOLOGY | Facility: MEDICAL CENTER | Age: OVER 89
End: 2025-07-15

## 2025-07-15 VITALS
WEIGHT: 122.8 LBS | HEART RATE: 77 BPM | HEIGHT: 65 IN | BODY MASS INDEX: 20.46 KG/M2 | SYSTOLIC BLOOD PRESSURE: 130 MMHG | OXYGEN SATURATION: 93 % | DIASTOLIC BLOOD PRESSURE: 80 MMHG | RESPIRATION RATE: 18 BRPM

## 2025-07-15 DIAGNOSIS — D68.69 HYPERCOAGULABLE STATE DUE TO PAROXYSMAL ATRIAL FIBRILLATION (HCC): ICD-10-CM

## 2025-07-15 DIAGNOSIS — I48.0 PAF (PAROXYSMAL ATRIAL FIBRILLATION) (HCC): ICD-10-CM

## 2025-07-15 DIAGNOSIS — I48.0 HYPERCOAGULABLE STATE DUE TO PAROXYSMAL ATRIAL FIBRILLATION (HCC): ICD-10-CM

## 2025-07-15 DIAGNOSIS — Z95.2 S/P TAVR (TRANSCATHETER AORTIC VALVE REPLACEMENT): Primary | ICD-10-CM

## 2025-07-15 DIAGNOSIS — I44.7 LBBB (LEFT BUNDLE BRANCH BLOCK): ICD-10-CM

## 2025-07-15 DIAGNOSIS — I70.0 ABDOMINAL AORTIC ATHEROSCLEROSIS (HCC): ICD-10-CM

## 2025-07-15 PROCEDURE — 99213 OFFICE O/P EST LOW 20 MIN: CPT | Performed by: INTERNAL MEDICINE

## 2025-07-15 PROCEDURE — G2211 COMPLEX E/M VISIT ADD ON: HCPCS | Performed by: INTERNAL MEDICINE

## 2025-07-15 PROCEDURE — 99214 OFFICE O/P EST MOD 30 MIN: CPT | Performed by: INTERNAL MEDICINE

## 2025-07-15 PROCEDURE — 3075F SYST BP GE 130 - 139MM HG: CPT | Performed by: INTERNAL MEDICINE

## 2025-07-15 PROCEDURE — 3079F DIAST BP 80-89 MM HG: CPT | Performed by: INTERNAL MEDICINE

## 2025-07-15 ASSESSMENT — FIBROSIS 4 INDEX: FIB4 SCORE: 3.83

## 2025-07-15 NOTE — TELEPHONE ENCOUNTER
----- Message from Physician Jared Sellers M.D. sent at 7/15/2025  4:20 PM PDT -----  Can you check on patients arrhythmia alarms in one month - if having ~>10% of days with alarms then have her do a zio.   Thx  BE

## 2025-07-15 NOTE — Clinical Note
Can you check on patients arrhythmia alarms in one month - if having ~>10% of days with alarms then have her do a zio.  Thx BE

## 2025-07-15 NOTE — PROGRESS NOTES
Valve Program Functional Assessment:     [id number]   1a) Showering/bathin  1b) Walking 1 block on ground: 5  1c) Hurrying or joggin  2) Swellin  3) Fatigue: 7  4) Shortness of breath: 7  5) Sleep sitting up: 5  6) Limited enjoyment of life: 5  7) Spend the rest of your life with HF: 5  8a) Hobbies, recreational activities:6  8b) Working or doing household chores:5  8c) Visiting family or friends: 5

## 2025-07-15 NOTE — PROGRESS NOTES
"INTERVENTIONAL CARDIOLOGY OUTPATIENT FOLLOWUP    PCP: Kathy Garcia M.D.    1. S/P TAVR (transcatheter aortic valve replacement)    2. PAF (paroxysmal atrial fibrillation) (HCC)    3. LBBB (left bundle branch block)    4. Abdominal aortic atherosclerosis (HCC)    5. Hypercoagulable state due to paroxysmal atrial fibrillation (HCC)        Lisa Garza has recent arrhythmia alarms on the blood pressure monitor without symptoms.  For now expectant management is advised though they may represent increasing burden of atrial fibrillation.  Will check on her in 1 month and if these persist I will have her complete a Zio patch.  She we will continue with Xarelto.    The AVR continues to function well albeit with a mild paravalvular leak.  She is not having any symptoms.  Predental antibiotic prophylaxis is recommended indefinite      Follow up: 1 year    History: Lisa Garza is a 89 y.o. female with TAVR July 2024, mild coronary atherosclerosis, PAF presenting for follow-up.  She was previously with Dr. Sutherland.  She feels well    .  Has had arrhythmia alarm on the blood pressure monitor.  Home blood pressure readings are good.    She is experiencing hand tremors and plans to see her primary care later this week      Physical Exam:  /80 (BP Location: Left arm, Patient Position: Sitting, BP Cuff Size: Adult)   Pulse 77   Resp 18   Ht 1.651 m (5' 5\")   Wt 55.7 kg (122 lb 12.8 oz)   SpO2 93%   BMI 20.43 kg/m²   GEN: NAD  CARDIAC: Regular. Normal S1, S2, Systolic murmur.   VASCULATURE: Normal carotid upstroke  RESP: Clear to auscultation bilaterally  ABD: Soft, non-tender, non-distended  EXT: No edema  NEURO: No focal deficit       () Today's E/M visit is associated with medical care services that serve as the continuing focal point for all needed health care services and/or with medical care services that  are part of ongoing care related to a patient's single, serious condition, or a complex " condition: This includes  furnishing services to patients on an ongoing basis that result in care that is personalized  to the patient. The services result in a comprehensive, longitudinal, and continuous  relationship with the patient and involve delivery of team-based care that is accessible, coordinated with other practitioners and providers, and integrated with the broader health  care landscape.     The ASCVD Risk score (Cady WEBB, et al., 2019) failed to calculate.    Studies  Lab Results   Component Value Date/Time    CHOLSTRLTOT 153 08/10/2024 10:22 AM    LDL 82 08/10/2024 10:22 AM    HDL 58 08/10/2024 10:22 AM    TRIGLYCERIDE 66 08/10/2024 10:22 AM       Lab Results   Component Value Date/Time    SODIUM 139 08/10/2024 10:22 AM    POTASSIUM 4.9 08/10/2024 10:22 AM    CHLORIDE 102 08/10/2024 10:22 AM    CO2 27 08/10/2024 10:22 AM    GLUCOSE 96 08/10/2024 10:22 AM    BUN 16 08/10/2024 10:22 AM    CREATININE 0.62 08/10/2024 10:22 AM      Lab Results   Component Value Date/Time    PROTHROMBTM 17.3 (H) 06/21/2024 10:56 AM    INR 1.40 (H) 06/21/2024 10:56 AM      Lab Results   Component Value Date/Time    WBC 7.7 08/10/2024 10:22 AM    RBC 3.90 (L) 08/10/2024 10:22 AM    HEMOGLOBIN 12.8 08/10/2024 10:22 AM    HEMATOCRIT 39.8 08/10/2024 10:22 AM    .1 (H) 08/10/2024 10:22 AM    MCH 32.8 08/10/2024 10:22 AM    MCHC 32.2 08/10/2024 10:22 AM    MPV 11.0 08/10/2024 10:22 AM    NEUTSPOLYS 72.30 (H) 08/10/2024 10:22 AM    LYMPHOCYTES 14.30 (L) 08/10/2024 10:22 AM    MONOCYTES 9.80 08/10/2024 10:22 AM    EOSINOPHILS 2.70 08/10/2024 10:22 AM    BASOPHILS 0.60 08/10/2024 10:22 AM        Past Medical History[1]  Allergies[2]  Encounter Medications[3]  Social History     Socioeconomic History    Marital status:      Spouse name: Not on file    Number of children: Not on file    Years of education: Not on file    Highest education level: Not on file   Occupational History    Not on file   Tobacco Use    Smoking  status: Former     Current packs/day: 0.00     Average packs/day: 1 pack/day for 33.0 years (33.0 ttl pk-yrs)     Types: Cigarettes     Start date: 1958     Quit date: 1991     Years since quittin.5    Smokeless tobacco: Never    Tobacco comments:     Started smoking at age 23   Vaping Use    Vaping status: Never Used   Substance and Sexual Activity    Alcohol use: Yes     Alcohol/week: 4.2 oz     Types: 7 Glasses of wine per week     Comment: 1 glass wine/day    Drug use: No    Sexual activity: Never     Partners: Male   Other Topics Concern    Not on file   Social History Narrative    She lost her   to Alzheimer's. She lives alone in her own home, 1 story. Has a son who lives nearby. She does all ADLs and IADLs w/o difficulty though recently hired a  to do her outside work. 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 She enjoys reading, learning to play the piano.      Social Drivers of Health     Financial Resource Strain: Not on file   Food Insecurity: No Food Insecurity (2024)    Hunger Vital Sign     Worried About Running Out of Food in the Last Year: Never true     Ran Out of Food in the Last Year: Never true   Transportation Needs: No Transportation Needs (2024)    PRAPARE - Transportation     Lack of Transportation (Medical): No     Lack of Transportation (Non-Medical): No   Physical Activity: Inactive (2024)    Exercise Vital Sign     Days of Exercise per Week: 0 days     Minutes of Exercise per Session: 0 min   Stress: Not on file   Social Connections: Feeling Socially Integrated (2024)    OASIS : Social Isolation     Frequency of experiencing loneliness or isolation: Never   Intimate Partner Violence: Not At Risk (7/15/2024)    Humiliation, Afraid, Rape, and Kick questionnaire     Fear of Current or Ex-Partner: No     Emotionally Abused: No     Physically Abused: No     Sexually Abused: No   Housing Stability: Low Risk   (7/18/2024)    Housing Stability Vital Sign     Unable to Pay for Housing in the Last Year: No     Number of Places Lived in the Last Year: 1     Unstable Housing in the Last Year: No         ROS:   10 point review systems is otherwise negative except as per the HPI    Chief Complaint   Patient presents with    Aortic Stenosis    Atrial Fibrillation    Hypertension          [1]   Past Medical History:  Diagnosis Date    Arrhythmia     a fib    Arthritis     osteo, hip    Breath shortness     Bronchitis     years ago    Cancer (HCC)     skin on left shoulder    Diarrhea 04/09/2024    resolved    Functional diarrhea 12/12/2022    Heart murmur     High cholesterol     Localized osteoarthritis of right shoulder 04/18/2025    Nonrheumatic aortic valve stenosis 01/25/2024    Pneumonia     years ago    Prediabetes 08/16/2023    Unspecified cataract     tony IOL    Unspecified urinary incontinence     Vitamin D deficiency 06/15/2023   [2]   Allergies  Allergen Reactions    Colchicine      diarrhea    [place] Unspecified     Itchy welts      Clindamycin Unspecified     Pt unsure of reaction    Oatmeal Diarrhea     Gas      Scallops Vomiting   [3]   Outpatient Encounter Medications as of 7/15/2025   Medication Sig Dispense Refill    metoprolol tartrate (LOPRESSOR) 25 MG Tab Take 1 Tablet by mouth 2 times a day. 100 Tablet 3    Probiotic Product (MISC INTESTINAL AKOSUA REGULAT) Cap Take  by mouth. 1 cap everyday      simvastatin (ZOCOR) 40 MG Tab TAKE ONE TABLET BY MOUTH IN THE EVENING 100 Tablet 3    rivaroxaban (XARELTO) 20 MG Tab tablet TAKE ONE TABLET BY MOUTH EVERY DAY WITH DINNER 100 Tablet 3    calcium citrate (CALCITRATE) 950 (200 Ca) MG Tab Take 950 mg by mouth every day. Indications: supplement      vitamin D3 (CHOLECALCIFEROL) 1000 Unit (25 mcg) Tab Take 1,000 Units by mouth every day. Indications: supplement      therapeutic multivitamin-minerals (THERAGRAN-M) Tab Take 1 Tablet by mouth every day. Indications:  supplement      acetaminophen (TYLENOL) 500 MG Tab Take 500 mg by mouth every 6 hours as needed for Moderate Pain. Indications: Pain      loratadine (CLARITIN) 10 MG Tab Take 10 mg by mouth every day. Indications: Hayfever      [DISCONTINUED] fluorouracil (EFUDEX) 5 % cream Twice daily to affected area on nose for 4-7 days. AVOID eyes. Wash hands after application or use gloves. 40 g 0    [DISCONTINUED] tizanidine (ZANAFLEX) 4 MG Tab Take 1 Tablet by mouth 2 times a day as needed (acute back spasm). 60 Tablet 1     No facility-administered encounter medications on file as of 7/15/2025.

## 2025-07-17 ENCOUNTER — HOSPITAL ENCOUNTER (OUTPATIENT)
Dept: LAB | Facility: MEDICAL CENTER | Age: OVER 89
End: 2025-07-17
Attending: INTERNAL MEDICINE
Payer: MEDICARE

## 2025-07-17 DIAGNOSIS — E55.9 VITAMIN D DEFICIENCY: ICD-10-CM

## 2025-07-17 DIAGNOSIS — R73.03 PREDIABETES: ICD-10-CM

## 2025-07-17 DIAGNOSIS — E78.5 DYSLIPIDEMIA: ICD-10-CM

## 2025-07-17 LAB
25(OH)D3 SERPL-MCNC: 59 NG/ML (ref 30–100)
ALBUMIN SERPL BCP-MCNC: 4.1 G/DL (ref 3.2–4.9)
ALBUMIN/GLOB SERPL: 1.6 G/DL
ALP SERPL-CCNC: 50 U/L (ref 30–99)
ALT SERPL-CCNC: 19 U/L (ref 2–50)
ANION GAP SERPL CALC-SCNC: 10 MMOL/L (ref 7–16)
AST SERPL-CCNC: 29 U/L (ref 12–45)
BASOPHILS # BLD AUTO: 0.8 % (ref 0–1.8)
BASOPHILS # BLD: 0.05 K/UL (ref 0–0.12)
BILIRUB SERPL-MCNC: 0.7 MG/DL (ref 0.1–1.5)
BUN SERPL-MCNC: 15 MG/DL (ref 8–22)
CALCIUM ALBUM COR SERPL-MCNC: 9.8 MG/DL (ref 8.5–10.5)
CALCIUM SERPL-MCNC: 9.9 MG/DL (ref 8.5–10.5)
CHLORIDE SERPL-SCNC: 104 MMOL/L (ref 96–112)
CHOLEST SERPL-MCNC: 189 MG/DL (ref 100–199)
CO2 SERPL-SCNC: 28 MMOL/L (ref 20–33)
CREAT SERPL-MCNC: 0.7 MG/DL (ref 0.5–1.4)
EOSINOPHIL # BLD AUTO: 0.17 K/UL (ref 0–0.51)
EOSINOPHIL NFR BLD: 2.8 % (ref 0–6.9)
ERYTHROCYTE [DISTWIDTH] IN BLOOD BY AUTOMATED COUNT: 51.7 FL (ref 35.9–50)
EST. AVERAGE GLUCOSE BLD GHB EST-MCNC: 120 MG/DL
GFR SERPLBLD CREATININE-BSD FMLA CKD-EPI: 82 ML/MIN/1.73 M 2
GLOBULIN SER CALC-MCNC: 2.6 G/DL (ref 1.9–3.5)
GLUCOSE SERPL-MCNC: 90 MG/DL (ref 65–99)
HBA1C MFR BLD: 5.8 % (ref 4–5.6)
HCT VFR BLD AUTO: 44 % (ref 37–47)
HDLC SERPL-MCNC: 74 MG/DL
HGB BLD-MCNC: 14.5 G/DL (ref 12–16)
IMM GRANULOCYTES # BLD AUTO: 0.02 K/UL (ref 0–0.11)
IMM GRANULOCYTES NFR BLD AUTO: 0.3 % (ref 0–0.9)
LDLC SERPL CALC-MCNC: 96 MG/DL
LYMPHOCYTES # BLD AUTO: 1.29 K/UL (ref 1–4.8)
LYMPHOCYTES NFR BLD: 21.2 % (ref 22–41)
MCH RBC QN AUTO: 34.2 PG (ref 27–33)
MCHC RBC AUTO-ENTMCNC: 33 G/DL (ref 32.2–35.5)
MCV RBC AUTO: 103.8 FL (ref 81.4–97.8)
MONOCYTES # BLD AUTO: 0.66 K/UL (ref 0–0.85)
MONOCYTES NFR BLD AUTO: 10.9 % (ref 0–13.4)
NEUTROPHILS # BLD AUTO: 3.89 K/UL (ref 1.82–7.42)
NEUTROPHILS NFR BLD: 64 % (ref 44–72)
NRBC # BLD AUTO: 0 K/UL
NRBC BLD-RTO: 0 /100 WBC (ref 0–0.2)
PLATELET # BLD AUTO: 159 K/UL (ref 164–446)
PMV BLD AUTO: 11.5 FL (ref 9–12.9)
POTASSIUM SERPL-SCNC: 4.6 MMOL/L (ref 3.6–5.5)
PROT SERPL-MCNC: 6.7 G/DL (ref 6–8.2)
RBC # BLD AUTO: 4.24 M/UL (ref 4.2–5.4)
SODIUM SERPL-SCNC: 142 MMOL/L (ref 135–145)
TRIGL SERPL-MCNC: 93 MG/DL (ref 0–149)
TSH SERPL DL<=0.005 MIU/L-ACNC: 3.13 UIU/ML (ref 0.38–5.33)
WBC # BLD AUTO: 6.1 K/UL (ref 4.8–10.8)

## 2025-07-17 PROCEDURE — 80053 COMPREHEN METABOLIC PANEL: CPT

## 2025-07-17 PROCEDURE — 82306 VITAMIN D 25 HYDROXY: CPT

## 2025-07-17 PROCEDURE — 36415 COLL VENOUS BLD VENIPUNCTURE: CPT

## 2025-07-17 PROCEDURE — 80061 LIPID PANEL: CPT

## 2025-07-17 PROCEDURE — 85025 COMPLETE CBC W/AUTO DIFF WBC: CPT

## 2025-07-17 PROCEDURE — 84443 ASSAY THYROID STIM HORMONE: CPT

## 2025-07-17 PROCEDURE — 83036 HEMOGLOBIN GLYCOSYLATED A1C: CPT

## 2025-07-18 ENCOUNTER — OFFICE VISIT (OUTPATIENT)
Dept: MEDICAL GROUP | Facility: PHYSICIAN GROUP | Age: OVER 89
End: 2025-07-18
Payer: MEDICARE

## 2025-07-18 VITALS
SYSTOLIC BLOOD PRESSURE: 118 MMHG | RESPIRATION RATE: 12 BRPM | WEIGHT: 121.1 LBS | OXYGEN SATURATION: 94 % | BODY MASS INDEX: 20.18 KG/M2 | TEMPERATURE: 98.6 F | DIASTOLIC BLOOD PRESSURE: 72 MMHG | HEIGHT: 65 IN | HEART RATE: 64 BPM

## 2025-07-18 DIAGNOSIS — R73.03 PREDIABETES: ICD-10-CM

## 2025-07-18 DIAGNOSIS — I48.0 PAF (PAROXYSMAL ATRIAL FIBRILLATION) (HCC): ICD-10-CM

## 2025-07-18 DIAGNOSIS — Z95.2 S/P TAVR (TRANSCATHETER AORTIC VALVE REPLACEMENT): ICD-10-CM

## 2025-07-18 DIAGNOSIS — R25.1 TREMOR OF LEFT HAND: Primary | ICD-10-CM

## 2025-07-18 DIAGNOSIS — R63.4 WEIGHT LOSS: ICD-10-CM

## 2025-07-18 DIAGNOSIS — R71.8 ELEVATED MCV: ICD-10-CM

## 2025-07-18 PROCEDURE — 3078F DIAST BP <80 MM HG: CPT | Performed by: INTERNAL MEDICINE

## 2025-07-18 PROCEDURE — 99214 OFFICE O/P EST MOD 30 MIN: CPT | Performed by: INTERNAL MEDICINE

## 2025-07-18 PROCEDURE — 3074F SYST BP LT 130 MM HG: CPT | Performed by: INTERNAL MEDICINE

## 2025-07-18 ASSESSMENT — FIBROSIS 4 INDEX: FIB4 SCORE: 3.72

## 2025-07-18 NOTE — PROGRESS NOTES
CC: Follow-up lab work, hand tremor    HPI:  Lisa presents with the following    1. Tremor of left hand  Patient noticed a fine tremor in her left hand starting about 2 months ago.  Not affecting her ADLs.  Intentional mostly and intermittent.  No family history that she knows of.  Recent labs were within good range.    2. 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  Patient was rehospitalized with pericardial effusion which seems to have resolved.  She is currently undergoing cardiac rehab for the next 3 months, 3 days a week for 2 hours.  Patient has an appointment with Dr. Sellers for follow-up echocardiogram in July.      4/18/25: Patient completed ICR and feels great.  She benefited from the cardiovascular rehab along with the education.  She has a follow-up appointment with cardiology in July and will complete her echocardiogram prior to her visit.  Asymptomatic.    7/18/2025:.  Patient followed up with Dr. Sellers.  Recent echocardiogram showed an EF of 75% with a functioning AVR.  Follow-up in 1 year recommended.    3. PAF (paroxysmal atrial fibrillation) ([facility])  4/18/2025:Chronic. Stable. Patient continued on Xarelto. Patient also restarted metoprolol at a lower dose of 25 mg twice daily. Continued on statin. Asymptomatic.    7/18/2025:.  Chronic.  Stable.  Continued on Xarelto.  Patient is asymptomatic however if patient having issues cardiology will consider Zio patch.    4. Prediabetes  Hemoglobin A1c 5.8.  Slightly higher than 5.6.  Diet controlled.    5. Elevated MCV  MCV elevated at 103.8.  Hemoglobin level 14.5.    6. Weight loss  Patient noted that since December she has lost about 10 to 12 pounds.  In December her weight was 131 pounds.  Current weight 121 pounds, BMI of 20.15.  Patient started losing weight during cardiac rehab when she was placed on a Mediterranean diet.  She feels good otherwise.  Weight has been stable since  her April appointment at 121 pounds.      Patient Active Problem List    Diagnosis Date Noted    Tremor of left hand 07/18/2025    Weight loss 07/18/2025    Localized osteoarthritis of right shoulder 04/18/2025    Acute pericarditis 07/25/2024    Pericardial effusion 07/16/2024    Pleural effusion 07/15/2024    LBBB (left bundle branch block) 07/02/2024    First degree heart block 07/02/2024    S/P TAVR (transcatheter aortic valve replacement) 07/01/2024    Secondary hypercoagulable state ([facility]) 02/15/2024    PAF (paroxysmal atrial fibrillation) ([facility]) 02/15/2024    Prediabetes 08/16/2023    Dyslipidemia 08/03/2023    Diplopia 07/11/2023    Vitamin D deficiency 06/15/2023    Upper back pain on left side 12/12/2022    Functional diarrhea 12/12/2022    BMI 23.0-23.9, adult 12/05/2022    Peripheral vascular disease, unspecified ([facility]) 12/05/2022    Mitral stenosis 01/17/2022    Osteopenia 11/03/2021    Low back pain without sciatica 04/29/2021    Abdominal aortic atherosclerosis (HCC) 04/29/2021    History of melanoma 04/29/2021    Hypercoagulability due to atrial fibrillation ([facility]) 04/29/2021    Full code status 04/29/2021    Urge incontinence 04/29/2021    Essential hypertension 11/15/2019    Mild mitral regurgitation 11/15/2019    Coronary artery calcification seen on CAT scan 09/16/2019    Chronic anticoagulation 08/14/2018    Low hemoglobin 03/27/2018    Chronic seasonal allergic rhinitis due to pollen 11/16/2017    History of total left hip arthroplasty 05/21/2015    Osteoarthritis, multiple sites 05/21/2015       Current Medications[1]      Allergies as of 07/18/2025 - Reviewed 07/18/2025   Allergen Reaction Noted    Colchicine  07/22/2024    [place] Unspecified 08/10/2022    Clindamycin Unspecified 09/26/2023    Oatmeal Diarrhea 08/10/2022    Scallops Vomiting 08/10/2022        Social History     Socioeconomic History    Marital status:      Spouse name: Not on file    Number of children: Not on file     Years of education: Not on file    Highest education level: Not on file   Occupational History    Not on file   Tobacco Use    Smoking status: Former     Current packs/day: 0.00     Average packs/day: 1 pack/day for 33.0 years (33.0 ttl pk-yrs)     Types: Cigarettes     Start date: 1958     Quit date: 1991     Years since quittin.5    Smokeless tobacco: Never    Tobacco comments:     Started smoking at age 23   Vaping Use    Vaping status: Never Used   Substance and Sexual Activity    Alcohol use: Yes     Alcohol/week: 4.2 oz     Types: 7 Glasses of wine per week     Comment: 1 glass wine/day    Drug use: No    Sexual activity: Never     Partners: Male   Other Topics Concern    Not on file   Social History Narrative    She lost her   to Alzheimer's. She lives alone in her own home, 1 story. Has a son who lives nearby. She does all ADLs and IADLs w/o difficulty though recently hired a  to do her outside work. 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 She enjoys reading, learning to play the piano.      Social Drivers of Health     Financial Resource Strain: Not on file   Food Insecurity: No Food Insecurity (2024)    Hunger Vital Sign     Worried About Running Out of Food in the Last Year: Never true     Ran Out of Food in the Last Year: Never true   Transportation Needs: No Transportation Needs (2024)    PRAPARE - Transportation     Lack of Transportation (Medical): No     Lack of Transportation (Non-Medical): No   Physical Activity: Inactive (2024)    Exercise Vital Sign     Days of Exercise per Week: 0 days     Minutes of Exercise per Session: 0 min   Stress: Not on file   Social Connections: Feeling Socially Integrated (2024)    OASIS : Social Isolation     Frequency of experiencing loneliness or isolation: Never   Intimate Partner Violence: Not At Risk (7/15/2024)    Humiliation, Afraid, Rape, and Kick questionnaire     Fear of Current  "or Ex-Partner: No     Emotionally Abused: No     Physically Abused: No     Sexually Abused: No   Housing Stability: Low Risk  (7/18/2024)    Housing Stability Vital Sign     Unable to Pay for Housing in the Last Year: No     Number of Places Lived in the Last Year: 1     Unstable Housing in the Last Year: No       Family History   Problem Relation Age of Onset    Cancer Father         GI    Heart Disease Father     Lung Disease Father     Stroke Sister     No Known Problems Son     No Known Problems Son     No Known Problems Maternal Grandmother     No Known Problems Maternal Grandfather     No Known Problems Paternal Grandmother     No Known Problems Paternal Grandfather     Hypertension Sister     Hyperlipidemia Sister        Past Surgical History[2]    ROS: Positive ROS per HPI.  Denies any Headache,Chest pain,  Shortness of breath,  Abdominal pain, Changes of bowel or bladder, Lower ext edema, Fevers, Nights sweats, Focal weakness or numbness.  All other systems are negative.    /72 (BP Location: Left arm, Patient Position: Sitting)   Pulse 64   Temp 37 °C (98.6 °F) (Temporal)   Resp 12   Ht 1.651 m (5' 5\")   Wt 54.9 kg (121 lb 1.6 oz)   SpO2 94%   BMI 20.15 kg/m²      Constitutional: Alert, no distress, well-groomed.  Skin: Warm, dry, good turgor, no rashes in visible areas.  Eye: Equal, round and reactive, conjunctiva clear, lids normal.  ENMT: Lips without lesions, good dentition, moist mucous membranes.  Neck: Trachea midline, no masses, no thyromegaly.  Respiratory: Unlabored respiratory effort, no cough.  Abdomen: Soft, no gross masses.  MSK: Normal gait, moves all extremities.  Neuro: Noted fine intentional left hand tremor.  Psych: Alert and oriented x3, normal affect and mood.      Assessment and Plan.   89 y.o. female presenting with the following.     1. Tremor of left hand (Primary)  Monitor  - VITAMIN B12; Future  - FOLATE; Future  - CERULOPLASMIN; Future    2. S/P TAVR (transcatheter " aortic valve replacement)  Stable.  Continue current plan of care.  Echo reviewed.  1 year follow-up with cardiology.    3. PAF (paroxysmal atrial fibrillation) (HCC)  Chronic.  Stable.  Continue current plan of care per cardiology.    4. Prediabetes  Continue diet lifestyle modifications.    5. Elevated MCV    - VITAMIN B12; Future  - FOLATE; Future    6. Weight loss  Continue to monitor.  Stable at this time.         [1]   Current Outpatient Medications   Medication Sig Dispense Refill    metoprolol tartrate (LOPRESSOR) 25 MG Tab Take 1 Tablet by mouth 2 times a day. 100 Tablet 3    Probiotic Product (MISC INTESTINAL AKOSUA REGULAT) Cap Take  by mouth. 1 cap everyday      simvastatin (ZOCOR) 40 MG Tab TAKE ONE TABLET BY MOUTH IN THE EVENING 100 Tablet 3    rivaroxaban (XARELTO) 20 MG Tab tablet TAKE ONE TABLET BY MOUTH EVERY DAY WITH DINNER 100 Tablet 3    calcium citrate (CALCITRATE) 950 (200 Ca) MG Tab Take 950 mg by mouth every day. Indications: supplement      vitamin D3 (CHOLECALCIFEROL) 1000 Unit (25 mcg) Tab Take 1,000 Units by mouth every day. Indications: supplement      therapeutic multivitamin-minerals (THERAGRAN-M) Tab Take 1 Tablet by mouth every day. Indications: supplement      acetaminophen (TYLENOL) 500 MG Tab Take 500 mg by mouth every 6 hours as needed for Moderate Pain. Indications: Pain      loratadine (CLARITIN) 10 MG Tab Take 10 mg by mouth every day. Indications: Hayfever       No current facility-administered medications for this visit.   [2]   Past Surgical History:  Procedure Laterality Date    MI REPLACE AORTIC VALVE PERQ FEMORAL Bilateral 7/1/2024    Procedure: TRANSCATHETER AORTIC VALVE REPLACEMENT;  Surgeon: Jared Sellers M.D.;  Location: SURGERY [facility];  Service: Cardiac    MI ECHO STEVIE TAVR TMVR LAAC W WO CONT N/A 7/1/2024    Procedure: ECHOCARDIOGRAM, TRANSESOPHAGEAL, INTRAOPERATIVE;  Surgeon: Jared Sellers M.D.;  Location: SURGERY [facility];  Service: Cardiac     APPENDECTOMY LAPAROSCOPIC  10/10/2018    Procedure: APPENDECTOMY LAPAROSCOPIC;  Surgeon: Sushil Dalton M.D.;  Location: SURG[facility];  Service: General    HIP ARTHROPLASTY TOTAL Left 07/06/2015    Procedure: HIP ARTHROPLASTY TOTAL;  Surgeon: Navid Howard M.D.;  Location: SURGERY [facility];  Service:     OTHER Right 01/01/2006    cyst & bone spur on right hand    CATARACT EXTRACTION WITH IOL Bilateral     OTHER Right     melanoma removed from right forearm

## 2025-07-22 ENCOUNTER — OFFICE VISIT (OUTPATIENT)
Dept: DERMATOLOGY | Facility: IMAGING CENTER | Age: OVER 89
End: 2025-07-22
Payer: MEDICARE

## 2025-07-22 DIAGNOSIS — L57.0 ACTINIC KERATOSIS: Primary | ICD-10-CM

## 2025-07-22 PROCEDURE — 99212 OFFICE O/P EST SF 10 MIN: CPT | Performed by: STUDENT IN AN ORGANIZED HEALTH CARE EDUCATION/TRAINING PROGRAM

## 2025-07-22 NOTE — PROGRESS NOTES
[facility] DERMATOLOGY CLINIC NOTE    Chief Complaint   Patient presents with    Follow-Up    Skin Lesion        HPI:    Lisa Garza is a 89 y.o. female here for spot check on nose     At last visit, was diagnosed with actinic keratosis, treated with topical chemotherapy.       Dermatology History:      - Right distal forearm MIS 2018 s/p WLE     Review of Systems: No fevers, chill. Pertinent positives and negatives above.       Medications, Medical History, Surgical History, Family History & Allergies:  Reviewed in the chart, relevant history noted above.         PHYSICAL EXAM, ASSESSMENT, & PLAN (per problem):   A total body skin exam was performed including the following areas: head (including face), neck, chest, abdomen, groin/buttocks (excluding genitals), back, bilateral upper extremities, and bilateral lower extremities with the following pertinent findings in assessment/plan.      Actinic Keratosis  Exam: nasal dorsum with mild erythema, no residual scaling or papule   - appears to have resolved s/p efudex   - no need for additional treatments (cryo, topical chemo, etc today)   - continue to monitor  at routine skin checks, call sooner as needed if needed           Follow up: q6 mo PEY[place] Nov 2025 (history of melanoma)         Patricia Govea MD   [facility]

## 2025-07-30 ENCOUNTER — PHARMACY VISIT (OUTPATIENT)
Dept: PHARMACY | Facility: MEDICAL CENTER | Age: OVER 89
End: 2025-07-30
Payer: COMMERCIAL

## 2025-07-30 PROCEDURE — RXMED WILLOW AMBULATORY MEDICATION CHARGE

## 2025-08-04 ENCOUNTER — SPECIALTY PHARMACY (OUTPATIENT)
Dept: CARDIOLOGY | Facility: MEDICAL CENTER | Age: OVER 89
End: 2025-08-04
Payer: MEDICARE

## 2025-08-27 PROCEDURE — RXMED WILLOW AMBULATORY MEDICATION CHARGE: Performed by: INTERNAL MEDICINE

## 2025-08-28 ENCOUNTER — PHARMACY VISIT (OUTPATIENT)
Dept: PHARMACY | Facility: MEDICAL CENTER | Age: OVER 89
End: 2025-08-28
Payer: COMMERCIAL

## (undated) DEVICE — CANISTER SUCTION 3000ML MECHANICAL FILTER AUTO SHUTOFF MEDI-VAC NONSTERILE LF DISP  (40EA/CA)

## (undated) DEVICE — CATHETER 6FR AL1 100CM (5/BX)

## (undated) DEVICE — GOWN SURGEONS LARGE - (32/CA)

## (undated) DEVICE — GUIDEWIRE STARTER STRAIGHT FIXED CORE .035 150CM 4 STRAIGHT PTFE/HEPARIN COATED (10/BX)

## (undated) DEVICE — SUCTION INSTRUMENT YANKAUER BULBOUS TIP W/O VENT (50EA/CA)

## (undated) DEVICE — INTRODUCER CATHETER  DILATOR PROTRUDING 8FR 2.5CM (10EA/BX)

## (undated) DEVICE — CHLORAPREP 26 ML APPLICATOR - ORANGE TINT(25/CA)

## (undated) DEVICE — GLOVESZ 8.5 BIOGEL PI MICRO - PF LF (50PR/BX)

## (undated) DEVICE — INTRODUCER SHEATH 6FR 2.5CM - DILATOR PROTRUDING (10/BX)

## (undated) DEVICE — MASK ANESTHESIA ADULT  - (100/CA)

## (undated) DEVICE — GLOVE BIOGEL INDICATOR SZ 7SURGICAL PF LTX - (50/BX 4BX/CA)

## (undated) DEVICE — SYR ANGIO CNRST INJ HI-PRS 3W 65 - (10EA/CA)"

## (undated) DEVICE — LACTATED RINGERS INJ 1000 ML - (14EA/CA 60CA/PF)

## (undated) DEVICE — ELECTRODE 850 FOAM ADHESIVE - HYDROGEL RADIOTRNSPRNT (50/PK)

## (undated) DEVICE — WIRE GUIDE AES .035 260CM WITH 3MM J TIP"

## (undated) DEVICE — ELECTRODE DUAL RETURN W/ CORD - (50/PK)

## (undated) DEVICE — SENSOR SPO2 NEO LNCS ADHESIVE (20/BX) SEE USER NOTES

## (undated) DEVICE — GLOVE BIOGEL INDICATOR SZ 7.5 SURGICAL PF LTX - (50PR/BX 4BX/CA)

## (undated) DEVICE — TROCAR 5X100 NON BLADED Z-TH - READ KII (6/BX)

## (undated) DEVICE — BLADE SURGICAL #11 - (50/BX)

## (undated) DEVICE — IV TUBING HI-FLO RATE W/CLAMP (50/CA)

## (undated) DEVICE — DECANTER FLD BLS - (50/CA)

## (undated) DEVICE — CABLE TEMPORARY PACING

## (undated) DEVICE — GOWN SURGEONS X-LARGE - DISP. (30/CA)

## (undated) DEVICE — SENSOR OXIMETER ADULT SPO2 RD SET (20EA/BX)

## (undated) DEVICE — TUBE E-T HI-LO CUFF 7.0MM (10EA/PK)

## (undated) DEVICE — SODIUM CHL IRRIGATION 0.9% 1000ML (12EA/CA)

## (undated) DEVICE — DEVICE INFLATION ATRION NOVALFEX TRANSFEMORAL SYSTEM (1EA)

## (undated) DEVICE — PACK LAP CHOLE OR - (2EA/CA)

## (undated) DEVICE — BLADE SURGICAL CLIPPER - (50EA/CA)

## (undated) DEVICE — DRAPE MAYO STAND - (30/CA)

## (undated) DEVICE — SET EXTENSION WITH 2 PORTS (48EA/CA) ***PART #2C8610 IS A SUBSTITUTE*****

## (undated) DEVICE — TUBING CLEARLINK DUO-VENT - C-FLO (48EA/CA)

## (undated) DEVICE — TOWELS CLOTH SURGICAL - (4/PK 20PK/CA)

## (undated) DEVICE — HEAD HOLDER JUNIOR/ADULT

## (undated) DEVICE — CLIP MED LG INTNL HRZN TI ESCP - (20/BX)

## (undated) DEVICE — Device

## (undated) DEVICE — CANNULA W/SEAL 5X100 Z-THRE - ADED KII (12/BX)

## (undated) DEVICE — NEEDLE INSFL 120MM 14GA VRRS - (20/BX)

## (undated) DEVICE — GLOVE BIOGEL SZ 8 SURGICAL PF LTX - (50PR/BX 4BX/CA)

## (undated) DEVICE — GOWN WARMING STANDARD FLEX - (30/CA)

## (undated) DEVICE — TROCAR Z THREAD12MM OPTICAL - NON BLADED (6/BX)

## (undated) DEVICE — KIT ROOM DECONTAMINATION

## (undated) DEVICE — PROTECTOR ULNA NERVE - (36PR/CA)

## (undated) DEVICE — COVER LIGHT HANDLE FLEXIBLE - SOFT (2EA/PK 80PK/CA)

## (undated) DEVICE — ARM BAND RADIAL TR BAND (5EA/BX)

## (undated) DEVICE — SYSTEM DELIVERY COMMANDER TAVR KIT 23MM COMPONENT (1EA)

## (undated) DEVICE — KIT RETROFIT PROBE COVERS (24EA/EA)

## (undated) DEVICE — SET LEADWIRE 5 LEAD BEDSIDE DISPOSABLE ECG (1SET OF 5/EA)

## (undated) DEVICE — CRIMPER CATHETER EDWARDS DISPOSABLE (1EA)

## (undated) DEVICE — ELECTRODE RADIOLUCNT SOLID GEL DEFIB PADS (12EA/CA)

## (undated) DEVICE — STAPLER 45MM ARTICULATING - ENDO (3EA/BX)

## (undated) DEVICE — SLEEVE, VASO, THIGH, MED

## (undated) DEVICE — PACK TAVR (3EA/CA)

## (undated) DEVICE — KIT ANESTHESIA W/CIRCUIT & 3/LT BAG W/FILTER (20EA/CA)

## (undated) DEVICE — WIRE GUIDE LUNDQST.035X180 - TSMG-35-180-4-LES ORDER BY BOX (5EA/BX)

## (undated) DEVICE — BAG RETRIEVAL 10ML (10EA/BX)

## (undated) DEVICE — SHEATH DESTINATION WITH DILATOR 6FR 45CM

## (undated) DEVICE — DRAPE CLEAR W/ELASTIC BAND RAD CARM 40 X40" (20EA/CA)"

## (undated) DEVICE — STOPCOCK IV 400 PSI 3W ROT (50EA/BX)

## (undated) DEVICE — BLANKET WARMING UPPER BODY - (10/CA)

## (undated) DEVICE — DETERGENT RENUZYME PLUS 10 OZ PACKET (50/BX)

## (undated) DEVICE — SET SUCTION/IRRIGATION WITH DISPOSABLE TIP (6/CA )PART #0250-070-520 IS A SUB

## (undated) DEVICE — GUIDEWIRE STARTER J CURVED FIXED CORE .035 260CM 10 3MM PTFE/HEPARIN COATED (5EA/BX)

## (undated) DEVICE — TUBING INSUFFLATION - (10/BX)

## (undated) DEVICE — SCISSORS 5MM CVD (6EA/BX)

## (undated) DEVICE — STAPLE 45MM VASCULAR WHITE 2.5MM (12EA/BX)

## (undated) DEVICE — SUTURE DEVICE CLOSURE REPAIR SYSTEM PERCLOSE PROSTYLE (10EA/PK)

## (undated) DEVICE — COVER LIGHT HANDLE ALC PLUS DISP (18EA/BX)

## (undated) DEVICE — SUTURE 0 VICRYL PLUS CT-2 - 27 INCH (36/BX)

## (undated) DEVICE — COVER FOOT UNIVERSAL DISP. - (25EA/CA)

## (undated) DEVICE — GLOVE BIOGEL PI ORTHO SZ 6 1/2 SURGICAL PF LF (40PR/BX)

## (undated) DEVICE — GLIDESHEATH SLENDER NITINOL KIT .021 GW 6FR 10CM SINGLE WALL

## (undated) DEVICE — SUTURE GENERAL

## (undated) DEVICE — CATHETER PIGTAIL 6FR 145 (5EA/BX)

## (undated) DEVICE — NEPTUNE 4 PORT MANIFOLD - (20/PK)

## (undated) DEVICE — SUTURE 4-0 MONOCRYL PLUS PS-2 - 27 INCH (36/BX)

## (undated) DEVICE — SUTURE  0 ETHIBOND CT-1 30 IN (36PK/BX)

## (undated) DEVICE — GLOVE BIOGEL SZ 7.5 SURGICAL PF LTX - (50PR/BX 4BX/CA)